# Patient Record
Sex: FEMALE | Race: BLACK OR AFRICAN AMERICAN | Employment: UNEMPLOYED
[De-identification: names, ages, dates, MRNs, and addresses within clinical notes are randomized per-mention and may not be internally consistent; named-entity substitution may affect disease eponyms.]

---

## 2017-01-20 ENCOUNTER — OFFICE VISIT (OUTPATIENT)
Dept: ORTHOPEDIC SURGERY | Facility: CLINIC | Age: 47
End: 2017-01-20

## 2017-01-20 VITALS — HEIGHT: 68 IN | BODY MASS INDEX: 44.41 KG/M2 | WEIGHT: 293 LBS

## 2017-01-20 DIAGNOSIS — M17.12 ARTHRITIS OF LEFT KNEE: Primary | ICD-10-CM

## 2017-01-20 DIAGNOSIS — M17.11 ARTHRITIS OF RIGHT KNEE: ICD-10-CM

## 2017-01-20 PROCEDURE — 1036F TOBACCO NON-USER: CPT | Performed by: ORTHOPAEDIC SURGERY

## 2017-01-20 PROCEDURE — 99213 OFFICE O/P EST LOW 20 MIN: CPT | Performed by: ORTHOPAEDIC SURGERY

## 2017-01-20 PROCEDURE — G8427 DOCREV CUR MEDS BY ELIG CLIN: HCPCS | Performed by: ORTHOPAEDIC SURGERY

## 2017-01-20 PROCEDURE — G8419 CALC BMI OUT NRM PARAM NOF/U: HCPCS | Performed by: ORTHOPAEDIC SURGERY

## 2017-01-20 PROCEDURE — 20610 DRAIN/INJ JOINT/BURSA W/O US: CPT | Performed by: ORTHOPAEDIC SURGERY

## 2017-01-20 PROCEDURE — G8484 FLU IMMUNIZE NO ADMIN: HCPCS | Performed by: ORTHOPAEDIC SURGERY

## 2017-01-20 RX ORDER — BUPIVACAINE HYDROCHLORIDE 2.5 MG/ML
2 INJECTION, SOLUTION INFILTRATION; PERINEURAL ONCE
Status: COMPLETED | OUTPATIENT
Start: 2017-01-20 | End: 2017-01-20

## 2017-01-20 RX ORDER — BETAMETHASONE SODIUM PHOSPHATE AND BETAMETHASONE ACETATE 3; 3 MG/ML; MG/ML
3 INJECTION, SUSPENSION INTRA-ARTICULAR; INTRALESIONAL; INTRAMUSCULAR; SOFT TISSUE ONCE
Status: COMPLETED | OUTPATIENT
Start: 2017-01-20 | End: 2017-01-20

## 2017-01-20 RX ORDER — METHYLPREDNISOLONE ACETATE 80 MG/ML
80 INJECTION, SUSPENSION INTRA-ARTICULAR; INTRALESIONAL; INTRAMUSCULAR; SOFT TISSUE ONCE
Status: COMPLETED | OUTPATIENT
Start: 2017-01-20 | End: 2017-01-20

## 2017-01-20 RX ADMIN — BUPIVACAINE HYDROCHLORIDE 5 MG: 2.5 INJECTION, SOLUTION INFILTRATION; PERINEURAL at 13:30

## 2017-01-20 RX ADMIN — METHYLPREDNISOLONE ACETATE 80 MG: 80 INJECTION, SUSPENSION INTRA-ARTICULAR; INTRALESIONAL; INTRAMUSCULAR; SOFT TISSUE at 13:28

## 2017-01-20 RX ADMIN — BETAMETHASONE SODIUM PHOSPHATE AND BETAMETHASONE ACETATE 3 MG: 3; 3 INJECTION, SUSPENSION INTRA-ARTICULAR; INTRALESIONAL; INTRAMUSCULAR; SOFT TISSUE at 13:30

## 2017-01-20 RX ADMIN — METHYLPREDNISOLONE ACETATE 80 MG: 80 INJECTION, SUSPENSION INTRA-ARTICULAR; INTRALESIONAL; INTRAMUSCULAR; SOFT TISSUE at 13:29

## 2017-01-20 ASSESSMENT — ENCOUNTER SYMPTOMS
NAUSEA: 0
COUGH: 0
DIARRHEA: 0
CONSTIPATION: 0

## 2017-02-20 ENCOUNTER — HOSPITAL ENCOUNTER (OUTPATIENT)
Dept: PAIN MANAGEMENT | Age: 47
Discharge: HOME | End: 2017-02-20
Payer: COMMERCIAL

## 2017-02-20 VITALS
HEIGHT: 68 IN | HEART RATE: 92 BPM | BODY MASS INDEX: 44.41 KG/M2 | WEIGHT: 293 LBS | DIASTOLIC BLOOD PRESSURE: 74 MMHG | TEMPERATURE: 97.6 F | SYSTOLIC BLOOD PRESSURE: 131 MMHG | RESPIRATION RATE: 18 BRPM

## 2017-02-20 PROCEDURE — 80307 DRUG TEST PRSMV CHEM ANLYZR: CPT

## 2017-02-20 PROCEDURE — 99213 OFFICE O/P EST LOW 20 MIN: CPT

## 2017-02-20 RX ORDER — TIZANIDINE 4 MG/1
4 TABLET ORAL NIGHTLY PRN
Qty: 30 TABLET | Refills: 0 | Status: SHIPPED | OUTPATIENT
Start: 2017-02-20 | End: 2017-03-24 | Stop reason: SDUPTHER

## 2017-02-20 RX ORDER — MELOXICAM 15 MG/1
15 TABLET ORAL DAILY
Qty: 30 TABLET | Refills: 0 | Status: SHIPPED | OUTPATIENT
Start: 2017-02-26 | End: 2017-03-24 | Stop reason: SDUPTHER

## 2017-02-20 RX ORDER — OXYCODONE HYDROCHLORIDE 5 MG/1
5 TABLET ORAL EVERY 8 HOURS PRN
Qty: 90 TABLET | Refills: 0 | Status: SHIPPED | OUTPATIENT
Start: 2017-02-26 | End: 2017-03-24 | Stop reason: SDUPTHER

## 2017-02-20 ASSESSMENT — PAIN DESCRIPTION - FREQUENCY: FREQUENCY: CONTINUOUS

## 2017-02-20 ASSESSMENT — PAIN DESCRIPTION - PROGRESSION: CLINICAL_PROGRESSION: NOT CHANGED

## 2017-02-20 ASSESSMENT — PAIN DESCRIPTION - ONSET: ONSET: ON-GOING

## 2017-02-20 ASSESSMENT — PAIN DESCRIPTION - ORIENTATION: ORIENTATION: LOWER;LEFT

## 2017-02-20 ASSESSMENT — PAIN SCALES - GENERAL: PAINLEVEL_OUTOF10: 6

## 2017-02-20 ASSESSMENT — PAIN DESCRIPTION - PAIN TYPE: TYPE: CHRONIC PAIN

## 2017-02-20 ASSESSMENT — PAIN DESCRIPTION - LOCATION: LOCATION: BACK

## 2017-02-23 LAB
6-ACETYLMORPHINE, UR: NOT DETECTED
7-AMINOCLONAZEPAM, URINE: NOT DETECTED
ALPHA-OH-ALPRAZ, URINE: NOT DETECTED
ALPRAZOLAM, URINE: NOT DETECTED
AMPHETAMINES, URINE: NOT DETECTED
BARBITURATES, URINE: NOT DETECTED
BENZOYLECGONINE, UR: NOT DETECTED
BUPRENORPHINE URINE: NOT DETECTED
CARISOPRODOL, UR: NOT DETECTED
CLONAZEPAM, URINE: NOT DETECTED
CODEINE, URINE: NOT DETECTED
CREATININE URINE: 139.7 MG/DL (ref 20–400)
DIAZEPAM, URINE: NOT DETECTED
EER PAIN MGT DRUG PANEL, HIGH RES/EMIT U: NORMAL
ETHYL GLUCURONIDE UR: NOT DETECTED
FENTANYL URINE: NOT DETECTED
HYDROCODONE, URINE: NOT DETECTED
HYDROMORPHONE, URINE: NOT DETECTED
LORAZEPAM, URINE: NOT DETECTED
MARIJUANA METAB, UR: NOT DETECTED
MDA, UR: NOT DETECTED
MDEA, EVE, UR: NOT DETECTED
MDMA URINE: NOT DETECTED
MEPERIDINE METAB, UR: NOT DETECTED
METHADONE, URINE: NOT DETECTED
METHAMPHETAMINE, URINE: NOT DETECTED
METHYLPHENIDATE: NOT DETECTED
MIDAZOLAM, URINE: NOT DETECTED
MORPHINE URINE: NOT DETECTED
NORBUPRENORPHINE, URINE: NOT DETECTED
NORDIAZEPAM, URINE: NOT DETECTED
NORFENTANYL, URINE: NOT DETECTED
NORHYDROCODONE, URINE: NOT DETECTED
NOROXYCODONE, URINE: PRESENT
NOROXYMORPHONE, URINE: NOT DETECTED
OXAZEPAM, URINE: NOT DETECTED
OXYCODONE URINE: PRESENT
OXYMORPHONE, URINE: NOT DETECTED
PAIN MGT DRUG PANEL, HI RES, UR: NORMAL
PCP,URINE: NOT DETECTED
PHENTERMINE, UR: NOT DETECTED
PROPOXYPHENE, URINE: NOT DETECTED
TAPENTADOL, URINE: NOT DETECTED
TAPENTADOL-O-SULFATE, URINE: NOT DETECTED
TEMAZEPAM, URINE: NOT DETECTED
TRAMADOL, URINE: NOT DETECTED
ZOLPIDEM, URINE: NOT DETECTED

## 2017-03-24 ENCOUNTER — HOSPITAL ENCOUNTER (OUTPATIENT)
Dept: PAIN MANAGEMENT | Age: 47
Discharge: HOME | End: 2017-03-24
Payer: COMMERCIAL

## 2017-03-24 VITALS — DIASTOLIC BLOOD PRESSURE: 81 MMHG | SYSTOLIC BLOOD PRESSURE: 137 MMHG | HEART RATE: 82 BPM | RESPIRATION RATE: 18 BRPM

## 2017-03-24 DIAGNOSIS — M17.0 PRIMARY OSTEOARTHRITIS OF BOTH KNEES: Primary | ICD-10-CM

## 2017-03-24 PROCEDURE — 99214 OFFICE O/P EST MOD 30 MIN: CPT | Performed by: NURSE PRACTITIONER

## 2017-03-24 PROCEDURE — 99213 OFFICE O/P EST LOW 20 MIN: CPT | Performed by: NURSE PRACTITIONER

## 2017-03-24 RX ORDER — OXYCODONE HYDROCHLORIDE 5 MG/1
5 TABLET ORAL EVERY 8 HOURS PRN
Qty: 90 TABLET | Refills: 0 | Status: SHIPPED | OUTPATIENT
Start: 2017-03-28 | End: 2017-04-24 | Stop reason: SDUPTHER

## 2017-03-24 RX ORDER — MELOXICAM 15 MG/1
15 TABLET ORAL DAILY
Qty: 30 TABLET | Refills: 0 | Status: SHIPPED | OUTPATIENT
Start: 2017-03-28 | End: 2017-04-24 | Stop reason: SDUPTHER

## 2017-03-24 RX ORDER — TIZANIDINE 4 MG/1
4 TABLET ORAL NIGHTLY PRN
Qty: 30 TABLET | Refills: 0 | Status: SHIPPED | OUTPATIENT
Start: 2017-03-28 | End: 2017-04-24 | Stop reason: SDUPTHER

## 2017-03-24 ASSESSMENT — ENCOUNTER SYMPTOMS
COUGH: 0
CONSTIPATION: 0
SHORTNESS OF BREATH: 0

## 2017-03-24 ASSESSMENT — PAIN DESCRIPTION - ORIENTATION: ORIENTATION: RIGHT;LEFT

## 2017-03-24 ASSESSMENT — PAIN SCALES - GENERAL: PAINLEVEL_OUTOF10: 6

## 2017-03-24 ASSESSMENT — PAIN DESCRIPTION - LOCATION: LOCATION: KNEE

## 2017-03-24 ASSESSMENT — PAIN DESCRIPTION - FREQUENCY: FREQUENCY: CONTINUOUS

## 2017-03-24 ASSESSMENT — PAIN DESCRIPTION - DESCRIPTORS: DESCRIPTORS: ACHING;CONSTANT;DULL;NAGGING

## 2017-04-24 ENCOUNTER — HOSPITAL ENCOUNTER (OUTPATIENT)
Dept: PAIN MANAGEMENT | Age: 47
Discharge: HOME | End: 2017-04-24
Payer: COMMERCIAL

## 2017-04-24 VITALS
DIASTOLIC BLOOD PRESSURE: 93 MMHG | HEIGHT: 68 IN | TEMPERATURE: 98.2 F | RESPIRATION RATE: 18 BRPM | WEIGHT: 293 LBS | HEART RATE: 90 BPM | BODY MASS INDEX: 44.41 KG/M2 | SYSTOLIC BLOOD PRESSURE: 146 MMHG

## 2017-04-24 DIAGNOSIS — M17.0 PRIMARY OSTEOARTHRITIS OF BOTH KNEES: Primary | ICD-10-CM

## 2017-04-24 PROCEDURE — 99214 OFFICE O/P EST MOD 30 MIN: CPT

## 2017-04-24 RX ORDER — MELOXICAM 15 MG/1
15 TABLET ORAL DAILY
Qty: 30 TABLET | Refills: 0 | Status: SHIPPED | OUTPATIENT
Start: 2017-04-27 | End: 2017-05-25 | Stop reason: SDUPTHER

## 2017-04-24 RX ORDER — OXYCODONE HYDROCHLORIDE 5 MG/1
5 TABLET ORAL EVERY 8 HOURS PRN
Qty: 90 TABLET | Refills: 0 | Status: SHIPPED | OUTPATIENT
Start: 2017-04-27 | End: 2017-05-25 | Stop reason: SDUPTHER

## 2017-04-24 RX ORDER — TIZANIDINE 4 MG/1
4 TABLET ORAL NIGHTLY PRN
Qty: 30 TABLET | Refills: 0 | Status: SHIPPED | OUTPATIENT
Start: 2017-04-27 | End: 2017-05-25 | Stop reason: SDUPTHER

## 2017-04-24 ASSESSMENT — ENCOUNTER SYMPTOMS
CONSTIPATION: 0
COUGH: 0
SHORTNESS OF BREATH: 0

## 2017-04-24 ASSESSMENT — PAIN DESCRIPTION - ORIENTATION: ORIENTATION: RIGHT;LEFT

## 2017-04-24 ASSESSMENT — PAIN SCALES - GENERAL: PAINLEVEL_OUTOF10: 6

## 2017-04-24 ASSESSMENT — PAIN DESCRIPTION - DESCRIPTORS: DESCRIPTORS: ACHING;BURNING;PINS AND NEEDLES

## 2017-04-24 ASSESSMENT — PAIN DESCRIPTION - ONSET: ONSET: ON-GOING

## 2017-04-24 ASSESSMENT — PAIN DESCRIPTION - PAIN TYPE: TYPE: CHRONIC PAIN

## 2017-04-24 ASSESSMENT — PAIN DESCRIPTION - PROGRESSION: CLINICAL_PROGRESSION: NOT CHANGED

## 2017-04-24 ASSESSMENT — PAIN DESCRIPTION - FREQUENCY: FREQUENCY: CONTINUOUS

## 2017-04-24 ASSESSMENT — PAIN DESCRIPTION - LOCATION: LOCATION: KNEE

## 2017-05-25 ENCOUNTER — HOSPITAL ENCOUNTER (OUTPATIENT)
Dept: PAIN MANAGEMENT | Age: 47
Discharge: HOME | End: 2017-05-25
Payer: COMMERCIAL

## 2017-05-25 VITALS
SYSTOLIC BLOOD PRESSURE: 123 MMHG | DIASTOLIC BLOOD PRESSURE: 71 MMHG | HEART RATE: 98 BPM | RESPIRATION RATE: 12 BRPM | TEMPERATURE: 98.2 F

## 2017-05-25 DIAGNOSIS — M17.0 PRIMARY OSTEOARTHRITIS OF BOTH KNEES: Primary | ICD-10-CM

## 2017-05-25 PROCEDURE — G0463 HOSPITAL OUTPT CLINIC VISIT: HCPCS

## 2017-05-25 PROCEDURE — 99214 OFFICE O/P EST MOD 30 MIN: CPT

## 2017-05-25 RX ORDER — MELOXICAM 15 MG/1
15 TABLET ORAL DAILY
Qty: 30 TABLET | Refills: 0 | Status: SHIPPED | OUTPATIENT
Start: 2017-05-27 | End: 2017-05-31

## 2017-05-25 RX ORDER — TIZANIDINE 4 MG/1
4 TABLET ORAL NIGHTLY PRN
Qty: 30 TABLET | Refills: 0 | Status: SHIPPED | OUTPATIENT
Start: 2017-05-27 | End: 2017-05-31

## 2017-05-25 RX ORDER — OXYCODONE HYDROCHLORIDE 5 MG/1
5 TABLET ORAL EVERY 8 HOURS PRN
Qty: 90 TABLET | Refills: 0 | Status: SHIPPED | OUTPATIENT
Start: 2017-05-27 | End: 2017-06-27 | Stop reason: SDUPTHER

## 2017-05-25 ASSESSMENT — PAIN DESCRIPTION - FREQUENCY: FREQUENCY: CONTINUOUS

## 2017-05-25 ASSESSMENT — ENCOUNTER SYMPTOMS
CONSTIPATION: 0
COUGH: 0
SHORTNESS OF BREATH: 0

## 2017-05-25 ASSESSMENT — PAIN SCALES - GENERAL: PAINLEVEL_OUTOF10: 7

## 2017-05-25 ASSESSMENT — PAIN DESCRIPTION - LOCATION: LOCATION: KNEE

## 2017-05-25 ASSESSMENT — PAIN DESCRIPTION - PROGRESSION: CLINICAL_PROGRESSION: NOT CHANGED

## 2017-05-25 ASSESSMENT — PAIN DESCRIPTION - ORIENTATION: ORIENTATION: LEFT;RIGHT

## 2017-05-25 ASSESSMENT — PAIN DESCRIPTION - PAIN TYPE: TYPE: CHRONIC PAIN

## 2017-05-31 ENCOUNTER — OFFICE VISIT (OUTPATIENT)
Dept: INTERNAL MEDICINE | Age: 47
End: 2017-05-31
Payer: COMMERCIAL

## 2017-05-31 ENCOUNTER — NURSE ONLY (OUTPATIENT)
Dept: INTERNAL MEDICINE | Age: 47
End: 2017-05-31

## 2017-05-31 VITALS
HEART RATE: 90 BPM | HEIGHT: 68 IN | SYSTOLIC BLOOD PRESSURE: 113 MMHG | WEIGHT: 293 LBS | DIASTOLIC BLOOD PRESSURE: 71 MMHG | BODY MASS INDEX: 44.41 KG/M2

## 2017-05-31 DIAGNOSIS — E11.9 TYPE 2 DIABETES MELLITUS WITHOUT COMPLICATION, WITH LONG-TERM CURRENT USE OF INSULIN (HCC): Primary | ICD-10-CM

## 2017-05-31 DIAGNOSIS — M17.0 PRIMARY OSTEOARTHRITIS OF BOTH KNEES: ICD-10-CM

## 2017-05-31 DIAGNOSIS — I10 ESSENTIAL HYPERTENSION: ICD-10-CM

## 2017-05-31 DIAGNOSIS — Z71.89 ACP (ADVANCE CARE PLANNING): Primary | ICD-10-CM

## 2017-05-31 DIAGNOSIS — Z79.4 TYPE 2 DIABETES MELLITUS WITHOUT COMPLICATION, WITH LONG-TERM CURRENT USE OF INSULIN (HCC): Primary | ICD-10-CM

## 2017-05-31 DIAGNOSIS — R07.89 OTHER CHEST PAIN: ICD-10-CM

## 2017-05-31 DIAGNOSIS — Z23 NEED FOR PROPHYLACTIC VACCINATION AGAINST STREPTOCOCCUS PNEUMONIAE (PNEUMOCOCCUS): ICD-10-CM

## 2017-05-31 DIAGNOSIS — E55.9 VITAMIN D DEFICIENCY: ICD-10-CM

## 2017-05-31 DIAGNOSIS — E78.00 PURE HYPERCHOLESTEROLEMIA: ICD-10-CM

## 2017-05-31 DIAGNOSIS — E03.9 ACQUIRED HYPOTHYROIDISM: ICD-10-CM

## 2017-05-31 DIAGNOSIS — D50.8 IRON DEFICIENCY ANEMIA SECONDARY TO INADEQUATE DIETARY IRON INTAKE: ICD-10-CM

## 2017-05-31 DIAGNOSIS — J45.20 MILD INTERMITTENT ASTHMA WITHOUT COMPLICATION: ICD-10-CM

## 2017-05-31 DIAGNOSIS — N39.41 URGE INCONTINENCE OF URINE: ICD-10-CM

## 2017-05-31 LAB — HBA1C MFR BLD: 7.6 %

## 2017-05-31 PROCEDURE — 90732 PPSV23 VACC 2 YRS+ SUBQ/IM: CPT | Performed by: INTERNAL MEDICINE

## 2017-05-31 PROCEDURE — G0009 ADMIN PNEUMOCOCCAL VACCINE: HCPCS | Performed by: INTERNAL MEDICINE

## 2017-05-31 PROCEDURE — 83036 HEMOGLOBIN GLYCOSYLATED A1C: CPT | Performed by: INTERNAL MEDICINE

## 2017-05-31 PROCEDURE — 99214 OFFICE O/P EST MOD 30 MIN: CPT | Performed by: INTERNAL MEDICINE

## 2017-05-31 RX ORDER — OXYBUTYNIN CHLORIDE 10 MG/1
10 TABLET, EXTENDED RELEASE ORAL DAILY
Qty: 30 TABLET | Refills: 2 | Status: SHIPPED | OUTPATIENT
Start: 2017-05-31 | End: 2017-05-31 | Stop reason: SDUPTHER

## 2017-05-31 RX ORDER — ALBUTEROL SULFATE 90 UG/1
2 AEROSOL, METERED RESPIRATORY (INHALATION) EVERY 6 HOURS PRN
Qty: 1 INHALER | Refills: 2 | Status: SHIPPED | OUTPATIENT
Start: 2017-05-31 | End: 2017-05-31 | Stop reason: SDUPTHER

## 2017-05-31 RX ORDER — OXYBUTYNIN CHLORIDE 10 MG/1
10 TABLET, EXTENDED RELEASE ORAL DAILY
Qty: 30 TABLET | Refills: 2 | Status: SHIPPED | OUTPATIENT
Start: 2017-05-31 | End: 2017-10-09 | Stop reason: SDUPTHER

## 2017-05-31 RX ORDER — TIZANIDINE 4 MG/1
4 TABLET ORAL NIGHTLY PRN
Qty: 30 TABLET | Refills: 2 | Status: SHIPPED | OUTPATIENT
Start: 2017-05-31 | End: 2017-06-27 | Stop reason: SDUPTHER

## 2017-05-31 RX ORDER — LISINOPRIL 5 MG/1
5 TABLET ORAL DAILY
Qty: 30 TABLET | Refills: 2 | Status: SHIPPED | OUTPATIENT
Start: 2017-05-31 | End: 2017-10-09 | Stop reason: SDUPTHER

## 2017-05-31 RX ORDER — ALBUTEROL SULFATE 90 UG/1
2 AEROSOL, METERED RESPIRATORY (INHALATION) EVERY 6 HOURS PRN
Qty: 1 INHALER | Refills: 2 | Status: SHIPPED | OUTPATIENT
Start: 2017-05-31 | End: 2017-08-22 | Stop reason: SDUPTHER

## 2017-05-31 RX ORDER — SIMVASTATIN 40 MG
40 TABLET ORAL NIGHTLY
Qty: 30 TABLET | Refills: 2 | Status: SHIPPED | OUTPATIENT
Start: 2017-05-31 | End: 2017-05-31 | Stop reason: SDUPTHER

## 2017-05-31 RX ORDER — LANCETS
EACH MISCELLANEOUS
Qty: 100 EACH | Refills: 11 | Status: SHIPPED | OUTPATIENT
Start: 2017-05-31 | End: 2019-03-06 | Stop reason: SDUPTHER

## 2017-05-31 RX ORDER — ASPIRIN 81 MG/1
81 TABLET ORAL DAILY
Qty: 30 TABLET | Refills: 2 | Status: SHIPPED | OUTPATIENT
Start: 2017-05-31 | End: 2017-10-09 | Stop reason: SDUPTHER

## 2017-05-31 RX ORDER — ASPIRIN 81 MG/1
81 TABLET ORAL DAILY
Qty: 30 TABLET | Refills: 2 | Status: SHIPPED | OUTPATIENT
Start: 2017-05-31 | End: 2017-05-31 | Stop reason: SDUPTHER

## 2017-05-31 RX ORDER — SIMVASTATIN 40 MG
40 TABLET ORAL NIGHTLY
Qty: 30 TABLET | Refills: 2 | Status: SHIPPED | OUTPATIENT
Start: 2017-05-31 | End: 2017-10-09 | Stop reason: SDUPTHER

## 2017-05-31 RX ORDER — MELOXICAM 15 MG/1
15 TABLET ORAL DAILY
Qty: 30 TABLET | Refills: 2 | Status: SHIPPED | OUTPATIENT
Start: 2017-05-31 | End: 2017-08-22 | Stop reason: SDUPTHER

## 2017-05-31 RX ORDER — LANCETS
EACH MISCELLANEOUS
Qty: 100 EACH | Refills: 11 | Status: SHIPPED | OUTPATIENT
Start: 2017-05-31 | End: 2017-05-31 | Stop reason: SDUPTHER

## 2017-05-31 RX ORDER — LISINOPRIL 5 MG/1
5 TABLET ORAL DAILY
Qty: 30 TABLET | Refills: 2 | Status: SHIPPED | OUTPATIENT
Start: 2017-05-31 | End: 2017-05-31 | Stop reason: SDUPTHER

## 2017-05-31 RX ORDER — TIZANIDINE 4 MG/1
4 TABLET ORAL NIGHTLY PRN
Qty: 30 TABLET | Refills: 2 | Status: SHIPPED | OUTPATIENT
Start: 2017-05-31 | End: 2017-05-31 | Stop reason: SDUPTHER

## 2017-05-31 RX ORDER — FERROUS SULFATE 325(65) MG
325 TABLET ORAL 2 TIMES DAILY
Qty: 60 TABLET | Refills: 2 | Status: SHIPPED | OUTPATIENT
Start: 2017-05-31 | End: 2017-10-09 | Stop reason: SDUPTHER

## 2017-05-31 RX ORDER — FERROUS SULFATE 325(65) MG
325 TABLET ORAL 2 TIMES DAILY
Qty: 60 TABLET | Refills: 2 | Status: SHIPPED | OUTPATIENT
Start: 2017-05-31 | End: 2017-05-31 | Stop reason: SDUPTHER

## 2017-05-31 RX ORDER — LEVOTHYROXINE SODIUM 0.05 MG/1
50 TABLET ORAL DAILY
Qty: 30 TABLET | Refills: 2 | Status: SHIPPED | OUTPATIENT
Start: 2017-05-31 | End: 2017-05-31 | Stop reason: SDUPTHER

## 2017-05-31 RX ORDER — LEVOTHYROXINE SODIUM 0.05 MG/1
50 TABLET ORAL DAILY
Qty: 30 TABLET | Refills: 2 | Status: SHIPPED | OUTPATIENT
Start: 2017-05-31 | End: 2017-10-09 | Stop reason: SDUPTHER

## 2017-05-31 RX ORDER — ALBUTEROL SULFATE 90 UG/1
2 AEROSOL, METERED RESPIRATORY (INHALATION) EVERY 6 HOURS PRN
Qty: 1 INHALER | Refills: 2 | Status: SHIPPED | OUTPATIENT
Start: 2017-05-31 | End: 2017-10-09 | Stop reason: SDUPTHER

## 2017-05-31 ASSESSMENT — PATIENT HEALTH QUESTIONNAIRE - PHQ9
4. FEELING TIRED OR HAVING LITTLE ENERGY: 1
7. TROUBLE CONCENTRATING ON THINGS, SUCH AS READING THE NEWSPAPER OR WATCHING TELEVISION: 1
9. THOUGHTS THAT YOU WOULD BE BETTER OFF DEAD, OR OF HURTING YOURSELF: 0
SUM OF ALL RESPONSES TO PHQ9 QUESTIONS 1 & 2: 2
5. POOR APPETITE OR OVEREATING: 2
8. MOVING OR SPEAKING SO SLOWLY THAT OTHER PEOPLE COULD HAVE NOTICED. OR THE OPPOSITE, BEING SO FIGETY OR RESTLESS THAT YOU HAVE BEEN MOVING AROUND A LOT MORE THAN USUAL: 0
SUM OF ALL RESPONSES TO PHQ QUESTIONS 1-9: 9
3. TROUBLE FALLING OR STAYING ASLEEP: 2
6. FEELING BAD ABOUT YOURSELF - OR THAT YOU ARE A FAILURE OR HAVE LET YOURSELF OR YOUR FAMILY DOWN: 1
2. FEELING DOWN, DEPRESSED OR HOPELESS: 1
1. LITTLE INTEREST OR PLEASURE IN DOING THINGS: 1
10. IF YOU CHECKED OFF ANY PROBLEMS, HOW DIFFICULT HAVE THESE PROBLEMS MADE IT FOR YOU TO DO YOUR WORK, TAKE CARE OF THINGS AT HOME, OR GET ALONG WITH OTHER PEOPLE: 1

## 2017-06-06 ENCOUNTER — TELEPHONE (OUTPATIENT)
Dept: INTERNAL MEDICINE | Age: 47
End: 2017-06-06

## 2017-06-15 ENCOUNTER — TELEPHONE (OUTPATIENT)
Dept: INTERNAL MEDICINE | Age: 47
End: 2017-06-15

## 2017-06-16 ENCOUNTER — TELEPHONE (OUTPATIENT)
Dept: INTERNAL MEDICINE | Age: 47
End: 2017-06-16

## 2017-06-27 ENCOUNTER — HOSPITAL ENCOUNTER (OUTPATIENT)
Dept: PAIN MANAGEMENT | Age: 47
Discharge: HOME | End: 2017-06-27
Payer: COMMERCIAL

## 2017-06-27 VITALS
HEART RATE: 89 BPM | DIASTOLIC BLOOD PRESSURE: 95 MMHG | TEMPERATURE: 98.3 F | RESPIRATION RATE: 16 BRPM | SYSTOLIC BLOOD PRESSURE: 152 MMHG

## 2017-06-27 DIAGNOSIS — M17.0 PRIMARY OSTEOARTHRITIS OF BOTH KNEES: ICD-10-CM

## 2017-06-27 PROCEDURE — G0463 HOSPITAL OUTPT CLINIC VISIT: HCPCS

## 2017-06-27 PROCEDURE — 99214 OFFICE O/P EST MOD 30 MIN: CPT

## 2017-06-27 PROCEDURE — 99213 OFFICE O/P EST LOW 20 MIN: CPT

## 2017-06-27 RX ORDER — AMOXICILLIN 500 MG/1
500 CAPSULE ORAL 3 TIMES DAILY
COMMUNITY
End: 2017-08-22 | Stop reason: ALTCHOICE

## 2017-06-27 RX ORDER — BUTALBITAL, ACETAMINOPHEN AND CAFFEINE 50; 325; 40 MG/1; MG/1; MG/1
1 TABLET ORAL EVERY 6 HOURS PRN
COMMUNITY
End: 2018-01-19

## 2017-06-27 RX ORDER — TIZANIDINE 4 MG/1
4 TABLET ORAL NIGHTLY PRN
Qty: 30 TABLET | Refills: 2 | Status: SHIPPED | OUTPATIENT
Start: 2017-06-27 | End: 2017-07-24 | Stop reason: SDUPTHER

## 2017-06-27 RX ORDER — OXYCODONE HYDROCHLORIDE 5 MG/1
5 TABLET ORAL EVERY 8 HOURS PRN
COMMUNITY
End: 2017-08-22 | Stop reason: SDUPTHER

## 2017-06-27 RX ORDER — OXYCODONE HYDROCHLORIDE 5 MG/1
5 TABLET ORAL EVERY 8 HOURS PRN
Qty: 90 TABLET | Refills: 0 | Status: SHIPPED | OUTPATIENT
Start: 2017-06-27 | End: 2017-07-24 | Stop reason: SDUPTHER

## 2017-06-27 ASSESSMENT — PAIN DESCRIPTION - LOCATION: LOCATION: KNEE

## 2017-06-27 ASSESSMENT — PAIN DESCRIPTION - PROGRESSION: CLINICAL_PROGRESSION: GRADUALLY WORSENING

## 2017-06-27 ASSESSMENT — PAIN DESCRIPTION - FREQUENCY: FREQUENCY: CONTINUOUS

## 2017-06-27 ASSESSMENT — PAIN SCALES - GENERAL: PAINLEVEL_OUTOF10: 7

## 2017-06-27 ASSESSMENT — PAIN DESCRIPTION - ONSET: ONSET: ON-GOING

## 2017-06-27 ASSESSMENT — PAIN DESCRIPTION - ORIENTATION: ORIENTATION: RIGHT;LEFT

## 2017-06-27 ASSESSMENT — PAIN DESCRIPTION - PAIN TYPE: TYPE: CHRONIC PAIN

## 2017-07-24 ENCOUNTER — HOSPITAL ENCOUNTER (OUTPATIENT)
Dept: PAIN MANAGEMENT | Age: 47
Discharge: HOME | End: 2017-07-24
Payer: COMMERCIAL

## 2017-07-24 VITALS
SYSTOLIC BLOOD PRESSURE: 128 MMHG | TEMPERATURE: 98 F | HEART RATE: 93 BPM | DIASTOLIC BLOOD PRESSURE: 83 MMHG | RESPIRATION RATE: 16 BRPM

## 2017-07-24 DIAGNOSIS — M17.0 PRIMARY OSTEOARTHRITIS OF BOTH KNEES: ICD-10-CM

## 2017-07-24 PROCEDURE — 99214 OFFICE O/P EST MOD 30 MIN: CPT

## 2017-07-24 PROCEDURE — G0463 HOSPITAL OUTPT CLINIC VISIT: HCPCS

## 2017-07-24 RX ORDER — TIZANIDINE 4 MG/1
4 TABLET ORAL NIGHTLY PRN
Qty: 30 TABLET | Refills: 2 | Status: SHIPPED | OUTPATIENT
Start: 2017-07-27 | End: 2017-08-26

## 2017-07-24 RX ORDER — OXYCODONE HYDROCHLORIDE 5 MG/1
5 TABLET ORAL EVERY 8 HOURS PRN
Qty: 90 TABLET | Refills: 0 | Status: SHIPPED | OUTPATIENT
Start: 2017-07-27 | End: 2017-08-26

## 2017-07-24 ASSESSMENT — PAIN SCALES - GENERAL: PAINLEVEL_OUTOF10: 7

## 2017-07-24 ASSESSMENT — PAIN DESCRIPTION - FREQUENCY: FREQUENCY: CONTINUOUS

## 2017-07-24 ASSESSMENT — ENCOUNTER SYMPTOMS
COUGH: 0
SHORTNESS OF BREATH: 0
CONSTIPATION: 0

## 2017-07-24 ASSESSMENT — PAIN DESCRIPTION - ONSET: ONSET: ON-GOING

## 2017-07-24 ASSESSMENT — PAIN DESCRIPTION - ORIENTATION: ORIENTATION: RIGHT;LEFT

## 2017-07-24 ASSESSMENT — PAIN DESCRIPTION - LOCATION: LOCATION: KNEE

## 2017-07-24 ASSESSMENT — PAIN DESCRIPTION - PROGRESSION: CLINICAL_PROGRESSION: GRADUALLY WORSENING

## 2017-07-24 ASSESSMENT — PAIN DESCRIPTION - PAIN TYPE: TYPE: CHRONIC PAIN

## 2017-08-22 ENCOUNTER — HOSPITAL ENCOUNTER (OUTPATIENT)
Dept: PAIN MANAGEMENT | Age: 47
Discharge: HOME | End: 2017-08-22
Payer: COMMERCIAL

## 2017-08-22 VITALS — TEMPERATURE: 97.9 F

## 2017-08-22 DIAGNOSIS — M17.0 PRIMARY OSTEOARTHRITIS OF BOTH KNEES: Primary | ICD-10-CM

## 2017-08-22 PROCEDURE — 99214 OFFICE O/P EST MOD 30 MIN: CPT

## 2017-08-22 PROCEDURE — G0463 HOSPITAL OUTPT CLINIC VISIT: HCPCS

## 2017-08-22 PROCEDURE — 80307 DRUG TEST PRSMV CHEM ANLYZR: CPT

## 2017-08-22 RX ORDER — MELOXICAM 15 MG/1
15 TABLET ORAL DAILY
Qty: 30 TABLET | Refills: 2 | Status: SHIPPED | OUTPATIENT
Start: 2017-08-26 | End: 2018-01-19

## 2017-08-22 RX ORDER — TIZANIDINE 4 MG/1
4 TABLET ORAL NIGHTLY PRN
Qty: 30 TABLET | Refills: 2 | Status: SHIPPED | OUTPATIENT
Start: 2017-08-26 | End: 2017-09-25

## 2017-08-22 RX ORDER — OXYCODONE HYDROCHLORIDE 5 MG/1
5 TABLET ORAL EVERY 8 HOURS PRN
Qty: 90 TABLET | Refills: 0 | Status: SHIPPED | OUTPATIENT
Start: 2017-08-26 | End: 2017-09-25

## 2017-08-22 ASSESSMENT — ENCOUNTER SYMPTOMS
CONSTIPATION: 0
SHORTNESS OF BREATH: 0
COUGH: 0
BACK PAIN: 1

## 2017-08-22 ASSESSMENT — PAIN DESCRIPTION - PAIN TYPE: TYPE: CHRONIC PAIN

## 2017-08-22 ASSESSMENT — PAIN DESCRIPTION - FREQUENCY: FREQUENCY: CONTINUOUS

## 2017-08-22 ASSESSMENT — PAIN DESCRIPTION - PROGRESSION: CLINICAL_PROGRESSION: NOT CHANGED

## 2017-08-22 ASSESSMENT — PAIN DESCRIPTION - DESCRIPTORS: DESCRIPTORS: SHARP;STABBING

## 2017-08-22 ASSESSMENT — PAIN DESCRIPTION - ORIENTATION: ORIENTATION: RIGHT;LEFT

## 2017-08-22 ASSESSMENT — PAIN SCALES - GENERAL: PAINLEVEL_OUTOF10: 5

## 2017-08-22 ASSESSMENT — PAIN DESCRIPTION - ONSET: ONSET: ON-GOING

## 2017-08-22 ASSESSMENT — PAIN DESCRIPTION - LOCATION: LOCATION: KNEE

## 2017-08-24 LAB

## 2017-09-05 ENCOUNTER — TELEPHONE (OUTPATIENT)
Dept: INTERNAL MEDICINE | Age: 47
End: 2017-09-05

## 2017-09-06 ENCOUNTER — HOSPITAL ENCOUNTER (EMERGENCY)
Age: 47
Discharge: HOME | End: 2017-09-07
Attending: EMERGENCY MEDICINE
Payer: COMMERCIAL

## 2017-09-06 VITALS
HEART RATE: 95 BPM | RESPIRATION RATE: 22 BRPM | DIASTOLIC BLOOD PRESSURE: 88 MMHG | OXYGEN SATURATION: 99 % | BODY MASS INDEX: 45.99 KG/M2 | WEIGHT: 293 LBS | TEMPERATURE: 98.2 F | HEIGHT: 67 IN | SYSTOLIC BLOOD PRESSURE: 150 MMHG

## 2017-09-06 DIAGNOSIS — R10.9 ABDOMINAL PAIN, UNSPECIFIED LOCATION: Primary | ICD-10-CM

## 2017-09-06 PROCEDURE — 96374 THER/PROPH/DIAG INJ IV PUSH: CPT

## 2017-09-06 PROCEDURE — 6360000002 HC RX W HCPCS: Performed by: NURSE PRACTITIONER

## 2017-09-06 PROCEDURE — 83605 ASSAY OF LACTIC ACID: CPT

## 2017-09-06 PROCEDURE — 99284 EMERGENCY DEPT VISIT MOD MDM: CPT

## 2017-09-06 PROCEDURE — 83690 ASSAY OF LIPASE: CPT

## 2017-09-06 PROCEDURE — 80076 HEPATIC FUNCTION PANEL: CPT

## 2017-09-06 PROCEDURE — 82150 ASSAY OF AMYLASE: CPT

## 2017-09-06 PROCEDURE — 80048 BASIC METABOLIC PNL TOTAL CA: CPT

## 2017-09-06 PROCEDURE — 2580000003 HC RX 258: Performed by: NURSE PRACTITIONER

## 2017-09-06 PROCEDURE — 85025 COMPLETE CBC W/AUTO DIFF WBC: CPT

## 2017-09-06 PROCEDURE — 96375 TX/PRO/DX INJ NEW DRUG ADDON: CPT

## 2017-09-06 RX ORDER — SODIUM CHLORIDE 9 MG/ML
INJECTION, SOLUTION INTRAVENOUS CONTINUOUS
Status: DISCONTINUED | OUTPATIENT
Start: 2017-09-06 | End: 2017-09-07 | Stop reason: HOSPADM

## 2017-09-06 RX ORDER — ONDANSETRON 2 MG/ML
4 INJECTION INTRAMUSCULAR; INTRAVENOUS ONCE
Status: COMPLETED | OUTPATIENT
Start: 2017-09-06 | End: 2017-09-06

## 2017-09-06 RX ORDER — FENTANYL CITRATE 50 UG/ML
25 INJECTION, SOLUTION INTRAMUSCULAR; INTRAVENOUS ONCE
Status: COMPLETED | OUTPATIENT
Start: 2017-09-06 | End: 2017-09-06

## 2017-09-06 RX ADMIN — FENTANYL CITRATE 25 MCG: 50 INJECTION INTRAMUSCULAR; INTRAVENOUS at 23:45

## 2017-09-06 RX ADMIN — SODIUM CHLORIDE: 9 INJECTION, SOLUTION INTRAVENOUS at 23:45

## 2017-09-06 RX ADMIN — ONDANSETRON 4 MG: 2 INJECTION INTRAMUSCULAR; INTRAVENOUS at 23:45

## 2017-09-06 ASSESSMENT — ENCOUNTER SYMPTOMS
SINUS PRESSURE: 0
VOMITING: 0
RHINORRHEA: 0
ABDOMINAL PAIN: 1
SORE THROAT: 0
SHORTNESS OF BREATH: 0
DIARRHEA: 0
COLOR CHANGE: 0
CONSTIPATION: 0
NAUSEA: 1
COUGH: 0
WHEEZING: 0

## 2017-09-06 ASSESSMENT — PAIN SCALES - GENERAL
PAINLEVEL_OUTOF10: 10
PAINLEVEL_OUTOF10: 10

## 2017-09-06 ASSESSMENT — PAIN DESCRIPTION - LOCATION: LOCATION: ABDOMEN

## 2017-09-07 LAB
ABSOLUTE EOS #: 0.1 K/UL (ref 0–0.4)
ABSOLUTE LYMPH #: 2.4 K/UL (ref 1–4.8)
ABSOLUTE MONO #: 0.4 K/UL (ref 0.2–0.8)
ALBUMIN SERPL-MCNC: 4 G/DL (ref 3.5–5.2)
ALBUMIN/GLOBULIN RATIO: NORMAL (ref 1–2.5)
ALP BLD-CCNC: 74 U/L (ref 35–104)
ALT SERPL-CCNC: 9 U/L (ref 5–33)
AMYLASE: 51 U/L (ref 28–100)
ANION GAP SERPL CALCULATED.3IONS-SCNC: 16 MMOL/L (ref 9–17)
AST SERPL-CCNC: 11 U/L
BASOPHILS # BLD: 1 %
BASOPHILS ABSOLUTE: 0.1 K/UL (ref 0–0.2)
BILIRUB SERPL-MCNC: 0.3 MG/DL (ref 0.3–1.2)
BILIRUBIN DIRECT: 0.08 MG/DL
BILIRUBIN, INDIRECT: 0.22 MG/DL (ref 0–1)
BUN BLDV-MCNC: 10 MG/DL (ref 6–20)
BUN/CREAT BLD: 16 (ref 9–20)
CALCIUM SERPL-MCNC: 9.3 MG/DL (ref 8.6–10.4)
CHLORIDE BLD-SCNC: 101 MMOL/L (ref 98–107)
CO2: 22 MMOL/L (ref 20–31)
CREAT SERPL-MCNC: 0.62 MG/DL (ref 0.5–0.9)
DIFFERENTIAL TYPE: NORMAL
EOSINOPHILS RELATIVE PERCENT: 1 %
GFR AFRICAN AMERICAN: >60 ML/MIN
GFR NON-AFRICAN AMERICAN: >60 ML/MIN
GFR SERPL CREATININE-BSD FRML MDRD: ABNORMAL ML/MIN/{1.73_M2}
GFR SERPL CREATININE-BSD FRML MDRD: ABNORMAL ML/MIN/{1.73_M2}
GLOBULIN: NORMAL G/DL (ref 1.5–3.8)
GLUCOSE BLD-MCNC: 121 MG/DL (ref 70–99)
HCT VFR BLD CALC: 42.6 % (ref 36–46)
HEMOGLOBIN: 14.1 G/DL (ref 12–16)
LACTIC ACID: 1.6 MMOL/L (ref 0.5–2.2)
LIPASE: 17 U/L (ref 13–60)
LYMPHOCYTES # BLD: 34 %
MCH RBC QN AUTO: 31.1 PG (ref 26–34)
MCHC RBC AUTO-ENTMCNC: 33.1 G/DL (ref 31–37)
MCV RBC AUTO: 94 FL (ref 80–100)
MONOCYTES # BLD: 5 %
PDW BLD-RTO: 13 % (ref 11.5–14.5)
PLATELET # BLD: 320 K/UL (ref 130–400)
PLATELET ESTIMATE: NORMAL
PMV BLD AUTO: NORMAL FL (ref 6–12)
POTASSIUM SERPL-SCNC: 3.9 MMOL/L (ref 3.7–5.3)
RBC # BLD: 4.53 M/UL (ref 4–5.2)
RBC # BLD: NORMAL 10*6/UL
SEG NEUTROPHILS: 59 %
SEGMENTED NEUTROPHILS ABSOLUTE COUNT: 4.2 K/UL (ref 1.8–7.7)
SODIUM BLD-SCNC: 139 MMOL/L (ref 135–144)
TOTAL PROTEIN: 7.1 G/DL (ref 6.4–8.3)
WBC # BLD: 7.2 K/UL (ref 3.5–11)
WBC # BLD: NORMAL 10*3/UL

## 2017-09-07 PROCEDURE — 96375 TX/PRO/DX INJ NEW DRUG ADDON: CPT

## 2017-09-07 PROCEDURE — 6360000002 HC RX W HCPCS: Performed by: NURSE PRACTITIONER

## 2017-09-07 RX ORDER — PROMETHAZINE HYDROCHLORIDE 25 MG/ML
12.5 INJECTION, SOLUTION INTRAMUSCULAR; INTRAVENOUS ONCE
Status: COMPLETED | OUTPATIENT
Start: 2017-09-07 | End: 2017-09-07

## 2017-09-07 RX ORDER — PROMETHAZINE HYDROCHLORIDE 25 MG/1
25 TABLET ORAL EVERY 6 HOURS PRN
Qty: 20 TABLET | Refills: 0 | Status: SHIPPED | OUTPATIENT
Start: 2017-09-07 | End: 2017-09-14

## 2017-09-07 RX ADMIN — PROMETHAZINE HYDROCHLORIDE 12.5 MG: 25 INJECTION INTRAMUSCULAR; INTRAVENOUS at 01:23

## 2017-09-07 RX ADMIN — Medication 1 MG: at 01:24

## 2017-09-07 ASSESSMENT — PAIN SCALES - GENERAL: PAINLEVEL_OUTOF10: 6

## 2017-09-11 ENCOUNTER — HOSPITAL ENCOUNTER (OUTPATIENT)
Age: 47
Setting detail: SPECIMEN
Discharge: HOME | End: 2017-09-11
Payer: COMMERCIAL

## 2017-09-11 DIAGNOSIS — E78.00 PURE HYPERCHOLESTEROLEMIA: ICD-10-CM

## 2017-09-11 LAB
CHOLESTEROL/HDL RATIO: 3.4
CHOLESTEROL: 189 MG/DL
CREATININE URINE: 168.8 MG/DL (ref 28–217)
HDLC SERPL-MCNC: 56 MG/DL
LDL CHOLESTEROL: 113 MG/DL (ref 0–130)
MICROALBUMIN/CREAT 24H UR: <12 MG/L
MICROALBUMIN/CREAT UR-RTO: 7 MCG/MG CREAT
TRIGL SERPL-MCNC: 100 MG/DL
VLDLC SERPL CALC-MCNC: NORMAL MG/DL (ref 1–30)

## 2017-09-11 PROCEDURE — 82570 ASSAY OF URINE CREATININE: CPT

## 2017-09-11 PROCEDURE — 80061 LIPID PANEL: CPT

## 2017-09-11 PROCEDURE — 82043 UR ALBUMIN QUANTITATIVE: CPT

## 2017-09-11 PROCEDURE — 36415 COLL VENOUS BLD VENIPUNCTURE: CPT

## 2017-10-02 ENCOUNTER — TELEPHONE (OUTPATIENT)
Dept: INTERNAL MEDICINE | Age: 47
End: 2017-10-02

## 2017-10-02 NOTE — LETTER
IR[address]  [address] [facility] 29 [facility]  Phone: [phone]  Fax: [phone]    Roopa Reveles MD        October 2, 2017    [address] [address]      Dear Charlette Sanchez: We are sending this letter because your PCP ordered Imaging for you to have done at your last visit here and they have not yet been completed. If you can please come to our office on the 2nd floor to  your orders and have your labs completed at our [facility] lab. If you do not have a follow-up appointment scheduled you can either contact the office to make an appointment with us or you can make one when you come in to pick-up your orders. If you have any questions or concerns, please don't hesitate to call.     Sincerely,        Roopa Reveles MD

## 2017-10-09 ENCOUNTER — OFFICE VISIT (OUTPATIENT)
Dept: INTERNAL MEDICINE | Age: 47
End: 2017-10-09
Payer: COMMERCIAL

## 2017-10-09 VITALS
WEIGHT: 293 LBS | BODY MASS INDEX: 44.41 KG/M2 | HEART RATE: 89 BPM | DIASTOLIC BLOOD PRESSURE: 76 MMHG | SYSTOLIC BLOOD PRESSURE: 118 MMHG | HEIGHT: 68 IN

## 2017-10-09 DIAGNOSIS — D50.8 IRON DEFICIENCY ANEMIA SECONDARY TO INADEQUATE DIETARY IRON INTAKE: ICD-10-CM

## 2017-10-09 DIAGNOSIS — N39.41 URGE INCONTINENCE OF URINE: ICD-10-CM

## 2017-10-09 DIAGNOSIS — M17.0 OSTEOARTHRITIS OF BOTH KNEES, UNSPECIFIED OSTEOARTHRITIS TYPE: ICD-10-CM

## 2017-10-09 DIAGNOSIS — E11.9 TYPE 2 DIABETES MELLITUS WITHOUT COMPLICATION, WITH LONG-TERM CURRENT USE OF INSULIN (HCC): Primary | ICD-10-CM

## 2017-10-09 DIAGNOSIS — E03.9 ACQUIRED HYPOTHYROIDISM: ICD-10-CM

## 2017-10-09 DIAGNOSIS — E78.00 PURE HYPERCHOLESTEROLEMIA: ICD-10-CM

## 2017-10-09 DIAGNOSIS — Z11.4 SCREENING FOR HIV WITHOUT PRESENCE OF RISK FACTORS: ICD-10-CM

## 2017-10-09 DIAGNOSIS — Z23 NEED FOR PROPHYLACTIC VACCINATION AGAINST DIPHTHERIA-TETANUS-PERTUSSIS (DTP): ICD-10-CM

## 2017-10-09 DIAGNOSIS — Z23 NEED FOR PROPHYLACTIC VACCINATION AND INOCULATION AGAINST INFLUENZA: ICD-10-CM

## 2017-10-09 DIAGNOSIS — I10 ESSENTIAL HYPERTENSION: ICD-10-CM

## 2017-10-09 DIAGNOSIS — J45.20 MILD INTERMITTENT ASTHMA WITHOUT COMPLICATION: ICD-10-CM

## 2017-10-09 DIAGNOSIS — Z79.4 TYPE 2 DIABETES MELLITUS WITHOUT COMPLICATION, WITH LONG-TERM CURRENT USE OF INSULIN (HCC): Primary | ICD-10-CM

## 2017-10-09 PROCEDURE — 90688 IIV4 VACCINE SPLT 0.5 ML IM: CPT | Performed by: INTERNAL MEDICINE

## 2017-10-09 PROCEDURE — G0008 ADMIN INFLUENZA VIRUS VAC: HCPCS | Performed by: INTERNAL MEDICINE

## 2017-10-09 PROCEDURE — 99214 OFFICE O/P EST MOD 30 MIN: CPT | Performed by: INTERNAL MEDICINE

## 2017-10-09 RX ORDER — FERROUS SULFATE 325(65) MG
325 TABLET ORAL 2 TIMES DAILY
Qty: 60 TABLET | Refills: 2 | Status: SHIPPED | OUTPATIENT
Start: 2017-10-09 | End: 2018-01-19

## 2017-10-09 RX ORDER — ALBUTEROL SULFATE 90 UG/1
2 AEROSOL, METERED RESPIRATORY (INHALATION) EVERY 6 HOURS PRN
Qty: 1 INHALER | Refills: 2 | Status: SHIPPED | OUTPATIENT
Start: 2017-10-09 | End: 2018-01-19

## 2017-10-09 RX ORDER — OXYBUTYNIN CHLORIDE 10 MG/1
10 TABLET, EXTENDED RELEASE ORAL DAILY
Qty: 30 TABLET | Refills: 2 | Status: SHIPPED | OUTPATIENT
Start: 2017-10-09 | End: 2018-01-19

## 2017-10-09 RX ORDER — ASPIRIN 81 MG/1
81 TABLET ORAL DAILY
Qty: 30 TABLET | Refills: 2 | Status: SHIPPED | OUTPATIENT
Start: 2017-10-09 | End: 2018-01-19

## 2017-10-09 RX ORDER — SIMVASTATIN 40 MG
40 TABLET ORAL NIGHTLY
Qty: 30 TABLET | Refills: 2 | Status: SHIPPED | OUTPATIENT
Start: 2017-10-09 | End: 2018-01-19

## 2017-10-09 RX ORDER — LEVOTHYROXINE SODIUM 0.05 MG/1
50 TABLET ORAL DAILY
Qty: 30 TABLET | Refills: 2 | Status: SHIPPED | OUTPATIENT
Start: 2017-10-09 | End: 2018-01-19

## 2017-10-09 RX ORDER — LISINOPRIL 5 MG/1
5 TABLET ORAL DAILY
Qty: 30 TABLET | Refills: 2 | Status: SHIPPED | OUTPATIENT
Start: 2017-10-09 | End: 2018-01-19

## 2017-10-09 NOTE — PROGRESS NOTES
[facility]  [facility] [address] [facility]  [address] [facility] [address]  Dept: [phone]  Dept Fax: [phone]    Office Progress/Follow Up Note  Date of patient's visit: 10/9/2017  Patient's Name:  Brad Shaw YOB: 1970            Patient Care Team:  Roopa Argueta MD as PCP - Lorena Parker MD as Anesthesiologist (Pain Management)  Goldy Alvarado MD as Consulting Physician (Urology)  ================================================================    REASON FOR VISIT/CHIEF COMPLAINT:  Hypertension (No showed for stress test); Diabetes; Health Maintenance (has eye appt scheduled 10/22/17); and Knee Pain (needs new Pain Clinic referral)    HISTORY OF PRESENTING ILLNESS:  History was obtained from: patient. Brad Shaw is a 49 y.o. is here for a follow-up visit. Patient was discharged from pain management clinic because her urine toxicology came back negative. Patient did report that she takes her medication on a regular basis. She is requesting a referral to a different pain management. She has chronic back pain. He denies any recent new symptoms of neuropathy. She has extensive medical history including diabetes and hypertension as well as obesity which are controlled. Her asthma is also controlled. She is due for medication refills today. Problem list, medications and blood work reviewed.       Patient Active Problem List   Diagnosis    Smoker    Diabetes mellitus, type 2 ([address]    Arthritis of knee, degenerative    Carpal tunnel syndrome on both sides    Hypothyroidism    Iron deficiency anemia    Depressive disorder, not elsewhere classified    Benign essential HTN    Type 2 diabetes, uncontrolled, with neuropathy ([address]    Morbid obesity with BMI of 50.0-59.9, adult ([address]    Primary osteoarthritis of both knees    Hydronephrosis, left    Ureteral calculus, left    Urge incontinence of urine    Mild intermittent asthma without complication       Health Maintenance Due   Topic Date Due    HIV screen  07/06/1984    DTaP/Tdap/Td vaccine (1 - Tdap) 07/06/1988    Cervical cancer screen  05/20/2016    Diabetic foot exam  09/04/2016    Diabetic retinal exam  10/01/2016       Allergies   Allergen Reactions    Januvia [Sitagliptin Phosphate] Hives    Naproxen Sodium Hives    Neurontin [Gabapentin] Nausea Only         Current Outpatient Prescriptions   Medication Sig Dispense Refill    Tdap (ADACEL) 5-2-15.5 LF-MCG/0.5 injection Inject 0.5 mLs into the muscle once for 1 dose 0.5 mL 0    insulin glargine (LANTUS SOLOSTAR) 100 UNIT/ML injection pen Inject 30 Units into the skin nightly 3 Pen 2    simvastatin (ZOCOR) 40 MG tablet Take 1 tablet by mouth nightly 30 tablet 2    insulin lispro (HUMALOG KWIKPEN) 100 UNIT/ML pen Inject 20 Units into the skin 3 times daily (before meals) 3 Pen 2    lisinopril (PRINIVIL;ZESTRIL) 5 MG tablet Take 1 tablet by mouth daily 30 tablet 2    aspirin EC 81 MG EC tablet Take 1 tablet by mouth daily 30 tablet 2    albuterol sulfate HFA (VENTOLIN HFA) 108 (90 Base) MCG/ACT inhaler Inhale 2 puffs into the lungs every 6 hours as needed for Wheezing 1 Inhaler 2    oxybutynin (DITROPAN XL) 10 MG extended release tablet Take 1 tablet by mouth daily 30 tablet 2    metFORMIN (GLUCOPHAGE) 1000 MG tablet Take 1 tablet by mouth 2 times daily (with meals) 60 tablet 2    levothyroxine (SYNTHROID) 50 MCG tablet Take 1 tablet by mouth daily 30 tablet 2    ferrous sulfate 325 (65 Fe) MG tablet Take 1 tablet by mouth 2 times daily 60 tablet 2    fluticasone (VERAMYST) 27.5 MCG/SPRAY nasal spray 2 sprays by Nasal route daily      butalbital-acetaminophen-caffeine (FIORICET, ESGIC) -40 MG per tablet Take 1 tablet by mouth every 6 hours as needed for Headaches or Migraine      glucose blood VI test strips (TRUETEST TEST) strip Dx: DM-2 use 2-3 times daily 100 strip 11    ONE TOUCH 07/24/17 128/83      General appearance - alert, well appearing, and in no distress  Mental status - alert, oriented to person, place, and time  Lymphatics - no palpable lymphadenopathy, no hepatosplenomegaly  Chest - clear to auscultation, no wheezes, rales or rhonchi, symmetric air entry  Heart - normal rate, regular rhythm, normal S1, S2, no murmurs, rubs, clicks or gallops  Abdomen - soft, nontender, nondistended, no masses or organomegaly  Musculoskeletal - no joint tenderness, deformity or swelling  Extremities - peripheral pulses normal, no pedal edema, no clubbing or cyanosis    Physical Exam      DIAGNOSTIC FINDINGS:  CBC:  Lab Results   Component Value Date    WBC 7.2 09/06/2017    HGB 14.1 09/06/2017     09/06/2017     04/26/2012       BMP:    Lab Results   Component Value Date     09/06/2017    K 3.9 09/06/2017     09/06/2017    CO2 22 09/06/2017    BUN 10 09/06/2017    CREATININE 0.62 09/06/2017    GLUCOSE 121 09/06/2017    GLUCOSE 296 12/29/2011       HEMOGLOBIN A1C:   Lab Results   Component Value Date    LABA1C 7.6 05/31/2017       FASTING LIPID PANEL:  Lab Results   Component Value Date    CHOL 189 09/11/2017    HDL 56 09/11/2017    TRIG 100 09/11/2017       ASSESSMENT AND PLAN:  Eleazar Ybarra was seen today for hypertension, diabetes, health maintenance and knee pain. Diagnoses and all orders for this visit:    Type 2 diabetes mellitus without complication, with long-term current use of insulin ([facility])  -      DIABETES FOOT EXAM  -     insulin glargine (LANTUS SOLOSTAR) 100 UNIT/ML injection pen; Inject 30 Units into the skin nightly  -     insulin lispro (HUMALOG KWIKPEN) 100 UNIT/ML pen; Inject 20 Units into the skin 3 times daily (before meals)  -     metFORMIN (GLUCOPHAGE) 1000 MG tablet; Take 1 tablet by mouth 2 times daily (with meals)    Screening for HIV without presence of risk factors  -     HIV Screen;  Future    Need for prophylactic vaccination against diphtheria-tetanus-pertussis (DTP)  -     Tdap (ADACEL) 5-2-15.5 LF-MCG/0.5 injection; Inject 0.5 mLs into the muscle once for 1 dose    Need for prophylactic vaccination and inoculation against influenza  -     NM ADMIN INFLUENZA VIRUS VAC  -     INFLUENZA, QUADV, 6 MO AND OLDER, IM, MDV, 0.5ML (FLULAVAL QUADV)    Osteoarthritis of both knees, unspecified osteoarthritis type  -     Kandy Wheat MD, Pain Management [place]*    Pure hypercholesterolemia  -     simvastatin (ZOCOR) 40 MG tablet; Take 1 tablet by mouth nightly    Essential hypertension  -     lisinopril (PRINIVIL;ZESTRIL) 5 MG tablet; Take 1 tablet by mouth daily  -     aspirin EC 81 MG EC tablet; Take 1 tablet by mouth daily    Mild intermittent asthma without complication  -     albuterol sulfate HFA (VENTOLIN HFA) 108 (90 Base) MCG/ACT inhaler; Inhale 2 puffs into the lungs every 6 hours as needed for Wheezing    Urge incontinence of urine  -     oxybutynin (DITROPAN XL) 10 MG extended release tablet; Take 1 tablet by mouth daily    Acquired hypothyroidism  -     levothyroxine (SYNTHROID) 50 MCG tablet; Take 1 tablet by mouth daily    Iron deficiency anemia secondary to inadequate dietary iron intake  -     ferrous sulfate 325 (65 Fe) MG tablet; Take 1 tablet by mouth 2 times daily      FOLLOW UP AND INSTRUCTIONS:  · Return in about 3 months (around 1/9/2018) for HTN, DM-2, HLD, chronic back pain. · Andre Concepciontles received counseling on the following healthy behaviors: nutrition and exercise    · Discussed use, benefit, and side effects of prescribed medications. Barriers to medication compliance addressed. All patient questions answered. Pt voiced understanding.      · Patient given educational materials - see patient instructions    Roopa Radford MD, CHIV[facility], [address] [place] [address] Internal Medicine Associate  10/9/2017, 12:47 PM    This note is created with the assistance of a speech-recognition

## 2017-10-09 NOTE — PROGRESS NOTES
DIABETES and HYPERTENSION visit    BP Readings from Last 3 Encounters:   09/06/17 (!) 150/88   07/24/17 128/83   06/27/17 (!) 152/95        Hemoglobin A1C (%)   Date Value   05/31/2017 7.6   09/16/2016 9.1   02/27/2016 8.1 (H)     Microalb/Crt. Ratio (mcg/mg creat)   Date Value   09/11/2017 7     LDL Cholesterol (mg/dL)   Date Value   09/11/2017 113     HDL (mg/dL)   Date Value   09/11/2017 56     BUN (mg/dL)   Date Value   09/06/2017 10     CREATININE (mg/dL)   Date Value   09/06/2017 0.62     Glucose (mg/dL)   Date Value   09/06/2017 121 (H)   12/29/2011 296 (H)            Have you changed or started any medications since your last visit including any over-the-counter medicines, vitamins, or herbal medicines? no   Have you stopped taking any of your medications? Is so, why? -  no  Are you having any side effects from any of your medications? - no    Have you seen any other physician or provider since your last visit?  no   Have you had any other diagnostic tests since your last visit? yes - blood work   Have you been seen in the emergency room and/or had an admission in a hospital since we last saw you?  yes - 9/6/17   Have you had your routine dental cleaning in the past 6 months?  no     Do you have an active MyChart account? If no, what is the barrier?   Yes    Patient Care Team:  Roopa Ya MD as PCP - Oscar Alatorre MD as Anesthesiologist (Pain Management)  Cleopatra Herbert MD as Consulting Physician (Urology)    Medical History Review  Past Medical, Family, and Social History reviewed and does contribute to the patient presenting condition    Health Maintenance   Topic Date Due    HIV screen  07/06/1984    DTaP/Tdap/Td vaccine (1 - Tdap) 07/06/1988    Cervical cancer screen  05/20/2016    Diabetic foot exam  09/04/2016    Diabetic retinal exam  10/01/2016    Flu vaccine (1) 09/01/2017    Diabetic hemoglobin A1C test  05/31/2018    Diabetic microalbuminuria test  09/11/2018    Lipid screen  09/11/2018    Pneumococcal med risk  Completed

## 2017-10-30 ENCOUNTER — OFFICE VISIT (OUTPATIENT)
Dept: PAIN MANAGEMENT | Age: 47
End: 2017-10-30
Payer: COMMERCIAL

## 2017-10-30 VITALS
BODY MASS INDEX: 45.99 KG/M2 | WEIGHT: 293 LBS | SYSTOLIC BLOOD PRESSURE: 131 MMHG | DIASTOLIC BLOOD PRESSURE: 80 MMHG | HEIGHT: 67 IN | RESPIRATION RATE: 16 BRPM | HEART RATE: 97 BPM

## 2017-10-30 DIAGNOSIS — M25.569 CHRONIC KNEE PAIN, UNSPECIFIED LATERALITY: Primary | ICD-10-CM

## 2017-10-30 DIAGNOSIS — E66.01 MORBID OBESITY WITH BMI OF 50.0-59.9, ADULT (HCC): ICD-10-CM

## 2017-10-30 DIAGNOSIS — G89.29 CHRONIC KNEE PAIN, UNSPECIFIED LATERALITY: Primary | ICD-10-CM

## 2017-10-30 DIAGNOSIS — M17.0 PRIMARY OSTEOARTHRITIS OF BOTH KNEES: ICD-10-CM

## 2017-10-30 LAB
AMPHETAMINE SCREEN, URINE: NEGATIVE
BARBITURATE SCREEN, URINE: NEGATIVE
BENZODIAZEPINE SCREEN, URINE: NEGATIVE
COCAINE METABOLITE SCREEN URINE: NEGATIVE
MDMA URINE: NORMAL
METHADONE SCREEN, URINE: NEGATIVE
METHAMPHETAMINE, URINE: NEGATIVE
OPIATE SCREEN URINE: NEGATIVE
OXYCODONE SCREEN URINE: NEGATIVE
PHENCYCLIDINE SCREEN URINE: NEGATIVE
PROPOXYPHENE SCREEN, URINE: NORMAL
THC: NEGATIVE
TRICYCLIC ANTIDEPRESSANTS, UR: NEGATIVE

## 2017-10-30 PROCEDURE — 80305 DRUG TEST PRSMV DIR OPT OBS: CPT | Performed by: INTERNAL MEDICINE

## 2017-10-30 PROCEDURE — G8427 DOCREV CUR MEDS BY ELIG CLIN: HCPCS | Performed by: INTERNAL MEDICINE

## 2017-10-30 PROCEDURE — G8484 FLU IMMUNIZE NO ADMIN: HCPCS | Performed by: INTERNAL MEDICINE

## 2017-10-30 PROCEDURE — G8417 CALC BMI ABV UP PARAM F/U: HCPCS | Performed by: INTERNAL MEDICINE

## 2017-10-30 PROCEDURE — 99214 OFFICE O/P EST MOD 30 MIN: CPT | Performed by: INTERNAL MEDICINE

## 2017-10-30 PROCEDURE — 1036F TOBACCO NON-USER: CPT | Performed by: INTERNAL MEDICINE

## 2017-10-30 RX ORDER — OXYMORPHONE HYDROCHLORIDE 5 MG/1
5 TABLET ORAL EVERY 8 HOURS PRN
Qty: 45 TABLET | Refills: 0 | Status: SHIPPED | OUTPATIENT
Start: 2017-10-30 | End: 2018-01-19

## 2017-10-30 ASSESSMENT — ENCOUNTER SYMPTOMS
SPUTUM PRODUCTION: 0
ORTHOPNEA: 0
CONSTIPATION: 0
BLOOD IN STOOL: 0
BACK PAIN: 1
ABDOMINAL PAIN: 0
COUGH: 0
VOMITING: 0
WHEEZING: 0
BLURRED VISION: 0
SHORTNESS OF BREATH: 0
NAUSEA: 0

## 2017-10-30 NOTE — PROGRESS NOTES
History Narrative    No narrative on file       Current Outpatient Prescriptions   Medication Sig Dispense Refill    glucose blood VI test strips (TRUETEST TEST) strip Dx: DM-2 use 2-3 times daily 100 strip 11    ONE TOUCH ULTRASOFT LANCETS MISC Dx: DM-2 use 2-3 times daily 100 each 11    traZODone (DESYREL) 150 MG tablet Take 150 mg by mouth nightly      VORTIoxetine (TRINTELLIX) 10 MG TABS tablet Take 10 mg by mouth daily      acetaminophen-codeine (TYLENOL #3) 300-30 MG per tablet Take 1-2 tablets by mouth 2 times daily as needed for Pain for up to 30 days. 60 tablet 0    Misc. Devices (TOILET SEAT ELEVATOR) MISC Dx: Chronic back pain, use as needed 1 each 0    meloxicam (MOBIC) 15 MG tablet Take 1 tablet by mouth daily 30 tablet 2    ferrous sulfate 325 (65 Fe) MG tablet Take 1 tablet by mouth 2 times daily 60 tablet 2    levothyroxine (SYNTHROID) 50 MCG tablet Take 1 tablet by mouth daily 30 tablet 2    metFORMIN (GLUCOPHAGE) 1000 MG tablet Take 1 tablet by mouth 2 times daily (with meals) 60 tablet 2    oxybutynin (DITROPAN XL) 10 MG extended release tablet Take 1 tablet by mouth daily 30 tablet 2    aspirin EC 81 MG EC tablet Take 1 tablet by mouth daily 30 tablet 2    lisinopril (PRINIVIL;ZESTRIL) 5 MG tablet Take 1 tablet by mouth daily 30 tablet 2    insulin lispro (HUMALOG KWIKPEN) 100 UNIT/ML pen Inject 20 Units into the skin 3 times daily (before meals) 3 pen 2    simvastatin (ZOCOR) 40 MG tablet Take 1 tablet by mouth nightly 30 tablet 2    insulin glargine (LANTUS SOLOSTAR) 100 UNIT/ML injection pen Inject 30 Units into the skin nightly 3 pen 2    albuterol sulfate HFA (PROVENTIL HFA) 108 (90 Base) MCG/ACT inhaler Inhale 2 puffs into the lungs every 6 hours as needed for Wheezing 1 Inhaler 2     No current facility-administered medications for this visit.         Allergies   Allergen Reactions    Januvia [Sitagliptin Phosphate] Hives    Naproxen Sodium Hives    Neurontin without complication 3/17/3392    Obesity     Osteoarthritis     KNEE    Polyneuropathy ([address] 2012    RAD (reactive airway disease) 2012    Snores     possible apnea but not tested yet    Type II or unspecified type diabetes mellitus without mention of complication, not stated as uncontrolled     Urge incontinence of urine 2017    Wears glasses     Weight loss        Past Surgical History:     Past Surgical History:   Procedure Laterality Date    CARPAL TUNNEL RELEASE      2008 left,  2009 right     SECTION  2007    CYSTOSCOPY  2016    cysto with left ureteral stent placement    HYSTERECTOMY  2013    LITHOTRIPSY Left 2016    cysto with ESWL and left ureteral stent placement.  NERVE BLOCK Left 2016    left knee kenalog 80mg    NERVE BLOCK  3/11/16    Rt Knee depo medrol 80     TUBAL LIGATION  3/2012       Family History:     Family History   Problem Relation Age of Onset    Stroke Father     Diabetes Mother     Hypertension Mother     Breast Cancer Neg Hx     Cancer Neg Hx     Colon Cancer Neg Hx     Eclampsia Neg Hx     Ovarian Cancer Neg Hx      Labor Neg Hx     Spont Abortions Neg Hx        Social History:      TOBACCO:   reports that she has quit smoking. Her smoking use included Cigarettes. She has a 3.75 pack-year smoking history. She has never used smokeless tobacco.  ETOH:   reports that she drinks alcohol. REVIEW OF SYSTEMS:     Review of Systems   Constitutional: Negative for chills, diaphoresis, fever, malaise/fatigue and weight loss. HENT: Negative for tinnitus. Eyes: Negative for blurred vision. Respiratory: Negative for cough, sputum production, shortness of breath and wheezing. Cardiovascular: Negative for chest pain, palpitations, orthopnea, claudication and leg swelling. Gastrointestinal: Negative for abdominal pain, blood in stool, constipation, melena, nausea and vomiting.    Genitourinary: Negative for tenderness is noted on palpation. Range of movements are painful and tender. Inspection of the knees shows no effusion or  Erythema. Skin or soft tissue change  is not  noted over the knees. No erythema. Palpation of the knees shows tenderness along the medial and lateral joint lines. Moderate effusion is  noted. Decreased quad tone, VMO atrophy. ROM 0-130 degrees. Compartments soft. 2+ DP pulse. TA/EHL/FHL/GS motor intact. Deep and Superficial Peroneal/Saphenous/Sural SILT. Patient walks with assistive device and gait is slow and painful. Tests for internal derangement shows the knee joint is intact with respect to ACL, PCL. Neurological: She is alert and oriented to person, place, and time. No cranial nerve deficit or sensory deficit. Gait (carries a cane and asks with painful antalgic gait. ) abnormal.   Skin: Skin is warm, dry and intact. No rash noted. Psychiatric: She has a normal mood and affect. Her speech is normal and behavior is normal. Judgment and thought content normal. Cognition and memory are normal.     Right Knee Exam     Other   Other tests: effusion present      Left Knee Exam     Other   Effusion: effusion present              Assessment:     DIAGNOSIS:  1. Chronic knee pain, unspecified laterality    2. Primary osteoarthritis of both knees    3. Morbid obesity with BMI of 50.0-59.9, adult ([facility]      We will do Urine drug screen and re evaluate. Treatment Plan:       Interventional Treatment:     No and Possible if conservative Rx does not help. Medical Necessity for Intervention: Bilateral knee pain worse on the left side. See Chief complaint, HPI, Physical Examination. [x]The patient's questions were answered to the best of my abilities.     Medical Management Plan:       Goals for today's visit include to decrease pain to a lesser level, ability to engage in daily activities, decrease pain medication use, decrease health care utilization, muscle strengthening exercises. Controlled Substances Monitoring: Attestation: The Prescription Monitoring Report for this patient was reviewed today. (Rosana Olivares MD)  Documentation: No signs of potential drug abuse or diversion identified. , Random urine drug screen sent today. (Rosana Olivares MD)    Orders Placed This Encounter   Medications    DISCONTD: oxymorphone (OPANA) 5 MG tablet     Sig: Take 1 tablet by mouth every 8 hours as needed for Pain . Dispense:  45 tablet     Refill:  0     Urine drug screen results:    Pending. Safe use of medications explained to patient. Counselling/Preventive measures for pain  Control:    [x]  Spine strengthening exercises are discussed with patient in detail. [x] Ill effects of being on chronic pain medications such as sleep disturbances, hormonal changes, withdrawal symptoms,  chronic opioid dependence and tolerance were discussed with patient. I had asked the patient to minimize medication use and utilize pain medications only for uncontrolled rest pain or pain with exertional activities. I advised patient not to self escalate pain medications without consulting with us. Patient had not been to any other pain clinic or ER since the last visit. Patient  does not complain of drowsiness and constipation occasionally from pain medications. Patient has taken medications as instructed and is satisfied with pain control. Patient denied illegal use of drugs and  is not currently enrolled in Physical Therapy or Psychological services. Patient does have questions or concerns. Patient's OARRS were reviewed. It is acceptable and appears patient is not receiving prescriptions from multiple prescribers. At each of patient's future visits we will try to taper pain medications, while adjusting the adjunct medications, and re-evaluating for Physical Therapy to improve spinal and joint strength.  We will continue to

## 2017-10-31 ENCOUNTER — TELEPHONE (OUTPATIENT)
Dept: PAIN MANAGEMENT | Age: 47
End: 2017-10-31

## 2017-10-31 NOTE — TELEPHONE ENCOUNTER
t left message states she was in need of a prior [facility] pharmacy unsure where the pt took RX they dont have it no need for auth form faxed over to send for PA

## 2017-11-13 ENCOUNTER — OFFICE VISIT (OUTPATIENT)
Dept: PAIN MANAGEMENT | Age: 47
End: 2017-11-13
Payer: COMMERCIAL

## 2017-11-13 VITALS
HEIGHT: 67 IN | SYSTOLIC BLOOD PRESSURE: 135 MMHG | WEIGHT: 293 LBS | RESPIRATION RATE: 16 BRPM | BODY MASS INDEX: 45.99 KG/M2 | DIASTOLIC BLOOD PRESSURE: 80 MMHG | HEART RATE: 94 BPM

## 2017-11-13 DIAGNOSIS — M17.0 OSTEOARTHRITIS OF BOTH KNEES, UNSPECIFIED OSTEOARTHRITIS TYPE: Primary | ICD-10-CM

## 2017-11-13 DIAGNOSIS — M17.0 PRIMARY OSTEOARTHRITIS OF BOTH KNEES: ICD-10-CM

## 2017-11-13 PROCEDURE — 1036F TOBACCO NON-USER: CPT | Performed by: INTERNAL MEDICINE

## 2017-11-13 PROCEDURE — G8484 FLU IMMUNIZE NO ADMIN: HCPCS | Performed by: INTERNAL MEDICINE

## 2017-11-13 PROCEDURE — 99213 OFFICE O/P EST LOW 20 MIN: CPT | Performed by: INTERNAL MEDICINE

## 2017-11-13 PROCEDURE — G8427 DOCREV CUR MEDS BY ELIG CLIN: HCPCS | Performed by: INTERNAL MEDICINE

## 2017-11-13 PROCEDURE — G8417 CALC BMI ABV UP PARAM F/U: HCPCS | Performed by: INTERNAL MEDICINE

## 2017-11-13 ASSESSMENT — ENCOUNTER SYMPTOMS
COUGH: 0
SHORTNESS OF BREATH: 0
ORTHOPNEA: 0
SPUTUM PRODUCTION: 0
CONSTIPATION: 0
BLOOD IN STOOL: 0
WHEEZING: 0

## 2017-11-13 NOTE — PROGRESS NOTES
Allergies   Allergen Reactions    Januvia [Sitagliptin Phosphate] Hives    Naproxen Sodium Hives    Neurontin [Gabapentin] Nausea Only       Past Medical History:     Past Medical History:   Diagnosis Date    Anxiety     Arthritis of knee, degenerative 2012    Carpal tunnel syndrome on both sides 1/15/2013    Chronic knee pain     Depressive disorder, not elsewhere classified 10/14/2014    Hydronephrosis, left 2016    Hypothyroidism 2013    Iron deficiency anemia 6/10/2014    Mild intermittent asthma without complication 3/72/5492    Obesity     Osteoarthritis     KNEE    Polyneuropathy ([address] 2012    RAD (reactive airway disease) 2012    Snores     possible apnea but not tested yet    Type II or unspecified type diabetes mellitus without mention of complication, not stated as uncontrolled     Urge incontinence of urine 2017    Wears glasses     Weight loss        Past Surgical History:     Past Surgical History:   Procedure Laterality Date    CARPAL TUNNEL RELEASE      2008 left,  2009 right     SECTION  2007    CYSTOSCOPY  2016    cysto with left ureteral stent placement    HYSTERECTOMY  2013    LITHOTRIPSY Left 2016    cysto with ESWL and left ureteral stent placement.  NERVE BLOCK Left 2016    left knee kenalog 80mg    NERVE BLOCK  3/11/16    Rt Knee depo medrol 80     TUBAL LIGATION  3/2012       Family History:     Family History   Problem Relation Age of Onset    Stroke Father     Diabetes Mother     Hypertension Mother     Breast Cancer Neg Hx     Cancer Neg Hx     Colon Cancer Neg Hx     Eclampsia Neg Hx     Ovarian Cancer Neg Hx      Labor Neg Hx     Spont Abortions Neg Hx        Social History:      TOBACCO:   reports that she has quit smoking. Her smoking use included Cigarettes. She has a 3.75 pack-year smoking history.  She has never used smokeless tobacco.  ETOH:   reports that she drinks OARRS)  Medical Necessity for Intervention:    See Chief complaint, HPI, Physical Examination. [x]The patient's questions were answered to the best of my abilities. Medical Management Plan: We will continue current pain medications. Current medications are being tolerated without any Adverse side effects. Goals for today's visit include to decrease pain to a lesser level, ability to engage in daily activities, decrease pain medication use, decrease health care utilization, muscle strengthening exercises. Controlled Substances Monitoring: Attestation: The Prescription Monitoring Report for this patient was reviewed today. (Dmitriy Florian MD)  Documentation: Potential drug abuse or diversion identified, see note documentation. , Random urine drug screen sent today. (positive for marijuana and lyrica ( not prescribed). ) Dmitriy Florian MD)    Urine drug screen results:    Positive for Marijuana and Lyrica ( not prescribed ). No aberrant activity noted. Analgesia is achieved. Activities of daily living are possible because of medications. Safe use of medications explained to patient. Counselling/Preventive measures for pain  Control:       Patient denied illegal use of drugs and  is not currently enrolled in Physical Therapy or Psychological services. Patient does have questions or concerns. Decision Making Process : Patient's health history and referral records thoroughly reviewed before focused physical examination and discussion with patient. Over 50% of today's visit is spent on examining the patient and counseling. Level of complexity of date to be reviewed is Moderate. The chart date reviewed include the following: Imaging Reports. Summary of Care. Time spent reviewing with patient the below reports:   Medication safety, Treatment options.     Level of diagnosis and management options of this case is multiple: involving the following management options:

## 2017-11-30 DIAGNOSIS — M17.0 OSTEOARTHRITIS OF BOTH KNEES, UNSPECIFIED OSTEOARTHRITIS TYPE: Primary | ICD-10-CM

## 2017-12-01 ENCOUNTER — OFFICE VISIT (OUTPATIENT)
Dept: ORTHOPEDIC SURGERY | Age: 47
End: 2017-12-01
Payer: COMMERCIAL

## 2017-12-01 VITALS — HEIGHT: 67 IN | BODY MASS INDEX: 45.99 KG/M2 | WEIGHT: 293 LBS

## 2017-12-01 DIAGNOSIS — M17.0 OSTEOARTHRITIS OF BOTH KNEES, UNSPECIFIED OSTEOARTHRITIS TYPE: Primary | ICD-10-CM

## 2017-12-01 PROCEDURE — 1036F TOBACCO NON-USER: CPT | Performed by: ORTHOPAEDIC SURGERY

## 2017-12-01 PROCEDURE — G8484 FLU IMMUNIZE NO ADMIN: HCPCS | Performed by: ORTHOPAEDIC SURGERY

## 2017-12-01 PROCEDURE — 20610 DRAIN/INJ JOINT/BURSA W/O US: CPT | Performed by: ORTHOPAEDIC SURGERY

## 2017-12-01 PROCEDURE — G8417 CALC BMI ABV UP PARAM F/U: HCPCS | Performed by: ORTHOPAEDIC SURGERY

## 2017-12-01 PROCEDURE — 99213 OFFICE O/P EST LOW 20 MIN: CPT | Performed by: ORTHOPAEDIC SURGERY

## 2017-12-01 PROCEDURE — G8427 DOCREV CUR MEDS BY ELIG CLIN: HCPCS | Performed by: ORTHOPAEDIC SURGERY

## 2017-12-01 RX ORDER — METHYLPREDNISOLONE ACETATE 80 MG/ML
80 INJECTION, SUSPENSION INTRA-ARTICULAR; INTRALESIONAL; INTRAMUSCULAR; SOFT TISSUE ONCE
Status: COMPLETED | OUTPATIENT
Start: 2017-12-01 | End: 2017-12-01

## 2017-12-01 RX ORDER — BUPIVACAINE HYDROCHLORIDE 2.5 MG/ML
2 INJECTION, SOLUTION INFILTRATION; PERINEURAL ONCE
Status: COMPLETED | OUTPATIENT
Start: 2017-12-01 | End: 2017-12-01

## 2017-12-01 RX ADMIN — BUPIVACAINE HYDROCHLORIDE 5 MG: 2.5 INJECTION, SOLUTION INFILTRATION; PERINEURAL at 17:25

## 2017-12-01 RX ADMIN — METHYLPREDNISOLONE ACETATE 80 MG: 80 INJECTION, SUSPENSION INTRA-ARTICULAR; INTRALESIONAL; INTRAMUSCULAR; SOFT TISSUE at 17:28

## 2017-12-01 ASSESSMENT — ENCOUNTER SYMPTOMS
DIARRHEA: 0
COUGH: 0
NAUSEA: 0
CONSTIPATION: 0

## 2017-12-01 NOTE — PROGRESS NOTES
There is a negative anterior drawer Lachman's test.  There is increased pain with varus Carmencita's testing. No calf tenderness is noted. There is a negative hip log roll and Stinchfield test.  Motor, sensory, vascular examination to the Bilateral lower extremity is intact. Patient has full range of motion of the ankle. Neuro: alert. oriented  Eyes: Extra-ocular muscles intact  Mouth: Oral mucosa moist. No perioral lesions  Pulm: Respirations unlabored and regular. Skin: warm, well perfused  Psych:   Patient has good fund of knowledge and displays understanging of exam, diagnosis, and plan. Radiology:     History: Right knee pain. Findings: Standing AP, lateral, merchant, tunnel view x-rays of the right knee done the office today reveals moderate joint space narrowing medially and mostly laterally with periarticular osteophytosis and joint line sclerosis. No evidence of fracture, subluxation or dislocation or radiopaque foreign body, radiopaque tumors appreciated. Impression: Moderate approaching severe right knee degenerative joint disease as described above    History: Left knee pain    Findings: Standing AP, lateral, merchant, tunnel view x-rays of left knee done the office today shows complete loss of medial joint space with bone-on-bone gonarthrosis, joint line sclerosis, subchondral cystic changes, periarticular osteophytosis, varus alignment. No evidence of fracture, subluxation, dislocation, radiopaque foreign body, radiopaque tumors appreciated. Impression: Left knee degenerative changes as described above. KNEE INJECTION PROCEDURE NOTE:  The patient was identified. The left knee was confirmed with the patient. After a sterile prep with Betadine the knee was injected using a lateral joint line approach with a mixture of 2 mL of 0.25% Marcaine and 80 mg of Depo-Medrol. Patient tolerated the procedure well without post injection complications.   I instructed the patient to call our

## 2018-01-19 ENCOUNTER — HOSPITAL ENCOUNTER (OUTPATIENT)
Age: 48
Setting detail: SPECIMEN
Discharge: HOME | End: 2018-01-19
Payer: COMMERCIAL

## 2018-01-19 ENCOUNTER — OFFICE VISIT (OUTPATIENT)
Dept: INTERNAL MEDICINE | Age: 48
End: 2018-01-19
Payer: COMMERCIAL

## 2018-01-19 VITALS
DIASTOLIC BLOOD PRESSURE: 73 MMHG | BODY MASS INDEX: 44.41 KG/M2 | HEIGHT: 68 IN | WEIGHT: 293 LBS | HEART RATE: 83 BPM | SYSTOLIC BLOOD PRESSURE: 128 MMHG

## 2018-01-19 DIAGNOSIS — E03.9 ACQUIRED HYPOTHYROIDISM: ICD-10-CM

## 2018-01-19 DIAGNOSIS — Z23 NEED FOR IMMUNIZATION AGAINST TETANUS ALONE: ICD-10-CM

## 2018-01-19 DIAGNOSIS — N39.41 URGE INCONTINENCE OF URINE: ICD-10-CM

## 2018-01-19 DIAGNOSIS — R07.89 OTHER CHEST PAIN: ICD-10-CM

## 2018-01-19 DIAGNOSIS — E78.00 PURE HYPERCHOLESTEROLEMIA: ICD-10-CM

## 2018-01-19 DIAGNOSIS — M17.0 PRIMARY OSTEOARTHRITIS OF BOTH KNEES: ICD-10-CM

## 2018-01-19 DIAGNOSIS — E11.42 TYPE 2 DIABETES MELLITUS WITH DIABETIC POLYNEUROPATHY, WITH LONG-TERM CURRENT USE OF INSULIN (HCC): ICD-10-CM

## 2018-01-19 DIAGNOSIS — Z11.4 SCREENING FOR HIV WITHOUT PRESENCE OF RISK FACTORS: ICD-10-CM

## 2018-01-19 DIAGNOSIS — D50.8 IRON DEFICIENCY ANEMIA SECONDARY TO INADEQUATE DIETARY IRON INTAKE: ICD-10-CM

## 2018-01-19 DIAGNOSIS — Z79.4 TYPE 2 DIABETES MELLITUS WITH DIABETIC POLYNEUROPATHY, WITH LONG-TERM CURRENT USE OF INSULIN (HCC): ICD-10-CM

## 2018-01-19 DIAGNOSIS — J45.20 MILD INTERMITTENT ASTHMA WITHOUT COMPLICATION: ICD-10-CM

## 2018-01-19 DIAGNOSIS — I10 ESSENTIAL HYPERTENSION: ICD-10-CM

## 2018-01-19 DIAGNOSIS — Z12.39 SCREENING FOR BREAST CANCER: ICD-10-CM

## 2018-01-19 DIAGNOSIS — E11.9 TYPE 2 DIABETES MELLITUS WITHOUT COMPLICATION, WITHOUT LONG-TERM CURRENT USE OF INSULIN (HCC): Primary | ICD-10-CM

## 2018-01-19 LAB
HBA1C MFR BLD: 7.5 %
HIV AG/AB: NONREACTIVE

## 2018-01-19 PROCEDURE — G8484 FLU IMMUNIZE NO ADMIN: HCPCS | Performed by: INTERNAL MEDICINE

## 2018-01-19 PROCEDURE — 1036F TOBACCO NON-USER: CPT | Performed by: INTERNAL MEDICINE

## 2018-01-19 PROCEDURE — G8427 DOCREV CUR MEDS BY ELIG CLIN: HCPCS | Performed by: INTERNAL MEDICINE

## 2018-01-19 PROCEDURE — 3045F PR MOST RECENT HEMOGLOBIN A1C LEVEL 7.0-9.0%: CPT | Performed by: INTERNAL MEDICINE

## 2018-01-19 PROCEDURE — 36415 COLL VENOUS BLD VENIPUNCTURE: CPT

## 2018-01-19 PROCEDURE — 87389 HIV-1 AG W/HIV-1&-2 AB AG IA: CPT

## 2018-01-19 PROCEDURE — 83036 HEMOGLOBIN GLYCOSYLATED A1C: CPT | Performed by: INTERNAL MEDICINE

## 2018-01-19 PROCEDURE — 99214 OFFICE O/P EST MOD 30 MIN: CPT | Performed by: INTERNAL MEDICINE

## 2018-01-19 PROCEDURE — G8417 CALC BMI ABV UP PARAM F/U: HCPCS | Performed by: INTERNAL MEDICINE

## 2018-01-19 RX ORDER — OXYBUTYNIN CHLORIDE 10 MG/1
10 TABLET, EXTENDED RELEASE ORAL DAILY
Qty: 30 TABLET | Refills: 2 | Status: SHIPPED | OUTPATIENT
Start: 2018-01-19 | End: 2018-05-09 | Stop reason: SDUPTHER

## 2018-01-19 RX ORDER — FERROUS SULFATE 325(65) MG
325 TABLET ORAL 2 TIMES DAILY
Qty: 60 TABLET | Refills: 2 | Status: SHIPPED | OUTPATIENT
Start: 2018-01-19 | End: 2018-05-09 | Stop reason: SDUPTHER

## 2018-01-19 RX ORDER — LISINOPRIL 5 MG/1
5 TABLET ORAL DAILY
Qty: 30 TABLET | Refills: 2 | Status: SHIPPED | OUTPATIENT
Start: 2018-01-19 | End: 2018-05-09 | Stop reason: SDUPTHER

## 2018-01-19 RX ORDER — SIMVASTATIN 40 MG
40 TABLET ORAL NIGHTLY
Qty: 30 TABLET | Refills: 2 | Status: SHIPPED | OUTPATIENT
Start: 2018-01-19 | End: 2018-05-09 | Stop reason: SDUPTHER

## 2018-01-19 RX ORDER — LEVOTHYROXINE SODIUM 0.05 MG/1
50 TABLET ORAL DAILY
Qty: 30 TABLET | Refills: 2 | Status: SHIPPED | OUTPATIENT
Start: 2018-01-19 | End: 2018-05-09 | Stop reason: SDUPTHER

## 2018-01-19 RX ORDER — MELOXICAM 15 MG/1
15 TABLET ORAL DAILY
Qty: 30 TABLET | Refills: 2 | Status: SHIPPED | OUTPATIENT
Start: 2018-01-19 | End: 2018-04-26 | Stop reason: ALTCHOICE

## 2018-01-19 RX ORDER — ALBUTEROL SULFATE 90 UG/1
2 AEROSOL, METERED RESPIRATORY (INHALATION) EVERY 6 HOURS PRN
Qty: 1 INHALER | Refills: 2 | Status: SHIPPED | OUTPATIENT
Start: 2018-01-19 | End: 2018-05-09 | Stop reason: SDUPTHER

## 2018-01-19 RX ORDER — ASPIRIN 81 MG/1
81 TABLET ORAL DAILY
Qty: 30 TABLET | Refills: 2 | Status: SHIPPED | OUTPATIENT
Start: 2018-01-19 | End: 2018-05-09 | Stop reason: SDUPTHER

## 2018-01-19 ASSESSMENT — ENCOUNTER SYMPTOMS
COUGH: 0
DOUBLE VISION: 0
HEMOPTYSIS: 0
NAUSEA: 0
ABDOMINAL PAIN: 0
BLURRED VISION: 0
HEARTBURN: 0

## 2018-01-19 NOTE — PROGRESS NOTES
[facility]  [facility] [address] [facility]  [address] [facility] [address]  Dept: [phone]  Dept Fax: [phone]    Office Progress/Follow Up Note  Date of patient's visit: 1/19/2018  Patient's Name:  David Griffin YOB: 1970            Patient Care Team:  Roopa Hodgson MD as PCP - Sonja Holland MD as Anesthesiologist (Pain Management)  Larisa Carrero MD as Consulting Physician (Urology)  ================================================================    REASON FOR VISIT/CHIEF COMPLAINT:  Hypertension; Diabetes; Health Maintenance (due for pap, patient given number to schedule eye appt, lost script for raised toilet seat); and Numbness (left hand and then left side of body gets numb 2 times weekly for one month, gets very dizzy when this happens, lasts about 10 minutes each time)    HISTORY OF PRESENTING ILLNESS:  History was obtained from: patient. David Griffin is a 49 y.o. is here for a follow-up visit. Patient is complaining of numbness without any weakness on the left upper extremity which usually happen when she ambulates. She reports occasional chest pain without any shortness of breath or palpitation. The pain will last for about a minute to 2 and resolved with rest.  She has been referred in the past to obtain stress test but has not done this. She has a risk factor for heart disease including obesity, hypertension and diabetes. She has chronic knee pain due to osteoarthritis from morbid obesity. She used to follow up with pain management and received narcotic from them but was discharged from the practice because she tested negative for tox screen when she was evaluated. She would like to be referred to a different pain management. She is also asking for a raised toilet seat due to severe pain when she goes to the bathroom. She has history of diabetes which is controlled. A1c is 7.6.   She is on both insulin and oral hypoglycemics. She denies any hypoglycemics and reported that her sugar especially fasting is below 1:30. She has hypertension which is controlled as well. She is due for screening mammogram.  She is also due for blood work as well as medication refills. Problem list, medications and blood work reviewed      Patient Active Problem List   Diagnosis    Smoker    Carpal tunnel syndrome on both sides    Acquired hypothyroidism    Iron deficiency anemia    Mild episode of recurrent major depressive disorder ([facility] [address]    Type 2 diabetes mellitus with diabetic polyneuropathy, with long-term current use of insulin ([facility] [address]    Morbid obesity with BMI of 50.0-59.9, adult ([facility] [address]    Primary osteoarthritis of both knees    Urge incontinence of urine    Mild intermittent asthma without complication    Pure hypercholesterolemia    Essential hypertension       Health Maintenance Due   Topic Date Due    HIV screen  07/06/1984    DTaP/Tdap/Td vaccine (1 - Tdap) 07/06/1988    Cervical cancer screen  05/20/2016    Diabetic retinal exam  10/01/2016    TSH testing  01/06/2018       Allergies   Allergen Reactions    Januvia [Sitagliptin Phosphate] Hives    Naproxen Sodium Hives    Neurontin [Gabapentin] Nausea Only         Current Outpatient Prescriptions   Medication Sig Dispense Refill    Tdap (ADACEL) 5-2-15.5 LF-MCG/0.5 injection Inject 0.5 mLs into the muscle once for 1 dose 0.5 mL 0    Misc.  Devices (TOILET SEAT ELEVATOR) MISC Dx: Chronic back pain, use as needed 1 each 0    meloxicam (MOBIC) 15 MG tablet Take 1 tablet by mouth daily 30 tablet 2    ferrous sulfate 325 (65 Fe) MG tablet Take 1 tablet by mouth 2 times daily 60 tablet 2    levothyroxine (SYNTHROID) 50 MCG tablet Take 1 tablet by mouth daily 30 tablet 2    metFORMIN (GLUCOPHAGE) 1000 MG tablet Take 1 tablet by mouth 2 times daily (with meals) 60 tablet 2    oxybutynin (DITROPAN XL) 10 MG extended release tablet Take 1 tablet by mouth daily 30 Value Date    CHOL 189 09/11/2017    HDL 56 09/11/2017    TRIG 100 09/11/2017       ASSESSMENT AND PLAN:  Willian Albarado was seen today for hypertension, diabetes, health maintenance and numbness. Diagnoses and all orders for this visit:    Type 2 diabetes mellitus without complication, without long-term current use of insulin (HCC)  -     POCT glycosylated hemoglobin (Hb A1C)  -     metFORMIN (GLUCOPHAGE) 1000 MG tablet; Take 1 tablet by mouth 2 times daily (with meals)  -     insulin lispro (HUMALOG KWIKPEN) 100 UNIT/ML pen; Inject 20 Units into the skin 3 times daily (before meals)  -     insulin glargine (LANTUS SOLOSTAR) 100 UNIT/ML injection pen; Inject 30 Units into the skin nightly    Primary osteoarthritis of both knees  -     Misc. Devices (TOILET SEAT ELEVATOR) MISC; Dx: Chronic back pain, use as needed  -     meloxicam (MOBIC) 15 MG tablet; Take 1 tablet by mouth daily  -     [facility]    Iron deficiency anemia secondary to inadequate dietary iron intake  -     ferrous sulfate 325 (65 Fe) MG tablet; Take 1 tablet by mouth 2 times daily    Acquired hypothyroidism  -     levothyroxine (SYNTHROID) 50 MCG tablet; Take 1 tablet by mouth daily    Urge incontinence of urine  -     oxybutynin (DITROPAN XL) 10 MG extended release tablet; Take 1 tablet by mouth daily    Mild intermittent asthma without complication  -     albuterol sulfate HFA (PROVENTIL HFA) 108 (90 Base) MCG/ACT inhaler; Inhale 2 puffs into the lungs every 6 hours as needed for Wheezing    Essential hypertension  -     aspirin EC 81 MG EC tablet; Take 1 tablet by mouth daily  -     lisinopril (PRINIVIL;ZESTRIL) 5 MG tablet; Take 1 tablet by mouth daily    Pure hypercholesterolemia  -     simvastatin (ZOCOR) 40 MG tablet; Take 1 tablet by mouth nightly    Need for immunization against tetanus alone  -     Tdap (ADACEL) 5-2-15.5 LF-MCG/0.5 injection;  Inject 0.5 mLs into the muscle once for 1 dose    Other chest pain  -

## 2018-02-16 ENCOUNTER — HOSPITAL ENCOUNTER (OUTPATIENT)
Dept: MAMMOGRAPHY | Age: 48
Discharge: HOME | End: 2018-02-18
Payer: COMMERCIAL

## 2018-02-16 DIAGNOSIS — Z12.39 SCREENING FOR BREAST CANCER: ICD-10-CM

## 2018-02-16 PROCEDURE — 77067 SCR MAMMO BI INCL CAD: CPT

## 2018-02-16 PROCEDURE — 77063 BREAST TOMOSYNTHESIS BI: CPT

## 2018-02-19 ENCOUNTER — OFFICE VISIT (OUTPATIENT)
Dept: INTERNAL MEDICINE | Age: 48
End: 2018-02-19
Payer: COMMERCIAL

## 2018-02-19 VITALS
WEIGHT: 293 LBS | SYSTOLIC BLOOD PRESSURE: 128 MMHG | BODY MASS INDEX: 45.99 KG/M2 | HEART RATE: 100 BPM | HEIGHT: 67 IN | DIASTOLIC BLOOD PRESSURE: 80 MMHG

## 2018-02-19 DIAGNOSIS — R20.0 NUMBNESS AND TINGLING IN LEFT UPPER EXTREMITY: Primary | ICD-10-CM

## 2018-02-19 DIAGNOSIS — Z23 NEED FOR PROPHYLACTIC VACCINATION AGAINST DIPHTHERIA-TETANUS-PERTUSSIS (DTP): ICD-10-CM

## 2018-02-19 DIAGNOSIS — M17.0 PRIMARY OSTEOARTHRITIS OF BOTH KNEES: ICD-10-CM

## 2018-02-19 DIAGNOSIS — R20.2 NUMBNESS AND TINGLING IN LEFT UPPER EXTREMITY: Primary | ICD-10-CM

## 2018-02-19 PROCEDURE — G8417 CALC BMI ABV UP PARAM F/U: HCPCS | Performed by: INTERNAL MEDICINE

## 2018-02-19 PROCEDURE — 99213 OFFICE O/P EST LOW 20 MIN: CPT | Performed by: INTERNAL MEDICINE

## 2018-02-19 PROCEDURE — G8484 FLU IMMUNIZE NO ADMIN: HCPCS | Performed by: INTERNAL MEDICINE

## 2018-02-19 PROCEDURE — G8427 DOCREV CUR MEDS BY ELIG CLIN: HCPCS | Performed by: INTERNAL MEDICINE

## 2018-02-19 PROCEDURE — 1036F TOBACCO NON-USER: CPT | Performed by: INTERNAL MEDICINE

## 2018-02-19 RX ORDER — ACETAMINOPHEN AND CODEINE PHOSPHATE 300; 30 MG/1; MG/1
1-2 TABLET ORAL 2 TIMES DAILY PRN
Qty: 60 TABLET | Refills: 0 | Status: SHIPPED | OUTPATIENT
Start: 2018-02-19 | End: 2018-03-21

## 2018-02-19 ASSESSMENT — ENCOUNTER SYMPTOMS
COUGH: 0
HEMOPTYSIS: 0
BLURRED VISION: 0
NAUSEA: 0
ABDOMINAL PAIN: 0
HEARTBURN: 0
DOUBLE VISION: 0

## 2018-02-19 NOTE — PROGRESS NOTES
Visit Information    Have you changed or started any medications since your last visit including any over-the-counter medicines, vitamins, or herbal medicines? no   Have you stopped taking any of your medications? Is so, why? -  no  Are you having any side effects from any of your medications? - no    Have you seen any other physician or provider since your last visit?  no   Have you had any other diagnostic tests since your last visit? yes - labs,  Mamm,   Have you been seen in the emergency room and/or had an admission in a hospital since we last saw you?  no   Have you had your routine dental cleaning in the past 6 months?  no     Do you have an active MyChart account? If no, what is the barrier?   Yes    Patient Care Team:  Roopa Hodgson MD as PCP - Sonja Holland MD as Anesthesiologist (Pain Management)  Larisa Carrero MD as Consulting Physician (Urology)    Medical History Review  Past Medical, Family, and Social History reviewed and does contribute to the patient presenting condition    Health Maintenance   Topic Date Due    DTaP/Tdap/Td vaccine (1 - Tdap) 07/06/1988    Cervical cancer screen  05/20/2016    Diabetic retinal exam  10/01/2016    TSH testing  01/06/2018    Potassium monitoring  09/06/2018    Creatinine monitoring  09/06/2018    Diabetic microalbuminuria test  09/11/2018    Lipid screen  09/11/2018    Diabetic foot exam  10/09/2018    A1C test (Diabetic or Prediabetic)  01/19/2019    Flu vaccine  Completed    Pneumococcal med risk  Completed    HIV screen  Completed
inhaler Inhale 2 puffs into the lungs every 6 hours as needed for Wheezing 1 Inhaler 2    glucose blood VI test strips (TRUETEST TEST) strip Dx: DM-2 use 2-3 times daily 100 strip 11    ONE TOUCH ULTRASOFT LANCETS MISC Dx: DM-2 use 2-3 times daily 100 each 11    traZODone (DESYREL) 150 MG tablet Take 150 mg by mouth nightly      meloxicam (MOBIC) 15 MG tablet Take 1 tablet by mouth daily 30 tablet 2     No current facility-administered medications for this visit. Social History   Substance Use Topics    Smoking status: Former Smoker     Packs/day: 0.25     Years: 15.00     Types: Cigarettes    Smokeless tobacco: Never Used      Comment: 3-4 cigarettes a day     Alcohol use 0.0 oz/week      Comment: pt states 1 glass of wine per month       Family History   Problem Relation Age of Onset   [place] Stroke Father     Diabetes Mother     Hypertension Mother     Breast Cancer Neg Hx     Cancer Neg Hx     Colon Cancer Neg Hx     Eclampsia Neg Hx     Ovarian Cancer Neg Hx      Labor Neg Hx     Spont Abortions Neg Hx         REVIEW OF SYSTEMS:  Review of Systems   Constitutional: Negative for chills and fever. HENT: Negative for congestion, ear discharge and nosebleeds. Eyes: Negative for blurred vision and double vision. Respiratory: Negative for cough and hemoptysis. Cardiovascular: Negative for chest pain and palpitations. Gastrointestinal: Negative for abdominal pain, heartburn and nausea. Genitourinary: Negative for dysuria and urgency. Musculoskeletal: Negative for myalgias and neck pain. Neurological: Negative for dizziness and headaches. Endo/Heme/Allergies: Does not bruise/bleed easily. Psychiatric/Behavioral: Negative for depression and suicidal ideas.        PHYSICAL EXAM:  Vitals:    18 1425   BP: 128/80   Site: Left Arm   Position: Sitting   Cuff Size: Large Adult   Pulse: 100   Weight: (!) 303 lb 3.2 oz (137.5 kg)   Height: 5' 7\" (1.702 m)     BP Readings from

## 2018-02-27 ENCOUNTER — TELEPHONE (OUTPATIENT)
Dept: INTERNAL MEDICINE | Age: 48
End: 2018-02-27

## 2018-03-13 ENCOUNTER — TELEPHONE (OUTPATIENT)
Dept: INTERNAL MEDICINE | Age: 48
End: 2018-03-13

## 2018-03-13 NOTE — TELEPHONE ENCOUNTER
PC from patient stating that she was kicked out of Pain Management in December due to Marijuana in her system although she does not smoke marijuana. Pt states that Dr. Garth Salinas told her that if Dr. Troy Aguila can write her a letter stating that she is cleared of any substances in her system outside of her medications that she can start resuming her care once more. Pt wanting to know if that is something Dr. Adryan Mccormick can do that for her, pt willing to come in for an appointment for a drug screen at anytime. Please advise and call patient back at main call number in chart.

## 2018-03-26 ENCOUNTER — TELEPHONE (OUTPATIENT)
Dept: PAIN MANAGEMENT | Age: 48
End: 2018-03-26

## 2018-03-26 NOTE — TELEPHONE ENCOUNTER
Phone call to pt and gave the phone number to pain management to schedule  [facility] office does not schedule for pain management

## 2018-04-02 ENCOUNTER — OFFICE VISIT (OUTPATIENT)
Dept: PAIN MANAGEMENT | Age: 48
End: 2018-04-02
Payer: COMMERCIAL

## 2018-04-02 VITALS
HEIGHT: 67 IN | RESPIRATION RATE: 14 BRPM | WEIGHT: 293 LBS | BODY MASS INDEX: 45.99 KG/M2 | DIASTOLIC BLOOD PRESSURE: 90 MMHG | SYSTOLIC BLOOD PRESSURE: 129 MMHG | OXYGEN SATURATION: 99 % | HEART RATE: 104 BPM

## 2018-04-02 DIAGNOSIS — M25.562 CHRONIC PAIN OF BOTH KNEES: Primary | ICD-10-CM

## 2018-04-02 DIAGNOSIS — M25.561 CHRONIC PAIN OF BOTH KNEES: Primary | ICD-10-CM

## 2018-04-02 DIAGNOSIS — Z01.89 ENCOUNTER FOR PAIN MANAGEMENT PLANNING: ICD-10-CM

## 2018-04-02 DIAGNOSIS — G89.29 CHRONIC PAIN OF BOTH KNEES: Primary | ICD-10-CM

## 2018-04-02 PROCEDURE — G8417 CALC BMI ABV UP PARAM F/U: HCPCS | Performed by: INTERNAL MEDICINE

## 2018-04-02 PROCEDURE — 99213 OFFICE O/P EST LOW 20 MIN: CPT | Performed by: INTERNAL MEDICINE

## 2018-04-02 PROCEDURE — G8427 DOCREV CUR MEDS BY ELIG CLIN: HCPCS | Performed by: INTERNAL MEDICINE

## 2018-04-02 PROCEDURE — 1036F TOBACCO NON-USER: CPT | Performed by: INTERNAL MEDICINE

## 2018-04-02 RX ORDER — LIDOCAINE 50 MG/G
1 PATCH TOPICAL DAILY
Qty: 30 PATCH | Refills: 0 | Status: SHIPPED | OUTPATIENT
Start: 2018-04-02 | End: 2018-07-20

## 2018-04-04 ENCOUNTER — TELEPHONE (OUTPATIENT)
Dept: PAIN MANAGEMENT | Age: 48
End: 2018-04-04

## 2018-04-17 ENCOUNTER — HOSPITAL ENCOUNTER (EMERGENCY)
Age: 48
Discharge: HOME | End: 2018-04-17
Attending: EMERGENCY MEDICINE
Payer: COMMERCIAL

## 2018-04-17 ENCOUNTER — APPOINTMENT (OUTPATIENT)
Dept: CT IMAGING | Age: 48
End: 2018-04-17
Payer: COMMERCIAL

## 2018-04-17 VITALS
DIASTOLIC BLOOD PRESSURE: 91 MMHG | HEART RATE: 97 BPM | RESPIRATION RATE: 22 BRPM | TEMPERATURE: 98.7 F | HEIGHT: 67 IN | SYSTOLIC BLOOD PRESSURE: 157 MMHG | BODY MASS INDEX: 45.99 KG/M2 | WEIGHT: 293 LBS | OXYGEN SATURATION: 100 %

## 2018-04-17 DIAGNOSIS — N83.209 CYST OF OVARY, UNSPECIFIED LATERALITY: ICD-10-CM

## 2018-04-17 DIAGNOSIS — R10.9 ABDOMINAL PAIN, UNSPECIFIED ABDOMINAL LOCATION: Primary | ICD-10-CM

## 2018-04-17 LAB
ABSOLUTE EOS #: 0.1 K/UL (ref 0–0.4)
ABSOLUTE IMMATURE GRANULOCYTE: ABNORMAL K/UL (ref 0–0.3)
ABSOLUTE LYMPH #: 2.4 K/UL (ref 1–4.8)
ABSOLUTE MONO #: 0.4 K/UL (ref 0.2–0.8)
ALBUMIN SERPL-MCNC: 3.5 G/DL (ref 3.5–5.2)
ALBUMIN/GLOBULIN RATIO: ABNORMAL (ref 1–2.5)
ALP BLD-CCNC: 82 U/L (ref 35–104)
ALT SERPL-CCNC: 9 U/L (ref 5–33)
AMYLASE: 59 U/L (ref 28–100)
ANION GAP SERPL CALCULATED.3IONS-SCNC: 14 MMOL/L (ref 9–17)
AST SERPL-CCNC: 10 U/L
BASOPHILS # BLD: 3 % (ref 0–2)
BASOPHILS ABSOLUTE: 0.2 K/UL (ref 0–0.2)
BILIRUB SERPL-MCNC: 0.15 MG/DL (ref 0.3–1.2)
BILIRUBIN DIRECT: 0.1 MG/DL
BILIRUBIN URINE: NEGATIVE
BILIRUBIN, INDIRECT: 0.05 MG/DL (ref 0–1)
BUN BLDV-MCNC: 9 MG/DL (ref 6–20)
BUN/CREAT BLD: 11 (ref 9–20)
CALCIUM SERPL-MCNC: 8.8 MG/DL (ref 8.6–10.4)
CHLORIDE BLD-SCNC: 101 MMOL/L (ref 98–107)
CO2: 20 MMOL/L (ref 20–31)
COLOR: YELLOW
COMMENT UA: ABNORMAL
CREAT SERPL-MCNC: 0.84 MG/DL (ref 0.5–0.9)
DIFFERENTIAL TYPE: ABNORMAL
EOSINOPHILS RELATIVE PERCENT: 2 % (ref 1–4)
GFR AFRICAN AMERICAN: >60 ML/MIN
GFR NON-AFRICAN AMERICAN: >60 ML/MIN
GFR SERPL CREATININE-BSD FRML MDRD: ABNORMAL ML/MIN/{1.73_M2}
GFR SERPL CREATININE-BSD FRML MDRD: ABNORMAL ML/MIN/{1.73_M2}
GLOBULIN: ABNORMAL G/DL (ref 1.5–3.8)
GLUCOSE BLD-MCNC: 232 MG/DL (ref 70–99)
GLUCOSE URINE: ABNORMAL
HCT VFR BLD CALC: 39 % (ref 36–46)
HEMOGLOBIN: 13 G/DL (ref 12–16)
IMMATURE GRANULOCYTES: ABNORMAL %
KETONES, URINE: NEGATIVE
LACTIC ACID: 1.4 MMOL/L (ref 0.5–2.2)
LEUKOCYTE ESTERASE, URINE: NEGATIVE
LIPASE: 29 U/L (ref 13–60)
LYMPHOCYTES # BLD: 38 % (ref 24–44)
MCH RBC QN AUTO: 32.2 PG (ref 26–34)
MCHC RBC AUTO-ENTMCNC: 33.3 G/DL (ref 31–37)
MCV RBC AUTO: 96.5 FL (ref 80–100)
MONOCYTES # BLD: 6 % (ref 1–7)
NITRITE, URINE: NEGATIVE
NRBC AUTOMATED: ABNORMAL PER 100 WBC
PDW BLD-RTO: 14.6 % (ref 11.5–14.5)
PH UA: 7 (ref 5–8)
PLATELET # BLD: 338 K/UL (ref 130–400)
PLATELET ESTIMATE: ABNORMAL
PMV BLD AUTO: 7.9 FL (ref 6–12)
POTASSIUM SERPL-SCNC: 4.3 MMOL/L (ref 3.7–5.3)
PROTEIN UA: NEGATIVE
RBC # BLD: 4.04 M/UL (ref 4–5.2)
RBC # BLD: ABNORMAL 10*6/UL
SEG NEUTROPHILS: 51 % (ref 36–66)
SEGMENTED NEUTROPHILS ABSOLUTE COUNT: 3.3 K/UL (ref 1.8–7.7)
SODIUM BLD-SCNC: 135 MMOL/L (ref 135–144)
SPECIFIC GRAVITY UA: 1.02 (ref 1–1.03)
TOTAL PROTEIN: 6.9 G/DL (ref 6.4–8.3)
TURBIDITY: CLEAR
URINE HGB: NEGATIVE
UROBILINOGEN, URINE: NORMAL
WBC # BLD: 6.5 K/UL (ref 3.5–11)
WBC # BLD: ABNORMAL 10*3/UL

## 2018-04-17 PROCEDURE — 83605 ASSAY OF LACTIC ACID: CPT

## 2018-04-17 PROCEDURE — 96374 THER/PROPH/DIAG INJ IV PUSH: CPT

## 2018-04-17 PROCEDURE — 99284 EMERGENCY DEPT VISIT MOD MDM: CPT

## 2018-04-17 PROCEDURE — 83690 ASSAY OF LIPASE: CPT

## 2018-04-17 PROCEDURE — 6360000002 HC RX W HCPCS: Performed by: NURSE PRACTITIONER

## 2018-04-17 PROCEDURE — 85025 COMPLETE CBC W/AUTO DIFF WBC: CPT

## 2018-04-17 PROCEDURE — 82150 ASSAY OF AMYLASE: CPT

## 2018-04-17 PROCEDURE — 80048 BASIC METABOLIC PNL TOTAL CA: CPT

## 2018-04-17 PROCEDURE — 6360000004 HC RX CONTRAST MEDICATION: Performed by: NURSE PRACTITIONER

## 2018-04-17 PROCEDURE — 74177 CT ABD & PELVIS W/CONTRAST: CPT

## 2018-04-17 PROCEDURE — 96375 TX/PRO/DX INJ NEW DRUG ADDON: CPT

## 2018-04-17 PROCEDURE — 81003 URINALYSIS AUTO W/O SCOPE: CPT

## 2018-04-17 PROCEDURE — 80076 HEPATIC FUNCTION PANEL: CPT

## 2018-04-17 PROCEDURE — 2580000003 HC RX 258: Performed by: NURSE PRACTITIONER

## 2018-04-17 RX ORDER — 0.9 % SODIUM CHLORIDE 0.9 %
50 INTRAVENOUS SOLUTION INTRAVENOUS ONCE
Status: COMPLETED | OUTPATIENT
Start: 2018-04-17 | End: 2018-04-17

## 2018-04-17 RX ORDER — ONDANSETRON 4 MG/1
4 TABLET, ORALLY DISINTEGRATING ORAL EVERY 8 HOURS PRN
Qty: 15 TABLET | Refills: 0 | Status: SHIPPED | OUTPATIENT
Start: 2018-04-17 | End: 2018-05-09

## 2018-04-17 RX ORDER — SODIUM CHLORIDE 0.9 % (FLUSH) 0.9 %
10 SYRINGE (ML) INJECTION PRN
Status: DISCONTINUED | OUTPATIENT
Start: 2018-04-17 | End: 2018-04-18 | Stop reason: HOSPADM

## 2018-04-17 RX ORDER — 0.9 % SODIUM CHLORIDE 0.9 %
1000 INTRAVENOUS SOLUTION INTRAVENOUS ONCE
Status: COMPLETED | OUTPATIENT
Start: 2018-04-17 | End: 2018-04-17

## 2018-04-17 RX ORDER — DICYCLOMINE HCL 20 MG
20 TABLET ORAL 3 TIMES DAILY PRN
Qty: 20 TABLET | Refills: 0 | Status: SHIPPED | OUTPATIENT
Start: 2018-04-17 | End: 2018-08-03

## 2018-04-17 RX ORDER — FENTANYL CITRATE 50 UG/ML
50 INJECTION, SOLUTION INTRAMUSCULAR; INTRAVENOUS ONCE
Status: COMPLETED | OUTPATIENT
Start: 2018-04-17 | End: 2018-04-17

## 2018-04-17 RX ORDER — PROMETHAZINE HYDROCHLORIDE 25 MG/ML
12.5 INJECTION, SOLUTION INTRAMUSCULAR; INTRAVENOUS ONCE
Status: COMPLETED | OUTPATIENT
Start: 2018-04-17 | End: 2018-04-17

## 2018-04-17 RX ADMIN — Medication 10 ML: at 21:57

## 2018-04-17 RX ADMIN — SODIUM CHLORIDE 50 ML: 9 INJECTION, SOLUTION INTRAVENOUS at 21:57

## 2018-04-17 RX ADMIN — IOPAMIDOL 125 ML: 755 INJECTION, SOLUTION INTRAVENOUS at 21:57

## 2018-04-17 RX ADMIN — FENTANYL CITRATE 50 MCG: 50 INJECTION INTRAMUSCULAR; INTRAVENOUS at 21:28

## 2018-04-17 RX ADMIN — PROMETHAZINE HYDROCHLORIDE 12.5 MG: 25 INJECTION INTRAMUSCULAR; INTRAVENOUS at 21:28

## 2018-04-17 RX ADMIN — SODIUM CHLORIDE 1000 ML: 9 INJECTION, SOLUTION INTRAVENOUS at 21:28

## 2018-04-17 ASSESSMENT — PAIN DESCRIPTION - DESCRIPTORS: DESCRIPTORS: CRAMPING;SHARP

## 2018-04-17 ASSESSMENT — PAIN DESCRIPTION - ORIENTATION: ORIENTATION: LOWER

## 2018-04-17 ASSESSMENT — PAIN DESCRIPTION - PAIN TYPE: TYPE: ACUTE PAIN

## 2018-04-17 ASSESSMENT — ENCOUNTER SYMPTOMS
CONSTIPATION: 0
VOMITING: 0
NAUSEA: 1
ABDOMINAL PAIN: 1
DIARRHEA: 0
BACK PAIN: 0

## 2018-04-17 ASSESSMENT — PAIN DESCRIPTION - LOCATION: LOCATION: ABDOMEN

## 2018-04-17 ASSESSMENT — PAIN DESCRIPTION - FREQUENCY: FREQUENCY: CONTINUOUS

## 2018-04-17 ASSESSMENT — PAIN SCALES - GENERAL: PAINLEVEL_OUTOF10: 7

## 2018-04-20 ENCOUNTER — TELEPHONE (OUTPATIENT)
Dept: INTERNAL MEDICINE | Age: 48
End: 2018-04-20

## 2018-04-26 ENCOUNTER — OFFICE VISIT (OUTPATIENT)
Dept: ORTHOPEDIC SURGERY | Age: 48
End: 2018-04-26
Payer: COMMERCIAL

## 2018-04-26 VITALS — HEIGHT: 68 IN | BODY MASS INDEX: 44.41 KG/M2 | WEIGHT: 293 LBS

## 2018-04-26 DIAGNOSIS — M17.12 ARTHRITIS OF LEFT KNEE: ICD-10-CM

## 2018-04-26 DIAGNOSIS — M17.11 ARTHRITIS OF RIGHT KNEE: ICD-10-CM

## 2018-04-26 DIAGNOSIS — M17.0 PRIMARY OSTEOARTHRITIS OF BOTH KNEES: Primary | ICD-10-CM

## 2018-04-26 PROCEDURE — 20610 DRAIN/INJ JOINT/BURSA W/O US: CPT | Performed by: ORTHOPAEDIC SURGERY

## 2018-04-26 PROCEDURE — 99213 OFFICE O/P EST LOW 20 MIN: CPT | Performed by: ORTHOPAEDIC SURGERY

## 2018-04-26 PROCEDURE — G8417 CALC BMI ABV UP PARAM F/U: HCPCS | Performed by: ORTHOPAEDIC SURGERY

## 2018-04-26 PROCEDURE — 1036F TOBACCO NON-USER: CPT | Performed by: ORTHOPAEDIC SURGERY

## 2018-04-26 PROCEDURE — G8427 DOCREV CUR MEDS BY ELIG CLIN: HCPCS | Performed by: ORTHOPAEDIC SURGERY

## 2018-04-26 RX ORDER — METHYLPREDNISOLONE ACETATE 80 MG/ML
80 INJECTION, SUSPENSION INTRA-ARTICULAR; INTRALESIONAL; INTRAMUSCULAR; SOFT TISSUE ONCE
Status: COMPLETED | OUTPATIENT
Start: 2018-04-26 | End: 2018-04-27

## 2018-04-26 RX ORDER — BUPIVACAINE HYDROCHLORIDE 2.5 MG/ML
2 INJECTION, SOLUTION INFILTRATION; PERINEURAL ONCE
Status: COMPLETED | OUTPATIENT
Start: 2018-04-26 | End: 2018-04-27

## 2018-04-26 ASSESSMENT — ENCOUNTER SYMPTOMS
VOMITING: 0
COUGH: 0
APNEA: 0
CHEST TIGHTNESS: 0
ABDOMINAL PAIN: 0

## 2018-04-27 RX ADMIN — BUPIVACAINE HYDROCHLORIDE 5 MG: 2.5 INJECTION, SOLUTION INFILTRATION; PERINEURAL at 14:05

## 2018-04-27 RX ADMIN — METHYLPREDNISOLONE ACETATE 80 MG: 80 INJECTION, SUSPENSION INTRA-ARTICULAR; INTRALESIONAL; INTRAMUSCULAR; SOFT TISSUE at 14:05

## 2018-04-27 RX ADMIN — BUPIVACAINE HYDROCHLORIDE 5 MG: 2.5 INJECTION, SOLUTION INFILTRATION; PERINEURAL at 14:04

## 2018-05-09 ENCOUNTER — OFFICE VISIT (OUTPATIENT)
Dept: INTERNAL MEDICINE | Age: 48
End: 2018-05-09
Payer: COMMERCIAL

## 2018-05-09 VITALS
SYSTOLIC BLOOD PRESSURE: 139 MMHG | WEIGHT: 293 LBS | HEIGHT: 68 IN | HEART RATE: 93 BPM | BODY MASS INDEX: 44.41 KG/M2 | DIASTOLIC BLOOD PRESSURE: 84 MMHG

## 2018-05-09 DIAGNOSIS — E66.01 MORBID OBESITY WITH BMI OF 50.0-59.9, ADULT (HCC): ICD-10-CM

## 2018-05-09 DIAGNOSIS — E03.9 ACQUIRED HYPOTHYROIDISM: ICD-10-CM

## 2018-05-09 DIAGNOSIS — D50.8 IRON DEFICIENCY ANEMIA SECONDARY TO INADEQUATE DIETARY IRON INTAKE: ICD-10-CM

## 2018-05-09 DIAGNOSIS — M17.0 PRIMARY OSTEOARTHRITIS OF BOTH KNEES: Primary | ICD-10-CM

## 2018-05-09 DIAGNOSIS — E78.00 PURE HYPERCHOLESTEROLEMIA: ICD-10-CM

## 2018-05-09 DIAGNOSIS — E11.9 TYPE 2 DIABETES MELLITUS WITHOUT COMPLICATION, WITHOUT LONG-TERM CURRENT USE OF INSULIN (HCC): ICD-10-CM

## 2018-05-09 DIAGNOSIS — E11.9 TYPE 2 DIABETES MELLITUS WITHOUT COMPLICATION, WITH LONG-TERM CURRENT USE OF INSULIN (HCC): ICD-10-CM

## 2018-05-09 DIAGNOSIS — I10 ESSENTIAL HYPERTENSION: ICD-10-CM

## 2018-05-09 DIAGNOSIS — N39.41 URGE INCONTINENCE OF URINE: ICD-10-CM

## 2018-05-09 DIAGNOSIS — J45.20 MILD INTERMITTENT ASTHMA WITHOUT COMPLICATION: ICD-10-CM

## 2018-05-09 DIAGNOSIS — Z79.4 TYPE 2 DIABETES MELLITUS WITHOUT COMPLICATION, WITH LONG-TERM CURRENT USE OF INSULIN (HCC): ICD-10-CM

## 2018-05-09 PROCEDURE — G8417 CALC BMI ABV UP PARAM F/U: HCPCS | Performed by: INTERNAL MEDICINE

## 2018-05-09 PROCEDURE — 99213 OFFICE O/P EST LOW 20 MIN: CPT | Performed by: INTERNAL MEDICINE

## 2018-05-09 PROCEDURE — 1036F TOBACCO NON-USER: CPT | Performed by: INTERNAL MEDICINE

## 2018-05-09 PROCEDURE — 2022F DILAT RTA XM EVC RTNOPTHY: CPT | Performed by: INTERNAL MEDICINE

## 2018-05-09 PROCEDURE — 99214 OFFICE O/P EST MOD 30 MIN: CPT | Performed by: INTERNAL MEDICINE

## 2018-05-09 PROCEDURE — G8427 DOCREV CUR MEDS BY ELIG CLIN: HCPCS | Performed by: INTERNAL MEDICINE

## 2018-05-09 PROCEDURE — 3045F PR MOST RECENT HEMOGLOBIN A1C LEVEL 7.0-9.0%: CPT | Performed by: INTERNAL MEDICINE

## 2018-05-09 RX ORDER — PHENTERMINE HYDROCHLORIDE 37.5 MG/1
37.5 TABLET ORAL
Qty: 30 TABLET | Refills: 0 | Status: SHIPPED | OUTPATIENT
Start: 2018-05-09 | End: 2018-05-30 | Stop reason: SDUPTHER

## 2018-05-09 RX ORDER — OXYBUTYNIN CHLORIDE 10 MG/1
10 TABLET, EXTENDED RELEASE ORAL DAILY
Qty: 30 TABLET | Refills: 2 | Status: SHIPPED | OUTPATIENT
Start: 2018-05-09 | End: 2019-03-06 | Stop reason: SDUPTHER

## 2018-05-09 RX ORDER — SIMVASTATIN 40 MG
40 TABLET ORAL NIGHTLY
Qty: 30 TABLET | Refills: 2 | Status: SHIPPED | OUTPATIENT
Start: 2018-05-09 | End: 2018-08-03 | Stop reason: SDUPTHER

## 2018-05-09 RX ORDER — ACETAMINOPHEN AND CODEINE PHOSPHATE 300; 30 MG/1; MG/1
1 TABLET ORAL 2 TIMES DAILY PRN
Qty: 60 TABLET | Refills: 0 | Status: SHIPPED | OUTPATIENT
Start: 2018-05-09 | End: 2018-06-08

## 2018-05-09 RX ORDER — LISINOPRIL 5 MG/1
5 TABLET ORAL DAILY
Qty: 30 TABLET | Refills: 2 | Status: SHIPPED | OUTPATIENT
Start: 2018-05-09 | End: 2018-08-03 | Stop reason: SDUPTHER

## 2018-05-09 RX ORDER — FERROUS SULFATE 325(65) MG
325 TABLET ORAL 2 TIMES DAILY
Qty: 60 TABLET | Refills: 2 | Status: SHIPPED | OUTPATIENT
Start: 2018-05-09 | End: 2018-08-03 | Stop reason: SDUPTHER

## 2018-05-09 RX ORDER — ALBUTEROL SULFATE 90 UG/1
2 AEROSOL, METERED RESPIRATORY (INHALATION) EVERY 6 HOURS PRN
Qty: 1 INHALER | Refills: 2 | Status: SHIPPED | OUTPATIENT
Start: 2018-05-09 | End: 2019-03-06 | Stop reason: SDUPTHER

## 2018-05-09 RX ORDER — ASPIRIN 81 MG/1
81 TABLET ORAL DAILY
Qty: 30 TABLET | Refills: 2 | Status: SHIPPED | OUTPATIENT
Start: 2018-05-09 | End: 2019-03-06 | Stop reason: SDUPTHER

## 2018-05-09 RX ORDER — LEVOTHYROXINE SODIUM 0.05 MG/1
50 TABLET ORAL DAILY
Qty: 30 TABLET | Refills: 2 | Status: SHIPPED | OUTPATIENT
Start: 2018-05-09 | End: 2018-08-03 | Stop reason: SDUPTHER

## 2018-05-09 ASSESSMENT — ENCOUNTER SYMPTOMS
DOUBLE VISION: 0
BLURRED VISION: 0
HEMOPTYSIS: 0
HEARTBURN: 0
ABDOMINAL PAIN: 0
COUGH: 0
NAUSEA: 0

## 2018-05-10 ENCOUNTER — OFFICE VISIT (OUTPATIENT)
Dept: OBGYN | Age: 48
End: 2018-05-10
Payer: COMMERCIAL

## 2018-05-10 VITALS
SYSTOLIC BLOOD PRESSURE: 112 MMHG | RESPIRATION RATE: 16 BRPM | TEMPERATURE: 97.6 F | BODY MASS INDEX: 44.41 KG/M2 | WEIGHT: 293 LBS | DIASTOLIC BLOOD PRESSURE: 75 MMHG | HEART RATE: 94 BPM | HEIGHT: 68 IN

## 2018-05-10 DIAGNOSIS — N83.202 BILATERAL OVARIAN CYSTS: ICD-10-CM

## 2018-05-10 DIAGNOSIS — N83.209 CYST OF OVARY, UNSPECIFIED LATERALITY: Primary | ICD-10-CM

## 2018-05-10 DIAGNOSIS — Z90.710 HISTORY OF HYSTERECTOMY: ICD-10-CM

## 2018-05-10 DIAGNOSIS — N83.201 BILATERAL OVARIAN CYSTS: ICD-10-CM

## 2018-05-10 DIAGNOSIS — R10.2 PELVIC PAIN IN FEMALE: ICD-10-CM

## 2018-05-10 PROCEDURE — 99203 OFFICE O/P NEW LOW 30 MIN: CPT | Performed by: OBSTETRICS & GYNECOLOGY

## 2018-05-10 PROCEDURE — G8417 CALC BMI ABV UP PARAM F/U: HCPCS | Performed by: OBSTETRICS & GYNECOLOGY

## 2018-05-10 PROCEDURE — 4004F PT TOBACCO SCREEN RCVD TLK: CPT | Performed by: OBSTETRICS & GYNECOLOGY

## 2018-05-10 PROCEDURE — 99212 OFFICE O/P EST SF 10 MIN: CPT | Performed by: OBSTETRICS & GYNECOLOGY

## 2018-05-10 PROCEDURE — G8427 DOCREV CUR MEDS BY ELIG CLIN: HCPCS | Performed by: OBSTETRICS & GYNECOLOGY

## 2018-05-14 ENCOUNTER — TELEPHONE (OUTPATIENT)
Dept: ORTHOPEDIC SURGERY | Age: 48
End: 2018-05-14

## 2018-05-22 ENCOUNTER — HOSPITAL ENCOUNTER (OUTPATIENT)
Age: 48
Setting detail: SPECIMEN
Discharge: HOME | End: 2018-05-22
Payer: COMMERCIAL

## 2018-05-22 DIAGNOSIS — E03.9 ACQUIRED HYPOTHYROIDISM: ICD-10-CM

## 2018-05-22 LAB — TSH SERPL DL<=0.05 MIU/L-ACNC: 1.13 MIU/L (ref 0.3–5)

## 2018-05-22 PROCEDURE — 36415 COLL VENOUS BLD VENIPUNCTURE: CPT

## 2018-05-22 PROCEDURE — 84443 ASSAY THYROID STIM HORMONE: CPT

## 2018-05-30 ENCOUNTER — OFFICE VISIT (OUTPATIENT)
Dept: INTERNAL MEDICINE | Age: 48
End: 2018-05-30
Payer: COMMERCIAL

## 2018-05-30 VITALS
SYSTOLIC BLOOD PRESSURE: 127 MMHG | BODY MASS INDEX: 44.41 KG/M2 | HEART RATE: 96 BPM | HEIGHT: 68 IN | DIASTOLIC BLOOD PRESSURE: 87 MMHG | WEIGHT: 293 LBS

## 2018-05-30 DIAGNOSIS — E66.01 MORBID OBESITY WITH BMI OF 50.0-59.9, ADULT (HCC): ICD-10-CM

## 2018-05-30 PROCEDURE — G8417 CALC BMI ABV UP PARAM F/U: HCPCS | Performed by: INTERNAL MEDICINE

## 2018-05-30 PROCEDURE — 99212 OFFICE O/P EST SF 10 MIN: CPT | Performed by: INTERNAL MEDICINE

## 2018-05-30 PROCEDURE — 99213 OFFICE O/P EST LOW 20 MIN: CPT | Performed by: INTERNAL MEDICINE

## 2018-05-30 PROCEDURE — G8427 DOCREV CUR MEDS BY ELIG CLIN: HCPCS | Performed by: INTERNAL MEDICINE

## 2018-05-30 PROCEDURE — 4004F PT TOBACCO SCREEN RCVD TLK: CPT | Performed by: INTERNAL MEDICINE

## 2018-05-30 RX ORDER — PHENTERMINE HYDROCHLORIDE 37.5 MG/1
37.5 TABLET ORAL
Qty: 30 TABLET | Refills: 0 | Status: SHIPPED | OUTPATIENT
Start: 2018-05-30 | End: 2018-05-30 | Stop reason: SDUPTHER

## 2018-05-30 RX ORDER — PHENTERMINE HYDROCHLORIDE 37.5 MG/1
37.5 TABLET ORAL
Qty: 30 TABLET | Refills: 0 | Status: SHIPPED | OUTPATIENT
Start: 2018-05-30 | End: 2018-07-02 | Stop reason: SDUPTHER

## 2018-05-30 ASSESSMENT — ENCOUNTER SYMPTOMS
DOUBLE VISION: 0
BLURRED VISION: 0
NAUSEA: 0
ABDOMINAL PAIN: 0
COUGH: 0
HEMOPTYSIS: 0
HEARTBURN: 0

## 2018-06-07 DIAGNOSIS — M17.0 PRIMARY OSTEOARTHRITIS OF BOTH KNEES: ICD-10-CM

## 2018-06-07 RX ORDER — ACETAMINOPHEN AND CODEINE PHOSPHATE 300; 30 MG/1; MG/1
1 TABLET ORAL 2 TIMES DAILY PRN
Qty: 60 TABLET | Refills: 0 | Status: CANCELLED | OUTPATIENT
Start: 2018-06-07 | End: 2018-07-07

## 2018-06-08 DIAGNOSIS — M17.0 PRIMARY OSTEOARTHRITIS OF BOTH KNEES: ICD-10-CM

## 2018-06-12 RX ORDER — ACETAMINOPHEN AND CODEINE PHOSPHATE 300; 30 MG/1; MG/1
TABLET ORAL
Qty: 60 TABLET | Refills: 0 | OUTPATIENT
Start: 2018-06-12

## 2018-06-14 ENCOUNTER — OFFICE VISIT (OUTPATIENT)
Dept: OBGYN | Age: 48
End: 2018-06-14
Payer: COMMERCIAL

## 2018-06-14 VITALS
HEART RATE: 106 BPM | BODY MASS INDEX: 44.75 KG/M2 | DIASTOLIC BLOOD PRESSURE: 77 MMHG | SYSTOLIC BLOOD PRESSURE: 120 MMHG | WEIGHT: 290 LBS | TEMPERATURE: 97.9 F

## 2018-06-14 DIAGNOSIS — N83.202 BILATERAL OVARIAN CYSTS: Primary | ICD-10-CM

## 2018-06-14 DIAGNOSIS — N83.201 BILATERAL OVARIAN CYSTS: Primary | ICD-10-CM

## 2018-06-14 PROCEDURE — G8417 CALC BMI ABV UP PARAM F/U: HCPCS | Performed by: OBSTETRICS & GYNECOLOGY

## 2018-06-14 PROCEDURE — 99213 OFFICE O/P EST LOW 20 MIN: CPT | Performed by: OBSTETRICS & GYNECOLOGY

## 2018-06-14 PROCEDURE — 4004F PT TOBACCO SCREEN RCVD TLK: CPT | Performed by: OBSTETRICS & GYNECOLOGY

## 2018-06-14 PROCEDURE — G8427 DOCREV CUR MEDS BY ELIG CLIN: HCPCS | Performed by: OBSTETRICS & GYNECOLOGY

## 2018-06-28 ENCOUNTER — TELEPHONE (OUTPATIENT)
Dept: OBGYN | Age: 48
End: 2018-06-28

## 2018-07-02 ENCOUNTER — OFFICE VISIT (OUTPATIENT)
Dept: INTERNAL MEDICINE | Age: 48
End: 2018-07-02
Payer: COMMERCIAL

## 2018-07-02 VITALS
WEIGHT: 289 LBS | HEART RATE: 100 BPM | SYSTOLIC BLOOD PRESSURE: 112 MMHG | BODY MASS INDEX: 43.8 KG/M2 | HEIGHT: 68 IN | DIASTOLIC BLOOD PRESSURE: 78 MMHG

## 2018-07-02 DIAGNOSIS — M17.0 PRIMARY OSTEOARTHRITIS OF BOTH KNEES: ICD-10-CM

## 2018-07-02 DIAGNOSIS — E66.01 MORBID OBESITY WITH BMI OF 50.0-59.9, ADULT (HCC): Primary | ICD-10-CM

## 2018-07-02 PROCEDURE — 99213 OFFICE O/P EST LOW 20 MIN: CPT | Performed by: INTERNAL MEDICINE

## 2018-07-02 PROCEDURE — G8417 CALC BMI ABV UP PARAM F/U: HCPCS | Performed by: INTERNAL MEDICINE

## 2018-07-02 PROCEDURE — 99212 OFFICE O/P EST SF 10 MIN: CPT | Performed by: INTERNAL MEDICINE

## 2018-07-02 PROCEDURE — 4004F PT TOBACCO SCREEN RCVD TLK: CPT | Performed by: INTERNAL MEDICINE

## 2018-07-02 PROCEDURE — G8427 DOCREV CUR MEDS BY ELIG CLIN: HCPCS | Performed by: INTERNAL MEDICINE

## 2018-07-02 RX ORDER — PHENTERMINE HYDROCHLORIDE 37.5 MG/1
37.5 TABLET ORAL
Qty: 30 TABLET | Refills: 0 | Status: SHIPPED | OUTPATIENT
Start: 2018-07-02 | End: 2018-08-01

## 2018-07-02 RX ORDER — DULOXETIN HYDROCHLORIDE 30 MG/1
30 CAPSULE, DELAYED RELEASE ORAL DAILY
Qty: 30 CAPSULE | Refills: 2 | Status: SHIPPED | OUTPATIENT
Start: 2018-07-02 | End: 2018-07-20 | Stop reason: SINTOL

## 2018-07-02 ASSESSMENT — ENCOUNTER SYMPTOMS
COUGH: 0
BLURRED VISION: 0
DOUBLE VISION: 0
HEARTBURN: 0
ABDOMINAL PAIN: 0
HEMOPTYSIS: 0
NAUSEA: 0

## 2018-07-02 ASSESSMENT — PATIENT HEALTH QUESTIONNAIRE - PHQ9
SUM OF ALL RESPONSES TO PHQ9 QUESTIONS 1 & 2: 0
SUM OF ALL RESPONSES TO PHQ QUESTIONS 1-9: 0
1. LITTLE INTEREST OR PLEASURE IN DOING THINGS: 0
2. FEELING DOWN, DEPRESSED OR HOPELESS: 0

## 2018-07-02 NOTE — PROGRESS NOTES
[facility]  [facility] [address]  [address] [facility] [address]  Dept: [phone]  Dept Fax: [phone]    Office Progress/Follow Up Note  Date of patient's visit: 7/2/2018  Patient's Name:  Nae Sky YOB: 1970            Patient Care Team:  Roopa Brown MD as PCP - Tien Gamez MD as Anesthesiologist (Pain Management)  Loretta Meek MD as Consulting Physician (Urology)  ================================================================    REASON FOR VISIT/CHIEF COMPLAINT:  Knee Pain (bilateral knee pain) and Obesity (Adipex review)    HISTORY OF PRESENTING ILLNESS:  History was obtained from: patient. Nae Sky is a 49 y.o. is here for a follow-up visit. She has morbid obesity with BMI of 45. She was started on Adipex which she has completed one month. She has lost 5 pounds. She would like to continue with Adipex. She also has chronic knee pain due to osteoarthritis from obesity. She is scheduled to see pain management in 2 weeks time. She continued to complain of pain medication and asking for narcotic. Patient has taking gabapentin in the past and developed side effects. Not been tried on Cymbalta.        Patient Active Problem List   Diagnosis    Smoker    Carpal tunnel syndrome on both sides    Acquired hypothyroidism    Iron deficiency anemia    Mild episode of recurrent major depressive disorder ([facility] [address]    Type 2 diabetes mellitus with diabetic polyneuropathy, with long-term current use of insulin ([facility] [address]    Morbid obesity with BMI of 50.0-59.9, adult (HCC)    Primary osteoarthritis of both knees    Urge incontinence of urine    Mild intermittent asthma without complication    Pure hypercholesterolemia    Essential hypertension    Pelvic pain in female    Bilateral ovarian cysts    H/O Hysterectomy (2013)       Health Maintenance Due   Topic Date Due    DTaP/Tdap/Td vaccine (1 - Tdap) 07/06/1988 1 patch onto the skin daily 12 hours on, 12 hours off. 30 patch 0    Misc. Devices (TOILET SEAT ELEVATOR) MISC Dx: Chronic back pain, use as needed 1 each 0     No current facility-administered medications for this visit. Social History   Substance Use Topics    Smoking status: Current Every Day Smoker     Packs/day: 0.25     Years: 15.00     Types: Cigarettes    Smokeless tobacco: Never Used      Comment: 3-4 cigarettes a day     Alcohol use 0.0 oz/week      Comment: pt states 1 glass of wine per month       Family History   Problem Relation Age of Onset   [facility] Stroke Father     Diabetes Mother     Hypertension Mother     Breast Cancer Neg Hx     Cancer Neg Hx     Colon Cancer Neg Hx     Eclampsia Neg Hx     Ovarian Cancer Neg Hx      Labor Neg Hx     Spont Abortions Neg Hx         REVIEW OF SYSTEMS:  Review of Systems   Constitutional: Negative for chills and fever. HENT: Negative for congestion, ear discharge and nosebleeds. Eyes: Negative for blurred vision and double vision. Respiratory: Negative for cough and hemoptysis. Cardiovascular: Negative for chest pain and palpitations. Gastrointestinal: Negative for abdominal pain, heartburn and nausea. Genitourinary: Negative for dysuria and urgency. Musculoskeletal: Negative for myalgias and neck pain. Neurological: Negative for dizziness and headaches. Endo/Heme/Allergies: Does not bruise/bleed easily. Psychiatric/Behavioral: Negative for depression and suicidal ideas. PHYSICAL EXAM:  Vitals:    18 1548   BP: 112/78   Site: Left Arm   Position: Sitting   Cuff Size: Large Adult   Pulse: 100   Weight: 289 lb (131.1 kg)   Height: 5' 7.5\" (1.715 m)     BP Readings from Last 3 Encounters:   18 112/78   18 120/77   18 127/87        Physical Exam   Constitutional: She is oriented to person, place, and time and well-developed, well-nourished, and in no distress. No distress.    HENT:   Mouth/Throat:

## 2018-07-02 NOTE — PROGRESS NOTES
Visit Information    Have you changed or started any medications since your last visit including any over-the-counter medicines, vitamins, or herbal medicines? no   Have you stopped taking any of your medications? Is so, why? -  no  Are you having any side effects from any of your medications? - no    Have you seen any other physician or provider since your last visit? yes - GYN  Have you had any other diagnostic tests since your last visit? yes - labs   Have you been seen in the emergency room and/or had an admission in a hospital since we last saw you?  no   Have you had your routine dental cleaning in the past 6 months?  no     Do you have an active MyChart account? If no, what is the barrier?   Yes    Patient Care Team:  Roopa Martinez MD as PCP - Nabil Pratt MD as Anesthesiologist (Pain Management)  Iesha Griffiths MD as Consulting Physician (Urology)    Medical History Review  Past Medical, Family, and Social History reviewed and does not contribute to the patient presenting condition    Health Maintenance   Topic Date Due    DTaP/Tdap/Td vaccine (1 - Tdap) 07/06/1988    Diabetic retinal exam  10/01/2016    Flu vaccine (1) 09/01/2018    Diabetic microalbuminuria test  09/11/2018    Lipid screen  09/11/2018    Diabetic foot exam  10/09/2018    A1C test (Diabetic or Prediabetic)  01/19/2019    Potassium monitoring  04/17/2019    Creatinine monitoring  04/17/2019    TSH testing  05/22/2019    Pneumococcal med risk  Completed    HIV screen  Completed

## 2018-07-11 DIAGNOSIS — M17.0 PRIMARY OSTEOARTHRITIS OF BOTH KNEES: ICD-10-CM

## 2018-07-11 RX ORDER — ACETAMINOPHEN AND CODEINE PHOSPHATE 300; 30 MG/1; MG/1
TABLET ORAL
Qty: 60 TABLET | Refills: 0 | OUTPATIENT
Start: 2018-07-11

## 2018-07-11 NOTE — TELEPHONE ENCOUNTER
E-scribe request for ACETAMINOPHEN-CODEINE # [id number] TABS. Please review and e-scribe if applicable. Last Visit Date:  7/2/18  Next Visit Date:  7/30/2018    Hemoglobin A1C (%)   Date Value   01/19/2018 7.5   05/31/2017 7.6   09/16/2016 9.1             ( goal A1C is < 7)   Microalb/Crt.  Ratio (mcg/mg creat)   Date Value   09/11/2017 7     LDL Cholesterol (mg/dL)   Date Value   09/11/2017 113       (goal LDL is <100)   AST (U/L)   Date Value   04/17/2018 10     ALT (U/L)   Date Value   04/17/2018 9     BUN (mg/dL)   Date Value   04/17/2018 9     BP Readings from Last 3 Encounters:   07/02/18 112/78   06/14/18 120/77   05/30/18 127/87          (goal 120/80)        Patient Active Problem List:     Smoker     Carpal tunnel syndrome on both sides     Acquired hypothyroidism     Iron deficiency anemia     Mild episode of recurrent major depressive disorder (HCC)     Type 2 diabetes mellitus with diabetic polyneuropathy, with long-term current use of insulin (HCC)     Morbid obesity with BMI of 50.0-59.9, adult (HCC)     Primary osteoarthritis of both knees     Urge incontinence of urine     Mild intermittent asthma without complication     Pure hypercholesterolemia     Essential hypertension     Pelvic pain in female     Bilateral ovarian cysts     H/O Hysterectomy (2013)      ----JF

## 2018-07-16 ENCOUNTER — TELEPHONE (OUTPATIENT)
Dept: PAIN MANAGEMENT | Age: 48
End: 2018-07-16

## 2018-07-20 ENCOUNTER — INITIAL CONSULT (OUTPATIENT)
Dept: PAIN MANAGEMENT | Age: 48
End: 2018-07-20
Payer: COMMERCIAL

## 2018-07-20 VITALS
HEART RATE: 111 BPM | SYSTOLIC BLOOD PRESSURE: 139 MMHG | WEIGHT: 287.6 LBS | DIASTOLIC BLOOD PRESSURE: 85 MMHG | HEIGHT: 68 IN | OXYGEN SATURATION: 98 % | TEMPERATURE: 98 F | RESPIRATION RATE: 16 BRPM | BODY MASS INDEX: 43.59 KG/M2

## 2018-07-20 DIAGNOSIS — M17.0 PRIMARY OSTEOARTHRITIS OF BOTH KNEES: Primary | ICD-10-CM

## 2018-07-20 PROCEDURE — G8427 DOCREV CUR MEDS BY ELIG CLIN: HCPCS | Performed by: ANESTHESIOLOGY

## 2018-07-20 PROCEDURE — 99204 OFFICE O/P NEW MOD 45 MIN: CPT | Performed by: ANESTHESIOLOGY

## 2018-07-20 PROCEDURE — G8417 CALC BMI ABV UP PARAM F/U: HCPCS | Performed by: ANESTHESIOLOGY

## 2018-07-20 PROCEDURE — 4004F PT TOBACCO SCREEN RCVD TLK: CPT | Performed by: ANESTHESIOLOGY

## 2018-07-20 ASSESSMENT — ENCOUNTER SYMPTOMS
GASTROINTESTINAL NEGATIVE: 1
RESPIRATORY NEGATIVE: 1
ALLERGIC/IMMUNOLOGIC NEGATIVE: 1
EYES NEGATIVE: 1

## 2018-07-20 NOTE — PROGRESS NOTES
Subjective:      Patient ID: Lorena Pollack is a 51 y.o. female. This is a 76-year-old woman with past medical history of obesity, she have lost significant weight over last year. She is seen today for chronic knee pain. Onset of pain many years ago symptoms have progressively worsened over years. She describes any pain as a constant aching throbbing pain sensation that aggravates with standing and walking and mid and daily physical activity and become sharp. She rates her average pain intensity between 7-8/10. Alleviating factors include rest TENS unit. She denies any associated numbness or paresthesia in her legs. No history of bladder or bowel incontinence. No history of fever chills or weight loss. Patient states that she had completed therapy many times in past but last therapy was many years ago. She been diagnosed with bilateral knee osteoarthritis and has been seeing orthopedic physicians at [facility]. She had received knee injections with short term benefits with the steroids. Most recent injection was in April 2018. No history of knee surgeries. She states that she is told by the surgeon to lose 100 pounds before she can have a total knee replacement surgeries. Patient states that she has tried Lyrica and Neurontin in past and had an allergic reaction. She states she of tried over-the-counter inflammatory medications and prescription strength NSAIDs without any relief. She is seen pain clinics in past.  She was seeing pain management at [facility] pain clinic at [facility] but had urine positive for alcohol and therefore was not prescribed any opioids. Before that she was seeing pain management at [place] and was discharged for failure urine drug screening in 2017. She states that she do have a TENS unit at home and knee brace which she uses as needed basis.        Requesting physician for the evaluation of Lorena Pollack 1970: Dr Duane Arredondo    Pain History  Pain score today 7  1. Location:both knees however the left knee is the worse  2. Radiation:left leg going up from the knee  3. Character:penetrating, aching, throbbing, shooting, sharp  5. Duration:constant  6. Onset: years, maybe 10 yrs  7. Did an injury cause pain: no  8. Aggravating factors:walking, stairs, housework, standing too long, cooking  9. Alleviating factors:laying in bed, TENS machine helps a little   10. Associated symptoms (numbness / tingling / weakness):  yes  -Where at:knees   -Down into finger tips or toes (specify which finger or toes):no   -constant or intermitting: intermittent  11. Red Flags: (weight loss / chills / loss of bladder or bowel control):weight loss purposely, urgency with Bladder     Previous management history  1. Previous diagnostic workup: (Imaging/EMG)   CT, MRI, or Xray: knee xrays in 2017 and 2016 in Epic  What part of the body:knees  What facility did they have it at:[facility]  What year or specific date:above  EMG:  no    2. Previous non interventional treatments tried:  chiropractor or physical therapy: PT several times   What part of the body:knees  What [place] [facility]  How long ago was it last tried:early MANY YEARS AGO  Did it work: No  Did they complete it:Yes    3. Previous Medications tried  NSAID's: yes  Neurontin: allergy  Lyrica: allergy  Trycyclic antidepressant (Ellavil / Pamelor ): no  Cymbalta:tried it caused stomach pain  Opioids (Ultram / Vicodin / Percocet / Morphine / Dilaudid / Oramorph/ Fentanyl etc.):Tylenol 3, Percocet years ago   Last Pain medication taken (name of med and date): Aleve today, Tylenol 3  About a month ago    4. Previous Interventional pain procedures tried: did see PM doc a  year ago in the [facility] building  What kind of injection:Dr Dodson-eliseo steroid ángel knees   did the injection help: for about  A week   Last time injection was done:May 2018    5.  Previous surgeries for pain no surgeries in the Comment: 3-4 cigarettes a day     Alcohol use 0.0 oz/week      Comment: holidays    Drug use: No    Sexual activity: Yes     Partners: Male     Other Topics Concern    None     Social History Narrative    None     Family History   Problem Relation Age of Onset   Bhupinder Velázquez Stroke Father     Diabetes Mother     Hypertension Mother     Breast Cancer Neg Hx     Cancer Neg Hx     Colon Cancer Neg Hx     Eclampsia Neg Hx     Ovarian Cancer Neg Hx      Labor Neg Hx     Spont Abortions Neg Hx        Review of Systems   HENT: Negative. Eyes: Negative. Respiratory: Negative. Cardiovascular: Negative. Gastrointestinal: Negative. Endocrine: Negative. Genitourinary: Positive for urgency. Musculoskeletal: Positive for joint swelling (knee pain and swelling). Skin: Negative. Allergic/Immunologic: Negative. Neurological: Positive for weakness (uses a cane ). Hematological: Bruises/bleeds easily. Psychiatric/Behavioral: Positive for agitation, dysphoric mood and sleep disturbance. Objective:   Physical Exam   Constitutional: She is oriented to person, place, and time. No distress. OBESE   HENT:   Head: Normocephalic and atraumatic. Eyes: Conjunctivae and EOM are normal. Pupils are equal, round, and reactive to light. Cardiovascular: Normal rate. Pulmonary/Chest: Effort normal.   Musculoskeletal:        Right knee: She exhibits decreased range of motion. She exhibits no swelling and no effusion. Tenderness found. Medial joint line and lateral joint line tenderness noted. Left knee: She exhibits decreased range of motion. She exhibits no swelling and no effusion. Tenderness found. Medial joint line and lateral joint line tenderness noted. Neurological: She is alert and oriented to person, place, and time. Gait abnormal.   AMBULATE WITH CANE   Skin: Skin is warm and dry. Psychiatric: She has a normal mood and affect.  Her behavior is normal. Judgment and thought

## 2018-07-20 NOTE — LETTER
[facility] Pain Management [address]   [place] [address]  Phone: [phone]  Fax: [phone]    Sandro Alba MD        July 20, 2018     Roopa Martinez MD  [address] [facility] [address]    Patient: Tomer Luis  MR Number: [id number]  YOB: 1970  Date of Visit: 7/20/2018    Dear Dr. Enedina Reina:    Thank you for the request for consultation for Doreen Vaughan to me for the evaluation of chronic bilateral knee pain. Below are the relevant portions of my assessment and plan of care. Assessment: This is a 25-year-old woman with past medical history of obesity, she have lost significant weight over last year. She is seen today for chronic knee pain. Onset of pain many years ago symptoms have progressively worsened over years. She describes any pain as a constant aching throbbing pain sensation that aggravates with standing and walking and mid and daily physical activity and become sharp. She rates her average pain intensity between 7-8/10. Alleviating factors include rest TENS unit. She denies any associated numbness or paresthesia in her legs. No history of bladder or bowel incontinence. No history of fever chills or weight loss. Patient states that she had completed therapy many times in past but last therapy was many years ago. She been diagnosed with bilateral knee osteoarthritis and has been seeing orthopedic physicians at [facility]. She had received knee injections with short term benefits with the steroids. Most recent injection was in April 2018. No history of knee surgeries. She states that she is told by the surgeon to lose 100 pounds before she can have a total knee replacement surgeries. Patient states that she has tried Lyrica and Neurontin in past and had an allergic reaction.   She states she of tried over-the-counter inflammatory medications and prescription strength NSAIDs without any relief. She is seen pain clinics in past.  She was seeing pain management at [facility] pain clinic at [facility] location but had urine positive for alcohol and therefore was not prescribed any opioids. Before that she was seeing pain management at [place] and was discharged for failure urine drug screening in 2017. She states that she do have a TENS unit at home and knee brace which she uses as needed basis. Have Knee brace and TENS unit  Have tried topical voltaren    1. Primary osteoarthritis of both knees    2. BMI 40.0-44.9, adult ([facility] [address]              Plan:        - As she have failed steroid injection I discussed the option off viscose supplementation. Information material is provided. I have also discussed the options of genicular nerve blocks. Information material was provided. She have not done any therapy for several years therefore I advised her to complete a formal course of physical therapy. She reported no improvement with topical Voltaren gel therefore I ordered a compounding cream with a combination of local anesthetic and anti-inflammatory drug. Patient will be a high risk for opioid considering obesity and previous failed urine drug screenings and history of marijuana abuse       Orders Placed This Encounter   Procedures    Ambulatory referral to Physical Therapy     Referral Priority:   Routine     Referral Type:   Eval and Treat     Referral Reason:   Continuity of Care     Requested Specialty:   Physical Therapy     Number of Visits Requested:   1     No orders of the defined types were placed in this encounter. Controlled Substances Monitoring:     RX Monitoring 6/9/2018   Attestation The Prescription Monitoring Report for this patient was reviewed today. Documentation No signs of potential drug abuse or diversion identified.    Chronic Pain - If you have questions, please do not hesitate to call me. I look forward to following Erica Rogers along with you.     Sincerely,        Selena Marshall MD

## 2018-07-20 NOTE — COMMUNICATION BODY
Assessment: This is a 50-year-old woman with past medical history of obesity, she have lost significant weight over last year. She is seen today for chronic knee pain. Onset of pain many years ago symptoms have progressively worsened over years. She describes any pain as a constant aching throbbing pain sensation that aggravates with standing and walking and mid and daily physical activity and become sharp. She rates her average pain intensity between 7-8/10. Alleviating factors include rest TENS unit. She denies any associated numbness or paresthesia in her legs. No history of bladder or bowel incontinence. No history of fever chills or weight loss. Patient states that she had completed therapy many times in past but last therapy was many years ago. She been diagnosed with bilateral knee osteoarthritis and has been seeing orthopedic physicians at [facility]. She had received knee injections with short term benefits with the steroids. Most recent injection was in April 2018. No history of knee surgeries. She states that she is told by the surgeon to lose 100 pounds before she can have a total knee replacement surgeries. Patient states that she has tried Lyrica and Neurontin in past and had an allergic reaction. She states she of tried over-the-counter inflammatory medications and prescription strength NSAIDs without any relief. She is seen pain clinics in past.  She was seeing pain management at [facility] pain clinic at [facility] but had urine positive for alcohol and therefore was not prescribed any opioids. Before that she was seeing pain management at [place] and was discharged for failure urine drug screening in 2017. She states that she do have a TENS unit at home and knee brace which she uses as needed basis. Have Knee brace and TENS unit  Have tried topical voltaren    1. Primary osteoarthritis of both knees    2.  BMI 40.0-44.9, adult [facility])              Plan: - As she have failed steroid injection I discussed the option off viscose supplementation. Information material is provided. I have also discussed the options of genicular nerve blocks. Information material was provided. She have not done any therapy for several years therefore I advised her to complete a formal course of physical therapy. She reported no improvement with topical Voltaren gel therefore I ordered a compounding cream with a combination of local anesthetic and anti-inflammatory drug. Patient will be a high risk for opioid considering obesity and previous failed urine drug screenings and history of marijuana abuse       Orders Placed This Encounter   Procedures    Ambulatory referral to Physical Therapy     Referral Priority:   Routine     Referral Type:   Eval and Treat     Referral Reason:   Continuity of Care     Requested Specialty:   Physical Therapy     Number of Visits Requested:   1     No orders of the defined types were placed in this encounter. Controlled Substances Monitoring:     RX Monitoring 6/9/2018   Attestation The Prescription Monitoring Report for this patient was reviewed today. Documentation No signs of potential drug abuse or diversion identified.    Chronic Pain -

## 2018-08-03 ENCOUNTER — OFFICE VISIT (OUTPATIENT)
Dept: INTERNAL MEDICINE | Age: 48
End: 2018-08-03
Payer: COMMERCIAL

## 2018-08-03 VITALS
DIASTOLIC BLOOD PRESSURE: 80 MMHG | SYSTOLIC BLOOD PRESSURE: 126 MMHG | WEIGHT: 288 LBS | HEIGHT: 67 IN | HEART RATE: 117 BPM | BODY MASS INDEX: 45.2 KG/M2

## 2018-08-03 DIAGNOSIS — Z79.4 TYPE 2 DIABETES MELLITUS WITH DIABETIC POLYNEUROPATHY, WITH LONG-TERM CURRENT USE OF INSULIN (HCC): ICD-10-CM

## 2018-08-03 DIAGNOSIS — D50.9 IRON DEFICIENCY ANEMIA, UNSPECIFIED IRON DEFICIENCY ANEMIA TYPE: ICD-10-CM

## 2018-08-03 DIAGNOSIS — E03.9 ACQUIRED HYPOTHYROIDISM: ICD-10-CM

## 2018-08-03 DIAGNOSIS — F17.200 SMOKER: ICD-10-CM

## 2018-08-03 DIAGNOSIS — E11.9 TYPE 2 DIABETES MELLITUS WITHOUT COMPLICATION, WITHOUT LONG-TERM CURRENT USE OF INSULIN (HCC): ICD-10-CM

## 2018-08-03 DIAGNOSIS — D50.8 IRON DEFICIENCY ANEMIA SECONDARY TO INADEQUATE DIETARY IRON INTAKE: ICD-10-CM

## 2018-08-03 DIAGNOSIS — K59.00 CONSTIPATION, UNSPECIFIED CONSTIPATION TYPE: ICD-10-CM

## 2018-08-03 DIAGNOSIS — M17.0 PRIMARY OSTEOARTHRITIS OF BOTH KNEES: ICD-10-CM

## 2018-08-03 DIAGNOSIS — E78.00 PURE HYPERCHOLESTEROLEMIA: ICD-10-CM

## 2018-08-03 DIAGNOSIS — J45.20 MILD INTERMITTENT ASTHMA WITHOUT COMPLICATION: ICD-10-CM

## 2018-08-03 DIAGNOSIS — E66.01 MORBID OBESITY WITH BMI OF 50.0-59.9, ADULT (HCC): Primary | ICD-10-CM

## 2018-08-03 DIAGNOSIS — I10 ESSENTIAL HYPERTENSION: ICD-10-CM

## 2018-08-03 DIAGNOSIS — E11.42 TYPE 2 DIABETES MELLITUS WITH DIABETIC POLYNEUROPATHY, WITH LONG-TERM CURRENT USE OF INSULIN (HCC): ICD-10-CM

## 2018-08-03 DIAGNOSIS — N39.41 URGE INCONTINENCE OF URINE: ICD-10-CM

## 2018-08-03 LAB — HBA1C MFR BLD: 7.1 %

## 2018-08-03 PROCEDURE — 99214 OFFICE O/P EST MOD 30 MIN: CPT | Performed by: STUDENT IN AN ORGANIZED HEALTH CARE EDUCATION/TRAINING PROGRAM

## 2018-08-03 PROCEDURE — 4004F PT TOBACCO SCREEN RCVD TLK: CPT | Performed by: STUDENT IN AN ORGANIZED HEALTH CARE EDUCATION/TRAINING PROGRAM

## 2018-08-03 PROCEDURE — 2022F DILAT RTA XM EVC RTNOPTHY: CPT | Performed by: STUDENT IN AN ORGANIZED HEALTH CARE EDUCATION/TRAINING PROGRAM

## 2018-08-03 PROCEDURE — G8427 DOCREV CUR MEDS BY ELIG CLIN: HCPCS | Performed by: STUDENT IN AN ORGANIZED HEALTH CARE EDUCATION/TRAINING PROGRAM

## 2018-08-03 PROCEDURE — 99213 OFFICE O/P EST LOW 20 MIN: CPT | Performed by: STUDENT IN AN ORGANIZED HEALTH CARE EDUCATION/TRAINING PROGRAM

## 2018-08-03 PROCEDURE — 3045F PR MOST RECENT HEMOGLOBIN A1C LEVEL 7.0-9.0%: CPT | Performed by: STUDENT IN AN ORGANIZED HEALTH CARE EDUCATION/TRAINING PROGRAM

## 2018-08-03 PROCEDURE — G8417 CALC BMI ABV UP PARAM F/U: HCPCS | Performed by: STUDENT IN AN ORGANIZED HEALTH CARE EDUCATION/TRAINING PROGRAM

## 2018-08-03 PROCEDURE — 83036 HEMOGLOBIN GLYCOSYLATED A1C: CPT | Performed by: STUDENT IN AN ORGANIZED HEALTH CARE EDUCATION/TRAINING PROGRAM

## 2018-08-03 RX ORDER — DOCUSATE SODIUM 100 MG/1
100 CAPSULE, LIQUID FILLED ORAL 2 TIMES DAILY PRN
Qty: 30 CAPSULE | Refills: 2 | Status: SHIPPED | OUTPATIENT
Start: 2018-08-03 | End: 2019-03-06 | Stop reason: SDUPTHER

## 2018-08-03 RX ORDER — SIMVASTATIN 40 MG
40 TABLET ORAL NIGHTLY
Qty: 30 TABLET | Refills: 2 | Status: SHIPPED | OUTPATIENT
Start: 2018-08-03 | End: 2019-03-06 | Stop reason: SDUPTHER

## 2018-08-03 RX ORDER — LEVOTHYROXINE SODIUM 0.05 MG/1
50 TABLET ORAL DAILY
Qty: 30 TABLET | Refills: 2 | Status: SHIPPED | OUTPATIENT
Start: 2018-08-03 | End: 2019-03-06 | Stop reason: SDUPTHER

## 2018-08-03 RX ORDER — LISINOPRIL 5 MG/1
5 TABLET ORAL DAILY
Qty: 30 TABLET | Refills: 2 | Status: SHIPPED | OUTPATIENT
Start: 2018-08-03 | End: 2019-03-06 | Stop reason: SDUPTHER

## 2018-08-03 RX ORDER — FERROUS SULFATE 325(65) MG
325 TABLET ORAL 2 TIMES DAILY
Qty: 60 TABLET | Refills: 2 | Status: SHIPPED | OUTPATIENT
Start: 2018-08-03 | End: 2019-03-06 | Stop reason: SDUPTHER

## 2018-08-03 NOTE — PROGRESS NOTES
Attending Physician Statement [facility])  Internal Medicine Clinic Progress Note      I have discussed the care of Mily Calabrese, including pertinent history and exam findings, with the resident. I have seen and examined the patient and the key elements of all parts of the encounter have been performed by me. I agree with the assessment, plan and orders as documented by the resident.     Roopa Saravia MD, CHIVO  Attending Physician, [address] [place] [address], Internal Medicine Residency Program  [place]  8/3/2018, 3:01 PM
Diabetes and Hypertension Visit Information    BP Readings from Last 3 Encounters:   07/20/18 139/85   07/02/18 112/78   06/14/18 120/77          Hemoglobin A1C (%)   Date Value   01/19/2018 7.5   05/31/2017 7.6   09/16/2016 9.1     Microalb/Crt. Ratio (mcg/mg creat)   Date Value   09/11/2017 7     LDL Cholesterol (mg/dL)   Date Value   09/11/2017 113     HDL (mg/dL)   Date Value   09/11/2017 56     BUN (mg/dL)   Date Value   04/17/2018 9     CREATININE (mg/dL)   Date Value   04/17/2018 0.84     Glucose (mg/dL)   Date Value   04/17/2018 232 (H)   12/29/2011 296 (H)            Have you changed or started any medications since your last visit including any over-the-counter medicines, vitamins, or herbal medicines? no   Are you having any side effects from any of your medications? -  yes - Pt states she has been constipated for last month since starting medications. Have you stopped taking any of your medications? Is so, why? -  no    Have you seen any other physician or provider since your last visit? Yes - Records Obtained, Pt saw Dr. Meera Adams on Pain Management on 7/20/18  Have you had any other diagnostic tests since your last visit? No  Have you been seen in the emergency room and/or had an admission to a hospital since we last saw you? No  Have you had your annual diabetic retinal (eye) exam? No  Have you had your routine dental cleaning in the past 6 months? yes     Have you activated your [employer] account? If not, what are your barriers?  Yes     Patient Care Team:  Roopa Brock MD as PCP - Harris Tran MD as Anesthesiologist (Pain Management)  Wendy Gonzalez MD as Consulting Physician (Urology)         Medical History Review  Past Medical, Family, and Social History reviewed and does contribute to the patient presenting condition    Health Maintenance   Topic Date Due    DTaP/Tdap/Td vaccine (1 - Tdap) 07/06/1988    Diabetic retinal exam  10/01/2016    Flu vaccine (1) 09/01/2018    Diabetic
Medication Sig Dispense Refill    ferrous sulfate 325 (65 Fe) MG tablet Take 1 tablet by mouth 2 times daily 60 tablet 2    levothyroxine (SYNTHROID) 50 MCG tablet Take 1 tablet by mouth daily 30 tablet 2    metFORMIN (GLUCOPHAGE) 1000 MG tablet Take 1 tablet by mouth 2 times daily (with meals) 60 tablet 2    oxybutynin (DITROPAN XL) 10 MG extended release tablet Take 1 tablet by mouth daily 30 tablet 2    aspirin EC 81 MG EC tablet Take 1 tablet by mouth daily 30 tablet 2    lisinopril (PRINIVIL;ZESTRIL) 5 MG tablet Take 1 tablet by mouth daily 30 tablet 2    insulin lispro (HUMALOG KWIKPEN) 100 UNIT/ML pen Inject 20 Units into the skin 3 times daily (before meals) 3 pen 2    simvastatin (ZOCOR) 40 MG tablet Take 1 tablet by mouth nightly 30 tablet 2    insulin glargine (LANTUS SOLOSTAR) 100 UNIT/ML injection pen Inject 30 Units into the skin nightly 3 pen 2    albuterol sulfate HFA (PROVENTIL HFA) 108 (90 Base) MCG/ACT inhaler Inhale 2 puffs into the lungs every 6 hours as needed for Wheezing 1 Inhaler 2    glucose blood VI test strips (TRUETEST TEST) strip Dx: DM-2 use 2-3 times daily 100 strip 11    dicyclomine (BENTYL) 20 MG tablet Take 1 tablet by mouth 3 times daily as needed (Abdominal pain) 20 tablet 0    VORTIoxetine (TRINTELLIX) 10 MG TABS tablet Take 10 mg by mouth daily      ONE TOUCH ULTRASOFT LANCETS MISC Dx: DM-2 use 2-3 times daily 100 each 11    traZODone (DESYREL) 150 MG tablet Take 150 mg by mouth nightly       No current facility-administered medications on file prior to visit. SOCIAL HISTORY    Reviewed and no change from previous record. Paige Newell  reports that she has been smoking Cigarettes. She has a 3.75 pack-year smoking history.  She has never used smokeless tobacco.    FAMILY HISTORY:    Reviewed and No change from previous visit    REVIEW OF SYSTEMS:    ENT: negative  Respiratory: no cough, shortness of breath, or wheezing  Cardiovascular: no chest pain or dyspnea

## 2018-10-26 ENCOUNTER — OFFICE VISIT (OUTPATIENT)
Dept: INTERNAL MEDICINE | Age: 48
End: 2018-10-26
Payer: COMMERCIAL

## 2018-10-26 VITALS
HEIGHT: 68 IN | BODY MASS INDEX: 43.95 KG/M2 | SYSTOLIC BLOOD PRESSURE: 139 MMHG | HEART RATE: 94 BPM | WEIGHT: 290 LBS | DIASTOLIC BLOOD PRESSURE: 87 MMHG

## 2018-10-26 DIAGNOSIS — Z23 NEEDS FLU SHOT: ICD-10-CM

## 2018-10-26 DIAGNOSIS — N89.8 VAGINAL DISCHARGE: Primary | ICD-10-CM

## 2018-10-26 PROCEDURE — 99211 OFF/OP EST MAY X REQ PHY/QHP: CPT | Performed by: INTERNAL MEDICINE

## 2018-10-26 PROCEDURE — 90686 IIV4 VACC NO PRSV 0.5 ML IM: CPT | Performed by: INTERNAL MEDICINE

## 2018-10-26 PROCEDURE — 99213 OFFICE O/P EST LOW 20 MIN: CPT | Performed by: INTERNAL MEDICINE

## 2018-10-26 PROCEDURE — G8417 CALC BMI ABV UP PARAM F/U: HCPCS | Performed by: INTERNAL MEDICINE

## 2018-10-26 PROCEDURE — G8427 DOCREV CUR MEDS BY ELIG CLIN: HCPCS | Performed by: INTERNAL MEDICINE

## 2018-10-26 PROCEDURE — 4004F PT TOBACCO SCREEN RCVD TLK: CPT | Performed by: INTERNAL MEDICINE

## 2018-10-26 PROCEDURE — G8482 FLU IMMUNIZE ORDER/ADMIN: HCPCS | Performed by: INTERNAL MEDICINE

## 2018-10-26 RX ORDER — METRONIDAZOLE 500 MG/1
500 TABLET ORAL 2 TIMES DAILY
Qty: 14 TABLET | Refills: 0 | Status: SHIPPED | OUTPATIENT
Start: 2018-10-26 | End: 2018-11-02

## 2018-10-26 NOTE — PROGRESS NOTES
Visit Information    Have you changed or started any medications since your last visit including any over-the-counter medicines, vitamins, or herbal medicines? no   Have you stopped taking any of your medications? Is so, why? -  no  Are you having any side effects from any of your medications? - no    Have you seen any other physician or provider since your last visit?  no   Have you had any other diagnostic tests since your last visit?  no   Have you been seen in the emergency room and/or had an admission in a hospital since we last saw you?  no   Have you had your routine dental cleaning in the past 6 months?  no     Do you have an active MyChart account? If no, what is the barrier?   Yes    Patient Care Team:  Roopa Young MD as PCP - Michelle Moore MD as Anesthesiologist (Pain Management)  Juan Francisco Kong MD as Consulting Physician (Urology)    Medical History Review  Past Medical, Family, and Social History reviewed and does contribute to the patient presenting condition    Health Maintenance   Topic Date Due    DTaP/Tdap/Td vaccine (1 - Tdap) 07/06/1988    Diabetic retinal exam  10/01/2016    Flu vaccine (1) 09/01/2018    Diabetic microalbuminuria test  09/11/2018    Lipid screen  09/11/2018    Diabetic foot exam  10/09/2018    Potassium monitoring  04/17/2019    Creatinine monitoring  04/17/2019    TSH testing  05/22/2019    A1C test (Diabetic or Prediabetic)  08/03/2019    Pneumococcal med risk  Completed    HIV screen  Completed

## 2018-12-04 ENCOUNTER — HOSPITAL ENCOUNTER (OUTPATIENT)
Age: 48
Setting detail: SPECIMEN
Discharge: HOME | End: 2018-12-04
Payer: MEDICARE

## 2018-12-04 ENCOUNTER — OFFICE VISIT (OUTPATIENT)
Dept: OBGYN | Age: 48
End: 2018-12-04
Payer: MEDICARE

## 2018-12-04 VITALS
HEART RATE: 109 BPM | SYSTOLIC BLOOD PRESSURE: 126 MMHG | DIASTOLIC BLOOD PRESSURE: 72 MMHG | WEIGHT: 293 LBS | BODY MASS INDEX: 44.41 KG/M2 | HEIGHT: 68 IN

## 2018-12-04 DIAGNOSIS — N89.8 VAGINAL DISCHARGE: Primary | ICD-10-CM

## 2018-12-04 DIAGNOSIS — N89.8 VAGINAL DISCHARGE: ICD-10-CM

## 2018-12-04 DIAGNOSIS — N83.202 LEFT OVARIAN CYST: ICD-10-CM

## 2018-12-04 DIAGNOSIS — A59.01 TRICHOMONAL VAGINITIS: ICD-10-CM

## 2018-12-04 LAB
DIRECT EXAM: ABNORMAL
Lab: ABNORMAL
SPECIMEN DESCRIPTION: ABNORMAL
STATUS: ABNORMAL

## 2018-12-04 PROCEDURE — 4004F PT TOBACCO SCREEN RCVD TLK: CPT | Performed by: OBSTETRICS & GYNECOLOGY

## 2018-12-04 PROCEDURE — 99213 OFFICE O/P EST LOW 20 MIN: CPT | Performed by: OBSTETRICS & GYNECOLOGY

## 2018-12-04 PROCEDURE — 99212 OFFICE O/P EST SF 10 MIN: CPT | Performed by: OBSTETRICS & GYNECOLOGY

## 2018-12-04 PROCEDURE — G8417 CALC BMI ABV UP PARAM F/U: HCPCS | Performed by: OBSTETRICS & GYNECOLOGY

## 2018-12-04 PROCEDURE — G8427 DOCREV CUR MEDS BY ELIG CLIN: HCPCS | Performed by: OBSTETRICS & GYNECOLOGY

## 2018-12-04 PROCEDURE — G8482 FLU IMMUNIZE ORDER/ADMIN: HCPCS | Performed by: OBSTETRICS & GYNECOLOGY

## 2018-12-04 RX ORDER — METRONIDAZOLE 500 MG/1
500 TABLET ORAL 2 TIMES DAILY
Qty: 14 TABLET | Refills: 0 | Status: SHIPPED | OUTPATIENT
Start: 2018-12-04 | End: 2018-12-11

## 2018-12-04 NOTE — LETTER
[address] [place] [address] [address] [address]  Phone: [phone]  Fax: [phone]    Hilda Christiansen MD        December 4, 2018     Patient: Brittany Harrison       Date of Visit: 12/4/2018       To Whom it May Concern:    Palmira Wilson was present in our office with above patient. If you have any questions or concerns, please don't hesitate to call.     Sincerely,         Hilda Christiansen MD

## 2018-12-04 NOTE — PROGRESS NOTES
ureteral stent placement.  NERVE BLOCK Left 2016    left knee kenalog 80mg    NERVE BLOCK  3/11/16    Rt Knee depo medrol [de-identified]     TUBAL LIGATION  3/2012       Family History   Problem Relation Age of Onset    Stroke Father     Diabetes Mother     Hypertension Mother     Breast Cancer Neg Hx     Cancer Neg Hx     Colon Cancer Neg Hx     Eclampsia Neg Hx     Ovarian Cancer Neg Hx      Labor Neg Hx     Spont Abortions Neg Hx        Social History     Social History    Marital status: Single     Spouse name: N/A    Number of children: N/A    Years of education: N/A     Occupational History    Not on file.      Social History Main Topics    Smoking status: Current Every Day Smoker     Packs/day: 0.25     Years: 15.00     Types: Cigarettes    Smokeless tobacco: Never Used      Comment: 3-4 cigarettes a day     Alcohol use 0.0 oz/week      Comment: holidays    Drug use: No    Sexual activity: Yes     Partners: Male     Other Topics Concern    Not on file     Social History Narrative    No narrative on file         MEDICATIONS:  Current Outpatient Prescriptions on File Prior to Visit   Medication Sig Dispense Refill    Calcium Carb-Ergocalciferol 250-125 MG-UNIT TABS Take 1 tablet by mouth      ferrous sulfate 325 (65 Fe) MG tablet Take 1 tablet by mouth 2 times daily 60 tablet 2    levothyroxine (SYNTHROID) 50 MCG tablet Take 1 tablet by mouth daily 30 tablet 2    metFORMIN (GLUCOPHAGE) 1000 MG tablet Take 1 tablet by mouth 2 times daily (with meals) 60 tablet 2    lisinopril (PRINIVIL;ZESTRIL) 5 MG tablet Take 1 tablet by mouth daily 30 tablet 2    simvastatin (ZOCOR) 40 MG tablet Take 1 tablet by mouth nightly 30 tablet 2    docusate sodium (COLACE) 100 MG capsule Take 1 capsule by mouth 2 times daily as needed for Constipation 30 capsule 2    oxybutynin (DITROPAN XL) 10 MG extended release tablet Take 1 tablet by mouth daily 30 tablet 2    aspirin EC 81 MG EC tablet Take 1

## 2019-02-27 ENCOUNTER — HOSPITAL ENCOUNTER (OUTPATIENT)
Dept: MAMMOGRAPHY | Age: 49
Discharge: HOME | End: 2019-03-01
Payer: MEDICARE

## 2019-02-27 DIAGNOSIS — Z12.31 ENCOUNTER FOR SCREENING MAMMOGRAM FOR BREAST CANCER: ICD-10-CM

## 2019-02-27 PROCEDURE — 77063 BREAST TOMOSYNTHESIS BI: CPT

## 2019-03-06 ENCOUNTER — OFFICE VISIT (OUTPATIENT)
Dept: INTERNAL MEDICINE | Age: 49
End: 2019-03-06
Payer: MEDICARE

## 2019-03-06 VITALS
WEIGHT: 292 LBS | HEIGHT: 68 IN | BODY MASS INDEX: 44.25 KG/M2 | HEART RATE: 102 BPM | DIASTOLIC BLOOD PRESSURE: 77 MMHG | SYSTOLIC BLOOD PRESSURE: 139 MMHG

## 2019-03-06 DIAGNOSIS — E66.01 MORBID OBESITY WITH BMI OF 45.0-49.9, ADULT (HCC): ICD-10-CM

## 2019-03-06 DIAGNOSIS — J45.20 MILD INTERMITTENT ASTHMA WITHOUT COMPLICATION: ICD-10-CM

## 2019-03-06 DIAGNOSIS — I10 ESSENTIAL HYPERTENSION: ICD-10-CM

## 2019-03-06 DIAGNOSIS — Z13.220 SCREENING FOR HYPERLIPIDEMIA: ICD-10-CM

## 2019-03-06 DIAGNOSIS — E11.42 TYPE 2 DIABETES MELLITUS WITH DIABETIC POLYNEUROPATHY, WITH LONG-TERM CURRENT USE OF INSULIN (HCC): Primary | ICD-10-CM

## 2019-03-06 DIAGNOSIS — N39.41 URGE INCONTINENCE OF URINE: ICD-10-CM

## 2019-03-06 DIAGNOSIS — D50.8 IRON DEFICIENCY ANEMIA SECONDARY TO INADEQUATE DIETARY IRON INTAKE: ICD-10-CM

## 2019-03-06 DIAGNOSIS — K59.00 CONSTIPATION, UNSPECIFIED CONSTIPATION TYPE: ICD-10-CM

## 2019-03-06 DIAGNOSIS — F33.0 MILD EPISODE OF RECURRENT MAJOR DEPRESSIVE DISORDER (HCC): ICD-10-CM

## 2019-03-06 DIAGNOSIS — E78.00 PURE HYPERCHOLESTEROLEMIA: ICD-10-CM

## 2019-03-06 DIAGNOSIS — E03.9 ACQUIRED HYPOTHYROIDISM: ICD-10-CM

## 2019-03-06 DIAGNOSIS — Z79.4 TYPE 2 DIABETES MELLITUS WITH DIABETIC POLYNEUROPATHY, WITH LONG-TERM CURRENT USE OF INSULIN (HCC): Primary | ICD-10-CM

## 2019-03-06 LAB — HBA1C MFR BLD: 8 %

## 2019-03-06 PROCEDURE — 83036 HEMOGLOBIN GLYCOSYLATED A1C: CPT | Performed by: INTERNAL MEDICINE

## 2019-03-06 PROCEDURE — G8427 DOCREV CUR MEDS BY ELIG CLIN: HCPCS | Performed by: INTERNAL MEDICINE

## 2019-03-06 PROCEDURE — G8482 FLU IMMUNIZE ORDER/ADMIN: HCPCS | Performed by: INTERNAL MEDICINE

## 2019-03-06 PROCEDURE — 99214 OFFICE O/P EST MOD 30 MIN: CPT | Performed by: INTERNAL MEDICINE

## 2019-03-06 PROCEDURE — 2022F DILAT RTA XM EVC RTNOPTHY: CPT | Performed by: INTERNAL MEDICINE

## 2019-03-06 PROCEDURE — 4004F PT TOBACCO SCREEN RCVD TLK: CPT | Performed by: INTERNAL MEDICINE

## 2019-03-06 PROCEDURE — 99211 OFF/OP EST MAY X REQ PHY/QHP: CPT | Performed by: INTERNAL MEDICINE

## 2019-03-06 PROCEDURE — G8417 CALC BMI ABV UP PARAM F/U: HCPCS | Performed by: INTERNAL MEDICINE

## 2019-03-06 PROCEDURE — 3045F PR MOST RECENT HEMOGLOBIN A1C LEVEL 7.0-9.0%: CPT | Performed by: INTERNAL MEDICINE

## 2019-03-06 RX ORDER — LISINOPRIL 5 MG/1
5 TABLET ORAL DAILY
Qty: 30 TABLET | Refills: 2 | Status: SHIPPED | OUTPATIENT
Start: 2019-03-06 | End: 2019-07-22 | Stop reason: SDUPTHER

## 2019-03-06 RX ORDER — BLOOD-GLUCOSE METER
KIT MISCELLANEOUS
Qty: 1 KIT | Refills: 0 | Status: SHIPPED | OUTPATIENT
Start: 2019-03-06 | End: 2020-05-01 | Stop reason: SDUPTHER

## 2019-03-06 RX ORDER — LANCETS
EACH MISCELLANEOUS
Qty: 100 EACH | Refills: 11 | Status: SHIPPED | OUTPATIENT
Start: 2019-03-06 | End: 2020-05-01 | Stop reason: SDUPTHER

## 2019-03-06 RX ORDER — ALBUTEROL SULFATE 90 UG/1
2 AEROSOL, METERED RESPIRATORY (INHALATION) EVERY 6 HOURS PRN
Qty: 1 INHALER | Refills: 2 | Status: SHIPPED | OUTPATIENT
Start: 2019-03-06 | End: 2020-05-01 | Stop reason: SDUPTHER

## 2019-03-06 RX ORDER — SIMVASTATIN 40 MG
40 TABLET ORAL NIGHTLY
Qty: 30 TABLET | Refills: 2 | Status: SHIPPED | OUTPATIENT
Start: 2019-03-06 | End: 2019-07-22 | Stop reason: SDUPTHER

## 2019-03-06 RX ORDER — LEVOTHYROXINE SODIUM 0.05 MG/1
50 TABLET ORAL DAILY
Qty: 30 TABLET | Refills: 2 | Status: SHIPPED | OUTPATIENT
Start: 2019-03-06 | End: 2020-05-01 | Stop reason: SDUPTHER

## 2019-03-06 RX ORDER — ASPIRIN 81 MG/1
81 TABLET ORAL DAILY
Qty: 30 TABLET | Refills: 2 | Status: SHIPPED | OUTPATIENT
Start: 2019-03-06 | End: 2020-05-01 | Stop reason: SDUPTHER

## 2019-03-06 RX ORDER — OXYBUTYNIN CHLORIDE 10 MG/1
10 TABLET, EXTENDED RELEASE ORAL DAILY
Qty: 30 TABLET | Refills: 2 | Status: SHIPPED | OUTPATIENT
Start: 2019-03-06 | End: 2019-07-22 | Stop reason: SDUPTHER

## 2019-03-06 RX ORDER — FERROUS SULFATE 325(65) MG
325 TABLET ORAL 2 TIMES DAILY
Qty: 60 TABLET | Refills: 2 | Status: SHIPPED | OUTPATIENT
Start: 2019-03-06 | End: 2020-05-01 | Stop reason: SDUPTHER

## 2019-03-06 RX ORDER — DOCUSATE SODIUM 100 MG/1
100 CAPSULE, LIQUID FILLED ORAL 2 TIMES DAILY PRN
Qty: 30 CAPSULE | Refills: 2 | Status: SHIPPED | OUTPATIENT
Start: 2019-03-06 | End: 2019-07-22 | Stop reason: ALTCHOICE

## 2019-03-06 ASSESSMENT — ENCOUNTER SYMPTOMS
BLOOD IN STOOL: 0
SHORTNESS OF BREATH: 0
EYE DISCHARGE: 0
PHOTOPHOBIA: 0
COUGH: 0
DIARRHEA: 0
WHEEZING: 0
CONSTIPATION: 0
VOMITING: 0
EYE PAIN: 0
COLOR CHANGE: 0
SORE THROAT: 0
ABDOMINAL PAIN: 0
NAUSEA: 0

## 2019-05-30 ENCOUNTER — TELEPHONE (OUTPATIENT)
Dept: OBGYN | Age: 49
End: 2019-05-30

## 2019-05-30 NOTE — TELEPHONE ENCOUNTER
Called and left a message for patient to give the office a call back regarding her appointment on 6/4/19.

## 2019-07-22 ENCOUNTER — OFFICE VISIT (OUTPATIENT)
Dept: INTERNAL MEDICINE | Age: 49
End: 2019-07-22
Payer: MEDICARE

## 2019-07-22 VITALS
DIASTOLIC BLOOD PRESSURE: 65 MMHG | BODY MASS INDEX: 44.41 KG/M2 | HEART RATE: 91 BPM | SYSTOLIC BLOOD PRESSURE: 121 MMHG | HEIGHT: 68 IN | WEIGHT: 293 LBS

## 2019-07-22 DIAGNOSIS — Z12.11 SCREENING FOR COLON CANCER: ICD-10-CM

## 2019-07-22 DIAGNOSIS — Z13.220 SCREENING FOR HYPERLIPIDEMIA: ICD-10-CM

## 2019-07-22 DIAGNOSIS — N39.41 URGE INCONTINENCE OF URINE: ICD-10-CM

## 2019-07-22 DIAGNOSIS — M17.0 PRIMARY OSTEOARTHRITIS OF BOTH KNEES: ICD-10-CM

## 2019-07-22 DIAGNOSIS — E78.00 PURE HYPERCHOLESTEROLEMIA: ICD-10-CM

## 2019-07-22 DIAGNOSIS — E11.42 TYPE 2 DIABETES MELLITUS WITH DIABETIC POLYNEUROPATHY, WITH LONG-TERM CURRENT USE OF INSULIN (HCC): ICD-10-CM

## 2019-07-22 DIAGNOSIS — Z79.4 TYPE 2 DIABETES MELLITUS WITH DIABETIC POLYNEUROPATHY, WITH LONG-TERM CURRENT USE OF INSULIN (HCC): ICD-10-CM

## 2019-07-22 DIAGNOSIS — I10 ESSENTIAL HYPERTENSION: Primary | ICD-10-CM

## 2019-07-22 DIAGNOSIS — Z23 NEED FOR PROPHYLACTIC VACCINATION AND INOCULATION AGAINST VARICELLA: ICD-10-CM

## 2019-07-22 DIAGNOSIS — E03.9 ACQUIRED HYPOTHYROIDISM: ICD-10-CM

## 2019-07-22 PROBLEM — R51.9 FRONTAL HEADACHE: Status: ACTIVE | Noted: 2017-06-24

## 2019-07-22 PROBLEM — J32.1 FRONTAL SINUSITIS: Status: ACTIVE | Noted: 2017-06-24

## 2019-07-22 PROBLEM — R07.9 CHEST PAIN: Status: ACTIVE | Noted: 2017-06-23

## 2019-07-22 PROCEDURE — 99214 OFFICE O/P EST MOD 30 MIN: CPT | Performed by: NURSE PRACTITIONER

## 2019-07-22 PROCEDURE — 4004F PT TOBACCO SCREEN RCVD TLK: CPT | Performed by: NURSE PRACTITIONER

## 2019-07-22 PROCEDURE — G8427 DOCREV CUR MEDS BY ELIG CLIN: HCPCS | Performed by: NURSE PRACTITIONER

## 2019-07-22 PROCEDURE — G8417 CALC BMI ABV UP PARAM F/U: HCPCS | Performed by: NURSE PRACTITIONER

## 2019-07-22 PROCEDURE — 3045F PR MOST RECENT HEMOGLOBIN A1C LEVEL 7.0-9.0%: CPT | Performed by: NURSE PRACTITIONER

## 2019-07-22 PROCEDURE — 99211 OFF/OP EST MAY X REQ PHY/QHP: CPT | Performed by: NURSE PRACTITIONER

## 2019-07-22 PROCEDURE — 3017F COLORECTAL CA SCREEN DOC REV: CPT | Performed by: NURSE PRACTITIONER

## 2019-07-22 PROCEDURE — 2022F DILAT RTA XM EVC RTNOPTHY: CPT | Performed by: NURSE PRACTITIONER

## 2019-07-22 RX ORDER — SIMVASTATIN 40 MG
40 TABLET ORAL NIGHTLY
Qty: 30 TABLET | Refills: 2 | Status: SHIPPED | OUTPATIENT
Start: 2019-07-22 | End: 2020-05-01 | Stop reason: SDUPTHER

## 2019-07-22 RX ORDER — LISINOPRIL 5 MG/1
5 TABLET ORAL DAILY
Qty: 30 TABLET | Refills: 2 | Status: SHIPPED | OUTPATIENT
Start: 2019-07-22 | End: 2020-05-01 | Stop reason: SDUPTHER

## 2019-07-22 RX ORDER — OXYBUTYNIN CHLORIDE 10 MG/1
10 TABLET, EXTENDED RELEASE ORAL DAILY
Qty: 30 TABLET | Refills: 2 | Status: SHIPPED | OUTPATIENT
Start: 2019-07-22 | End: 2020-05-01 | Stop reason: SDUPTHER

## 2019-07-22 ASSESSMENT — ENCOUNTER SYMPTOMS
BLURRED VISION: 0
SHORTNESS OF BREATH: 0
ORTHOPNEA: 0

## 2019-07-22 NOTE — PROGRESS NOTES
polyneuropathy, with long-term current use of insulin (HCC)  - Microalbumin, Ur; Future  - insulin lispro (HUMALOG KWIKPEN) 100 UNIT/ML pen; Inject 20 Units into the skin 3 times daily (before meals)  Dispense: 3 pen; Refill: 2  - insulin glargine (LANTUS SOLOSTAR) 100 UNIT/ML injection pen; Inject 30 Units into the skin nightly  Dispense: 3 pen; Refill: 2  - metFORMIN (GLUCOPHAGE) 1000 MG tablet; Take 1 tablet by mouth 2 times daily (with meals)  Dispense: 60 tablet; Refill: 2  - CBC Auto Differential; Future  - Comprehensive Metabolic Panel; Future  - Hemoglobin A1C; Future    3. Primary osteoarthritis of both knees  - [facility], [address] [facility], DO, Orthopedic Surgery, [facility]    4. Screening for hyperlipidemia  - Lipid, Fasting; Future    5. Need for prophylactic vaccination and inoculation against varicella  - zoster recombinant adjuvanted vaccine [facility]) 50 MCG/0.5ML SUSR injection; Inject 0.5 mLs into the muscle once for 1 dose 50 MCG IM then repeat 2-6 months. Dispense: 1 each; Refill: 1    6. Screening for colon cancer  - COLOGUARD (For external results only); Future    7. Acquired hypothyroidism  - TSH with Reflex; Future    8. Pure hypercholesterolemia  - simvastatin (ZOCOR) 40 MG tablet; Take 1 tablet by mouth nightly  Dispense: 30 tablet; Refill: 2    9. Urge incontinence of urine  - oxybutynin (DITROPAN XL) 10 MG extended release tablet; Take 1 tablet by mouth daily  Dispense: 30 tablet;  Refill: 2    RV 3 months        MARILU Noriega - CNP

## 2019-07-23 ENCOUNTER — TELEPHONE (OUTPATIENT)
Dept: INTERNAL MEDICINE | Age: 49
End: 2019-07-23

## 2019-07-23 NOTE — TELEPHONE ENCOUNTER
PC from Griselda at the pt. pharmacy stating the medication that was prescribed- Lispro on 7/22/19 is not covered under the patients insurance but Novalog is. Griselda stated the medications are similar but no the same, they are both fast acting insulins. He wanted to know if the medication can be switched for the patient?

## 2019-07-25 DIAGNOSIS — M17.0 PRIMARY OSTEOARTHRITIS OF BOTH KNEES: Primary | ICD-10-CM

## 2019-07-29 ENCOUNTER — OFFICE VISIT (OUTPATIENT)
Dept: ORTHOPEDIC SURGERY | Age: 49
End: 2019-07-29
Payer: MEDICARE

## 2019-07-29 VITALS — WEIGHT: 293 LBS | BODY MASS INDEX: 44.41 KG/M2 | HEIGHT: 68 IN

## 2019-07-29 DIAGNOSIS — M17.0 PRIMARY OSTEOARTHRITIS OF BOTH KNEES: Primary | ICD-10-CM

## 2019-07-29 PROCEDURE — 20610 DRAIN/INJ JOINT/BURSA W/O US: CPT | Performed by: ORTHOPAEDIC SURGERY

## 2019-07-29 PROCEDURE — 99213 OFFICE O/P EST LOW 20 MIN: CPT | Performed by: ORTHOPAEDIC SURGERY

## 2019-08-29 ENCOUNTER — TELEPHONE (OUTPATIENT)
Dept: INTERNAL MEDICINE | Age: 49
End: 2019-08-29

## 2019-10-09 ENCOUNTER — HOSPITAL ENCOUNTER (EMERGENCY)
Age: 49
Discharge: HOME | End: 2019-10-10
Attending: EMERGENCY MEDICINE
Payer: MEDICARE

## 2019-10-09 DIAGNOSIS — K08.89 PAIN, DENTAL: Primary | ICD-10-CM

## 2019-10-09 PROCEDURE — 99282 EMERGENCY DEPT VISIT SF MDM: CPT

## 2019-10-10 VITALS
DIASTOLIC BLOOD PRESSURE: 78 MMHG | OXYGEN SATURATION: 99 % | WEIGHT: 291.7 LBS | RESPIRATION RATE: 16 BRPM | TEMPERATURE: 97.7 F | BODY MASS INDEX: 45.78 KG/M2 | SYSTOLIC BLOOD PRESSURE: 134 MMHG | HEIGHT: 67 IN | HEART RATE: 90 BPM

## 2019-10-10 RX ORDER — PENICILLIN V POTASSIUM 500 MG/1
500 TABLET ORAL 4 TIMES DAILY
Qty: 40 TABLET | Refills: 0 | Status: SHIPPED | OUTPATIENT
Start: 2019-10-10 | End: 2019-10-20

## 2019-10-10 RX ORDER — IBUPROFEN 800 MG/1
800 TABLET ORAL EVERY 8 HOURS PRN
Qty: 15 TABLET | Refills: 0 | Status: SHIPPED | OUTPATIENT
Start: 2019-10-10 | End: 2020-01-29 | Stop reason: ALTCHOICE

## 2019-10-10 RX ORDER — ACETAMINOPHEN AND CODEINE PHOSPHATE 300; 30 MG/1; MG/1
1 TABLET ORAL EVERY 8 HOURS PRN
Qty: 10 TABLET | Refills: 0 | Status: SHIPPED | OUTPATIENT
Start: 2019-10-10 | End: 2019-10-17

## 2019-10-10 ASSESSMENT — ENCOUNTER SYMPTOMS
TROUBLE SWALLOWING: 0
VOICE CHANGE: 0
SORE THROAT: 0
COLOR CHANGE: 0
FACIAL SWELLING: 0

## 2019-11-07 ENCOUNTER — TELEPHONE (OUTPATIENT)
Dept: INTERNAL MEDICINE | Age: 49
End: 2019-11-07

## 2019-11-13 ENCOUNTER — OFFICE VISIT (OUTPATIENT)
Dept: PRIMARY CARE CLINIC | Age: 49
End: 2019-11-13
Payer: MEDICARE

## 2019-11-13 ENCOUNTER — HOSPITAL ENCOUNTER (OUTPATIENT)
Age: 49
Setting detail: SPECIMEN
Discharge: HOME | End: 2019-11-13
Payer: MEDICARE

## 2019-11-13 VITALS
BODY MASS INDEX: 45.89 KG/M2 | TEMPERATURE: 97.2 F | DIASTOLIC BLOOD PRESSURE: 86 MMHG | SYSTOLIC BLOOD PRESSURE: 136 MMHG | WEIGHT: 293 LBS | HEART RATE: 91 BPM

## 2019-11-13 DIAGNOSIS — R30.0 DYSURIA: ICD-10-CM

## 2019-11-13 DIAGNOSIS — N89.8 VAGINAL DISCHARGE: Primary | ICD-10-CM

## 2019-11-13 DIAGNOSIS — N89.8 VAGINAL DISCHARGE: ICD-10-CM

## 2019-11-13 LAB
BILIRUBIN, POC: NEGATIVE
BLOOD URINE, POC: ABNORMAL
CLARITY, POC: ABNORMAL
COLOR, POC: ABNORMAL
GLUCOSE URINE, POC: NEGATIVE
KETONES, POC: POSITIVE
LEUKOCYTE EST, POC: ABNORMAL
NITRITE, POC: NEGATIVE
PH, POC: 5.5
PROTEIN, POC: 30
SPECIFIC GRAVITY, POC: 1.03
UROBILINOGEN, POC: 0.2

## 2019-11-13 PROCEDURE — G8417 CALC BMI ABV UP PARAM F/U: HCPCS | Performed by: NURSE PRACTITIONER

## 2019-11-13 PROCEDURE — 4004F PT TOBACCO SCREEN RCVD TLK: CPT | Performed by: NURSE PRACTITIONER

## 2019-11-13 PROCEDURE — 99202 OFFICE O/P NEW SF 15 MIN: CPT | Performed by: NURSE PRACTITIONER

## 2019-11-13 PROCEDURE — G8484 FLU IMMUNIZE NO ADMIN: HCPCS | Performed by: NURSE PRACTITIONER

## 2019-11-13 PROCEDURE — 81003 URINALYSIS AUTO W/O SCOPE: CPT | Performed by: NURSE PRACTITIONER

## 2019-11-13 PROCEDURE — 3017F COLORECTAL CA SCREEN DOC REV: CPT | Performed by: NURSE PRACTITIONER

## 2019-11-13 PROCEDURE — G8427 DOCREV CUR MEDS BY ELIG CLIN: HCPCS | Performed by: NURSE PRACTITIONER

## 2019-11-13 RX ORDER — FLUCONAZOLE 150 MG/1
150 TABLET ORAL ONCE
Qty: 1 TABLET | Refills: 0 | Status: SHIPPED | OUTPATIENT
Start: 2019-11-13 | End: 2019-11-13

## 2019-11-13 RX ORDER — SULFAMETHOXAZOLE AND TRIMETHOPRIM 800; 160 MG/1; MG/1
1 TABLET ORAL 2 TIMES DAILY
Qty: 14 TABLET | Refills: 0 | Status: SHIPPED | OUTPATIENT
Start: 2019-11-13 | End: 2019-11-20

## 2019-11-13 RX ORDER — METRONIDAZOLE 500 MG/1
500 TABLET ORAL 2 TIMES DAILY
Qty: 14 TABLET | Refills: 0 | Status: SHIPPED | OUTPATIENT
Start: 2019-11-13 | End: 2019-11-20

## 2019-11-13 ASSESSMENT — ENCOUNTER SYMPTOMS
DIARRHEA: 0
NAUSEA: 0
VOMITING: 0
ABDOMINAL PAIN: 0
COUGH: 0
SHORTNESS OF BREATH: 0

## 2019-11-14 LAB
CULTURE: ABNORMAL
DIRECT EXAM: ABNORMAL
Lab: ABNORMAL
Lab: ABNORMAL
SPECIMEN DESCRIPTION: ABNORMAL
SPECIMEN DESCRIPTION: ABNORMAL

## 2019-11-15 LAB
C TRACH DNA GENITAL QL NAA+PROBE: NEGATIVE
N. GONORRHOEAE DNA: NEGATIVE
SPECIMEN DESCRIPTION: NORMAL

## 2020-01-20 ENCOUNTER — OFFICE VISIT (OUTPATIENT)
Dept: ORTHOPEDIC SURGERY | Age: 50
End: 2020-01-20
Payer: MEDICARE

## 2020-01-20 VITALS — WEIGHT: 292.99 LBS | HEIGHT: 67 IN | BODY MASS INDEX: 45.99 KG/M2

## 2020-01-20 PROCEDURE — G8484 FLU IMMUNIZE NO ADMIN: HCPCS | Performed by: ORTHOPAEDIC SURGERY

## 2020-01-20 PROCEDURE — G8428 CUR MEDS NOT DOCUMENT: HCPCS | Performed by: ORTHOPAEDIC SURGERY

## 2020-01-20 PROCEDURE — 3017F COLORECTAL CA SCREEN DOC REV: CPT | Performed by: ORTHOPAEDIC SURGERY

## 2020-01-20 PROCEDURE — 99214 OFFICE O/P EST MOD 30 MIN: CPT | Performed by: ORTHOPAEDIC SURGERY

## 2020-01-20 PROCEDURE — G8417 CALC BMI ABV UP PARAM F/U: HCPCS | Performed by: ORTHOPAEDIC SURGERY

## 2020-01-20 PROCEDURE — 4004F PT TOBACCO SCREEN RCVD TLK: CPT | Performed by: ORTHOPAEDIC SURGERY

## 2020-01-20 PROCEDURE — 20610 DRAIN/INJ JOINT/BURSA W/O US: CPT | Performed by: ORTHOPAEDIC SURGERY

## 2020-01-20 RX ORDER — METHYLPREDNISOLONE ACETATE 80 MG/ML
80 INJECTION, SUSPENSION INTRA-ARTICULAR; INTRALESIONAL; INTRAMUSCULAR; SOFT TISSUE ONCE
Status: COMPLETED | OUTPATIENT
Start: 2020-01-20 | End: 2020-01-21

## 2020-01-20 RX ORDER — BUPIVACAINE HYDROCHLORIDE 2.5 MG/ML
2 INJECTION, SOLUTION INFILTRATION; PERINEURAL ONCE
Status: COMPLETED | OUTPATIENT
Start: 2020-01-20 | End: 2020-01-21

## 2020-01-20 ASSESSMENT — ENCOUNTER SYMPTOMS
DIARRHEA: 0
NAUSEA: 0
CONSTIPATION: 0
COUGH: 0

## 2020-01-20 NOTE — PROGRESS NOTES
[facility]  [address]  Dept: [phone]  Dept Fax: [phone]        Ambulatory Follow Up      Subjective:   Bennie Sheppard is a 47y.o. year old female who presents to our office today for routine followup regarding her   1. Primary osteoarthritis of both knees    . Chief Complaint   Patient presents with    Knee Pain     bilateral       HPI     Bennie Sheppard  is a 47 y.o.  female who presents today in follow for bilateral knee pain due to knee arthritis. Patient states the left knee is more painful than the right. The patient was last seen on 7/29/2019 and underwent treatment in the form of Synvisc One injections. The patient notes 70-80% improvement with the previous treatment for about a month. Patient states that she uses a cane for the left knee pain. She is unable to walk long distance or stand for a long period of time. Review of Systems   Constitutional: Negative for chills and fever. Respiratory: Negative for cough. Gastrointestinal: Negative for constipation, diarrhea and nausea. Musculoskeletal: Positive for arthralgias (bilateral knee). Negative for gait problem, joint swelling and myalgias. Neurological: Negative for dizziness, weakness and numbness. I have reviewed the CC, HPI, ROS, PMH, FHX, Social History, and if not present in this note, I have reviewed in the patient's chart. I agree with the documentation provided by other staff and have reviewed their documentation prior to providing my signature indicating agreement. Objective :   Ht 5' 7.01\" (1.702 m)   Wt 292 lb 15.9 oz (132.9 kg)   LMP 07/29/2013   BMI 45.88 kg/m²  Body mass index is 45.88 kg/m². General: Bennie Sheppard is a 47 y.o. female who is alert and oriented and sitting comfortably in our office. Ortho Exam  MS:  Evaluation of the Bilateral knee reveals no significant outward deformity.   There is no erythema, osteoarthritis of both knees       Plan:      Discussed etiology and natural history of bilateral knee arthriths. The treatment options may include oral anti-inflammatories, bracing, injections, advanced imaging, activity modification, physical therapy and/or surgical intervention. The patient would like to proceed with bilateral corticosteroid injections and referral to weight management. Discussed with the patient that she is in need of a TKA, however I would like her BMI to be at or under 40. Patient notes understanding. The patient will follow up in 4-6 weeks. We discussed that the patient should call us with any concerns or questions. Follow up:Return in about 5 weeks (around 2/24/2020). Orders Placed This Encounter   Medications    bupivacaine (MARCAINE) 0.25 % injection 5 mg    methylPREDNISolone acetate (DEPO-MEDROL) injection 80 mg    bupivacaine (MARCAINE) 0.25 % injection 5 mg    methylPREDNISolone acetate (DEPO-MEDROL) injection 80 mg         Orders Placed This Encounter   Procedures   Luis Angel Colbert MD, General Surgery, [facility]     Referral Priority:   Routine     Referral Type:   Eval and Treat     Referral Reason:   Specialty Services Required     Requested Specialty:   General Surgery     Number of Visits Requested:   1    AL ARTHROCENTESIS ASPIR&/INJ MAJOR JT/BURSA W/O US     I, Ninfa Salazar MA am scribing for and in the presence of Dr. Anisa Melendez  1/20/2020 12:08 PM    I have reviewed and made changes accordingly to the work scribed by Coco Duran. The documentation accurately reflects work and decisions made by me. I have also reviewed documentation completed by clinical staff.     Anisa Melendez DO, [address] [facility] Sports Medicine  1/24/2020 5:19 PM    This note is created with the assistance of a speech recognition program.  While intending to generate a document that actually reflects the content of the visit, the document can still have some errors including those of syntax and sound a like substitutions which may escape proof reading.  In such instances, actual meaning can be extrapolated by contextual diversion      Electronically signed by Warner Narvaez DO, FAOAO on 1/24/2020 at 5:19 PM

## 2020-01-21 RX ADMIN — METHYLPREDNISOLONE ACETATE 80 MG: 80 INJECTION, SUSPENSION INTRA-ARTICULAR; INTRALESIONAL; INTRAMUSCULAR; SOFT TISSUE at 09:25

## 2020-01-21 RX ADMIN — METHYLPREDNISOLONE ACETATE 80 MG: 80 INJECTION, SUSPENSION INTRA-ARTICULAR; INTRALESIONAL; INTRAMUSCULAR; SOFT TISSUE at 09:26

## 2020-01-21 RX ADMIN — BUPIVACAINE HYDROCHLORIDE 5 MG: 2.5 INJECTION, SOLUTION INFILTRATION; PERINEURAL at 09:25

## 2020-01-21 RX ADMIN — BUPIVACAINE HYDROCHLORIDE 5 MG: 2.5 INJECTION, SOLUTION INFILTRATION; PERINEURAL at 09:23

## 2020-01-27 ENCOUNTER — HOSPITAL ENCOUNTER (OUTPATIENT)
Age: 50
Setting detail: SPECIMEN
Discharge: HOME | End: 2020-01-27
Payer: MEDICARE

## 2020-01-27 ENCOUNTER — OFFICE VISIT (OUTPATIENT)
Dept: INTERNAL MEDICINE | Age: 50
End: 2020-01-27
Payer: MEDICARE

## 2020-01-27 VITALS
WEIGHT: 288.8 LBS | SYSTOLIC BLOOD PRESSURE: 128 MMHG | BODY MASS INDEX: 45.33 KG/M2 | HEART RATE: 89 BPM | HEIGHT: 67 IN | DIASTOLIC BLOOD PRESSURE: 77 MMHG

## 2020-01-27 LAB
ABSOLUTE EOS #: 0.04 K/UL (ref 0–0.44)
ABSOLUTE IMMATURE GRANULOCYTE: 0.03 K/UL (ref 0–0.3)
ABSOLUTE LYMPH #: 2.48 K/UL (ref 1.1–3.7)
ABSOLUTE MONO #: 1.06 K/UL (ref 0.1–1.2)
ALBUMIN SERPL-MCNC: 4.1 G/DL (ref 3.5–5.2)
ALBUMIN/GLOBULIN RATIO: 1.2 (ref 1–2.5)
ALP BLD-CCNC: 93 U/L (ref 35–104)
ALT SERPL-CCNC: 22 U/L (ref 5–33)
ANION GAP SERPL CALCULATED.3IONS-SCNC: 15 MMOL/L (ref 9–17)
AST SERPL-CCNC: 16 U/L
BASOPHILS # BLD: 0 % (ref 0–2)
BASOPHILS ABSOLUTE: 0.04 K/UL (ref 0–0.2)
BILIRUB SERPL-MCNC: 0.22 MG/DL (ref 0.3–1.2)
BUN BLDV-MCNC: 17 MG/DL (ref 6–20)
BUN/CREAT BLD: ABNORMAL (ref 9–20)
CALCIUM SERPL-MCNC: 9.8 MG/DL (ref 8.6–10.4)
CHLORIDE BLD-SCNC: 100 MMOL/L (ref 98–107)
CHOLESTEROL, FASTING: 203 MG/DL
CHOLESTEROL/HDL RATIO: 2.5
CO2: 21 MMOL/L (ref 20–31)
CREAT SERPL-MCNC: 0.63 MG/DL (ref 0.5–0.9)
DIFFERENTIAL TYPE: ABNORMAL
EOSINOPHILS RELATIVE PERCENT: 0 % (ref 1–4)
GFR AFRICAN AMERICAN: >60 ML/MIN
GFR NON-AFRICAN AMERICAN: >60 ML/MIN
GFR SERPL CREATININE-BSD FRML MDRD: ABNORMAL ML/MIN/{1.73_M2}
GFR SERPL CREATININE-BSD FRML MDRD: ABNORMAL ML/MIN/{1.73_M2}
GLUCOSE BLD-MCNC: 228 MG/DL (ref 70–99)
HBA1C MFR BLD: 9.5 %
HCT VFR BLD CALC: 42.5 % (ref 36.3–47.1)
HDLC SERPL-MCNC: 82 MG/DL
HEMOGLOBIN: 13.7 G/DL (ref 11.9–15.1)
IMMATURE GRANULOCYTES: 0 %
LDL CHOLESTEROL: 100 MG/DL (ref 0–130)
LYMPHOCYTES # BLD: 26 % (ref 24–43)
MCH RBC QN AUTO: 30.9 PG (ref 25.2–33.5)
MCHC RBC AUTO-ENTMCNC: 32.2 G/DL (ref 28.4–34.8)
MCV RBC AUTO: 95.7 FL (ref 82.6–102.9)
MONOCYTES # BLD: 11 % (ref 3–12)
NRBC AUTOMATED: 0 PER 100 WBC
PDW BLD-RTO: 13.2 % (ref 11.8–14.4)
PLATELET # BLD: 297 K/UL (ref 138–453)
PLATELET ESTIMATE: ABNORMAL
PMV BLD AUTO: 10.7 FL (ref 8.1–13.5)
POTASSIUM SERPL-SCNC: 4 MMOL/L (ref 3.7–5.3)
RBC # BLD: 4.44 M/UL (ref 3.95–5.11)
RBC # BLD: ABNORMAL 10*6/UL
SEG NEUTROPHILS: 63 % (ref 36–65)
SEGMENTED NEUTROPHILS ABSOLUTE COUNT: 6 K/UL (ref 1.5–8.1)
SODIUM BLD-SCNC: 136 MMOL/L (ref 135–144)
TOTAL PROTEIN: 7.4 G/DL (ref 6.4–8.3)
TRIGLYCERIDE, FASTING: 103 MG/DL
TSH SERPL DL<=0.05 MIU/L-ACNC: 2.83 MIU/L (ref 0.3–5)
VLDLC SERPL CALC-MCNC: ABNORMAL MG/DL (ref 1–30)
WBC # BLD: 9.7 K/UL (ref 3.5–11.3)
WBC # BLD: ABNORMAL 10*3/UL

## 2020-01-27 PROCEDURE — 85025 COMPLETE CBC W/AUTO DIFF WBC: CPT

## 2020-01-27 PROCEDURE — G0008 ADMIN INFLUENZA VIRUS VAC: HCPCS | Performed by: NURSE PRACTITIONER

## 2020-01-27 PROCEDURE — G8417 CALC BMI ABV UP PARAM F/U: HCPCS | Performed by: NURSE PRACTITIONER

## 2020-01-27 PROCEDURE — G8427 DOCREV CUR MEDS BY ELIG CLIN: HCPCS | Performed by: NURSE PRACTITIONER

## 2020-01-27 PROCEDURE — 4004F PT TOBACCO SCREEN RCVD TLK: CPT | Performed by: NURSE PRACTITIONER

## 2020-01-27 PROCEDURE — 80061 LIPID PANEL: CPT

## 2020-01-27 PROCEDURE — 84443 ASSAY THYROID STIM HORMONE: CPT

## 2020-01-27 PROCEDURE — 99211 OFF/OP EST MAY X REQ PHY/QHP: CPT | Performed by: NURSE PRACTITIONER

## 2020-01-27 PROCEDURE — 3017F COLORECTAL CA SCREEN DOC REV: CPT | Performed by: NURSE PRACTITIONER

## 2020-01-27 PROCEDURE — G8482 FLU IMMUNIZE ORDER/ADMIN: HCPCS | Performed by: NURSE PRACTITIONER

## 2020-01-27 PROCEDURE — 83036 HEMOGLOBIN GLYCOSYLATED A1C: CPT | Performed by: NURSE PRACTITIONER

## 2020-01-27 PROCEDURE — 80053 COMPREHEN METABOLIC PANEL: CPT

## 2020-01-27 PROCEDURE — 36415 COLL VENOUS BLD VENIPUNCTURE: CPT

## 2020-01-27 PROCEDURE — 3046F HEMOGLOBIN A1C LEVEL >9.0%: CPT | Performed by: NURSE PRACTITIONER

## 2020-01-27 PROCEDURE — 2022F DILAT RTA XM EVC RTNOPTHY: CPT | Performed by: NURSE PRACTITIONER

## 2020-01-27 PROCEDURE — 99214 OFFICE O/P EST MOD 30 MIN: CPT | Performed by: NURSE PRACTITIONER

## 2020-01-27 RX ORDER — HYDROCODONE BITARTRATE AND ACETAMINOPHEN 5; 325 MG/1; MG/1
1 TABLET ORAL
COMMUNITY
Start: 2020-01-24 | End: 2020-01-29

## 2020-01-27 RX ORDER — METHYLPREDNISOLONE 4 MG/1
TABLET ORAL
COMMUNITY
Start: 2020-01-24 | End: 2020-01-29 | Stop reason: ALTCHOICE

## 2020-01-27 RX ORDER — FAMOTIDINE 40 MG/1
40 TABLET, FILM COATED ORAL
COMMUNITY
Start: 2020-01-24 | End: 2020-01-29 | Stop reason: ALTCHOICE

## 2020-01-27 RX ORDER — CYCLOBENZAPRINE HCL 10 MG
10 TABLET ORAL
COMMUNITY
Start: 2020-01-24 | End: 2020-10-26 | Stop reason: ALTCHOICE

## 2020-01-27 ASSESSMENT — PATIENT HEALTH QUESTIONNAIRE - PHQ9
1. LITTLE INTEREST OR PLEASURE IN DOING THINGS: 0
SUM OF ALL RESPONSES TO PHQ9 QUESTIONS 1 & 2: 0
2. FEELING DOWN, DEPRESSED OR HOPELESS: 0
SUM OF ALL RESPONSES TO PHQ QUESTIONS 1-9: 0
SUM OF ALL RESPONSES TO PHQ QUESTIONS 1-9: 0

## 2020-01-27 NOTE — PATIENT INSTRUCTIONS
Return To Clinic 4/27/2020 for 3 month follow up. After Visit Summary given and reviewed. The medication list included in this document is our record of what you are currently taking, including any changes that were made at today's visit. If you find any differences when compared to your medications at home, or have any questions that were not answered at your visit, please contact the office. It is very important for your care that you keep your appointment. If for some reason you are unable to keep your appointment, it is equally important that you call our office at [phone] to cancel your appointment and reschedule. Failure to do so may result in your termination from our practice. Referral for Endocrinology sent to [facility]. Specialty office will contact patient for appointment. A copy of referral with contact information and address given to patient. Patient should contact specialist office if no contact has been made to patient within 1 week. Script for Tdap was printed and given to patient. Patient was instructed to take script(s) to pharmacy to get filled. Script for Shingrix was printed and given to patient. Patient was instructed to take script(s) to pharmacy to get filled. Patient was administered vaccination(s) for Influenza in the office. VIS given to patient for each vaccination given.     -MAR Lind     Labs reprinted from 7/22/2019. Roge resubmitted for patient.

## 2020-01-27 NOTE — PROGRESS NOTES
DIABETES and HYPERTENSION visit    BP Readings from Last 3 Encounters:   11/13/19 136/86   10/09/19 134/78   07/22/19 121/65        Hemoglobin A1C (%)   Date Value   03/06/2019 8.0   08/03/2018 7.1   01/19/2018 7.5     Microalb/Crt. Ratio (mcg/mg creat)   Date Value   09/11/2017 7     LDL Cholesterol (mg/dL)   Date Value   09/11/2017 113     HDL (mg/dL)   Date Value   09/11/2017 56     BUN (mg/dL)   Date Value   04/17/2018 9     CREATININE (mg/dL)   Date Value   04/17/2018 0.84     Glucose (mg/dL)   Date Value   04/17/2018 232 (H)   12/29/2011 296 (H)            Have you changed or started any medications since your last visit including any over-the-counter medicines, vitamins, or herbal medicines? no   Have you stopped taking any of your medications? Is so, why? -  no  Are you having any side effects from any of your medications? - no    Have you seen any other physician or provider since your last visit? yes - Orthopedics   Have you had any other diagnostic tests since your last visit? yes - Cultures, XR   Have you been seen in the emergency room and/or had an admission in a hospital since we last saw you?  yes - [facility] (10/9), [facility] (11/13)   Have you had your routine dental cleaning in the past 6 months?  no     Have you had your annual diabetic retinal (eye) exam? No   (ensure copy of exam is in the chart)    Do you have an active MyChart account? If no, what is the barrier?   Yes    Patient Care Team:  Bethann Lefort, APRN - CNP as PCP - General (Family Nurse Practitioner)  Roopa Mims MD as PCP - [facility]  Sherita Easley MD as Anesthesiologist (Pain Management)  Frederick Roman MD as Consulting Physician (Urology)  Ellwood Boeck, MD as Consulting Physician (Internal Medicine)    Medical History Review  Past Medical, Family, and Social History reviewed and does not contribute to the patient presenting condition    Health Maintenance   Topic Date Due    DTaP/Tdap/Td vaccine (1 - Tdap) 07/06/1980    Hepatitis B vaccine (1 of 3 - Risk 3-dose series) 07/06/1988    Diabetic retinal exam  10/01/2016    Diabetic microalbuminuria test  09/11/2018    Lipid screen  09/11/2018    Potassium monitoring  04/17/2019    Creatinine monitoring  04/17/2019    TSH testing  05/22/2019    Annual Wellness Visit (AWV)  05/29/2019    Shingles Vaccine (1 of 2) 07/06/2019    Colon cancer screen colonoscopy  07/06/2019    Flu vaccine (1) 09/01/2019    Diabetic foot exam  03/06/2020    A1C test (Diabetic or Prediabetic)  03/06/2020    Breast cancer screen  02/27/2021    Pneumococcal 0-64 years Vaccine  Completed    HIV screen  Completed

## 2020-01-27 NOTE — PROGRESS NOTES
Subjective:      Patient ID: Lina Shetty is a 47 y.o. female. Hypertension   This is a chronic problem. The current episode started more than 1 year ago. The problem has been waxing and waning since onset. The problem is controlled. Associated symptoms include headaches. Pertinent negatives include no anxiety, blurred vision, chest pain, malaise/fatigue, neck pain, orthopnea, palpitations, peripheral edema, PND, shortness of breath or sweats. There are no associated agents to hypertension. Risk factors for coronary artery disease include diabetes mellitus, dyslipidemia, obesity and smoking/tobacco exposure. Past treatments include ACE inhibitors. The current treatment provides significant improvement. Compliance problems include diet and exercise. There is no history of angina, kidney disease, CAD/MI, CVA, heart failure, left ventricular hypertrophy, PVD or retinopathy. Diabetes   She presents for her follow-up diabetic visit. She has type 2 diabetes mellitus. No MedicAlert identification noted. Her disease course has been fluctuating. Hypoglycemia symptoms include headaches. Pertinent negatives for hypoglycemia include no sweats. There are no diabetic associated symptoms. Pertinent negatives for diabetes include no blurred vision and no chest pain. There are no hypoglycemic complications. There are no diabetic complications. Pertinent negatives for diabetic complications include no CVA, PVD or retinopathy. Risk factors for coronary artery disease include diabetes mellitus, dyslipidemia, hypertension, obesity and tobacco exposure. Current diabetic treatment includes insulin injections and oral agent (monotherapy). She is compliant with treatment most of the time. Her weight is increasing steadily. She is following a generally unhealthy diet. When asked about meal planning, she reported none. She has not had a previous visit with a dietitian. She never participates in exercise.  Her home blood glucose canal and external ear normal.      Nose: Nose normal.      Mouth/Throat:      Mouth: Mucous membranes are moist.      Pharynx: Oropharynx is clear. Eyes:      Extraocular Movements: Extraocular movements intact. Conjunctiva/sclera: Conjunctivae normal.      Pupils: Pupils are equal, round, and reactive to light. Neck:      Musculoskeletal: Normal range of motion and neck supple. Cardiovascular:      Rate and Rhythm: Normal rate and regular rhythm. Pulmonary:      Effort: Pulmonary effort is normal.      Breath sounds: Normal breath sounds. Abdominal:      General: Bowel sounds are normal.      Palpations: Abdomen is soft. Musculoskeletal: Normal range of motion. Skin:     General: Skin is warm and dry. Neurological:      General: No focal deficit present. Mental Status: She is alert and oriented to person, place, and time. Psychiatric:         Mood and Affect: Mood normal.         Behavior: Behavior normal.         Thought Content: Thought content normal.         Judgment: Judgment normal.         Assessment/Plan:      1. Type 2 diabetes mellitus with diabetic polyneuropathy, with long-term current use of insulin (LTAC, located within [facility])  - POCT glycosylated hemoglobin (Hb A1C)  - Yodit Gutierrez MD, Endocrinology, [place]    2. Need for prophylactic vaccination against diphtheria-tetanus-pertussis (DTP)  - Tdap (ADACEL) 5-2-15.5 LF-MCG/0.5 injection; Inject 0.5 mLs into the muscle once for 1 dose  Dispense: 0.5 mL; Refill: 0    3. Need for prophylactic vaccination and inoculation against varicella  - zoster recombinant adjuvanted vaccine [facility]) 50 MCG/0.5ML SUSR injection; Inject 0.5 mLs into the muscle once for 1 dose 50 MCG IM then repeat 2-6 months. Dispense: 1 each; Refill: 1    4.  Flu vaccine need  - INFLUENZA, QUADV, 3 YRS AND OLDER, IM, MDV, 0.5ML (AFLURIA QUADV)  - NH IMMUNIZ ADMIN,1 SINGLE/COMB VAC/TOXOID    RV 3 months     MARILU Dockery - CNP

## 2020-01-29 ENCOUNTER — OFFICE VISIT (OUTPATIENT)
Dept: BARIATRICS/WEIGHT MGMT | Age: 50
End: 2020-01-29
Payer: MEDICARE

## 2020-01-29 VITALS
HEIGHT: 68 IN | WEIGHT: 280.8 LBS | BODY MASS INDEX: 42.56 KG/M2 | SYSTOLIC BLOOD PRESSURE: 138 MMHG | DIASTOLIC BLOOD PRESSURE: 80 MMHG | HEART RATE: 84 BPM

## 2020-01-29 PROBLEM — E66.813 OBESITY, CLASS III, BMI 40-49.9 (MORBID OBESITY): Status: ACTIVE | Noted: 2020-01-29

## 2020-01-29 PROBLEM — M25.562 BILATERAL CHRONIC KNEE PAIN: Status: ACTIVE | Noted: 2020-01-29

## 2020-01-29 PROBLEM — G89.29 BILATERAL CHRONIC KNEE PAIN: Status: ACTIVE | Noted: 2020-01-29

## 2020-01-29 PROBLEM — M25.561 BILATERAL CHRONIC KNEE PAIN: Status: ACTIVE | Noted: 2020-01-29

## 2020-01-29 PROBLEM — E66.01 OBESITY, CLASS III, BMI 40-49.9 (MORBID OBESITY) (HCC): Status: ACTIVE | Noted: 2020-01-29

## 2020-01-29 PROCEDURE — 99214 OFFICE O/P EST MOD 30 MIN: CPT | Performed by: NURSE PRACTITIONER

## 2020-01-29 PROCEDURE — G8417 CALC BMI ABV UP PARAM F/U: HCPCS | Performed by: NURSE PRACTITIONER

## 2020-01-29 PROCEDURE — 3046F HEMOGLOBIN A1C LEVEL >9.0%: CPT | Performed by: NURSE PRACTITIONER

## 2020-01-29 PROCEDURE — G8427 DOCREV CUR MEDS BY ELIG CLIN: HCPCS | Performed by: NURSE PRACTITIONER

## 2020-01-29 PROCEDURE — G8482 FLU IMMUNIZE ORDER/ADMIN: HCPCS | Performed by: NURSE PRACTITIONER

## 2020-01-29 PROCEDURE — 3017F COLORECTAL CA SCREEN DOC REV: CPT | Performed by: NURSE PRACTITIONER

## 2020-01-29 PROCEDURE — 4004F PT TOBACCO SCREEN RCVD TLK: CPT | Performed by: NURSE PRACTITIONER

## 2020-01-29 PROCEDURE — 2022F DILAT RTA XM EVC RTNOPTHY: CPT | Performed by: NURSE PRACTITIONER

## 2020-01-29 NOTE — PROGRESS NOTES
partner violence:     Fear of current or ex partner: Not on file     Emotionally abused: Not on file     Physically abused: Not on file     Forced sexual activity: Not on file   Other Topics Concern    Not on file   Social History Narrative    Not on file       Current Medications:  Current Outpatient Medications   Medication Sig Dispense Refill    cyclobenzaprine (FLEXERIL) 10 MG tablet Take 10 mg by mouth      insulin lispro (HUMALOG KWIKPEN) 100 UNIT/ML pen Inject 20 Units into the skin 3 times daily (before meals) 3 pen 2    insulin glargine (LANTUS SOLOSTAR) 100 UNIT/ML injection pen Inject 30 Units into the skin nightly 3 pen 2    lisinopril (PRINIVIL;ZESTRIL) 5 MG tablet Take 1 tablet by mouth daily 30 tablet 2    metFORMIN (GLUCOPHAGE) 1000 MG tablet Take 1 tablet by mouth 2 times daily (with meals) 60 tablet 2    simvastatin (ZOCOR) 40 MG tablet Take 1 tablet by mouth nightly 30 tablet 2    oxybutynin (DITROPAN XL) 10 MG extended release tablet Take 1 tablet by mouth daily 30 tablet 2    ferrous sulfate 325 (65 Fe) MG tablet Take 1 tablet by mouth 2 times daily 60 tablet 2    levothyroxine (SYNTHROID) 50 MCG tablet Take 1 tablet by mouth daily 30 tablet 2    aspirin EC 81 MG EC tablet Take 1 tablet by mouth daily 30 tablet 2    albuterol sulfate HFA (PROVENTIL HFA) 108 (90 Base) MCG/ACT inhaler Inhale 2 puffs into the lungs every 6 hours as needed for Wheezing 1 Inhaler 2    blood glucose test strips (TRUETEST TEST) strip Dx: DM-2 use 2-3 times daily 100 strip 11    Calcium Carb-Ergocalciferol 250-125 MG-UNIT TABS Take 1 tablet by mouth      VORTIoxetine (TRINTELLIX) 10 MG TABS tablet Take 10 mg by mouth daily      traZODone (DESYREL) 150 MG tablet Take 150 mg by mouth nightly      ONE TOUCH ULTRASOFT LANCETS MISC Dx: DM-2 use 2-3 times daily 100 each 11    glucose monitoring kit (FREESTYLE) monitoring kit Diagnosis: Diabetes type 2, insulin-dependent.   Use 3-4 times daily 1 kit 0     No

## 2020-01-31 ASSESSMENT — ENCOUNTER SYMPTOMS
SHORTNESS OF BREATH: 0
BLURRED VISION: 0
ORTHOPNEA: 0

## 2020-02-19 ENCOUNTER — HOSPITAL ENCOUNTER (OUTPATIENT)
Age: 50
Setting detail: SPECIMEN
Discharge: HOME | End: 2020-02-19
Payer: MEDICARE

## 2020-02-19 ENCOUNTER — OFFICE VISIT (OUTPATIENT)
Dept: OBGYN | Age: 50
End: 2020-02-19
Payer: MEDICARE

## 2020-02-19 VITALS
DIASTOLIC BLOOD PRESSURE: 82 MMHG | HEIGHT: 68 IN | SYSTOLIC BLOOD PRESSURE: 133 MMHG | WEIGHT: 279.13 LBS | HEART RATE: 95 BPM | BODY MASS INDEX: 42.3 KG/M2

## 2020-02-19 PROCEDURE — G8482 FLU IMMUNIZE ORDER/ADMIN: HCPCS | Performed by: OBSTETRICS & GYNECOLOGY

## 2020-02-19 PROCEDURE — G0101 CA SCREEN;PELVIC/BREAST EXAM: HCPCS | Performed by: OBSTETRICS & GYNECOLOGY

## 2020-02-19 RX ORDER — FLUCONAZOLE 150 MG/1
150 TABLET ORAL ONCE
Qty: 2 TABLET | Refills: 0 | Status: SHIPPED | OUTPATIENT
Start: 2020-02-19 | End: 2020-02-19

## 2020-02-19 NOTE — PROGRESS NOTES
II or unspecified type diabetes mellitus without mention of complication, not stated as uncontrolled     Urge incontinence of urine 2017    Wears glasses     Weight loss                                                                    Past Surgical History:   Procedure Laterality Date    CARPAL TUNNEL RELEASE      2008 left,  2009 right     SECTION  2007    CYSTOSCOPY  2016    cysto with left ureteral stent placement    HYSTERECTOMY, TOTAL ABDOMINAL  2013    LITHOTRIPSY Left 2016    cysto with ESWL and left ureteral stent placement.     NERVE BLOCK Left 2016    left knee kenalog 80mg    NERVE BLOCK  3/11/16    Rt Knee depo medrol [de-identified]     TUBAL LIGATION  3/2012     Family History   Problem Relation Age of Onset    Stroke Father     Diabetes Mother     Hypertension Mother     Breast Cancer Neg Hx     Cancer Neg Hx     Colon Cancer Neg Hx     Eclampsia Neg Hx     Ovarian Cancer Neg Hx      Labor Neg Hx     Spont Abortions Neg Hx      Social History     Socioeconomic History    Marital status: Single     Spouse name: Not on file    Number of children: Not on file    Years of education: Not on file    Highest education level: Not on file   Occupational History    Not on file   Social Needs    Financial resource strain: Not on file    Food insecurity:     Worry: Not on file     Inability: Not on file    Transportation needs:     Medical: Not on file     Non-medical: Not on file   Tobacco Use    Smoking status: Current Every Day Smoker     Packs/day: 0.25     Years: 15.00     Pack years: 3.75     Types: Cigarettes    Smokeless tobacco: Never Used    Tobacco comment: 3-4 cigarettes a day    Substance and Sexual Activity    Alcohol use: Not Currently     Alcohol/week: 0.0 standard drinks     Comment: rare    Drug use: No    Sexual activity: Yes     Partners: Male   Lifestyle    Physical activity:     Days per week: Not on file     Minutes per session: Not on file    Stress: Not on file   Relationships    Social connections:     Talks on phone: Not on file     Gets together: Not on file     Attends Bahai service: Not on file     Active member of club or organization: Not on file     Attends meetings of clubs or organizations: Not on file     Relationship status: Not on file    Intimate partner violence:     Fear of current or ex partner: Not on file     Emotionally abused: Not on file     Physically abused: Not on file     Forced sexual activity: Not on file   Other Topics Concern    Not on file   Social History Narrative    Not on file       MEDICATIONS:  Current Outpatient Medications   Medication Sig Dispense Refill    fluconazole (DIFLUCAN) 150 MG tablet Take 1 tablet by mouth once for 1 dose Take one tablet today and may repeat again in 2-3 days if symptoms persist 2 tablet 0    cyclobenzaprine (FLEXERIL) 10 MG tablet Take 10 mg by mouth      insulin lispro (HUMALOG KWIKPEN) 100 UNIT/ML pen Inject 20 Units into the skin 3 times daily (before meals) 3 pen 2    insulin glargine (LANTUS SOLOSTAR) 100 UNIT/ML injection pen Inject 30 Units into the skin nightly 3 pen 2    lisinopril (PRINIVIL;ZESTRIL) 5 MG tablet Take 1 tablet by mouth daily 30 tablet 2    metFORMIN (GLUCOPHAGE) 1000 MG tablet Take 1 tablet by mouth 2 times daily (with meals) 60 tablet 2    simvastatin (ZOCOR) 40 MG tablet Take 1 tablet by mouth nightly 30 tablet 2    oxybutynin (DITROPAN XL) 10 MG extended release tablet Take 1 tablet by mouth daily 30 tablet 2    ferrous sulfate 325 (65 Fe) MG tablet Take 1 tablet by mouth 2 times daily 60 tablet 2    levothyroxine (SYNTHROID) 50 MCG tablet Take 1 tablet by mouth daily 30 tablet 2    aspirin EC 81 MG EC tablet Take 1 tablet by mouth daily 30 tablet 2    albuterol sulfate HFA (PROVENTIL HFA) 108 (90 Base) MCG/ACT inhaler Inhale 2 puffs into the lungs every 6 hours as needed for Wheezing 1 Inhaler 2    blood glucose Appearance: This  is a well Developed, well Nourished, well groomed female. Her BMI was reviewed. Nutritional decision making was discussed. Skin:  There was a Normal Inspection of the skin without rashes or lesions. There were no rashes. (Papular, Maculopapular, Hives, Pustular, Macular)     There were no lesions (Ulcers, Erythema, Abn. Appearing Nevi)            Lymphatic:  No Lymph Nodes were Palpable in the neck , axilla or groin.  0 # Of Lymph Nodes; Location ; Character [Normal]  [Shotty] [Tender] [Enlarged]     Neck and EENT:  The neck was supple. There were no masses   The thyroid was not enlarged and had no masses. Perrla, EOMI B/L, TMI B/L No Abnormalities. Throat inspected-No exudates or Masses, Nares Patent No Masses        Respiratory: The lungs were auscultated and found to be clear. There were no rales, rhonchi or wheezes. There was a good respiratory effort. Cardiovascular: The heart was in a regular rate and rhythm. . No S3 or S4. There was no murmur appreciated. Location, grade, and radiation are not applicable. Extremities: The patients extremities were without calf tenderness, edema, or varicosities. There was full range of motion in all four extremities. Pulses in all four extremities were appreciated and are 2/4. Abdomen: The abdomen was soft and non-tender. There were good bowel sounds in all quadrants and there was no guarding, rebound or rigidity. On evaluation there was no evidence of hepatosplenomegaly and there was no costal vertebral arnaldo tenderness bilaterally. No hernias were appreciated. Abdominal Scars: pfannenstiel incision from hysterectomy    Psych:   The patient had a normal Orientation to: Time, Place, Person, and Situation  There is no Mood / Affect changes    Breast:  (Chest)  normal appearance, no masses or tenderness, Inspection negative, No nipple retraction or dimpling, No nipple discharge or bleeding, No axillary or supraclavicular per prevailing guidelines. 2. Mammograms begin every year at 35 yo if no abnormalities are found and no family     History. 3. Bone density studies every 2-3 years. Begin at 73 yo. If no fracture history or osteoporosis family history. (significant). 4. Colonoscopy begin at 40 yo. Repeat every ten years if negative and no family history. 5. Calcium of 3223-0904 mg/day in split dosing  6. Vitamin D 400-800 IU/day  7. All other preventative health recommendations will be managed by the patients Primary care physician. The patient was instructed to stop smoking. Morbidity, mortality, and cessation programs were reviewed. Worsening of long and short term medical health risks were discussed with the addition of tobacco. Secondary smoke risks were reviewed with respect to children and newborns. Increases in Sudden Infant Death Syndrome, asthma, respiratory problems, and cancers were reviewed. PLAN:  Pap smears no longer needed - s/p hysterectomy  Vaginal cultures obtained - will treat if appropriate  Mammogram ordered  Flu vaccine - pt reported she already received this  Ordered pelvic ultrasound to follow up on ovarian cysts  Will refer pt to gen surg for colonoscopy  Return in about 1 year (around 2/19/2021) for Annual Exam.  Repeat Annual every 1 year  Cervical Cytology Evaluation begins at 24years old. If Negative Cytology, Follow-up screening per current guidelines. Mammograms every 1 year. If 35 yo and last mammogram was negative. Calcium and Vitamin D dosing reviewed. Colonoscopy screening reviewed as well as onset for bone density testing. Birth control and barrier recommendations discussed. STD counseling and prevention reviewed. Gardisil counseling completed for all patients 10-35 yo. Routine health maintenance per patients PCP.     Lupillo Adhikari DO

## 2020-02-20 LAB
DIRECT EXAM: ABNORMAL
Lab: ABNORMAL
SPECIMEN DESCRIPTION: ABNORMAL

## 2020-02-20 RX ORDER — METRONIDAZOLE 500 MG/1
500 TABLET ORAL 2 TIMES DAILY
Qty: 14 TABLET | Refills: 0 | Status: SHIPPED | OUTPATIENT
Start: 2020-02-20 | End: 2020-02-27

## 2020-03-02 ENCOUNTER — OFFICE VISIT (OUTPATIENT)
Dept: ORTHOPEDIC SURGERY | Age: 50
End: 2020-03-02
Payer: MEDICARE

## 2020-03-02 VITALS — BODY MASS INDEX: 42.3 KG/M2 | HEIGHT: 68 IN | WEIGHT: 279.1 LBS

## 2020-03-02 PROCEDURE — 20610 DRAIN/INJ JOINT/BURSA W/O US: CPT | Performed by: STUDENT IN AN ORGANIZED HEALTH CARE EDUCATION/TRAINING PROGRAM

## 2020-03-02 NOTE — PROGRESS NOTES
[facility]  [facility]  [address] [facility] [address]  Dept: [phone]  Dept Fax: [phone]        Ambulatory Follow Up    Subjective:   Josefina Ocasio is a 47y.o. year old female who presents to our office today for routine followup regarding her   1. Primary osteoarthritis of both knees    2. Primary osteoarthritis of left knee    . Chief Complaint   Patient presents with    Knee Pain     bilateral       HPI   49f here for follow up of mainly left knee pain. Pt with hx of left knee OA being treated conservatively. Pt had steroid injection to left knee 1/20/20, pt reports minimal relief since then. Patient had a Synvisc injection in July 2019 with provided significant relief for a significant amount of time. Pt is doing her home exercises. Pt is taking Mobic without relief. Pt has connected with weight management program and continues to lose weight. Pt reports no pain to right knee. Review of Systems  Negative except in HPI    Objective :   General: Josefina Ocasio is a 47 y.o. female who is alert and oriented and sitting comfortably in our office. Ortho Exam  MS: L knee: TTP medial and lateral joint lines. Stable varus and valgus stress at 0 and 30 degrees. Negative Lockman's, negative posterior drawer. Negative Carmencita's. Compartments soft. 2+ DP pulse. TA/EHL/FHL/GS motor intact. Deep and Superficial Peroneal/Saphenous/Sural SILT. Neuro: alert. oriented  Eyes: Extra-ocular muscles intact  Mouth: Oral mucosa moist. No perioral lesions  Pulm: Respirations unlabored and regular. Skin: warm, well perfused  Psych:   Patient has good fund of knowledge and displays understanging of exam, diagnosis, and plan. Radiology:   None    Assessment:      1. Primary osteoarthritis of both knees    2. Primary osteoarthritis of left knee       Plan:       Patient is too close to previous steroid injection.   However, patient is covered for another Synvisc injection which she is interested in today. Injection given, see procedure note below. Voltaren prescription given. Patient is to continue home exercise program as well as weight management classes. Follow-up in 8 weeks. Injection procedure note  The alternatives, benefits, and risks were discussed with the patient. After answering all questions to the patient's satisfaction, the patient agreed to proceed forward with injection and gave verbal consent for the procedure. With the patient's permission, appropriate anatomic landmarks were identified and the left knee joint was prepped in a sterile fashion using alcohol and/or betadine. A 22 gauge needle was then used to inject Synvisc one. The injection was advanced without resistance confirming appropriate position. The patient tolerated the procedure well and the site was dressed with a band-aid. Patient was advised to ice the area for 15-20 minutes to relieve any injection site related pain. Patient was advised to contact nurse if area becomes swollen, hot, erythematous, or painful, or to go to the emergency room after business hours.       Orders Placed This Encounter   Medications    Hylan injection 48 mg    diclofenac (VOLTAREN) 50 MG EC tablet     Sig: Take 1 tablet by mouth 2 times daily     Dispense:  60 tablet     Refill:  3          Orders Placed This Encounter   Procedures    HI ARTHROCENTESIS ASPIR&/INJ MAJOR JT/BURSA W/O US       Electronically signed by Isaiah Mead DO   Orthopedic Surgery Resident PGY-2  [facility]  3/2/2020 at 2:24 PM

## 2020-03-04 NOTE — PROGRESS NOTES
I performed a history and physical examination of the patient and discussed management with the resident. I reviewed the physician assistant/resident physician note and agree with the documented findings and plan of care. Any areas of disagreement are noted on the chart. I have personally evaluated this patient and have completed at least one if not all key elements of the E/M (history, physical exam, and MDM). Additional findings are as noted. I agree with the chief complaint, past medical history, past surgical history, allergies, medications, social and family history as documented unless otherwise noted below.      Electronically signed by Capo Smith DO on 3/4/2020 at 1:33 PM

## 2020-03-17 ENCOUNTER — TELEPHONE (OUTPATIENT)
Dept: INTERNAL MEDICINE | Age: 50
End: 2020-03-17

## 2020-05-01 ENCOUNTER — TELEPHONE (OUTPATIENT)
Dept: INTERNAL MEDICINE | Age: 50
End: 2020-05-01

## 2020-05-01 ENCOUNTER — VIRTUAL VISIT (OUTPATIENT)
Dept: INTERNAL MEDICINE | Age: 50
End: 2020-05-01
Payer: MEDICARE

## 2020-05-01 VITALS — BODY MASS INDEX: 45.36 KG/M2 | WEIGHT: 289 LBS | HEIGHT: 67 IN

## 2020-05-01 PROCEDURE — G2012 BRIEF CHECK IN BY MD/QHP: HCPCS | Performed by: NURSE PRACTITIONER

## 2020-05-01 RX ORDER — LEVOTHYROXINE SODIUM 0.05 MG/1
50 TABLET ORAL DAILY
Qty: 30 TABLET | Refills: 2 | Status: SHIPPED | OUTPATIENT
Start: 2020-05-01 | End: 2020-10-26 | Stop reason: SDUPTHER

## 2020-05-01 RX ORDER — SIMVASTATIN 40 MG
40 TABLET ORAL NIGHTLY
Qty: 30 TABLET | Refills: 2 | Status: SHIPPED | OUTPATIENT
Start: 2020-05-01 | End: 2020-10-26 | Stop reason: SDUPTHER

## 2020-05-01 RX ORDER — INSULIN GLARGINE 100 [IU]/ML
30 INJECTION, SOLUTION SUBCUTANEOUS NIGHTLY
Qty: 3 PEN | Refills: 2 | Status: CANCELLED | OUTPATIENT
Start: 2020-05-01

## 2020-05-01 RX ORDER — NYSTATIN 100000 [USP'U]/G
POWDER TOPICAL
Qty: 1 BOTTLE | Refills: 0 | Status: SHIPPED | OUTPATIENT
Start: 2020-05-01 | End: 2020-10-26 | Stop reason: SDUPTHER

## 2020-05-01 RX ORDER — ASPIRIN 81 MG/1
81 TABLET ORAL DAILY
Qty: 30 TABLET | Refills: 2 | Status: SHIPPED | OUTPATIENT
Start: 2020-05-01 | End: 2020-10-26 | Stop reason: SDUPTHER

## 2020-05-01 RX ORDER — LANCETS
EACH MISCELLANEOUS
Qty: 100 EACH | Refills: 2 | Status: SHIPPED | OUTPATIENT
Start: 2020-05-01 | End: 2020-05-04

## 2020-05-01 RX ORDER — BIOTIN 1 MG
TABLET ORAL
Qty: 100 STRIP | Refills: 2 | Status: SHIPPED | OUTPATIENT
Start: 2020-05-01 | End: 2020-05-04

## 2020-05-01 RX ORDER — INSULIN GLARGINE 100 [IU]/ML
35 INJECTION, SOLUTION SUBCUTANEOUS NIGHTLY
Qty: 3 PEN | Refills: 2 | Status: SHIPPED | OUTPATIENT
Start: 2020-05-01 | End: 2020-05-08 | Stop reason: ALTCHOICE

## 2020-05-01 RX ORDER — LISINOPRIL 5 MG/1
5 TABLET ORAL DAILY
Qty: 30 TABLET | Refills: 2 | Status: SHIPPED | OUTPATIENT
Start: 2020-05-01 | End: 2020-10-26 | Stop reason: SDUPTHER

## 2020-05-01 RX ORDER — OXYBUTYNIN CHLORIDE 10 MG/1
10 TABLET, EXTENDED RELEASE ORAL DAILY
Qty: 30 TABLET | Refills: 2 | Status: SHIPPED | OUTPATIENT
Start: 2020-05-01 | End: 2020-10-26

## 2020-05-01 RX ORDER — BLOOD PRESSURE TEST KIT
KIT MISCELLANEOUS
Qty: 1 KIT | Refills: 0 | Status: SHIPPED | OUTPATIENT
Start: 2020-05-01 | End: 2020-05-22

## 2020-05-01 RX ORDER — BLOOD-GLUCOSE METER
KIT MISCELLANEOUS
Qty: 1 KIT | Refills: 0 | Status: SHIPPED | OUTPATIENT
Start: 2020-05-01 | End: 2020-05-04

## 2020-05-01 RX ORDER — ALBUTEROL SULFATE 90 UG/1
2 AEROSOL, METERED RESPIRATORY (INHALATION) EVERY 6 HOURS PRN
Qty: 1 INHALER | Refills: 2 | Status: SHIPPED | OUTPATIENT
Start: 2020-05-01 | End: 2020-10-26 | Stop reason: SDUPTHER

## 2020-05-01 RX ORDER — FERROUS SULFATE 325(65) MG
325 TABLET ORAL 2 TIMES DAILY
Qty: 60 TABLET | Refills: 2 | Status: SHIPPED | OUTPATIENT
Start: 2020-05-01 | End: 2020-10-26 | Stop reason: SDUPTHER

## 2020-05-01 ASSESSMENT — PATIENT HEALTH QUESTIONNAIRE - PHQ9
SUM OF ALL RESPONSES TO PHQ QUESTIONS 1-9: 0
1. LITTLE INTEREST OR PLEASURE IN DOING THINGS: 0
2. FEELING DOWN, DEPRESSED OR HOPELESS: 0
SUM OF ALL RESPONSES TO PHQ9 QUESTIONS 1 & 2: 0
SUM OF ALL RESPONSES TO PHQ QUESTIONS 1-9: 0

## 2020-05-04 ENCOUNTER — OFFICE VISIT (OUTPATIENT)
Dept: ORTHOPEDIC SURGERY | Age: 50
End: 2020-05-04
Payer: MEDICARE

## 2020-05-04 VITALS — WEIGHT: 284.5 LBS | BODY MASS INDEX: 44.65 KG/M2 | HEIGHT: 67 IN

## 2020-05-04 PROCEDURE — 99213 OFFICE O/P EST LOW 20 MIN: CPT | Performed by: ORTHOPAEDIC SURGERY

## 2020-05-04 PROCEDURE — 4004F PT TOBACCO SCREEN RCVD TLK: CPT | Performed by: ORTHOPAEDIC SURGERY

## 2020-05-04 PROCEDURE — G8417 CALC BMI ABV UP PARAM F/U: HCPCS | Performed by: ORTHOPAEDIC SURGERY

## 2020-05-04 PROCEDURE — G8428 CUR MEDS NOT DOCUMENT: HCPCS | Performed by: ORTHOPAEDIC SURGERY

## 2020-05-04 PROCEDURE — 3017F COLORECTAL CA SCREEN DOC REV: CPT | Performed by: ORTHOPAEDIC SURGERY

## 2020-05-04 PROCEDURE — 20610 DRAIN/INJ JOINT/BURSA W/O US: CPT | Performed by: ORTHOPAEDIC SURGERY

## 2020-05-04 RX ORDER — METHYLPREDNISOLONE ACETATE 80 MG/ML
80 INJECTION, SUSPENSION INTRA-ARTICULAR; INTRALESIONAL; INTRAMUSCULAR; SOFT TISSUE ONCE
Status: COMPLETED | OUTPATIENT
Start: 2020-05-04 | End: 2020-05-06

## 2020-05-04 RX ORDER — BIOTIN 1 MG
TABLET ORAL
Qty: 100 STRIP | Refills: 2 | Status: SHIPPED | OUTPATIENT
Start: 2020-05-04 | End: 2020-10-26 | Stop reason: SDUPTHER

## 2020-05-04 RX ORDER — BUPIVACAINE HYDROCHLORIDE 2.5 MG/ML
2 INJECTION, SOLUTION INFILTRATION; PERINEURAL ONCE
Status: COMPLETED | OUTPATIENT
Start: 2020-05-04 | End: 2020-05-06

## 2020-05-04 RX ORDER — BLOOD-GLUCOSE METER
KIT MISCELLANEOUS
Qty: 1 KIT | Refills: 0 | Status: SHIPPED | OUTPATIENT
Start: 2020-05-04 | End: 2020-05-22

## 2020-05-04 RX ORDER — LANCETS
EACH MISCELLANEOUS
Qty: 100 EACH | Refills: 2 | Status: SHIPPED | OUTPATIENT
Start: 2020-05-04 | End: 2020-10-26 | Stop reason: SDUPTHER

## 2020-05-06 RX ADMIN — METHYLPREDNISOLONE ACETATE 80 MG: 80 INJECTION, SUSPENSION INTRA-ARTICULAR; INTRALESIONAL; INTRAMUSCULAR; SOFT TISSUE at 07:41

## 2020-05-06 RX ADMIN — BUPIVACAINE HYDROCHLORIDE 5 MG: 2.5 INJECTION, SOLUTION INFILTRATION; PERINEURAL at 07:40

## 2020-05-06 RX ADMIN — BUPIVACAINE HYDROCHLORIDE 5 MG: 2.5 INJECTION, SOLUTION INFILTRATION; PERINEURAL at 07:41

## 2020-05-07 ENCOUNTER — TELEPHONE (OUTPATIENT)
Dept: INTERNAL MEDICINE | Age: 50
End: 2020-05-07

## 2020-05-16 ENCOUNTER — HOSPITAL ENCOUNTER (EMERGENCY)
Age: 50
Discharge: HOME | End: 2020-05-16
Attending: EMERGENCY MEDICINE
Payer: MEDICARE

## 2020-05-16 VITALS
HEART RATE: 101 BPM | SYSTOLIC BLOOD PRESSURE: 130 MMHG | OXYGEN SATURATION: 99 % | BODY MASS INDEX: 44.42 KG/M2 | HEIGHT: 67 IN | WEIGHT: 283 LBS | RESPIRATION RATE: 18 BRPM | DIASTOLIC BLOOD PRESSURE: 84 MMHG | TEMPERATURE: 99.1 F

## 2020-05-16 PROCEDURE — 99282 EMERGENCY DEPT VISIT SF MDM: CPT

## 2020-05-16 RX ORDER — HYDROXYZINE HYDROCHLORIDE 25 MG/1
25 TABLET, FILM COATED ORAL EVERY 8 HOURS PRN
Qty: 30 TABLET | Refills: 0 | Status: SHIPPED | OUTPATIENT
Start: 2020-05-16 | End: 2020-05-26

## 2020-05-16 RX ORDER — FLUCONAZOLE 100 MG/1
100 TABLET ORAL EVERY OTHER DAY
Qty: 3 TABLET | Refills: 0 | Status: SHIPPED | OUTPATIENT
Start: 2020-05-16 | End: 2020-05-21

## 2020-05-16 ASSESSMENT — PAIN SCALES - GENERAL: PAINLEVEL_OUTOF10: 8

## 2020-05-16 ASSESSMENT — ENCOUNTER SYMPTOMS
ABDOMINAL PAIN: 0
COLOR CHANGE: 1

## 2020-05-16 NOTE — DISCHARGE INSTR - COC
Polysaccharide ([id number]) 05/31/2017       Active Problems:  Patient Active Problem List   Diagnosis Code    Tobacco use Z72.0    Carpal tunnel syndrome on both sides G56.03    Acquired hypothyroidism E03.9    Hyperlipidemia E78.5    Iron deficiency anemia D50.9    Mild episode of recurrent major depressive disorder ([facility] F33.0    Diabetes mellitus type 2 in obese ([address] [facility] E11.69, E66.9    Morbid obesity with BMI of 50.0-59.9, adult ([address] [facility] E66.01, Z68.43    Primary osteoarthritis of both knees M17.0    Urge incontinence of urine N39.41    Mild intermittent asthma without complication K90.53    Pure hypercholesterolemia E78.00    Hypertension I10    Pelvic pain in female R10.2    Bilateral ovarian cysts N83.201, N83.202    H/O Hysterectomy (2013) Z90.710    Trichomonal vaginitis A59.01    Chest pain R07.9    Frontal headache R51    Frontal sinusitis J32.1    Obesity, Class III, BMI 40-49.9 (morbid obesity) ([facility]) E66.01    Bilateral chronic knee pain M25.561, M25.562, G89.29       Isolation/Infection:   Isolation          No Isolation        Patient Infection Status     None to display          Nurse Assessment:  Last Vital Signs: /84   Pulse 101   Temp 99.1 °F (37.3 °C) (Oral)   Resp 18   Ht 5' 7\" (1.702 m)   Wt 283 lb (128.4 kg)   LMP 07/29/2013   SpO2 99%   BMI 44.32 kg/m²     Last documented pain score (0-10 scale): Pain Level: 8  Last Weight:   Wt Readings from Last 1 Encounters:   05/16/20 283 lb (128.4 kg)     Mental Status:  {IP PT MENTAL STATUS:55468}    IV Access:  { ZEN IV ACCESS:530391946}    Nursing Mobility/ADLs:  Walking   {P DME TTAF:035340048}  Transfer  {P DME JQHA:530008684}  Bathing  {P DME LYRE:323014625}  Dressing  {P DME OHBY:345411382}  Toileting  {P DME OSNK:680114741}  Feeding  {P DME OZUM:573841583}  Med Admin  {[facility] QDUJ:318581703}  Med Delivery   {[facility] MED Delivery:458909316}    Wound Care Documentation and Therapy: Elimination:  Continence:   · Bowel: {YES / Greenlandic:05748}  · Bladder: {YES / RD:10016}  Urinary Catheter: {Urinary Catheter:587888156}   Colostomy/Ileostomy/Ileal Conduit: {YES / IX:12251}       Date of Last BM: ***  No intake or output data in the 24 hours ending 20 1542  No intake/output data recorded.     Safety Concerns:     [facility] Safety Concerns:055505533}    Impairments/Disabilities:      [facility] Impairments/Disabilities:522108410}    Nutrition Therapy:  Current Nutrition Therapy:   [facility] Diet List:433599073}    Routes of Feeding: {CHP DME Other Feedings:379634663}  Liquids: {Slp liquid thickness:54502}  Daily Fluid Restriction: {CHP DME Yes amt example:937167249}  Last Modified Barium Swallow with Video (Video Swallowing Test): {Done Not Done PEWK:087600449}    Treatments at the Time of Hospital Discharge:   Respiratory Treatments: ***  Oxygen Therapy:  {Therapy; copd oxygen:13446}  Ventilator:    { CC Vent YAMB:117907066}    Rehab Therapies: {THERAPEUTIC INTERVENTION:9084192992}  Weight Bearing Status/Restrictions: [address] [place] [address] Weight Bearin}  Other Medical Equipment (for information only, NOT a DME order):  {EQUIPMENT:766798461}  Other Treatments: ***    Patient's personal belongings (please select all that are sent with patient):  {[facility] DME Belongings:623503323}    RN SIGNATURE:  {Esignature:727964093}    CASE MANAGEMENT/SOCIAL WORK SECTION    Inpatient Status Date: ***    Readmission Risk Assessment Score:  Readmission Risk              Risk of Unplanned Readmission:        0           Discharging to Facility/ Agency   · Name:   · Address:  · Phone:  · Fax:    Dialysis Facility (if applicable)   · Name:  · Address:  · Dialysis Schedule:  · Phone:  · Fax:    / signature: {Esignature:205267451}    PHYSICIAN SECTION    Prognosis: {Prognosis:0585111783}    Condition at Discharge: [address] [place] [address] Patient Condition:038742774}    Rehab Potential (if transferring to Rehab):

## 2020-05-16 NOTE — ED PROVIDER NOTES
(GLUCOPHAGE) 1000 MG tablet Take 1 tablet by mouth 2 times daily (with meals), Disp-60 tablet, R-2Normal      lisinopril (PRINIVIL;ZESTRIL) 5 MG tablet Take 1 tablet by mouth daily, Disp-30 tablet, R-2Normal      levothyroxine (SYNTHROID) 50 MCG tablet Take 1 tablet by mouth daily, Disp-30 tablet, R-2Normal      ferrous sulfate (IRON 325) 325 (65 Fe) MG tablet Take 1 tablet by mouth 2 times daily, Disp-60 tablet, R-2Normal      aspirin EC 81 MG EC tablet Take 1 tablet by mouth daily, Disp-30 tablet, R-2Normal      albuterol sulfate HFA (PROVENTIL HFA) 108 (90 Base) MCG/ACT inhaler Inhale 2 puffs into the lungs every 6 hours as needed for Wheezing, Disp-1 Inhaler, R-2Normal      nystatin (MYCOSTATIN) 461982 UNIT/GM powder Apply to skin of legs and in folds of skin with irritation, Disp-1 Bottle, R-0, Normal      Blood Pressure KIT Disp-1 kit, R-0, NormalUse to check blood pressure daily and as needed      cyclobenzaprine (FLEXERIL) 10 MG tablet Take 10 mg by mouthHistorical Med      insulin lispro (HUMALOG KWIKPEN) 100 UNIT/ML pen Inject 20 Units into the skin 3 times daily (before meals), Disp-3 pen, R-2Normal      Calcium Carb-Ergocalciferol 250-125 MG-UNIT TABS Take 1 tablet by mouthHistorical Med      traZODone (DESYREL) 150 MG tablet Take 150 mg by mouth nightly             PAST MEDICAL HISTORY         Diagnosis Date    Anxiety     Arthritis of knee, degenerative 1/31/2012    Carpal tunnel syndrome on both sides 1/15/2013    Chronic knee pain     on 4/17/18 pt is presently in pain mgmnt    Depressive disorder, not elsewhere classified 10/14/2014    Hydronephrosis, left 9/13/2016    Hypothyroidism 8/2/2013    Iron deficiency anemia 6/10/2014    Mild intermittent asthma without complication 4/95/6922    Obesity     Osteoarthritis     KNEE    Polyneuropathy 11/14/2012    RAD (reactive airway disease) 6/26/2012    Snores     possible apnea but not tested yet    Type II or unspecified type diabetes mellitus without mention of complication, not stated as uncontrolled     Urge incontinence of urine 2017    Wears glasses     Weight loss        SURGICAL HISTORY           Procedure Laterality Date    CARPAL TUNNEL RELEASE      2008 left,  2009 right     SECTION  2007    CYSTOSCOPY  2016    cysto with left ureteral stent placement    HYSTERECTOMY, TOTAL ABDOMINAL  2013    LITHOTRIPSY Left 2016    cysto with ESWL and left ureteral stent placement.  NERVE BLOCK Left 2016    left knee kenalog 80mg    NERVE BLOCK  3/11/16    Rt Knee depo medrol 80     TUBAL LIGATION  3/2012         FAMILY HISTORY           Problem Relation Age of Onset    Stroke Father     Diabetes Mother     Hypertension Mother     Breast Cancer Neg Hx     Cancer Neg Hx     Colon Cancer Neg Hx     Eclampsia Neg Hx     Ovarian Cancer Neg Hx      Labor Neg Hx     Spont Abortions Neg Hx      Family Status   Relation Name Status    Father  Alive    Mother  Alive    Neg Hx  (Not Specified)        SOCIAL HISTORY      reports that she has been smoking cigarettes. She has a 3.75 pack-year smoking history. She has never used smokeless tobacco. She reports previous alcohol use. She reports that she does not use drugs. REVIEW OF SYSTEMS    (2-9 systems for level 4, 10 or more for level 5)     Review of Systems   Constitutional: Negative for chills, diaphoresis, fatigue and fever. Gastrointestinal: Negative for abdominal pain. Genitourinary: Negative for dysuria, vaginal bleeding and vaginal discharge. Musculoskeletal: Negative for arthralgias and myalgias. Skin: Positive for color change and rash. Neurological: Negative for weakness. Except as noted above the remainder of the review of systems was reviewed and negative.      PHYSICAL EXAM    (up to 7 for level 4, 8 or more for level 5)     ED Triage Vitals [20 1313]   BP Temp Temp Source Pulse Resp SpO2 Height Weight

## 2020-05-18 ENCOUNTER — TELEPHONE (OUTPATIENT)
Dept: FAMILY MEDICINE CLINIC | Age: 50
End: 2020-05-18

## 2020-05-18 ENCOUNTER — TELEPHONE (OUTPATIENT)
Dept: OBGYN | Age: 50
End: 2020-05-18

## 2020-05-18 ENCOUNTER — CARE COORDINATION (OUTPATIENT)
Dept: CARE COORDINATION | Age: 50
End: 2020-05-18

## 2020-05-20 ENCOUNTER — HOSPITAL ENCOUNTER (OUTPATIENT)
Age: 50
Setting detail: SPECIMEN
Discharge: HOME | End: 2020-05-20
Payer: MEDICARE

## 2020-05-20 LAB
ESTIMATED AVERAGE GLUCOSE: 275 MG/DL
HBA1C MFR BLD: 11.2 % (ref 4–6)

## 2020-05-21 ENCOUNTER — OFFICE VISIT (OUTPATIENT)
Dept: SURGERY | Age: 50
End: 2020-05-21
Payer: COMMERCIAL

## 2020-05-21 VITALS
RESPIRATION RATE: 18 BRPM | DIASTOLIC BLOOD PRESSURE: 86 MMHG | TEMPERATURE: 97.8 F | BODY MASS INDEX: 43.04 KG/M2 | HEIGHT: 68 IN | SYSTOLIC BLOOD PRESSURE: 127 MMHG | WEIGHT: 284 LBS | HEART RATE: 88 BPM

## 2020-05-21 PROCEDURE — 99202 OFFICE O/P NEW SF 15 MIN: CPT

## 2020-05-21 PROCEDURE — 99999 PR OFFICE/OUTPT VISIT,PROCEDURE ONLY: CPT | Performed by: SURGERY

## 2020-05-21 NOTE — PROGRESS NOTES
File Prior to Visit   Medication Sig Dispense Refill    fluconazole (DIFLUCAN) 100 MG tablet Take 1 tablet by mouth every other day for 3 doses 3 tablet 0    hydrOXYzine (ATARAX) 25 MG tablet Take 1 tablet by mouth every 8 hours as needed for Itching 30 tablet 0    insulin glargine (LANTUS;BASAGLAR) 100 UNIT/ML injection pen Inject 35 Units into the skin nightly 5 pen 3    ONE TOUCH ULTRASOFT LANCETS MISC Dx: DM-2 use 2-3 times daily 100 each 2    blood glucose test strips (TRUETEST TEST) strip Dx: DM-2 use 2-3 times daily 100 strip 2    Insulin Pen Needle 31G X 5 MM MISC 1 each by Does not apply route daily 100 each 3    glucose monitoring kit (FREESTYLE) monitoring kit Diagnosis: Diabetes type 2, insulin-dependent.   Use 3-4 times daily 1 kit 0    diclofenac (VOLTAREN) 50 MG EC tablet Take 1 tablet by mouth 3 times daily (with meals) 60 tablet 3    simvastatin (ZOCOR) 40 MG tablet Take 1 tablet by mouth nightly 30 tablet 2    oxybutynin (DITROPAN XL) 10 MG extended release tablet Take 1 tablet by mouth daily 30 tablet 2    metFORMIN (GLUCOPHAGE) 1000 MG tablet Take 1 tablet by mouth 2 times daily (with meals) 60 tablet 2    lisinopril (PRINIVIL;ZESTRIL) 5 MG tablet Take 1 tablet by mouth daily 30 tablet 2    levothyroxine (SYNTHROID) 50 MCG tablet Take 1 tablet by mouth daily 30 tablet 2    ferrous sulfate (IRON 325) 325 (65 Fe) MG tablet Take 1 tablet by mouth 2 times daily 60 tablet 2    aspirin EC 81 MG EC tablet Take 1 tablet by mouth daily 30 tablet 2    albuterol sulfate HFA (PROVENTIL HFA) 108 (90 Base) MCG/ACT inhaler Inhale 2 puffs into the lungs every 6 hours as needed for Wheezing 1 Inhaler 2    nystatin (MYCOSTATIN) 438043 UNIT/GM powder Apply to skin of legs and in folds of skin with irritation 1 Bottle 0    Blood Pressure KIT Use to check blood pressure daily and as needed 1 kit 0    cyclobenzaprine (FLEXERIL) 10 MG tablet Take 10 mg by mouth      insulin lispro (HUMALOG KWIKPEN)

## 2020-05-22 ENCOUNTER — APPOINTMENT (OUTPATIENT)
Dept: GENERAL RADIOLOGY | Age: 50
End: 2020-05-22
Payer: COMMERCIAL

## 2020-05-22 ENCOUNTER — HOSPITAL ENCOUNTER (EMERGENCY)
Age: 50
Discharge: HOME | End: 2020-05-22
Attending: EMERGENCY MEDICINE
Payer: COMMERCIAL

## 2020-05-22 ENCOUNTER — VIRTUAL VISIT (OUTPATIENT)
Dept: INTERNAL MEDICINE | Age: 50
End: 2020-05-22
Payer: COMMERCIAL

## 2020-05-22 VITALS
TEMPERATURE: 96.4 F | DIASTOLIC BLOOD PRESSURE: 91 MMHG | BODY MASS INDEX: 44.42 KG/M2 | HEART RATE: 91 BPM | RESPIRATION RATE: 16 BRPM | SYSTOLIC BLOOD PRESSURE: 151 MMHG | WEIGHT: 283 LBS | OXYGEN SATURATION: 98 % | HEIGHT: 67 IN

## 2020-05-22 LAB
-: ABNORMAL
ABSOLUTE EOS #: 0.07 K/UL (ref 0–0.44)
ABSOLUTE IMMATURE GRANULOCYTE: 0.03 K/UL (ref 0–0.3)
ABSOLUTE LYMPH #: 2.68 K/UL (ref 1.1–3.7)
ABSOLUTE MONO #: 0.61 K/UL (ref 0.1–1.2)
ALBUMIN SERPL-MCNC: 3.7 G/DL (ref 3.5–5.2)
ALBUMIN/GLOBULIN RATIO: ABNORMAL (ref 1–2.5)
ALP BLD-CCNC: 98 U/L (ref 35–104)
ALT SERPL-CCNC: 14 U/L (ref 5–33)
AMORPHOUS: ABNORMAL
ANION GAP SERPL CALCULATED.3IONS-SCNC: 14 MMOL/L (ref 9–17)
AST SERPL-CCNC: 15 U/L
BACTERIA: ABNORMAL
BASOPHILS # BLD: 0 % (ref 0–2)
BASOPHILS ABSOLUTE: 0.03 K/UL (ref 0–0.2)
BETA-HYDROXYBUTYRATE: 0.06 MMOL/L (ref 0.02–0.27)
BILIRUB SERPL-MCNC: 0.15 MG/DL (ref 0.3–1.2)
BILIRUBIN URINE: NEGATIVE
BUN BLDV-MCNC: 11 MG/DL (ref 6–20)
BUN/CREAT BLD: 18 (ref 9–20)
CALCIUM SERPL-MCNC: 9.2 MG/DL (ref 8.6–10.4)
CASTS UA: ABNORMAL /LPF
CHLORIDE BLD-SCNC: 99 MMOL/L (ref 98–107)
CHP ED QC CHECK: NORMAL
CO2: 20 MMOL/L (ref 20–31)
COLOR: YELLOW
COMMENT UA: ABNORMAL
CREAT SERPL-MCNC: 0.61 MG/DL (ref 0.5–0.9)
CRYSTALS, UA: ABNORMAL /HPF
DIFFERENTIAL TYPE: ABNORMAL
EOSINOPHILS RELATIVE PERCENT: 1 % (ref 1–4)
EPITHELIAL CELLS UA: ABNORMAL /HPF (ref 0–5)
FIO2: ABNORMAL
GFR AFRICAN AMERICAN: >60 ML/MIN
GFR NON-AFRICAN AMERICAN: >60 ML/MIN
GFR SERPL CREATININE-BSD FRML MDRD: ABNORMAL ML/MIN/{1.73_M2}
GFR SERPL CREATININE-BSD FRML MDRD: ABNORMAL ML/MIN/{1.73_M2}
GLUCOSE BLD-MCNC: 358 MG/DL (ref 65–105)
GLUCOSE BLD-MCNC: 358 MG/DL (ref 70–99)
GLUCOSE URINE: ABNORMAL
HCO3 VENOUS: 24.2 MMOL/L (ref 24–30)
HCT VFR BLD CALC: 38.2 % (ref 36.3–47.1)
HEMOGLOBIN: 12.6 G/DL (ref 11.9–15.1)
IMMATURE GRANULOCYTES: 0 %
INR BLD: 0.9
KETONES, URINE: NEGATIVE
LEUKOCYTE ESTERASE, URINE: NEGATIVE
LYMPHOCYTES # BLD: 34 % (ref 24–43)
MCH RBC QN AUTO: 32 PG (ref 25.2–33.5)
MCHC RBC AUTO-ENTMCNC: 33 G/DL (ref 28.4–34.8)
MCV RBC AUTO: 97 FL (ref 82.6–102.9)
MONOCYTES # BLD: 8 % (ref 3–12)
MUCUS: ABNORMAL
NEGATIVE BASE EXCESS, VEN: ABNORMAL (ref 0–2)
NITRITE, URINE: NEGATIVE
NRBC AUTOMATED: 0 PER 100 WBC
O2 DEVICE/FLOW/%: ABNORMAL
O2 SAT, VEN: 92 %
OTHER OBSERVATIONS UA: ABNORMAL
PARTIAL THROMBOPLASTIN TIME: 24.1 SEC (ref 23.9–33.8)
PATIENT TEMP: ABNORMAL
PCO2, VEN: 37 MM HG (ref 39–55)
PDW BLD-RTO: 13 % (ref 11.8–14.4)
PH UA: 6 (ref 5–8)
PH VENOUS: 7.42 (ref 7.32–7.42)
PLATELET # BLD: 280 K/UL (ref 138–453)
PLATELET ESTIMATE: ABNORMAL
PMV BLD AUTO: 9.9 FL (ref 8.1–13.5)
PO2, VEN: 61 MM HG (ref 30–50)
POC PCO2 TEMP: ABNORMAL MM HG
POC PH TEMP: ABNORMAL
POC PO2 TEMP: ABNORMAL MM HG
POSITIVE BASE EXCESS, VEN: 0 (ref 0–2)
POTASSIUM SERPL-SCNC: 4.1 MMOL/L (ref 3.7–5.3)
PROTEIN UA: NEGATIVE
PROTHROMBIN TIME: 11.7 SEC (ref 11.5–14.2)
RBC # BLD: 3.94 M/UL (ref 3.95–5.11)
RBC # BLD: ABNORMAL 10*6/UL
RBC UA: ABNORMAL /HPF (ref 0–2)
RENAL EPITHELIAL, UA: ABNORMAL /HPF
SEG NEUTROPHILS: 57 % (ref 36–65)
SEGMENTED NEUTROPHILS ABSOLUTE COUNT: 4.52 K/UL (ref 1.5–8.1)
SODIUM BLD-SCNC: 133 MMOL/L (ref 135–144)
SPECIFIC GRAVITY UA: 1.02 (ref 1–1.03)
TOTAL CO2, VENOUS: 25 MMOL/L (ref 25–31)
TOTAL PROTEIN: 6.6 G/DL (ref 6.4–8.3)
TRICHOMONAS: ABNORMAL
TROPONIN INTERP: NORMAL
TROPONIN T: NORMAL NG/ML
TROPONIN, HIGH SENSITIVITY: <6 NG/L (ref 0–14)
TURBIDITY: ABNORMAL
URINE HGB: NEGATIVE
UROBILINOGEN, URINE: NORMAL
WBC # BLD: 7.9 K/UL (ref 3.5–11.3)
WBC # BLD: ABNORMAL 10*3/UL
WBC UA: ABNORMAL /HPF (ref 0–5)
YEAST: ABNORMAL

## 2020-05-22 PROCEDURE — 85730 THROMBOPLASTIN TIME PARTIAL: CPT

## 2020-05-22 PROCEDURE — 2580000003 HC RX 258: Performed by: EMERGENCY MEDICINE

## 2020-05-22 PROCEDURE — 82803 BLOOD GASES ANY COMBINATION: CPT

## 2020-05-22 PROCEDURE — 99285 EMERGENCY DEPT VISIT HI MDM: CPT

## 2020-05-22 PROCEDURE — 82805 BLOOD GASES W/O2 SATURATION: CPT

## 2020-05-22 PROCEDURE — 82010 KETONE BODYS QUAN: CPT

## 2020-05-22 PROCEDURE — 71045 X-RAY EXAM CHEST 1 VIEW: CPT

## 2020-05-22 PROCEDURE — 6370000000 HC RX 637 (ALT 250 FOR IP): Performed by: EMERGENCY MEDICINE

## 2020-05-22 PROCEDURE — 96360 HYDRATION IV INFUSION INIT: CPT

## 2020-05-22 PROCEDURE — 82947 ASSAY GLUCOSE BLOOD QUANT: CPT

## 2020-05-22 PROCEDURE — 85610 PROTHROMBIN TIME: CPT

## 2020-05-22 PROCEDURE — 36415 COLL VENOUS BLD VENIPUNCTURE: CPT

## 2020-05-22 PROCEDURE — 99442 PR PHYS/QHP TELEPHONE EVALUATION 11-20 MIN: CPT | Performed by: NURSE PRACTITIONER

## 2020-05-22 PROCEDURE — 80053 COMPREHEN METABOLIC PANEL: CPT

## 2020-05-22 PROCEDURE — 81001 URINALYSIS AUTO W/SCOPE: CPT

## 2020-05-22 PROCEDURE — 93005 ELECTROCARDIOGRAM TRACING: CPT | Performed by: EMERGENCY MEDICINE

## 2020-05-22 PROCEDURE — 84484 ASSAY OF TROPONIN QUANT: CPT

## 2020-05-22 PROCEDURE — 85025 COMPLETE CBC W/AUTO DIFF WBC: CPT

## 2020-05-22 RX ORDER — BUTALBITAL, ACETAMINOPHEN AND CAFFEINE 300; 40; 50 MG/1; MG/1; MG/1
1 CAPSULE ORAL EVERY 6 HOURS PRN
Qty: 10 CAPSULE | Refills: 0 | Status: SHIPPED | OUTPATIENT
Start: 2020-05-22 | End: 2020-05-27

## 2020-05-22 RX ORDER — BLOOD PRESSURE TEST KIT
KIT MISCELLANEOUS
Qty: 1 KIT | Refills: 0 | Status: CANCELLED | OUTPATIENT
Start: 2020-05-22

## 2020-05-22 RX ORDER — METRONIDAZOLE 500 MG/1
500 TABLET ORAL 2 TIMES DAILY
Qty: 14 TABLET | Refills: 0 | Status: SHIPPED | OUTPATIENT
Start: 2020-05-22 | End: 2020-05-29

## 2020-05-22 RX ORDER — BUTALBITAL, ACETAMINOPHEN AND CAFFEINE 50; 325; 40 MG/1; MG/1; MG/1
1 TABLET ORAL ONCE
Status: COMPLETED | OUTPATIENT
Start: 2020-05-22 | End: 2020-05-22

## 2020-05-22 RX ORDER — 0.9 % SODIUM CHLORIDE 0.9 %
1000 INTRAVENOUS SOLUTION INTRAVENOUS ONCE
Status: COMPLETED | OUTPATIENT
Start: 2020-05-22 | End: 2020-05-22

## 2020-05-22 RX ORDER — CLOTRIMAZOLE 1 %
CREAM (GRAM) TOPICAL
Qty: 1 TUBE | Refills: 0 | Status: SHIPPED | OUTPATIENT
Start: 2020-05-22 | End: 2020-05-29

## 2020-05-22 RX ORDER — GLUCOSAMINE HCL/CHONDROITIN SU 500-400 MG
CAPSULE ORAL
Refills: 0 | Status: CANCELLED | OUTPATIENT
Start: 2020-05-22

## 2020-05-22 RX ORDER — BLOOD PRESSURE TEST KIT
KIT MISCELLANEOUS
Qty: 1 KIT | Refills: 0 | Status: SHIPPED | OUTPATIENT
Start: 2020-05-22 | End: 2020-10-26 | Stop reason: SDUPTHER

## 2020-05-22 RX ADMIN — BUTALBITAL, ACETAMINOPHEN, AND CAFFEINE 1 TABLET: 50; 325; 40 TABLET ORAL at 01:28

## 2020-05-22 RX ADMIN — SODIUM CHLORIDE 1000 ML: 9 INJECTION, SOLUTION INTRAVENOUS at 01:19

## 2020-05-22 ASSESSMENT — PAIN SCALES - GENERAL
PAINLEVEL_OUTOF10: 10
PAINLEVEL_OUTOF10: 10

## 2020-05-22 ASSESSMENT — ENCOUNTER SYMPTOMS
VOMITING: 0
RHINORRHEA: 0
NAUSEA: 0
ABDOMINAL PAIN: 0
SHORTNESS OF BREATH: 0
BLURRED VISION: 0

## 2020-05-22 ASSESSMENT — PAIN DESCRIPTION - LOCATION: LOCATION: HEAD

## 2020-05-22 NOTE — PROGRESS NOTES
Brina Garcia is a 47 y.o. female evaluated via telephone on 5/22/2020. Consent:  She and/or health care decision maker is aware that that she may receive a bill for this telephone service, depending on her insurance coverage, and has provided verbal consent to proceed: Yes      Documentation:  I communicated with the patient and/or health care decision maker about:  Diabetes   She presents for her follow-up diabetic visit. She has type 2 diabetes mellitus. No MedicAlert identification noted. Her disease course has been fluctuating. There are no hypoglycemic associated symptoms. There are no diabetic associated symptoms. Pertinent negatives for diabetes include no fatigue. There are no hypoglycemic complications. Symptoms are stable. There are no diabetic complications. Risk factors for coronary artery disease include diabetes mellitus, hypertension, obesity, sedentary lifestyle, stress and tobacco exposure. Current diabetic treatment includes insulin injections and oral agent (monotherapy). She is compliant with treatment most of the time. Her weight is increasing steadily. She is following a generally healthy diet. When asked about meal planning, she reported none. She has not had a previous visit with a dietitian. She never participates in exercise. Her home blood glucose trend is increasing steadily. An ACE inhibitor/angiotensin II receptor blocker is being taken. She does not see a podiatrist.Eye exam is current. Rash   This is a recurrent problem. The current episode started more than 1 month ago. The problem has been waxing and waning since onset. The affected locations include the genitalia and groin. The rash is characterized by itchiness and draining. She was exposed to nothing. Pertinent negatives include no anorexia, congestion, cough, diarrhea, eye pain, facial edema, fatigue, fever, joint pain, nail changes, rhinorrhea, shortness of breath, sore throat or vomiting.  Past treatments include anti-itch cream and cold compress. The treatment provided no relief. There is no history of allergies, asthma, eczema or varicella. Details of this discussion including any medical advice provided:   Reports vaginal itch and drainage is similar to past episodes of BV. She would like to be treated for such. Explained this is reasonable but she should seek further treatment if symptoms do no resolve. Verbalizes understanding. I affirm this is a Patient Initiated Episode with a Patient who has not had a related appointment within my department in the past 7 days or scheduled within the next 24 hours. Patient identification was verified at the start of the visit: Yes    Total Time: minutes: 11-20 minutes    1. Type 2 diabetes mellitus with diabetic polyneuropathy, with long-term current use of insulin (HCC)  - insulin glargine (LANTUS;BASAGLAR) 100 UNIT/ML injection pen; Inject 40 Units into the skin nightly  Dispense: 5 pen; Refill: 3  - [id number] [facility]    2. BV (bacterial vaginosis)  - metroNIDAZOLE (FLAGYL) 500 MG tablet; Take 1 tablet by mouth 2 times daily for 7 days  Dispense: 14 tablet; Refill: 0    3.  Essential hypertension  - continue present medications        Note: not billable if this call serves to triage the patient into an appointment for the relevant concern      Hlida Valencia

## 2020-05-22 NOTE — ED PROVIDER NOTES
Negative for shortness of breath. Cardiovascular: Negative for chest pain. Gastrointestinal: Negative for abdominal pain, nausea and vomiting. Endocrine: Positive for polydipsia and polyuria. Genitourinary: Negative for dysuria. Musculoskeletal: Negative for neck pain. Skin: Positive for rash. Neurological: Positive for headaches. Negative for dizziness, weakness and numbness. Psychiatric/Behavioral: Negative for decreased concentration.      PASTMEDICAL HISTORY     Past Medical History:   Diagnosis Date    Anxiety     Arthritis of knee, degenerative 1/31/2012    Carpal tunnel syndrome on both sides 1/15/2013    Chronic knee pain     on 4/17/18 pt is presently in pain mgmnt    Depressive disorder, not elsewhere classified 10/14/2014    Hydronephrosis, left 9/13/2016    Hypothyroidism 8/2/2013    Iron deficiency anemia 6/10/2014    Mild intermittent asthma without complication 5/20/1637    Obesity     Osteoarthritis     KNEE    Polyneuropathy 11/14/2012    RAD (reactive airway disease) 6/26/2012    Snores     possible apnea but not tested yet    Type II or unspecified type diabetes mellitus without mention of complication, not stated as uncontrolled     Urge incontinence of urine 5/31/2017    Wears glasses     Weight loss      Past Problem List  Patient Active Problem List   Diagnosis Code    Tobacco use Z72.0    Carpal tunnel syndrome on both sides G56.03    Acquired hypothyroidism E03.9    Hyperlipidemia E78.5    Iron deficiency anemia D50.9    Mild episode of recurrent major depressive disorder ([facility] F33.0    Diabetes mellitus type 2 in obese ([facility] E11.69, E66.9    Morbid obesity with BMI of 50.0-59.9, adult ([facility] E66.01, Z68.43    Primary osteoarthritis of both knees M17.0    Urge incontinence of urine N39.41    Mild intermittent asthma without complication V24.94    Pure hypercholesterolemia E78.00    Hypertension I10    Pelvic pain in female R10.2    Bilateral ovarian cysts N83.201, N83.202    H/O Hysterectomy () Z90.710    Trichomonal vaginitis A59.01    Chest pain R07.9    Frontal headache R51    Frontal sinusitis J32.1    Obesity, Class III, BMI 40-49.9 (morbid obesity) ([facility]) E66.01    Bilateral chronic knee pain M25.561, M25.562, G89.29     SURGICAL HISTORY       Past Surgical History:   Procedure Laterality Date    CARPAL TUNNEL RELEASE      2008 left,  2009 right     SECTION      CYSTOSCOPY  2016    cysto with left ureteral stent placement    HYSTERECTOMY, TOTAL ABDOMINAL  2013    LITHOTRIPSY Left 2016    cysto with ESWL and left ureteral stent placement.  NERVE BLOCK Left 2016    left knee kenalog 80mg    NERVE BLOCK  3/11/16    Rt Knee depo medrol 80     TUBAL LIGATION  3/2012     CURRENT MEDICATIONS       Previous Medications    ALBUTEROL SULFATE HFA (PROVENTIL HFA) 108 (90 BASE) MCG/ACT INHALER    Inhale 2 puffs into the lungs every 6 hours as needed for Wheezing    ASPIRIN EC 81 MG EC TABLET    Take 1 tablet by mouth daily    BLOOD GLUCOSE TEST STRIPS (TRUETEST TEST) STRIP    Dx: DM-2 use 2-3 times daily    BLOOD PRESSURE KIT    Use to check blood pressure daily and as needed    CALCIUM CARB-ERGOCALCIFEROL 250-125 MG-UNIT TABS    Take 1 tablet by mouth    CYCLOBENZAPRINE (FLEXERIL) 10 MG TABLET    Take 10 mg by mouth    DICLOFENAC (VOLTAREN) 50 MG EC TABLET    Take 1 tablet by mouth 3 times daily (with meals)    FERROUS SULFATE (IRON 325) 325 (65 FE) MG TABLET    Take 1 tablet by mouth 2 times daily    GLUCOSE MONITORING KIT (FREESTYLE) MONITORING KIT    Diagnosis: Diabetes type 2, insulin-dependent.   Use 3-4 times daily    HYDROXYZINE (ATARAX) 25 MG TABLET    Take 1 tablet by mouth every 8 hours as needed for Itching    INSULIN GLARGINE (LANTUS;BASAGLAR) 100 UNIT/ML INJECTION PEN    Inject 35 Units into the skin nightly    INSULIN LISPRO (HUMALOG KWIKPEN) 100 UNIT/ML PEN    Inject 20 Units into the skin 3

## 2020-05-23 ENCOUNTER — CARE COORDINATION (OUTPATIENT)
Dept: CARE COORDINATION | Age: 50
End: 2020-05-23

## 2020-05-23 LAB
EKG ATRIAL RATE: 92 BPM
EKG P AXIS: 62 DEGREES
EKG P-R INTERVAL: 158 MS
EKG Q-T INTERVAL: 372 MS
EKG QRS DURATION: 84 MS
EKG QTC CALCULATION (BAZETT): 460 MS
EKG R AXIS: 34 DEGREES
EKG T AXIS: 49 DEGREES
EKG VENTRICULAR RATE: 92 BPM

## 2020-05-23 PROCEDURE — 93010 ELECTROCARDIOGRAM REPORT: CPT | Performed by: INTERNAL MEDICINE

## 2020-05-23 NOTE — CARE COORDINATION
Patient contacted regarding recent discharge and COVID-19 risk. Discussed COVID-19 related testing which was not done at this time. Test results were not done. Patient informed of results, if available? N/a      Care Transition Nurse/ Ambulatory Care Manager contacted the patient by telephone to perform post discharge assessment. Verified name and  with patient as identifiers. Patient has following risk factors of: diabetes. CTN/ACM reviewed discharge instructions, medical action plan and red flags related to discharge diagnosis. Reviewed and educated them on any new and changed medications related to discharge diagnosis. Advised obtaining a 90-day supply of all daily and as-needed medications. Education provided regarding infection prevention, and signs and symptoms of COVID-19 and when to seek medical attention with patient who verbalized understanding. Discussed exposure protocols and quarantine from [facility] you at higher risk for severe illness  and given an opportunity for questions and concerns. The patient agrees to contact the COVID-19 hotline [phone] or PCP office for questions related to their healthcare. CTN/ACM provided contact information for future reference. From CDC: Are you at higher risk for severe illness?  Wash your hands often.  Avoid close contact (6 feet, which is about two arm lengths) with people who are sick.  Put distance between yourself and other people if COVID-19 is spreading in your community.  Clean and disinfect frequently touched surfaces.  Avoid all cruise travel and non-essential air travel.  Call your healthcare professional if you have concerns about COVID-19 and your underlying condition or if you are sick. For more information on steps you can take to protect yourself, see CDC's How to Protect Yourself      Pt will be further monitored by COVID Loop Team based on severity of symptoms and risk factors.

## 2020-05-26 ENCOUNTER — TELEPHONE (OUTPATIENT)
Dept: SURGERY | Age: 50
End: 2020-05-26

## 2020-05-26 RX ORDER — POLYETHYLENE GLYCOL 3350, SODIUM SULFATE ANHYDROUS, SODIUM BICARBONATE, SODIUM CHLORIDE, POTASSIUM CHLORIDE 236; 22.74; 6.74; 5.86; 2.97 G/4L; G/4L; G/4L; G/4L; G/4L
4 POWDER, FOR SOLUTION ORAL ONCE
Qty: 1 BOTTLE | Refills: 0 | Status: SHIPPED | OUTPATIENT
Start: 2020-05-26 | End: 2020-05-26

## 2020-05-27 ENCOUNTER — HOSPITAL ENCOUNTER (OUTPATIENT)
Dept: DIABETES SERVICES | Age: 50
Setting detail: THERAPIES SERIES
Discharge: HOME | End: 2020-05-27
Payer: COMMERCIAL

## 2020-05-27 PROCEDURE — G0108 DIAB MANAGE TRN  PER INDIV: HCPCS

## 2020-05-27 SDOH — ECONOMIC STABILITY: FOOD INSECURITY: ADDITIONAL INFORMATION: NO

## 2020-05-27 ASSESSMENT — PROBLEM AREAS IN DIABETES QUESTIONNAIRE (PAID)
WORRYING ABOUT THE FUTURE AND THE POSSIBILITY OF SERIOUS COMPLICATIONS: 3
PAID-5 TOTAL SCORE: 9
COPING WITH COMPLICATIONS OF DIABETES: 1
FEELING DEPRESSED WHEN YOU THINK ABOUT LIVING WITH DIABETES: 2
FEELING SCARED WHEN YOU THINK ABOUT LIVING WITH DIABETES: 2
FEELING THAT DIABETES IS TAKING UP TOO MUCH OF YOUR MENTAL AND PHYSICAL ENERGY EVERY DAY: 1

## 2020-05-27 NOTE — LETTER
[facility]  [address] [facility]  Phone: [phone]         May 27, 2020    To Hay Reyes, APRN - CNP  [facility] fawad, [address]    From: Stanislav Chamorro RN    Patient: Kirk Manning   YOB: 1970   Date of Visit: 5/27/20     An initial assessment to determine diabetes education needs was completed on 5/27/20. Education plan includes :referred to Diabetes Educator, interpretation of lab results, blood sugar goals, complications of diabetes mellitus, hypoglycemia prevention and treatment, exercise, illness management, self-monitoring of blood glucose skills, nutrition, carbohydrate counting, site rotation, use of insulin pen and insulin adjustments. An ongoing plan was created to include: follow up per video on June 8 at 10:00a    Thank you for the opportunity to provide Diabetes Self Management Education to your patient.

## 2020-05-27 NOTE — PROGRESS NOTES
metFORMIN (GLUCOPHAGE) 1000 MG tablet Take 1 tablet by mouth 2 times daily (with meals) 60 tablet 2    lisinopril (PRINIVIL;ZESTRIL) 5 MG tablet Take 1 tablet by mouth daily 30 tablet 2    levothyroxine (SYNTHROID) 50 MCG tablet Take 1 tablet by mouth daily 30 tablet 2    ferrous sulfate (IRON 325) 325 (65 Fe) MG tablet Take 1 tablet by mouth 2 times daily 60 tablet 2    aspirin EC 81 MG EC tablet Take 1 tablet by mouth daily 30 tablet 2    albuterol sulfate HFA (PROVENTIL HFA) 108 (90 Base) MCG/ACT inhaler Inhale 2 puffs into the lungs every 6 hours as needed for Wheezing 1 Inhaler 2    nystatin (MYCOSTATIN) 512973 UNIT/GM powder Apply to skin of legs and in folds of skin with irritation 1 Bottle 0    insulin lispro (HUMALOG KWIKPEN) 100 UNIT/ML pen Inject 20 Units into the skin 3 times daily (before meals) 3 pen 2    Calcium Carb-Ergocalciferol 250-125 MG-UNIT TABS Take 1 tablet by mouth      traZODone (DESYREL) 150 MG tablet Take 150 mg by mouth nightly      Blood Pressure KIT Use to monitor blood pressure daily and as needed (Patient not taking: Reported on 5/27/2020) 1 kit 0    diclofenac (VOLTAREN) 50 MG EC tablet Take 1 tablet by mouth 3 times daily (with meals) (Patient not taking: Reported on 5/27/2020) 60 tablet 3    cyclobenzaprine (FLEXERIL) 10 MG tablet Take 10 mg by mouth       No current facility-administered medications for this encounter.    :     Comments:  Allergies:     Allergies   Allergen Reactions    Januvia [Sitagliptin Phosphate] Hives    Naproxen Sodium Hives    Neurontin [Gabapentin] Nausea Only       A1C blood level   Lab Results   Component Value Date    LABA1C 11.2 (H) 05/20/2020    LABA1C 9.5 (A) 01/27/2020    LABA1C 8.0 03/06/2019     Lab Results   Component Value Date    LABMICR 7 09/11/2017    CREATININE 0.61 05/22/2020       Blood pressure   BP Readings from Last 3 Encounters:   05/22/20 (!) 151/91   05/21/20 127/86   05/16/20 130/84        Cholesterol  Lab Results available. 1    Frustrated with BG and weight and motivated. Lives with son.  5/27/20CB     Developing strategies to promote health/change behavior: Identify 7 self-care behaviors; Personal health risk factors; Benefits, challenges & strategies for behavioral change;    1         Individualized goal selection. My goal , to help me improve my health, I will:   1. Try not to skip meals, reduce orange juice      2.try to walk 10 min after large meal       Plan  Follow-up Appointments planned June 8 @ 10:00 per video   Mailed out packet today  Instruction Method: [x]Lecture/Discussion  []Power Point Presentation  [x]Handouts  []Return Demonstration    Education Materials/Equipment Provided (VIA Mail for phone visits)  :    [x]Self-Management - Initial assessment - Enrolment in to [facility]  Where do I Begin, Living with Type 2 diabetes ADA home support program and  handout on diabetes education classes. 5/27/20CB    []Self-Management  Class 1 -Self-Management  Class 1 - \"Diabetes - your take control guide\" - ADA booklet -  o  \"ready, set, start counting\" teaching sheet in diet section   o  GLP-1 understanding 8 core deficits puzzle sheet in the medication section  o one day food diary and envelope for return of diet HX   o  You tube and website resource sheet-Understanding Type 2 Diabetes from Animated Diabetes Patient https://youtu. be/AZuJc04pACJ      [] Self-Management  Class 2 - Meal Plan and handout for serving sizes, smarter snacking, Ready Set Carb Counting / Plate Method, Nutrient Conversion and International Diabetes [address] Eating for People with Diabetes and Nutrition in the [employer] - fast facts about fast food    [] Self-Management  Class 3 -  Diabetes your take control guide pages 20 - 30,  type 2 diabetes and the role of GLP- 1,  Individualized Diabetes report card     [] Self-Management Class 4 - BD Booklet  Sick Day [place] and  Dinning Out Guide , recipe hand outs and tips,

## 2020-05-28 ASSESSMENT — ENCOUNTER SYMPTOMS
RHINORRHEA: 0
SHORTNESS OF BREATH: 0
EYE PAIN: 0
COUGH: 0
NAIL CHANGES: 0
SORE THROAT: 0
DIARRHEA: 0
VOMITING: 0

## 2020-06-04 ENCOUNTER — TELEPHONE (OUTPATIENT)
Dept: INTERNAL MEDICINE | Age: 50
End: 2020-06-04

## 2020-06-04 RX ORDER — INSULIN GLARGINE 100 [IU]/ML
40 INJECTION, SOLUTION SUBCUTANEOUS NIGHTLY
Qty: 5 PEN | Refills: 3 | Status: SHIPPED | OUTPATIENT
Start: 2020-06-04 | End: 2020-10-26 | Stop reason: SDUPTHER

## 2020-06-08 ENCOUNTER — HOSPITAL ENCOUNTER (OUTPATIENT)
Dept: DIABETES SERVICES | Age: 50
Setting detail: THERAPIES SERIES
Discharge: HOME | End: 2020-06-08
Payer: MEDICARE

## 2020-06-08 PROCEDURE — G0108 DIAB MANAGE TRN  PER INDIV: HCPCS

## 2020-06-08 NOTE — PROGRESS NOTES
unspecified type diabetes mellitus without mention of complication, not stated as uncontrolled     Urge incontinence of urine 2017    Wears glasses     Weight loss      Family History   Problem Relation Age of Onset    Stroke Father     Diabetes Mother     Hypertension Mother     Breast Cancer Neg Hx     Cancer Neg Hx     Colon Cancer Neg Hx     Eclampsia Neg Hx     Ovarian Cancer Neg Hx      Labor Neg Hx     Spont Abortions Neg Hx      Januvia [sitagliptin phosphate];  Naproxen sodium; and Neurontin [gabapentin]   Immunization History   Administered Date(s) Administered    Influenza Vaccine, unspecified formulation 10/09/2017, 10/26/2018    Influenza Virus Vaccine 2013, 10/09/2014, 2015, 2020    Influenza, High Dose (Fluzone 65 yrs and older) 2012    Influenza, Edwin, 6 mo and older, IM (Fluzone, Flulaval) 10/09/2017    Influenza, Quadv, IM, (6 mo and older Fluzone, Flulaval, Fluarix and 3 yrs and older Afluria) 2020    Influenza, Quadv, IM, PF (6 mo and older Fluzone, Flulaval, Fluarix, and 3 yrs and older Afluria) 10/26/2018    Pneumococcal Conjugate 13-valent ([id number]) 2015    Pneumococcal Polysaccharide ([id number]) 2017     Current Medications  Current Outpatient Medications   Medication Sig Dispense Refill    insulin glargine (BASAGLAR KWIKPEN) 100 UNIT/ML injection pen Inject 40 Units into the skin nightly 5 pen 3    Blood Pressure KIT Use to monitor blood pressure daily and as needed (Patient not taking: Reported on 2020) 1 kit 0    ONE TOUCH ULTRASOFT LANCETS MISC Dx: DM-2 use 2-3 times daily 100 each 2    blood glucose test strips (TRUETEST TEST) strip Dx: DM-2 use 2-3 times daily 100 strip 2    Insulin Pen Needle 31G X 5 MM MISC 1 each by Does not apply route daily 100 each 3    diclofenac (VOLTAREN) 50 MG EC tablet Take 1 tablet by mouth 3 times daily (with meals) (Patient not taking: Reported on 2020) 60 tablet

## 2020-06-19 ENCOUNTER — HOSPITAL ENCOUNTER (OUTPATIENT)
Dept: DIABETES SERVICES | Age: 50
Setting detail: THERAPIES SERIES
Discharge: HOME | End: 2020-06-19
Payer: MEDICARE

## 2020-06-19 PROCEDURE — G0108 DIAB MANAGE TRN  PER INDIV: HCPCS

## 2020-07-06 ENCOUNTER — TELEPHONE (OUTPATIENT)
Dept: INTERNAL MEDICINE | Age: 50
End: 2020-07-06

## 2020-07-06 NOTE — TELEPHONE ENCOUNTER
Fax received that pt has not returned cologuard test. PC to pt to see if pt received test and if pt had any questions or concerns about the test. PC to pt; Pt states she has the test and will do it today if possible.

## 2020-07-16 ENCOUNTER — TELEPHONE (OUTPATIENT)
Dept: PHARMACY | Facility: CLINIC | Age: 50
End: 2020-07-16

## 2020-07-16 NOTE — LETTER
[address]  [address], [place] [address]  Phone: [phone], [address] [facility] [address]           07/17/20     Dear Dariana Jorge,    We tried to reach you recently regarding your simvastatin, but were unable to reach you on the telephone. It appears that this medication has not been filled at proper times. We are worried you might be missing doses or not taking it as directed. It is important that you take your medications regularly and try not to miss a single dose. We have on file that you are currently taking simvastatin 40 mg in the evening. If you are no longer taking it or taking it differently than above, please call us at the number below so that we can discuss this and update your medication profile.     Some ways to help you remember to take and refill your medications are to:  · Use a pill box, set an alarm, and/or keep your medication near something that you do every day  · Fill a 3-month supply of your prescription at a time to save you time and trips to the pharmacy  if you would like assistance in switching your prescriptions to a 3-month supply, please contact us  · Ask your pharmacy if they participate in [facility]", a program where you can  all of your medications on the same day each month  · Ask your pharmacy if you can be set up with automatic refill, where they will automatically refill your prescription when it is due and let you know it's ready to     Sincerely,     Bonnie Larson, PharmD  [address]  Phone: [phone], [address]

## 2020-07-16 NOTE — TELEPHONE ENCOUNTER
CLINICAL PHARMACY: STATIN THERAPY REVIEW    SUBJECTIVE:   Identified care gap per [employer]: statin use in persons with diabetes and adherence     Last Office Visit: 5/22/2020    ASSESSMENT  STATIN GAP IDENTIFIED    Per chart review, patient is prescribed simvastatin 40 mg daily. Will route to clinical pharmacy  to outreach to pharmacy to confirm most recent refill data as well as prescription, prescriber and insurance information.      Génesis Flores, PharmD, [facility]  Direct: [phone]  Department, toll free [phone], [address]

## 2020-08-10 ENCOUNTER — TELEPHONE (OUTPATIENT)
Dept: OBGYN | Age: 50
End: 2020-08-10

## 2020-08-19 ENCOUNTER — TELEPHONE (OUTPATIENT)
Dept: FAMILY MEDICINE CLINIC | Age: 50
End: 2020-08-19

## 2020-08-19 NOTE — TELEPHONE ENCOUNTER
If Monistat did not improve her symptoms she needs an appointment for evaluation as it may not be a yeast infection.

## 2020-08-19 NOTE — TELEPHONE ENCOUNTER
Pt called she wants med for a Yeast Infection,symptoms are creamy white discharge/itchy x 1 wk, sent to the [address] [place] [address] Pharmacy,pt said she used Monistat 7 but did not work,pt last appt was 2/19/2020,please call when done

## 2020-08-27 ENCOUNTER — OFFICE VISIT (OUTPATIENT)
Dept: ORTHOPEDIC SURGERY | Age: 50
End: 2020-08-27
Payer: MEDICAID

## 2020-08-27 VITALS — WEIGHT: 276 LBS | BODY MASS INDEX: 41.83 KG/M2 | HEIGHT: 68 IN

## 2020-08-27 PROCEDURE — 99213 OFFICE O/P EST LOW 20 MIN: CPT | Performed by: ORTHOPAEDIC SURGERY

## 2020-08-27 PROCEDURE — 20610 DRAIN/INJ JOINT/BURSA W/O US: CPT | Performed by: ORTHOPAEDIC SURGERY

## 2020-08-27 RX ORDER — BUPIVACAINE HYDROCHLORIDE 2.5 MG/ML
2 INJECTION, SOLUTION INFILTRATION; PERINEURAL ONCE
Status: COMPLETED | OUTPATIENT
Start: 2020-08-27 | End: 2020-08-27

## 2020-08-27 RX ORDER — METHYLPREDNISOLONE ACETATE 80 MG/ML
80 INJECTION, SUSPENSION INTRA-ARTICULAR; INTRALESIONAL; INTRAMUSCULAR; SOFT TISSUE ONCE
Status: COMPLETED | OUTPATIENT
Start: 2020-08-27 | End: 2020-08-27

## 2020-08-27 RX ADMIN — BUPIVACAINE HYDROCHLORIDE 5 MG: 2.5 INJECTION, SOLUTION INFILTRATION; PERINEURAL at 15:30

## 2020-08-27 RX ADMIN — METHYLPREDNISOLONE ACETATE 80 MG: 80 INJECTION, SUSPENSION INTRA-ARTICULAR; INTRALESIONAL; INTRAMUSCULAR; SOFT TISSUE at 15:34

## 2020-08-27 NOTE — PROGRESS NOTES
[facility]  [facility]  [address] [facility] [address]  Dept: [phone]    Orthopedic Clinic New Patient    Subjective:     Chief Complaint   Patient presents with    Knee Pain     B/L       History of Present Illness:  Kevin Marshall is a 45y.o. year old female who presents as a follow up for her bilateral knee pain. Patient has been previously diagnosed with bilateral knee osteoarthritis. Left knee pain is greater than the right. She has no history of trauma to her bilateral knees. She does have a significant past medical history including obesity and uncontrolled diabetes. We have discussed total knee replacement surgery in the past which includes recommending weight management and better control of her diabetes. She has the same pain symptoms as previously described in notes. She routinely gets bilateral knee corticosteroid injections for which her last one on 5/4/2020 gave her roughly 2 to 3 months of relief. She is also had Synvisc injections in the past, her last being on 3/2/2020 which helped her out for 3 weeks in duration. She returns today to the office for repeat corticosteroid injections to her bilateral knees. She has no other complaints in the office today. Review of Systems:   Constitutional: Negative for fever and chills. HENT: Negative for congestion. Eyes: Negative for blurred vision and double vision. Respiratory: Negative for cough, shortness of breath. Cardiovascular: Negative for chest pain and palpitations. Gastrointestinal: Negative for nausea. Negative for vomiting. Musculoskeletal: Positive for bilateral knee pain. Skin: Negative for itching and rash. Neurological: Negative for numbness, tingling, weakness. Psychiatric/Behavioral: Negative for depression and suicidal ideas. Objective :   Physical Exam:  General: AAOx3, NAD, sitting comfortably in the room. CV: No dependent edema, RRR.   Pulm: Respirations compartments with associated osteophyte formation and subchondral sclerosis. No acute fracture or dislocation appreciated. There is also degenerative changes including joint space narrowing and osteophyte formation along the patellofemoral joint, no incidence of patellar subluxation. Impression: Tricompartmental osteoarthritis of the right knee. Left knee:  History: 45y.o. year old female with a history of bilateral knee osteoarthritis. Comparison: 7/29/2019    Findings: 4 views AP, oblique, lateral, merchant of the left knee demonstrates degenerative changes about the tibiofemoral joint in the medial compartment with significant joint space narrowing which is bone-on-bone. There is significant subchondral sclerosis and osteophyte formation. There is mild lateral subluxation of the tibia. No acute fracture or dislocation appreciated. Also appreciable is the patellofemoral degenerative changes including osteophyte formation and joint space narrowing. Patella is aligned at the trochlear groove. Impression: Tricompartmental osteoarthritis of the left knee. Assessment:   1. Tricompartmental osteoarthritis of the left knee  2. Tricompartmental osteoarthritis of the right knee  Plan:   Candi Martinez is here for her 3-month interval of bilateral knee corticosteroid injections. She states that her last injections lasted roughly 2 to almost 3 months in duration. She still needs a cane for ambulation assistance. She denies any new injuries to her bilateral knees. We discussed nonoperative and operative interventions for bilateral knee osteoarthritis, patient is understandable to the agreed plan for conservative therapy. She continues with weight loss management and better control of her diabetes in order to prepare her for future knee replacements. She was encouraged to call the office with any questions or concerns.      -Return if symptoms worsen or fail to improve.     Orders Placed This Encounter   Medications    bupivacaine (MARCAINE) 0.25 % injection 5 mg    bupivacaine (MARCAINE) 0.25 % injection 5 mg    methylPREDNISolone acetate (DEPO-MEDROL) injection 80 mg    methylPREDNISolone acetate (DEPO-MEDROL) injection 80 mg      Orders Placed This Encounter   Procedures    WV ARTHROCENTESIS ASPIR&/INJ MAJOR JT/BURSA W/O US         Electronically signed by Bolivar Elliott DO on 8/27/2020 at 12:22 PM

## 2020-09-02 ENCOUNTER — HOSPITAL ENCOUNTER (OUTPATIENT)
Age: 50
Setting detail: SPECIMEN
Discharge: HOME | End: 2020-09-02
Payer: MEDICARE

## 2020-09-02 ENCOUNTER — OFFICE VISIT (OUTPATIENT)
Dept: OBGYN | Age: 50
End: 2020-09-02
Payer: MEDICARE

## 2020-09-02 VITALS
DIASTOLIC BLOOD PRESSURE: 76 MMHG | BODY MASS INDEX: 41.82 KG/M2 | WEIGHT: 271 LBS | HEART RATE: 91 BPM | SYSTOLIC BLOOD PRESSURE: 123 MMHG | TEMPERATURE: 98 F

## 2020-09-02 PROCEDURE — 4004F PT TOBACCO SCREEN RCVD TLK: CPT | Performed by: OBSTETRICS & GYNECOLOGY

## 2020-09-02 PROCEDURE — 3017F COLORECTAL CA SCREEN DOC REV: CPT | Performed by: OBSTETRICS & GYNECOLOGY

## 2020-09-02 PROCEDURE — G8427 DOCREV CUR MEDS BY ELIG CLIN: HCPCS | Performed by: OBSTETRICS & GYNECOLOGY

## 2020-09-02 PROCEDURE — G8417 CALC BMI ABV UP PARAM F/U: HCPCS | Performed by: OBSTETRICS & GYNECOLOGY

## 2020-09-02 PROCEDURE — 99213 OFFICE O/P EST LOW 20 MIN: CPT | Performed by: OBSTETRICS & GYNECOLOGY

## 2020-09-02 RX ORDER — POLYETHYLENE GLYCOL 3350, SODIUM SULFATE ANHYDROUS, SODIUM BICARBONATE, SODIUM CHLORIDE, POTASSIUM CHLORIDE 236; 22.74; 6.74; 5.86; 2.97 G/4L; G/4L; G/4L; G/4L; G/4L
POWDER, FOR SOLUTION ORAL
COMMUNITY
Start: 2020-05-26 | End: 2020-10-26 | Stop reason: ALTCHOICE

## 2020-09-02 RX ORDER — CLOTRIMAZOLE AND BETAMETHASONE DIPROPIONATE 10; .64 MG/G; MG/G
CREAM TOPICAL
Qty: 1 TUBE | Refills: 1 | Status: SHIPPED | OUTPATIENT
Start: 2020-09-02 | End: 2020-10-26 | Stop reason: ALTCHOICE

## 2020-09-02 RX ORDER — FLUCONAZOLE 150 MG/1
150 TABLET ORAL ONCE
Qty: 2 TABLET | Refills: 0 | Status: SHIPPED | OUTPATIENT
Start: 2020-09-02 | End: 2020-10-19

## 2020-09-02 NOTE — PROGRESS NOTES
Surgical History:   Procedure Laterality Date    CARPAL TUNNEL RELEASE      2008 left,  2009 right     SECTION  2007    CYSTOSCOPY  2016    cysto with left ureteral stent placement    HYSTERECTOMY, TOTAL ABDOMINAL  2013    LITHOTRIPSY Left 2016    cysto with ESWL and left ureteral stent placement.     NERVE BLOCK Left 2016    left knee kenalog 80mg    NERVE BLOCK  3/11/16    Rt Knee depo medrol [de-identified]     TUBAL LIGATION  3/2012       Family History   Problem Relation Age of Onset    Stroke Father     Diabetes Mother     Hypertension Mother     Breast Cancer Neg Hx     Cancer Neg Hx     Colon Cancer Neg Hx     Eclampsia Neg Hx     Ovarian Cancer Neg Hx      Labor Neg Hx     Spont Abortions Neg Hx        Social History     Socioeconomic History    Marital status: Single     Spouse name: Not on file    Number of children: Not on file    Years of education: Not on file    Highest education level: Not on file   Occupational History    Not on file   Social Needs    Financial resource strain: Not on file    Food insecurity     Worry: Not on file     Inability: Not on file    Transportation needs     Medical: Not on file     Non-medical: Not on file   Tobacco Use    Smoking status: Current Some Day Smoker     Packs/day: 0.25     Years: 15.00     Pack years: 3.75     Types: Cigarettes    Smokeless tobacco: Never Used    Tobacco comment: occasionaly   Substance and Sexual Activity    Alcohol use: Not Currently     Alcohol/week: 0.0 standard drinks     Comment: rare    Drug use: No    Sexual activity: Yes     Partners: Male   Lifestyle    Physical activity     Days per week: Not on file     Minutes per session: Not on file    Stress: Not on file   Relationships    Social connections     Talks on phone: Not on file     Gets together: Not on file     Attends Jewish service: Not on file     Active member of club or organization: Not on file     Attends meetings of clubs or organizations: Not on file     Relationship status: Not on file    Intimate partner violence     Fear of current or ex partner: Not on file     Emotionally abused: Not on file     Physically abused: Not on file     Forced sexual activity: Not on file   Other Topics Concern    Not on file   Social History Narrative    Not on file         MEDICATIONS:  Current Outpatient Medications   Medication Sig Dispense Refill    PEG 3350-KCl-NaBcb-NaCl-NaSulf (PEG-3350/ELECTROLYTES) 236 g SOLR       Calcium Citrate-Vitamin D (CALCITRATE/VITAMIN D PO) Take by mouth      clotrimazole-betamethasone (LOTRISONE) 1-0.05 % cream Apply to affected area bid externally x 4 days then daily for 3 days 1 Tube 1    fluconazole (DIFLUCAN) 150 MG tablet Take 1 tablet by mouth once for 1 dose Take one tablet today and may repeat again in 2-3 days if symptoms persist 2 tablet 0    insulin glargine (BASAGLAR KWIKPEN) 100 UNIT/ML injection pen Inject 40 Units into the skin nightly 5 pen 3    Blood Pressure KIT Use to monitor blood pressure daily and as needed 1 kit 0    ONE TOUCH ULTRASOFT LANCETS MISC Dx: DM-2 use 2-3 times daily 100 each 2    blood glucose test strips (TRUETEST TEST) strip Dx: DM-2 use 2-3 times daily 100 strip 2    Insulin Pen Needle 31G X 5 MM MISC 1 each by Does not apply route daily 100 each 3    diclofenac (VOLTAREN) 50 MG EC tablet Take 1 tablet by mouth 3 times daily (with meals) 60 tablet 3    simvastatin (ZOCOR) 40 MG tablet Take 1 tablet by mouth nightly 30 tablet 2    oxybutynin (DITROPAN XL) 10 MG extended release tablet Take 1 tablet by mouth daily 30 tablet 2    metFORMIN (GLUCOPHAGE) 1000 MG tablet Take 1 tablet by mouth 2 times daily (with meals) 60 tablet 2    lisinopril (PRINIVIL;ZESTRIL) 5 MG tablet Take 1 tablet by mouth daily 30 tablet 2    levothyroxine (SYNTHROID) 50 MCG tablet Take 1 tablet by mouth daily 30 tablet 2    ferrous sulfate (IRON 325) 325 (65 Fe) MG tablet Take 1 tablet by mouth 2 times daily 60 tablet 2    aspirin EC 81 MG EC tablet Take 1 tablet by mouth daily 30 tablet 2    albuterol sulfate HFA (PROVENTIL HFA) 108 (90 Base) MCG/ACT inhaler Inhale 2 puffs into the lungs every 6 hours as needed for Wheezing 1 Inhaler 2    nystatin (MYCOSTATIN) 871176 UNIT/GM powder Apply to skin of legs and in folds of skin with irritation 1 Bottle 0    cyclobenzaprine (FLEXERIL) 10 MG tablet Take 10 mg by mouth      insulin lispro (HUMALOG KWIKPEN) 100 UNIT/ML pen Inject 20 Units into the skin 3 times daily (before meals) 3 pen 2    Calcium Carb-Ergocalciferol 250-125 MG-UNIT TABS Take 1 tablet by mouth      traZODone (DESYREL) 150 MG tablet Take 150 mg by mouth nightly       No current facility-administered medications for this visit. ALLERGIES:  Allergies as of 09/02/2020 - Review Complete 09/02/2020   Allergen Reaction Noted    Januvia [sitagliptin phosphate] Hives 12/06/2011    Naproxen sodium Hives 12/05/2011    Neurontin [gabapentin] Nausea Only 06/03/2013         REVIEW OF SYSTEMS:       A minimum of an eleven point review of systems was completed. Review Of Systems (11 point):  Constitutional: No fever, chills or malaise; No weight change or fatigue  Head and Eyes: No vision, Headache, Dizziness or trauma in last 12 months  ENT ROS: No hearing, Tinnitis, sinus or taste problems  Hematological and Lymphatic ROS:No Lymphoma, Von Willebrand's, Hemophillia or Bleeding History  Psych ROS: No Depression, Homicidal thoughts,suicidal thoughts, or anxiety  Breast ROS: No prior breast abnormalities or lumps  Respiratory ROS: No SOB, Pneumoniae,Cough, or Pulmonary Embolism History  Cardiovascular ROS: No Chest Pain with Exertion, Palpitations, Syncope, Edema, Arrhythmia  Gastrointestinal ROS: No Indigestion, Heartburn, Nausea, vomiting, Diarrhea, Constipation,or Bowel Changes;  No Bloody Stools or melena  Genito-Urinary ROS:+vulvar irritation and

## 2020-09-03 LAB
C TRACH DNA GENITAL QL NAA+PROBE: NEGATIVE
DIRECT EXAM: ABNORMAL
Lab: ABNORMAL
N. GONORRHOEAE DNA: NEGATIVE
SPECIMEN DESCRIPTION: ABNORMAL
SPECIMEN DESCRIPTION: NORMAL

## 2020-09-03 RX ORDER — METRONIDAZOLE 500 MG/1
500 TABLET ORAL 2 TIMES DAILY
Qty: 14 TABLET | Refills: 0 | Status: SHIPPED | OUTPATIENT
Start: 2020-09-03 | End: 2020-09-10

## 2020-09-03 RX ORDER — FLUCONAZOLE 150 MG/1
150 TABLET ORAL ONCE
Qty: 2 TABLET | Refills: 0 | Status: SHIPPED | OUTPATIENT
Start: 2020-09-03 | End: 2020-09-03

## 2020-09-04 ENCOUNTER — TELEPHONE (OUTPATIENT)
Dept: OBGYN | Age: 50
End: 2020-09-04

## 2020-09-04 NOTE — TELEPHONE ENCOUNTER
----- Message from Mari January, DO sent at 9/3/2020 12:05 PM EDT -----  Please let pt know she has BV and yeast - and Rxs have been sent to [facility] pharmacy to treat these.

## 2020-09-04 NOTE — LETTER
[address] [facility] [address] [address]  Phone: [phone]  Fax: [address] [address], APRN - CNP        March 17, 2020    Ambreen Snowden [address] [place] [id number]      Dear Ayan Das: We are sending this letter because your PCP ordered [facility] for you to have done at your last visit here and they have not yet been completed. If you can please come to our office on the 2nd floor to  your orders to have them compelted. If you do not have a follow-up appointment scheduled you can either contact the office to make an appointment with us or you can make one when you come in to pick-up your orders. If you have any questions or concerns, please don't hesitate to call.     Sincerely,        MARILU Sue CNP Continue with Ensure TID.  Diet consistency per SLP

## 2020-10-19 RX ORDER — FLUCONAZOLE 150 MG/1
TABLET ORAL
Qty: 2 TABLET | Refills: 0 | Status: SHIPPED | OUTPATIENT
Start: 2020-10-19 | End: 2020-10-26 | Stop reason: ALTCHOICE

## 2020-10-22 ENCOUNTER — HOSPITAL ENCOUNTER (EMERGENCY)
Age: 50
Discharge: HOME | End: 2020-10-22
Attending: EMERGENCY MEDICINE
Payer: MEDICARE

## 2020-10-22 VITALS
HEART RATE: 93 BPM | OXYGEN SATURATION: 97 % | DIASTOLIC BLOOD PRESSURE: 72 MMHG | SYSTOLIC BLOOD PRESSURE: 133 MMHG | HEIGHT: 67 IN | TEMPERATURE: 98.3 F | WEIGHT: 264 LBS | RESPIRATION RATE: 16 BRPM | BODY MASS INDEX: 41.44 KG/M2

## 2020-10-22 LAB
-: ABNORMAL
AMORPHOUS: ABNORMAL
BACTERIA: ABNORMAL
BILIRUBIN URINE: NEGATIVE
CASTS UA: ABNORMAL /LPF
COLOR: YELLOW
COMMENT UA: ABNORMAL
CRYSTALS, UA: ABNORMAL /HPF
DIRECT EXAM: ABNORMAL
EPITHELIAL CELLS UA: ABNORMAL /HPF (ref 0–5)
GLUCOSE URINE: ABNORMAL
KETONES, URINE: ABNORMAL
LEUKOCYTE ESTERASE, URINE: ABNORMAL
Lab: ABNORMAL
MUCUS: ABNORMAL
NITRITE, URINE: NEGATIVE
OTHER OBSERVATIONS UA: ABNORMAL
PH UA: 5.5 (ref 5–8)
PROTEIN UA: ABNORMAL
RBC UA: ABNORMAL /HPF (ref 0–2)
RENAL EPITHELIAL, UA: ABNORMAL /HPF
SPECIFIC GRAVITY UA: 1.02 (ref 1–1.03)
SPECIMEN DESCRIPTION: ABNORMAL
TRICHOMONAS: POSITIVE
TURBIDITY: ABNORMAL
URINE HGB: ABNORMAL
UROBILINOGEN, URINE: NORMAL
WBC UA: ABNORMAL /HPF (ref 0–5)
YEAST: ABNORMAL

## 2020-10-22 PROCEDURE — 81025 URINE PREGNANCY TEST: CPT

## 2020-10-22 PROCEDURE — 99283 EMERGENCY DEPT VISIT LOW MDM: CPT

## 2020-10-22 PROCEDURE — 87086 URINE CULTURE/COLONY COUNT: CPT

## 2020-10-22 PROCEDURE — 87591 N.GONORRHOEAE DNA AMP PROB: CPT

## 2020-10-22 PROCEDURE — 81001 URINALYSIS AUTO W/SCOPE: CPT

## 2020-10-22 PROCEDURE — 87480 CANDIDA DNA DIR PROBE: CPT

## 2020-10-22 PROCEDURE — 87510 GARDNER VAG DNA DIR PROBE: CPT

## 2020-10-22 PROCEDURE — 87660 TRICHOMONAS VAGIN DIR PROBE: CPT

## 2020-10-22 PROCEDURE — 87491 CHLMYD TRACH DNA AMP PROBE: CPT

## 2020-10-22 PROCEDURE — 86403 PARTICLE AGGLUT ANTBDY SCRN: CPT

## 2020-10-22 RX ORDER — METRONIDAZOLE 500 MG/1
500 TABLET ORAL 2 TIMES DAILY
Qty: 14 TABLET | Refills: 0 | Status: SHIPPED | OUTPATIENT
Start: 2020-10-22 | End: 2020-10-26 | Stop reason: ALTCHOICE

## 2020-10-22 ASSESSMENT — PAIN DESCRIPTION - LOCATION: LOCATION: VAGINA

## 2020-10-22 ASSESSMENT — PAIN SCALES - GENERAL: PAINLEVEL_OUTOF10: 5

## 2020-10-22 ASSESSMENT — ENCOUNTER SYMPTOMS
COLOR CHANGE: 0
DIARRHEA: 0
ABDOMINAL PAIN: 0
VOMITING: 0
NAUSEA: 0

## 2020-10-22 ASSESSMENT — PAIN DESCRIPTION - PAIN TYPE: TYPE: ACUTE PAIN

## 2020-10-22 ASSESSMENT — PAIN DESCRIPTION - DESCRIPTORS: DESCRIPTORS: DISCOMFORT

## 2020-10-22 NOTE — ED PROVIDER NOTES
EMERGENCY DEPARTMENT ENCOUNTER   ATTENDING ATTESTATION     Pt Name: Alysia Rai  MRN: [id number]  [place] 1970  Date of evaluation: 10/22/20   Alysia Rai is a 45 y.o. female with CC: Vaginal Pain    MDM:   Patient is a 39-year-old female who presented to the emergency department secondary to vaginal discharge cultures obtained treated for trichomonas prophylactically for other sexual transmitted infections, discharged home with outpatient follow-up and parameters to return to the emergency department. CRITICAL CARE:       EKG: All EKG's are interpreted by the Emergency Department Physician who either signs or Co-signs this chart in the absence of a cardiologist.      RADIOLOGY:All plain film, CT, MRI, and formal ultrasound images (except ED bedside ultrasound) are read by the radiologist, see reports below, unless otherwise noted in MDM or here. No orders to display     LABS: All lab results were reviewed by myself, and all abnormals are listed below. Labs Reviewed   VAGINITIS DNA PROBE   C.TRACHOMATIS N.GONORRHOEAE DNA   URINALYSIS WITH MICROSCOPIC     CONSULTS:  None  FINAL IMPRESSION    No diagnosis found.         PASTMEDICAL HISTORY     Past Medical History:   Diagnosis Date    Anxiety     Arthritis of knee, degenerative 1/31/2012    Carpal tunnel syndrome on both sides 1/15/2013    Chronic knee pain     on 4/17/18 pt is presently in pain mgmnt    Depressive disorder, not elsewhere classified 10/14/2014    Hydronephrosis, left 9/13/2016    Hypothyroidism 8/2/2013    Iron deficiency anemia 6/10/2014    Mild intermittent asthma without complication 0/73/1163    Obesity     Osteoarthritis     KNEE    Polyneuropathy 11/14/2012    RAD (reactive airway disease) 6/26/2012    Snores     possible apnea but not tested yet    Type II or unspecified type diabetes mellitus without mention of complication, not stated as uncontrolled     Urge incontinence of urine 5/31/2017    Wears glasses     Weight loss      SURGICAL HISTORY       Past Surgical History:   Procedure Laterality Date    CARPAL TUNNEL RELEASE      2008 left,  2009 right     SECTION  2007    CYSTOSCOPY  2016    cysto with left ureteral stent placement    HYSTERECTOMY, TOTAL ABDOMINAL  2013    LITHOTRIPSY Left 2016    cysto with ESWL and left ureteral stent placement.  NERVE BLOCK Left 2016    left knee kenalog 80mg    NERVE BLOCK  3/11/16    Rt Knee depo medrol 80     TUBAL LIGATION  3/2012     CURRENT MEDICATIONS       Previous Medications    ALBUTEROL SULFATE HFA (PROVENTIL HFA) 108 (90 BASE) MCG/ACT INHALER    Inhale 2 puffs into the lungs every 6 hours as needed for Wheezing    ASPIRIN EC 81 MG EC TABLET    Take 1 tablet by mouth daily    BLOOD GLUCOSE TEST STRIPS (TRUETEST TEST) STRIP    Dx: DM-2 use 2-3 times daily    BLOOD PRESSURE KIT    Use to monitor blood pressure daily and as needed    CALCIUM CARB-ERGOCALCIFEROL 250-125 MG-UNIT TABS    Take 1 tablet by mouth    CALCIUM CITRATE-VITAMIN D (CALCITRATE/VITAMIN D PO)    Take by mouth    CLOTRIMAZOLE-BETAMETHASONE (LOTRISONE) 1-0.05 % CREAM    Apply to affected area bid externally x 4 days then daily for 3 days    CYCLOBENZAPRINE (FLEXERIL) 10 MG TABLET    Take 10 mg by mouth    DICLOFENAC (VOLTAREN) 50 MG EC TABLET    Take 1 tablet by mouth 3 times daily (with meals)    FERROUS SULFATE (IRON 325) 325 (65 FE) MG TABLET    Take 1 tablet by mouth 2 times daily    FLUCONAZOLE (DIFLUCAN) 150 MG TABLET    TAKE 1 TABLET TODAY FOR 1 DOSE & MAY REPEAT IN 2-(3) DAYS IF SYMPTOMS PERSIST.     INSULIN GLARGINE (BASAGLAR KWIKPEN) 100 UNIT/ML INJECTION PEN    Inject 40 Units into the skin nightly    INSULIN LISPRO (HUMALOG KWIKPEN) 100 UNIT/ML PEN    Inject 20 Units into the skin 3 times daily (before meals)    INSULIN PEN NEEDLE 31G X 5 MM MISC    1 each by Does not apply route daily    LEVOTHYROXINE (SYNTHROID) 50 MCG TABLET    Take 1 tablet by mouth daily    LISINOPRIL (PRINIVIL;ZESTRIL) 5 MG TABLET    Take 1 tablet by mouth daily    METFORMIN (GLUCOPHAGE) 1000 MG TABLET    Take 1 tablet by mouth 2 times daily (with meals)    NYSTATIN (MYCOSTATIN) 291762 UNIT/GM POWDER    Apply to skin of legs and in folds of skin with irritation    ONE TOUCH ULTRASOFT LANCETS MISC    Dx: DM-2 use 2-3 times daily    OXYBUTYNIN (DITROPAN XL) 10 MG EXTENDED RELEASE TABLET    Take 1 tablet by mouth daily    PEG 3350-KCL-NABCB-NACL-NASULF (PEG-3350/ELECTROLYTES) 236 G SOLR        SIMVASTATIN (ZOCOR) 40 MG TABLET    Take 1 tablet by mouth nightly    TRAZODONE (DESYREL) 150 MG TABLET    Take 150 mg by mouth nightly     ALLERGIES     is allergic to [place] [sitagliptin phosphate]; naproxen sodium; and neurontin [gabapentin]. FAMILY HISTORY     She indicated that her mother is alive. She indicated that her father is alive. She indicated that the status of her neg hx is unknown. SOCIAL HISTORY       Social History     Tobacco Use    Smoking status: Current Some Day Smoker     Packs/day: 0.25     Years: 15.00     Pack years: 3.75     Types: Cigarettes    Smokeless tobacco: Never Used    Tobacco comment: occasionaly   Substance Use Topics    Alcohol use: Not Currently     Alcohol/week: 0.0 standard drinks     Comment: rare    Drug use: No       I personally evaluated and examined the patient in conjunction with the APC and agree with the assessment, treatment plan, and disposition of the patient as recorded by the APC.    Gilbert Moon MD  Attending Emergency Physician          Gilbert Moon MD  96/53/26 1490

## 2020-10-22 NOTE — ED PROVIDER NOTES
[facility] ED  eMERGENCY dEPARTMENT eNCOUnter      Pt Name: Gerard Dance  MRN: [id number]  [place] 1970  Date of evaluation: 10/22/2020  Provider: MARILU Rocha [id number]       Chief Complaint   Patient presents with    Vaginal Pain         HISTORY OF PRESENT ILLNESS  (Location/Symptom, Timing/Onset, Context/Setting, Quality, Duration, Modifying Factors, Severity.)   Gerard Dance is a 45 y.o. female who presents to the emergency department via private auto for vaginal irritation. Onset was a few days ago. States she completed OTC medication for a yeast infection without relief. Denies fever, chills, vaginal lesions. Reports mild discharge. Rates her pain 5/10 at this time. Nursing Notes were reviewed. ALLERGIES     Januvia [sitagliptin phosphate]; Naproxen sodium; and Neurontin [gabapentin]    CURRENT MEDICATIONS       Previous Medications    ALBUTEROL SULFATE HFA (PROVENTIL HFA) 108 (90 BASE) MCG/ACT INHALER    Inhale 2 puffs into the lungs every 6 hours as needed for Wheezing    ASPIRIN EC 81 MG EC TABLET    Take 1 tablet by mouth daily    BLOOD GLUCOSE TEST STRIPS (TRUETEST TEST) STRIP    Dx: DM-2 use 2-3 times daily    BLOOD PRESSURE KIT    Use to monitor blood pressure daily and as needed    CALCIUM CARB-ERGOCALCIFEROL 250-125 MG-UNIT TABS    Take 1 tablet by mouth    CALCIUM CITRATE-VITAMIN D (CALCITRATE/VITAMIN D PO)    Take by mouth    CLOTRIMAZOLE-BETAMETHASONE (LOTRISONE) 1-0.05 % CREAM    Apply to affected area bid externally x 4 days then daily for 3 days    CYCLOBENZAPRINE (FLEXERIL) 10 MG TABLET    Take 10 mg by mouth    DICLOFENAC (VOLTAREN) 50 MG EC TABLET    Take 1 tablet by mouth 3 times daily (with meals)    FERROUS SULFATE (IRON 325) 325 (65 FE) MG TABLET    Take 1 tablet by mouth 2 times daily    FLUCONAZOLE (DIFLUCAN) 150 MG TABLET    TAKE 1 TABLET TODAY FOR 1 DOSE & MAY REPEAT IN 2-(3) DAYS IF SYMPTOMS PERSIST.     INSULIN 2007    CYSTOSCOPY  2016    cysto with left ureteral stent placement    HYSTERECTOMY, TOTAL ABDOMINAL  2013    LITHOTRIPSY Left 2016    cysto with ESWL and left ureteral stent placement.  NERVE BLOCK Left 2016    left knee kenalog 80mg    NERVE BLOCK  3/11/16    Rt Knee depo medrol 80     TUBAL LIGATION  3/2012         FAMILY HISTORY           Problem Relation Age of Onset    Stroke Father     Diabetes Mother     Hypertension Mother     Breast Cancer Neg Hx     Cancer Neg Hx     Colon Cancer Neg Hx     Eclampsia Neg Hx     Ovarian Cancer Neg Hx      Labor Neg Hx     Spont Abortions Neg Hx      Family Status   Relation Name Status    Father  Alive    Mother  Alive    Neg Hx  (Not Specified)        SOCIAL HISTORY      reports that she has been smoking cigarettes. She has a 3.75 pack-year smoking history. She has never used smokeless tobacco. She reports previous alcohol use. She reports that she does not use drugs. REVIEW OF SYSTEMS    (2-9 systems for level 4, 10 or more for level 5)     Review of Systems   Constitutional: Negative for chills, diaphoresis, fatigue and fever. Gastrointestinal: Negative for abdominal pain, diarrhea, nausea and vomiting. Genitourinary: Positive for vaginal discharge. Negative for dysuria, flank pain, frequency, genital sores, hematuria, pelvic pain, urgency and vaginal bleeding. Skin: Negative for color change, rash and wound. Except as noted above the remainder of the review of systems was reviewed and negative. PHYSICAL EXAM    (up to 7 for level 4, 8 or more for level 5)     ED Triage Vitals [10/22/20 1020]   BP Temp Temp Source Pulse Resp SpO2 Height Weight   133/72 98.3 °F (36.8 °C) Oral 93 16 97 % 5' 7\" (1.702 m) 264 lb (119.7 kg)     Physical Exam  Vitals signs reviewed. Exam conducted with a chaperone present. Constitutional:       General: She is not in acute distress. Appearance: She is well-developed. She is not diaphoretic. Eyes:      General: No scleral icterus. Conjunctiva/sclera: Conjunctivae normal.   Cardiovascular:      Rate and Rhythm: Normal rate. Pulmonary:      Effort: Pulmonary effort is normal. No respiratory distress. Breath sounds: No rales. Abdominal:      Palpations: Abdomen is soft. Tenderness: There is no abdominal tenderness. Genitourinary:     Labia:         Right: No rash, tenderness or lesion. Left: No rash, tenderness or lesion. Vagina: No foreign body. Vaginal discharge (yellow) present. No erythema, bleeding or lesions. Skin:     General: Skin is warm and dry. Findings: No rash. Neurological:      Mental Status: She is alert and oriented to person, place, and time. Psychiatric:         Behavior: Behavior normal.         DIAGNOSTIC RESULTS       LABS:  Labs Reviewed   VAGINITIS DNA PROBE - Abnormal; Notable for the following components:       Result Value    Direct Exam POSITIVE for Trichomonas vaginalis (*)     All other components within normal limits   URINALYSIS WITH MICROSCOPIC - Abnormal; Notable for the following components:    Turbidity UA CLOUDY (*)     Glucose, Ur 3+ (*)     Ketones, Urine TRACE (*)     Urine Hgb 1+ (*)     Protein, UA TRACE (*)     Leukocyte Esterase, Urine SMALL (*)     Trichomonas, UA POSITIVE (*)     Amorphous, UA 2+ (*)     All other components within normal limits   C.TRACHOMATIS N.GONORRHOEAE DNA       All other labs were within normal range or not returned as of this dictation. EMERGENCY DEPARTMENT COURSE and DIFFERENTIAL DIAGNOSIS/MDM:   Vitals:    Vitals:    10/22/20 1020   BP: 133/72   Pulse: 93   Resp: 16   Temp: 98.3 °F (36.8 °C)   TempSrc: Oral   SpO2: 97%   Weight: 264 lb (119.7 kg)   Height: 5' 7\" (1.702 m)       CLINICAL DECISION MAKING:  The patient presented alert with a nontoxic appearance. She tested positive for trichomonas; GC/chlamydia cultures are pending.  A prescription was written for flagyl. Follow up with pcp and/or OB/GYN, return to ED if condition worsens. FINAL IMPRESSION      1.  Trichomonas infection            Problem List  Patient Active Problem List   Diagnosis Code    Tobacco use Z72.0    Carpal tunnel syndrome on both sides G56.03    Acquired hypothyroidism E03.9    Hyperlipidemia E78.5    Iron deficiency anemia D50.9    Mild episode of recurrent major depressive disorder ([facility] [address] F33.0    Diabetes mellitus type 2 in obese ([facility] [address] E11.69, E66.9    Morbid obesity with BMI of 50.0-59.9, adult ([facility] [address] E66.01, Z68.43    Primary osteoarthritis of both knees M17.0    Urge incontinence of urine N39.41    Mild intermittent asthma without complication A62.98    Pure hypercholesterolemia E78.00    Hypertension I10    Pelvic pain in female R10.2    Bilateral ovarian cysts N83.201, N83.202    H/O Hysterectomy (2013) Z90.710    Trichomonal vaginitis A59.01    Chest pain R07.9    Frontal headache R51.9    Frontal sinusitis J32.1    Obesity, Class III, BMI 40-49.9 (morbid obesity) ([facility]) E66.01    Bilateral chronic knee pain M25.561, M25.562, G89.29         DISPOSITION/PLAN   DISPOSITION Decision To Discharge 10/22/2020 11:27:34 AM      PATIENT REFERRED TO:   Floridalma Hansen, [place] [id number] [phone]    Schedule an appointment as soon as possible for a visit       [facility] ED  [address]  [phone]          DISCHARGE MEDICATIONS:     New Prescriptions    METRONIDAZOLE (FLAGYL) 500 MG TABLET    Take 1 tablet by mouth 2 times daily for 7 days           (Please note that portions of this note were completed with a voice recognition program.  Efforts were made to edit the dictations but occasionally words are mis-transcribed.)    Marcello Eid, [address] [facility], [facility]  10/22/20 9536

## 2020-10-23 LAB
CULTURE: ABNORMAL
HCG, PREGNANCY URINE (POC): NEGATIVE
Lab: ABNORMAL
SPECIMEN DESCRIPTION: ABNORMAL

## 2020-10-26 ENCOUNTER — OFFICE VISIT (OUTPATIENT)
Dept: INTERNAL MEDICINE | Age: 50
End: 2020-10-26
Payer: MEDICARE

## 2020-10-26 VITALS
HEART RATE: 94 BPM | SYSTOLIC BLOOD PRESSURE: 123 MMHG | BODY MASS INDEX: 42.28 KG/M2 | HEIGHT: 67 IN | WEIGHT: 269.4 LBS | DIASTOLIC BLOOD PRESSURE: 74 MMHG

## 2020-10-26 LAB — HBA1C MFR BLD: 14 %

## 2020-10-26 PROCEDURE — G8427 DOCREV CUR MEDS BY ELIG CLIN: HCPCS | Performed by: NURSE PRACTITIONER

## 2020-10-26 PROCEDURE — 2022F DILAT RTA XM EVC RTNOPTHY: CPT | Performed by: NURSE PRACTITIONER

## 2020-10-26 PROCEDURE — 3017F COLORECTAL CA SCREEN DOC REV: CPT | Performed by: NURSE PRACTITIONER

## 2020-10-26 PROCEDURE — 99214 OFFICE O/P EST MOD 30 MIN: CPT | Performed by: NURSE PRACTITIONER

## 2020-10-26 PROCEDURE — 3046F HEMOGLOBIN A1C LEVEL >9.0%: CPT | Performed by: NURSE PRACTITIONER

## 2020-10-26 PROCEDURE — 90688 IIV4 VACCINE SPLT 0.5 ML IM: CPT | Performed by: NURSE PRACTITIONER

## 2020-10-26 PROCEDURE — G8417 CALC BMI ABV UP PARAM F/U: HCPCS | Performed by: NURSE PRACTITIONER

## 2020-10-26 PROCEDURE — 83036 HEMOGLOBIN GLYCOSYLATED A1C: CPT | Performed by: NURSE PRACTITIONER

## 2020-10-26 PROCEDURE — 4004F PT TOBACCO SCREEN RCVD TLK: CPT | Performed by: NURSE PRACTITIONER

## 2020-10-26 PROCEDURE — 90746 HEPB VACCINE 3 DOSE ADULT IM: CPT | Performed by: NURSE PRACTITIONER

## 2020-10-26 PROCEDURE — 99211 OFF/OP EST MAY X REQ PHY/QHP: CPT | Performed by: NURSE PRACTITIONER

## 2020-10-26 PROCEDURE — G8482 FLU IMMUNIZE ORDER/ADMIN: HCPCS | Performed by: NURSE PRACTITIONER

## 2020-10-26 RX ORDER — BLOOD PRESSURE TEST KIT
KIT MISCELLANEOUS
Qty: 1 KIT | Refills: 0 | Status: SHIPPED | OUTPATIENT
Start: 2020-10-26 | End: 2022-01-21

## 2020-10-26 RX ORDER — NYSTATIN 100000 [USP'U]/G
POWDER TOPICAL
Qty: 1 BOTTLE | Refills: 0 | Status: SHIPPED | OUTPATIENT
Start: 2020-10-26 | End: 2021-02-18

## 2020-10-26 RX ORDER — TRAZODONE HYDROCHLORIDE 150 MG/1
150 TABLET ORAL NIGHTLY
Qty: 30 TABLET | Refills: 3 | Status: SHIPPED | OUTPATIENT
Start: 2020-10-26 | End: 2021-10-27 | Stop reason: SDUPTHER

## 2020-10-26 RX ORDER — FERROUS SULFATE 325(65) MG
325 TABLET ORAL 2 TIMES DAILY
Qty: 60 TABLET | Refills: 3 | Status: SHIPPED | OUTPATIENT
Start: 2020-10-26 | End: 2021-11-29 | Stop reason: SDUPTHER

## 2020-10-26 RX ORDER — LEVOTHYROXINE SODIUM 0.05 MG/1
50 TABLET ORAL DAILY
Qty: 30 TABLET | Refills: 2 | Status: SHIPPED | OUTPATIENT
Start: 2020-10-26 | End: 2021-09-30 | Stop reason: SDUPTHER

## 2020-10-26 RX ORDER — CYCLOBENZAPRINE HCL 10 MG
10 TABLET ORAL 3 TIMES DAILY PRN
Qty: 30 TABLET | Refills: 0 | Status: CANCELLED | OUTPATIENT
Start: 2020-10-26

## 2020-10-26 RX ORDER — SIMVASTATIN 40 MG
40 TABLET ORAL NIGHTLY
Qty: 30 TABLET | Refills: 3 | Status: SHIPPED | OUTPATIENT
Start: 2020-10-26 | End: 2021-10-27 | Stop reason: SDUPTHER

## 2020-10-26 RX ORDER — INSULIN GLARGINE 100 [IU]/ML
45 INJECTION, SOLUTION SUBCUTANEOUS NIGHTLY
Qty: 5 PEN | Refills: 3 | Status: SHIPPED | OUTPATIENT
Start: 2020-10-26 | End: 2020-11-17 | Stop reason: SDUPTHER

## 2020-10-26 RX ORDER — POLYETHYLENE GLYCOL 3350, SODIUM SULFATE ANHYDROUS, SODIUM BICARBONATE, SODIUM CHLORIDE, POTASSIUM CHLORIDE 236; 22.74; 6.74; 5.86; 2.97 G/4L; G/4L; G/4L; G/4L; G/4L
POWDER, FOR SOLUTION ORAL
Status: CANCELLED | OUTPATIENT
Start: 2020-10-26

## 2020-10-26 RX ORDER — ALBUTEROL SULFATE 90 UG/1
2 AEROSOL, METERED RESPIRATORY (INHALATION) EVERY 6 HOURS PRN
Qty: 1 INHALER | Refills: 2 | Status: SHIPPED | OUTPATIENT
Start: 2020-10-26

## 2020-10-26 RX ORDER — BIOTIN 1 MG
TABLET ORAL
Qty: 100 STRIP | Refills: 2 | Status: ON HOLD | OUTPATIENT
Start: 2020-10-26 | End: 2021-01-09 | Stop reason: HOSPADM

## 2020-10-26 RX ORDER — LISINOPRIL 5 MG/1
5 TABLET ORAL DAILY
Qty: 30 TABLET | Refills: 3 | Status: SHIPPED | OUTPATIENT
Start: 2020-10-26 | End: 2021-10-27 | Stop reason: SDUPTHER

## 2020-10-26 RX ORDER — LANCETS
EACH MISCELLANEOUS
Qty: 100 EACH | Refills: 2 | Status: SHIPPED | OUTPATIENT
Start: 2020-10-26 | End: 2021-10-27 | Stop reason: SDUPTHER

## 2020-10-26 RX ORDER — ASPIRIN 81 MG/1
81 TABLET ORAL DAILY
Qty: 30 TABLET | Refills: 3 | Status: SHIPPED | OUTPATIENT
Start: 2020-10-26 | End: 2021-10-27 | Stop reason: SDUPTHER

## 2020-10-26 RX ORDER — INSULIN LISPRO 100 [IU]/ML
35 INJECTION, SOLUTION INTRAVENOUS; SUBCUTANEOUS
Qty: 5 PEN | Refills: 3 | Status: SHIPPED | OUTPATIENT
Start: 2020-10-26 | End: 2020-11-04 | Stop reason: CLARIF

## 2020-10-26 NOTE — PATIENT INSTRUCTIONS
ANNUAL WELLNESS VISIT : 11/5/2020 at 10:00AM via telephone. Medications have been e-scribed to pharmacy of patients choice. LABORATORY INSTRUCTIONS    Your doctor has ordered blood or urine testing. You can get this testing done at the Lab located on the first floor of the [facility], or at any other [facility]. Please stop at Main Registration, before going to the lab, as you must be registered first.     Please get this lab done before your next visit. You may eat or drink before this test.    An order for Colonoscopy was placed by your physician. [facility] will be contacting you to schedule this procedure. Please contact the office at [phone] if you have not heard from scheduling within 1 week. An order for Blood Pressure Kit and Glucometer was given to patient. Patient was advised that this order must be taken to a [address] in order to receive product. List of DME Suppliers was given to patient along with order. Patient was administered vaccination(s) for Hepatitis B and Influenza in the office. VIS given to patient for each vaccination given. Script for Shingrix and Tdap was printed and given to patient.  Patient was instructed to take script(s) to pharmacy to get filled.    -MAR Lind

## 2020-10-26 NOTE — PROGRESS NOTES
provides moderate improvement. Compliance problems include diet and exercise. There is no history of angina, kidney disease, CAD/MI, CVA, heart failure, left ventricular hypertrophy, PVD or retinopathy. Review of Systems   Constitutional: Negative for malaise/fatigue. Eyes: Negative for blurred vision. Respiratory: Negative for shortness of breath. Cardiovascular: Negative for chest pain, palpitations, orthopnea and PND. Musculoskeletal: Negative for neck pain. Neurological: Negative for headaches. All other systems reviewed and are negative. Prior to Visit Medications    Medication Sig Taking? Authorizing Provider   zoster recombinant adjuvanted vaccine (SHINGRIX) 50 MCG/0.5ML SUSR injection 50 MCG IM then repeat 2-6 months. Yes MARILU Salmon CNP   Blood Pressure KIT Use to monitor blood pressure daily and as needed Yes MARILU Salmon CNP   blood glucose monitor kit and supplies Dispense sufficient amount for indicated testing frequency plus additional to accommodate PRN testing needs. Dispense all needed supplies to include: monitor, strips, lancing device, lancets, control solutions, alcohol swabs.  Yes MARILU Salmon CNP   traZODone (DESYREL) 150 MG tablet Take 1 tablet by mouth nightly Yes MARILU Salmon CNP   nystatin (MYCOSTATIN) 327747 UNIT/GM powder Apply to skin of legs and in folds of skin with irritation Yes MARILU Salmon CNP   albuterol sulfate HFA (PROVENTIL HFA) 108 (90 Base) MCG/ACT inhaler Inhale 2 puffs into the lungs every 6 hours as needed for Wheezing Yes MARILU Salmon CNP   aspirin EC 81 MG EC tablet Take 1 tablet by mouth daily Yes MARILU Salmon CNP   ferrous sulfate (IRON 325) 325 (65 Fe) MG tablet Take 1 tablet by mouth 2 times daily Yes MARILU Salmon CNP   levothyroxine (SYNTHROID) 50 MCG tablet Take 1 tablet by mouth daily Yes MARILU Salmon CNP   lisinopril (PRINIVIL;ZESTRIL) 5 MG tablet Take 1 tablet by mouth daily Yes MARILU Hyatt CNP   metFORMIN (GLUCOPHAGE) 500 MG tablet Take 1 tablet by mouth 2 times daily (with meals) Yes MARILU Hyatt CNP   simvastatin (ZOCOR) 40 MG tablet Take 1 tablet by mouth nightly Yes MARILU Hyatt CNP   diclofenac (VOLTAREN) 50 MG EC tablet Take 1 tablet by mouth 3 times daily (with meals) Yes MARILU Hyatt CNP   Insulin Pen Needle 31G X 5 MM MISC 1 each by Does not apply route daily Yes MARILU Hyatt CNP   blood glucose test strips (TRUETEST TEST) strip Dx: DM-2 use 2-3 times daily Yes MARILU Hyatt CNP   ONE TOUCH ULTRASOFT LANCETS MISC Dx: DM-2 use 2-3 times daily Yes MARILU Hyatt CNP   insulin glargine (BASAGLAR KWIKPEN) 100 UNIT/ML injection pen Inject 45 Units into the skin nightly Yes MARILU Hyatt CNP   mirabegron (MYRBETRIQ) 25 MG TB24 Take 1 tablet by mouth daily Yes MARILU Hyatt CNP   Calcium Citrate-Vitamin D (CALCITRATE/VITAMIN D PO) Take by mouth Yes Historical Provider, MD   insulin aspart (NOVOLOG FLEXPEN) 100 UNIT/ML injection pen Inject 35 Units into the skin 3 times daily (before meals)  MARILU Hyatt CNP        Social History     Tobacco Use    Smoking status: Current Some Day Smoker     Packs/day: 0.25     Years: 15.00     Pack years: 3.75     Types: Cigarettes    Smokeless tobacco: Never Used    Tobacco comment: occasionaly   Substance Use Topics    Alcohol use: Not Currently     Alcohol/week: 0.0 standard drinks     Comment: rare        Vitals:    10/26/20 1456   BP: 123/74  Comment: medication taken today   Site: Right Upper Arm   Position: Sitting   Cuff Size: Large Adult   Pulse: 94   Weight: 269 lb 6.4 oz (122.2 kg)   Height: 5' 7\" (1.702 m)     Estimated body mass index is 42.19 kg/m² as calculated from the following:    Height as of this encounter: 5' 7\" (1.702 m).     Weight as of this encounter: 269 lb 6.4 oz (122.2 kg). Physical Exam  Vitals signs and nursing note reviewed. Constitutional:       General: She is not in acute distress. Appearance: Normal appearance. HENT:      Head: Normocephalic and atraumatic. Right Ear: External ear normal.      Left Ear: External ear normal.      Nose: Nose normal.      Mouth/Throat:      Mouth: Mucous membranes are moist.      Pharynx: Oropharynx is clear. Eyes:      Conjunctiva/sclera: Conjunctivae normal.   Neck:      Musculoskeletal: Normal range of motion and neck supple. Cardiovascular:      Rate and Rhythm: Normal rate and regular rhythm. Pulmonary:      Effort: Pulmonary effort is normal.      Breath sounds: Normal breath sounds. Abdominal:      General: Bowel sounds are normal.      Palpations: Abdomen is soft. Musculoskeletal: Normal range of motion. Lymphadenopathy:      Cervical: No cervical adenopathy. Skin:     General: Skin is warm and dry. Neurological:      General: No focal deficit present. Mental Status: She is alert and oriented to person, place, and time. Psychiatric:         Mood and Affect: Mood normal.         Behavior: Behavior normal.         Thought Content: Thought content normal.         Judgment: Judgment normal.         ASSESSMENT/PLAN:  1. Type 2 diabetes mellitus with diabetic polyneuropathy, with long-term current use of insulin ([facility])  - POCT glycosylated hemoglobin (Hb A1C)  - Microalbumin, Ur; Future  - Creatinine, Random Urine; Future  - blood glucose monitor kit and supplies; Dispense sufficient amount for indicated testing frequency plus additional to accommodate PRN testing needs. Dispense all needed supplies to include: monitor, strips, lancing device, lancets, control solutions, alcohol swabs. Dispense: 1 kit; Refill: 0  - metFORMIN (GLUCOPHAGE) 500 MG tablet; Take 1 tablet by mouth 2 times daily (with meals)  Dispense: 60 tablet;  Refill: 3  - Insulin Pen Needle 31G X 5 MM MISC; times daily  Dispense: 60 tablet; Refill: 3    10. Acquired hypothyroidism  - levothyroxine (SYNTHROID) 50 MCG tablet; Take 1 tablet by mouth daily  Dispense: 30 tablet; Refill: 2    11. Pure hypercholesterolemia  - simvastatin (ZOCOR) 40 MG tablet; Take 1 tablet by mouth nightly  Dispense: 30 tablet; Refill: 3    12. Screening for colon cancer  - [facility] Screening Colonoscopy    13. Mild episode of recurrent major depressive disorder (HCC)  - traZODone (DESYREL) 150 MG tablet; Take 1 tablet by mouth nightly  Dispense: 30 tablet; Refill: 3    14. Chronic pain of both knees  - diclofenac (VOLTAREN) 50 MG EC tablet; Take 1 tablet by mouth 3 times daily (with meals)  Dispense: 60 tablet; Refill: 3    15. Urge incontinence of urine  - mirabegron (MYRBETRIQ) 25 MG TB24; Take 1 tablet by mouth daily  Dispense: 30 tablet; Refill: 3    16. Urinary frequency  - Urinalysis Reflex to Culture; Future      Return in about 2 weeks (around 11/9/2020). An electronic signature was used to authenticate this note.     --MARILU Brownlee - CNP on 11/8/2020 at 8:38 PM

## 2020-10-26 NOTE — PROGRESS NOTES
Vaccine Information Sheet, \"Influenza - Inactivated\"  given to Senait Dalal, or parent/legal guardian of  Senait Dalal and verbalized understanding. Patient responses:    Is the person being vaccinated sick today? No    Does the person to be vaccinated have an allergy to a component of the vaccine? No    Has the person to be vaccinated ever had a reaction to the flu vaccine in the past?  No    Have you ever had Guillian [place] Syndrome? No    Flu vaccine given per order. Please see immunization tab.

## 2020-11-04 ENCOUNTER — TELEPHONE (OUTPATIENT)
Dept: INTERNAL MEDICINE | Age: 50
End: 2020-11-04

## 2020-11-04 RX ORDER — INSULIN ASPART 100 [IU]/ML
35 INJECTION, SOLUTION INTRAVENOUS; SUBCUTANEOUS
Qty: 5 PEN | Refills: 3 | Status: SHIPPED | OUTPATIENT
Start: 2020-11-04 | End: 2021-07-16 | Stop reason: SDUPTHER

## 2020-11-04 NOTE — TELEPHONE ENCOUNTER
Received PA for Humalog Kwikpen. This is a non-formulary medication. Alternative medications that are covered by insurance :   Novolog Flexpen    Please discontinue non formulary medication and order the preferred medication, if appropriate. Thanks!

## 2020-11-05 ENCOUNTER — VIRTUAL VISIT (OUTPATIENT)
Dept: INTERNAL MEDICINE | Age: 50
End: 2020-11-05
Payer: COMMERCIAL

## 2020-11-05 PROCEDURE — G0438 PPPS, INITIAL VISIT: HCPCS | Performed by: NURSE PRACTITIONER

## 2020-11-05 ASSESSMENT — LIFESTYLE VARIABLES
AUDIT TOTAL SCORE: 1
HOW OFTEN DO YOU HAVE A DRINK CONTAINING ALCOHOL: 1
HOW OFTEN DURING THE LAST YEAR HAVE YOU HAD A FEELING OF GUILT OR REMORSE AFTER DRINKING: 0
HOW OFTEN DURING THE LAST YEAR HAVE YOU FAILED TO DO WHAT WAS NORMALLY EXPECTED FROM YOU BECAUSE OF DRINKING: 0
HOW OFTEN DURING THE LAST YEAR HAVE YOU FOUND THAT YOU WERE NOT ABLE TO STOP DRINKING ONCE YOU HAD STARTED: 0
HOW OFTEN DURING THE LAST YEAR HAVE YOU NEEDED AN ALCOHOLIC DRINK FIRST THING IN THE MORNING TO GET YOURSELF GOING AFTER A NIGHT OF HEAVY DRINKING: 0
HAVE YOU OR SOMEONE ELSE BEEN INJURED AS A RESULT OF YOUR DRINKING: 0
HOW OFTEN DURING THE LAST YEAR HAVE YOU BEEN UNABLE TO REMEMBER WHAT HAPPENED THE NIGHT BEFORE BECAUSE YOU HAD BEEN DRINKING: 0
HOW OFTEN DO YOU HAVE SIX OR MORE DRINKS ON ONE OCCASION: 0
HOW MANY STANDARD DRINKS CONTAINING ALCOHOL DO YOU HAVE ON A TYPICAL DAY: 0
HAS A RELATIVE, FRIEND, DOCTOR, OR ANOTHER HEALTH PROFESSIONAL EXPRESSED CONCERN ABOUT YOUR DRINKING OR SUGGESTED YOU CUT DOWN: 0
AUDIT-C TOTAL SCORE: 1

## 2020-11-05 ASSESSMENT — PATIENT HEALTH QUESTIONNAIRE - PHQ9
SUM OF ALL RESPONSES TO PHQ QUESTIONS 1-9: 0
SUM OF ALL RESPONSES TO PHQ QUESTIONS 1-9: 0
2. FEELING DOWN, DEPRESSED OR HOPELESS: 0
SUM OF ALL RESPONSES TO PHQ9 QUESTIONS 1 & 2: 0
SUM OF ALL RESPONSES TO PHQ QUESTIONS 1-9: 0
1. LITTLE INTEREST OR PLEASURE IN DOING THINGS: 0

## 2020-11-05 NOTE — PATIENT INSTRUCTIONS
(Maybe you're afraid of having pain, losing your independence, or being kept alive by machines.)  · Where would you prefer to die? (Your home? A hospital? A nursing home?)  · Do you want to donate your organs when you die? · Do you want certain Temple practices performed before you die? When should you call for help? Be sure to contact your doctor if you have any questions. Where can you learn more? Go to [email or web address] and sign in to your bright box account. Enter R264 in the Minka box to learn more about \"Advance Directives: Care Instructions. \"     If you do not have an account, please click on the \"Sign Up Now\" link. Current as of: December 9, 2019               Content Version: 12.6  © 4194-4950 [employer]. Care instructions adapted under license by [facility] ([place] [facility]). If you have questions about a medical condition or this instruction, always ask your healthcare professional. [place] [address] any warranty or liability for your use of this information. Learning About Medical Power of   What is a medical power of ? A medical power of , also called a durable power of  for health care, is one type of the legal forms called advance directives. It lets you name the person you want to make treatment decisions for you if you can't speak or decide for yourself. The person you choose is called your health care agent. This person is also called a health care proxy or health care surrogate. A medical power of  may be called something else in your state. How do you choose a health care agent? Choose your health care agent carefully. This person may or may not be a family member. Talk to the person before you make your final decision. Make sure he or she is comfortable with this responsibility. It's a good idea to choose someone who:  · Is at least 25years old.   · Knows you well and understands what makes life meaningful for you. · Understands your Adventist and moral values. · Will do what you want, not what he or she wants. · Will be able to make difficult choices at a stressful time. · Will be able to refuse or stop treatment, if that is what you would want, even if you could die. · Will be firm and confident with health professionals if needed. · Will ask questions to get needed information. · Lives near you or agrees to travel to you if needed. Your family may help you make medical decisions while you can still be part of that process. But it's important to choose one person to be your health care agent in case you aren't able to make decisions for yourself. If you don't fill out the legal form and name a health care agent, the decisions your family can make may be limited. A health care agent may be called something else in your state. Who will make decisions for you if you don't have a health care agent? If you don't have a health care agent or a living will, you may not get the care you want. Decisions may be made by family members who disagree about your medical care. Or decisions may be made by a medical professional who doesn't know you well. In some cases, a  makes the decisions. When you name a health care agent, it is very clear who has the power to make health decisions for you. How do you name a health care agent? You name your health care agent on a legal form. This form is usually called a medical power of . Ask your hospital, state bar association, or office on aging where to find these forms. You must sign the form to make it legal. Some states require you to get the form notarized. This means that a person called a  watches you sign the form and then he or she signs the form. Some states also require that two or more witnesses sign the form. Be sure to tell your family members and doctors who your health care agent is.   Where can you learn more? Go to [email or web address]://[email or web address]. org and sign in to your [employer] account. Enter [id number] in the [facility] box to learn more about \"Learning About [address]. \"     If you do not have an account, please click on the \"Sign Up Now\" link. Current as of: December 9, 2019               Content Version: 12.6  © 5688-9017 [employer]. Care instructions adapted under license by [facility] ([facility]). If you have questions about a medical condition or this instruction, always ask your healthcare professional. [address] any warranty or liability for your use of this information. Learning About Living Perroy  What is a living will? A living will, also called a declaration, is a legal form. It tells your family and your doctor your wishes when you can't speak for yourself. It's used by the health professionals who will treat you as you near the end of your life or if you get seriously hurt or ill. If you put your wishes in writing, your loved ones and others will know what kind of care you want. They won't need to guess. This can ease your mind and be helpful to others. And you can change or cancel your living will at any time. A living will is not the same as an estate or property will. An estate will explains what you want to happen with your money and property after you die. How do you use it? A living will is used to describe the kinds of treatment or life support you want as you near the end of your life or if you get seriously hurt or ill. Keep these facts in mind about living leyva. · Your living will is used only if you can't speak or make decisions for yourself. Most often, one or more doctors must certify that you can't speak or decide for yourself before your living will takes effect. · If you get better and can speak for yourself again, you can accept or refuse any treatment.  It doesn't matter what you said in your living will. · Some states may limit your right to refuse treatment in certain cases. For example, you may need to clearly state in your living will that you don't want artificial hydration and nutrition, such as being fed through a tube. Is a living will a legal document? A living will is a legal document. Each state has its own laws about living leyva. And a living will may be called something else in your state. Here are some things to know about living leyva. · You don't need an  to complete a living will. But legal advice can be helpful if your state's laws are unclear. It can also help if your health history is complicated or your family can't agree on what should be in your living will. · You can change your living will at any time. Some people find that their wishes about end-of-life care change as their health changes. If you make big changes to your living will, complete a new form. · If you move to another state, make sure that your living will is legal in the state where you now live. In most cases, doctors will respect your wishes even if you have a form from a different state. · You might use a universal form that has been approved by many states. This kind of form can sometimes be filled out and stored online. Your digital copy will then be available wherever you have a connection to the internet. The doctors and nurses who need to treat you can find it right away. · Your state may offer an online registry. This is another place where you can store your living will online. · It's a good idea to get your living will notarized. This means using a person called a  to watch two people sign, or witness, your living will. What should you know when you create a living will? Here are some questions to ask yourself as you make your living will:  · Do you know enough about life support methods that might be used?  If not, talk to your doctor so you know what might be done if you can't breathe on your own, your heart stops, or you can't swallow. · What things would you still want to be able to do after you receive life-support methods? Would you want to be able to walk? To speak? To eat on your own? To live without the help of machines? · Do you want certain Bahai practices performed if you become very ill? · If you have a choice, where do you want to be cared for? In your home? At a hospital or nursing home? · If you have a choice at the end of your life, where would you prefer to die? At home? In a hospital or nursing home? Somewhere else? · Would you prefer to be buried or cremated? · Do you want your organs to be donated after you die? What should you do with your living will? · Make sure that your family members and your health care agent have copies of your living will (also called a declaration). · Give your doctor a copy of your living will. Ask him or her to keep it as part of your medical record. If you have more than one doctor, make sure that each one has a copy. · Put a copy of your living will where it can be easily found. For example, some people may put a copy on their refrigerator door. If you are using a digital copy, be sure your doctor, family members, and health care agent know how to find and access it. Where can you learn more? Go to [email or web address] [email or web address] org and sign in to your [employer] account. Enter H341 in the [facility] box to learn more about \"Learning About Living Lesly. \"     If you do not have an account, please click on the \"Sign Up Now\" link. Current as of: December 9, 2019               Content Version: 12.6  © 8860-7184 [employer], Incorporated. Care instructions adapted under license by [facility] ([facility]). If you have questions about a medical condition or this instruction, always ask your healthcare professional. [address] any warranty or liability for your use of this information. Learning About Low-Carbohydrate Diets  What is a low-carbohydrate diet? A low-carbohydrate (or \"low-carb\") diet limits foods and drinks that have carbohydrates. This includes grains, fruits, milk and yogurt, and starchy vegetables like potatoes, beans, and corn. It also avoids foods and drinks that have added sugar. Instead, low-carb diets include foods that are high in protein and fat. Why might you follow a low-carb diet? Low-carb diets may be used for a variety of reasons, such as for weight loss. People who have diabetes may use a low-carb diet to help manage their blood sugar levels. What should you do before you start the diet? Talk to your doctor before you try any diet. This is even more important if you have health problems like kidney disease, heart disease, or diabetes. Your doctor may suggest that you meet with a registered dietitian. A dietitian can help you make an eating plan that works for you. What foods do you eat on a low-carb diet? On a low-carb diet, you choose foods that are high in protein and fat. Examples of these are:  · Meat, poultry, and fish. · Eggs. · Nuts, such as walnuts, pecans, almonds, and peanuts. · Peanut butter and other nut butters. · Tofu. · Avocado. · [place]. · Non-starchy vegetables like broccoli, cauliflower, green beans, mushrooms, peppers, lettuce, and spinach. · Unsweetened non-dairy milks like almond milk and coconut milk. · Cheese, cottage cheese, and cream cheese. Current as of: August 22, 2019               Content Version: 12.6  © 8078-8174 [employer], Incorporated. Care instructions adapted under license by [facility] ([facility]). If you have questions about a medical condition or this instruction, always ask your healthcare professional. [address] any warranty or liability for your use of this information.            Eating Healthy Foods: Care Instructions  Your Care Instructions     Eating healthy foods can help lower your risk for disease. Healthy food gives you energy and keeps your heart strong, your brain active, your muscles working, and your bones strong. A healthy diet includes a variety of foods from the basic food groups: grains, vegetables, fruits, milk and milk products, and meat and beans. Some people may eat more of their favorite foods from only one food group and, as a result, miss getting the nutrients they need. So, it is important to pay attention not only to what you eat but also to what you are missing from your diet. You can eat a healthy, balanced diet by making a few small changes. Follow-up care is a key part of your treatment and safety. Be sure to make and go to all appointments, and call your doctor if you are having problems. It's also a good idea to know your test results and keep a list of the medicines you take. How can you care for yourself at home? Look at what you eat  · Keep a food diary for a week or two and record everything you eat or drink. Track the number of servings you eat from each food group. · For a balanced diet every day, eat a variety of:  ? 6 or more ounce-equivalents of grains, such as cereals, breads, crackers, rice, or pasta, every day. An ounce-equivalent is 1 slice of bread, 1 cup of ready-to-eat cereal, or ½ cup of cooked rice, cooked pasta, or cooked cereal.  ? 2½ cups of vegetables, especially:  § Dark-green vegetables such as broccoli and spinach. § Orange vegetables such as carrots and sweet potatoes. § Dry beans (such as avila and kidney beans) and peas (such as lentils). ? 2 cups of fresh, frozen, or canned fruit. A small apple or 1 banana or orange equals 1 cup. ? 3 cups of nonfat or low-fat milk, yogurt, or other milk products. ? 5½ ounces of meat and beans, such as chicken, fish, lean meat, beans, nuts, and seeds. One egg, 1 tablespoon of peanut butter, ½ ounce nuts or seeds, or ¼ cup of cooked beans equals 1 ounce of meat.   · Learn how to read food labels for serving sizes and ingredients. Fast-food and convenience-food meals often contain few or no fruits or vegetables. Make sure you eat some fruits and vegetables to make the meal more nutritious. · Look at your food diary. For each food group, add up what you have eaten and then divide the total by the number of days. This will give you an idea of how much you are eating from each food group. See if you can find some ways to change your diet to make it more healthy. Start small  · Do not try to make dramatic changes to your diet all at once. You might feel that you are missing out on your favorite foods and then be more likely to fail. · Start slowly, and gradually change your habits. Try some of the following:  ? Use whole wheat bread instead of white bread. ? Use nonfat or low-fat milk instead of whole milk. ? Eat brown rice instead of white rice, and eat whole wheat pasta instead of white-flour pasta. ? Try low-fat cheeses and low-fat yogurt. ? Add more fruits and vegetables to meals and have them for snacks. ? Add lettuce, tomato, cucumber, and onion to sandwiches. ? Add fruit to yogurt and cereal.  Enjoy food  · You can still eat your favorite foods. You just may need to eat less of them. If your favorite foods are high in fat, salt, and sugar, limit how often you eat them, but do not cut them out entirely. · Eat a wide variety of foods. Make healthy choices when eating out  · The type of restaurant you choose can help you make healthy choices. Even fast-food chains are now offering more low-fat or healthier choices on the menu. · Choose smaller portions, or take half of your meal home. · When eating out, try:  ? A veggie pizza with a whole wheat crust or grilled chicken (instead of sausage or pepperoni). ? Pasta with roasted vegetables, grilled chicken, or marinara sauce instead of cream sauce. ? A vegetable wrap or grilled chicken wrap. ?  Broiled or poached food instead of fried or breaded items. Make healthy choices easy  · Buy packaged, prewashed, ready-to-eat fresh vegetables and fruits, such as baby carrots, salad mixes, and chopped or shredded broccoli and cauliflower. · Buy packaged, presliced fruits, such as melon or pineapple. · Choose 100% fruit or vegetable juice instead of soda. Limit juice intake to 4 to 6 oz (½ to ¾ cup) a day. · Blend low-fat yogurt, fruit juice, and canned or frozen fruit to make a smoothie for breakfast or a snack. Where can you learn more? Go to [email or web address].[email or web address] and sign in to your SumoSkinny account. Enter G244 in the Sp[employer]Vision box to learn more about \"Eating Healthy Foods: Care Instructions. \"     If you do not have an account, please click on the \"Sign Up Now\" link. Current as of: August 22, 2019               Content Version: 12.6  © 5216-5209 [employer]. Care instructions adapted under license by [facility] ([facility]). If you have questions about a medical condition or this instruction, always ask your healthcare professional. [place] [address] any warranty or liability for your use of this information. Personalized Preventive Plan for Krupa Lentz - 11/5/2020  Medicare offers a range of preventive health benefits. Some of the tests and screenings are paid in full while other may be subject to a deductible, co-insurance, and/or copay. Some of these benefits include a comprehensive review of your medical history including lifestyle, illnesses that may run in your family, and various assessments and screenings as appropriate. After reviewing your medical record and screening and assessments performed today your provider may have ordered immunizations, labs, imaging, and/or referrals for you. A list of these orders (if applicable) as well as your Preventive Care list are included within your After Visit Summary for your review.     Other Preventive Recommendations:    · A preventive eye exam performed by an eye specialist is recommended every 1-2 years to screen for glaucoma; cataracts, macular degeneration, and other eye disorders. · A preventive dental visit is recommended every 6 months. · Try to get at least 150 minutes of exercise per week or 10,000 steps per day on a pedometer . · Order or download the FREE \"Exercise & Physical Activity: Your Everyday Guide\" from [employer]. Call 7-710.762.1082 or search [employer] online. · You need 2927-6697 mg of calcium and 2790-7645 IU of vitamin D per day. It is possible to meet your calcium requirement with diet alone, but a vitamin D supplement is usually necessary to meet this goal.  · When exposed to the sun, use a sunscreen that protects against both UVA and UVB radiation with an SPF of 30 or greater. Reapply every 2 to 3 hours or after sweating, drying off with a towel, or swimming. · Always wear a seat belt when traveling in a car. Always wear a helmet when riding a bicycle or motorcycle.

## 2020-11-05 NOTE — PROGRESS NOTES
Medicare Annual Wellness Visit  Are Name: Howard Hebert Date: 2020   MRN: [id number] Sex: Female   Age: 45 y.o. Ethnicity: Non-/Non    : 1970 Race: Black      Foster Mane is here for Medicare AWV (py has scripts for vaccines, has referral for colonscopy and will call and make eye appt)    Screenings for behavioral, psychosocial and functional/safety risks, and cognitive dysfunction are all negative except as indicated below. These results, as well as other patient data from the [address] [facility] [address] form, are documented in Flowsheets linked to this Encounter. Allergies   Allergen Reactions    Januvia [Sitagliptin Phosphate] Hives    Naproxen Sodium Hives    Neurontin [Gabapentin] Nausea Only         Prior to Visit Medications    Medication Sig Taking? Authorizing Provider   insulin aspart (NOVOLOG FLEXPEN) 100 UNIT/ML injection pen Inject 35 Units into the skin 3 times daily (before meals) Yes MARILU Joseph CNP   blood glucose monitor kit and supplies Dispense sufficient amount for indicated testing frequency plus additional to accommodate PRN testing needs. Dispense all needed supplies to include: monitor, strips, lancing device, lancets, control solutions, alcohol swabs.  Yes MARILU Joseph CNP   traZODone (DESYREL) 150 MG tablet Take 1 tablet by mouth nightly Yes MARILU Joseph CNP   nystatin (MYCOSTATIN) 967272 UNIT/GM powder Apply to skin of legs and in folds of skin with irritation Yes MARILU Joseph CNP   albuterol sulfate HFA (PROVENTIL HFA) 108 (90 Base) MCG/ACT inhaler Inhale 2 puffs into the lungs every 6 hours as needed for Wheezing Yes MARILU Joseph CNP   aspirin EC 81 MG EC tablet Take 1 tablet by mouth daily Yes MARILU Joseph CNP   ferrous sulfate (IRON 325) 325 (65 Fe) MG tablet Take 1 tablet by mouth 2 times daily Yes MARILU Joseph CNP   levothyroxine (SYNTHROID) 50 MCG tablet Take 1 tablet by mouth daily Yes Jacqualine Seip, APRN - CNP   lisinopril (PRINIVIL;ZESTRIL) 5 MG tablet Take 1 tablet by mouth daily Yes Jacqualine Seip, APRN - CNP   metFORMIN (GLUCOPHAGE) 500 MG tablet Take 1 tablet by mouth 2 times daily (with meals) Yes Jacqualine Seip, APRN - CNP   simvastatin (ZOCOR) 40 MG tablet Take 1 tablet by mouth nightly Yes Jacqualine Seip, APRN - CNP   diclofenac (VOLTAREN) 50 MG EC tablet Take 1 tablet by mouth 3 times daily (with meals) Yes Jacqualine Seip, APRN - CNP   Insulin Pen Needle 31G X 5 MM MISC 1 each by Does not apply route daily Yes Jacqualine Seip, APRN - CNP   blood glucose test strips (TRUETEST TEST) strip Dx: DM-2 use 2-3 times daily Yes Jacqualine Seip, APRN - CNP   ONE TOUCH ULTRASOFT LANCETS MISC Dx: DM-2 use 2-3 times daily Yes Jacqualine Seip, APRN - CNP   insulin glargine (BASAGLAR KWIKPEN) 100 UNIT/ML injection pen Inject 45 Units into the skin nightly Yes Jacqualine Seip, APRN - CNP   mirabegron (MYRBETRIQ) 25 MG TB24 Take 1 tablet by mouth daily Yes Jacqualine Seip, APRN - CNP   Calcium Citrate-Vitamin D (CALCITRATE/VITAMIN D PO) Take by mouth Yes Historical Provider, MD   zoster recombinant adjuvanted vaccine (SHINGRIX) 50 MCG/0.5ML SUSR injection 50 MCG IM then repeat 2-6 months.   Jacqualine Seip, APRN - CNP   Blood Pressure KIT Use to monitor blood pressure daily and as needed  Jacqualine Seip, APRN - CNP         Past Medical History:   Diagnosis Date    Anxiety     Arthritis of knee, degenerative 1/31/2012    Carpal tunnel syndrome on both sides 1/15/2013    Chronic knee pain     on 4/17/18 pt is presently in pain mgmnt    Depressive disorder, not elsewhere classified 10/14/2014    Hydronephrosis, left 9/13/2016    Hypothyroidism 8/2/2013    Iron deficiency anemia 6/10/2014    Mild intermittent asthma without complication 5/90/7577    Obesity     Osteoarthritis     KNEE    Polyneuropathy 11/14/2012    RAD (reactive airway disease) 2012    Snores     possible apnea but not tested yet    Type II or unspecified type diabetes mellitus without mention of complication, not stated as uncontrolled     Urge incontinence of urine 2017    Wears glasses     Weight loss        Past Surgical History:   Procedure Laterality Date    CARPAL TUNNEL RELEASE      2008 left,  2009 right     SECTION  2007    CYSTOSCOPY  2016    cysto with left ureteral stent placement    HYSTERECTOMY, TOTAL ABDOMINAL  2013    LITHOTRIPSY Left 2016    cysto with ESWL and left ureteral stent placement.  NERVE BLOCK Left 2016    left knee kenalog 80mg    NERVE BLOCK  3/11/16    Rt Knee depo medrol 80     TUBAL LIGATION  3/2012         Family History   Problem Relation Age of Onset    Stroke Father     Diabetes Mother     Hypertension Mother     Breast Cancer Neg Hx     Cancer Neg Hx     Colon Cancer Neg Hx     Eclampsia Neg Hx     Ovarian Cancer Neg Hx      Labor Neg Hx     Spont Abortions Neg Hx        CareTeam (Including outside providers/suppliers regularly involved in providing care):   Patient Care Team:  MARILU Hyatt CNP as PCP - General (Family Nurse Practitioner)  MARILU Hyatt CNP as PCP - [facility] Empaneled Provider  Jorge Luis Preciado MD as Anesthesiologist (Pain Management)  Tahir Vargas MD as Consulting Physician (Urology)  Kasi Peacock MD as Consulting Physician (Internal Medicine)    Wt Readings from Last 3 Encounters:   10/26/20 269 lb 6.4 oz (122.2 kg)   10/22/20 264 lb (119.7 kg)   20 271 lb (122.9 kg)      Patient-Reported Vitals 2020   Patient-Reported Weight 265.0lb   Patient-Reported Height 5 7      There is no height or weight on file to calculate BMI. Based upon direct observation of the patient, evaluation of cognition reveals recent and remote memory intact.     Wt Readings from Last 3 Encounters:   10/26/20 269 lb 6.4 oz (122.2 kg)   10/22/20 264 lb (119.7 kg)   09/02/20 271 lb (122.9 kg)     Temp Readings from Last 3 Encounters:   10/22/20 98.3 °F (36.8 °C) (Oral)   09/02/20 98 °F (36.7 °C) (Temporal)   05/22/20 96.4 °F (35.8 °C)     BP Readings from Last 3 Encounters:   10/26/20 123/74   10/22/20 133/72   09/02/20 123/76     Pulse Readings from Last 3 Encounters:   10/26/20 94   10/22/20 93   09/02/20 91       Patient's complete Health Risk Assessment and screening values have been reviewed and are found in Flowsheets. The following problems were reviewed today and where indicated follow up appointments were made and/or referrals ordered. Positive Risk Factor Screenings with Interventions:     Substance History:  Social History     Tobacco History     Smoking Status  Current Some Day Smoker Smoking Frequency  0.25 packs/day for 15 years (3.75 pk yrs) Smoking Tobacco Type  Cigarettes    Smokeless Tobacco Use  Never Used    Tobacco Comment  occasionaly          Alcohol History     Alcohol Use Status  Not Currently Drinks/Week  0 Standard drinks or equivalent per week Amount  0.0 standard drinks of alcohol/wk Comment  rare          Drug Use     Drug Use Status  No          Sexual Activity     Sexually Active  Yes Partners  Male               Alcohol Screening: Audit-C Score: 1  Total Score: 1    A score of 8 or more is associated with harmful or hazardous drinking. A score of 13 or more in women, and 15 or more in men, is likely to indicate alcohol dependence. Substance Abuse Interventions:  · Tobacco abuse:  patient is not ready to work toward tobacco cessation at this time    [address] [facility] and ACP:  General  In the past 7 days, have you experienced any of the following?  New or Increased Pain, New or Increased Fatigue, Loneliness, Social Isolation, Stress or Anger?: None of These  Do you get the social and emotional support that you need?: Yes  Do you have a Living Will?: (!) No  Advance Directives     Power of  Living Will ACP-Advance Directive ACP-Power of     Not on File Not on File Filed [address] Risk Interventions:  · No Living Will: Advance Care Planning addressed with patient today and info sent with [employer]    Health Habits/Nutrition:  Health Habits/Nutrition  Do you exercise for at least 20 minutes 2-3 times per week?: (!) No  Have you lost any weight without trying in the past 3 months?: No  Do you eat fewer than 2 meals per day?: No  Have you seen a dentist within the past year?: (!) No     Health Habits/Nutrition Interventions:  · Inadequate physical activity:  patient agrees to exercise for at least 150 minutes/week  · Nutritional issues:  educational materials for healthy, well-balanced diet provided  · Dental exam overdue:  info sent with [employer]    Hearing/Vision:  No exam data present  Hearing/Vision  Do you or your family notice any trouble with your hearing?: No  Do you have difficulty driving, watching TV, or doing any of your daily activities because of your eyesight?: No  Have you had an eye exam within the past year?: (!) No  Hearing/Vision Interventions:  · Vision concerns:  info sent with [employer]    Safety:  Safety  Do you have working smoke detectors?: Yes  Have all throw rugs been removed or fastened?: Yes  Do you have non-slip mats or surfaces in all bathtubs/showers?: Yes  Do all of your stairways have a railing or banister?: Yes  Are your doorways, halls and stairs free of clutter?: Yes  Do you always fasten your seatbelt when you are in a car?: Yes  Safety Interventions:  · Home safety tips provided    Personalized Preventive Plan   Current Health Maintenance Status  Immunization History   Administered Date(s) Administered    Hepatitis B Adult (Engerix-B) 10/26/2020    Influenza Vaccine, unspecified formulation 10/09/2017, 10/26/2018    Influenza Virus Vaccine 12/17/2013, 10/09/2014, 12/02/2015, 01/27/2020    Influenza, High Dose (Fluzone 65 yrs and older) 09/27/2012    Influenza, [place], 6 mo and older, IM (Fluzone, Flulaval) 10/09/2017    Influenza, Quadv, IM, (6 mo and older Fluzone, Flulaval, Fluarix and 3 yrs and older Afluria) 01/27/2020, 10/26/2020    Influenza, Quadv, IM, PF (6 mo and older Fluzone, Flulaval, Fluarix, and 3 yrs and older Afluria) 10/26/2018    Pneumococcal Conjugate 13-valent ([id number]) 09/04/2015    Pneumococcal Polysaccharide ([id number]) 05/31/2017        Health Maintenance   Topic Date Due    DTaP/Tdap/Td vaccine (1 - Tdap) 07/06/1988    Diabetic retinal exam  10/01/2016    Diabetic microalbuminuria test  09/11/2018    Annual Wellness Visit (AWV)  05/29/2019    Shingles Vaccine (1 of 2) 07/06/2019    Colon cancer screen colonoscopy  07/06/2019    Hepatitis B vaccine (2 of 3 - Risk 3-dose series) 11/23/2020    A1C test (Diabetic or Prediabetic)  01/26/2021    Lipid screen  01/27/2021    TSH testing  01/27/2021    Breast cancer screen  02/27/2021    Potassium monitoring  05/22/2021    Creatinine monitoring  05/22/2021    Diabetic foot exam  10/26/2021    Flu vaccine  Completed    Pneumococcal 0-64 years Vaccine  Completed    HIV screen  Completed    Hepatitis A vaccine  Aged Out    Hib vaccine  Aged Out    Meningococcal (ACWY) vaccine  Aged Out     Recommendations for [email or web address] Due: see orders and patient instructions/AVS.  . Recommended screening schedule for the next 5-10 years is provided to the patient in written form: see Patient Darling Chun was seen today for medicare awv. Diagnoses and all orders for this visit:    ACP (advance care planning)    BMI 40.0-44.9, adult [facility])    Routine general medical examination at a health care facility  -     [address] [place] [address] Ned Welch is a 45 y.o. female being evaluated by a Virtual Visit (phone) encounter to address concerns as mentioned above. A caregiver was present when appropriate.  Due to this being a TeleHealth encounter (During MRAHT-71 public health emergency), evaluation of the following organ systems was limited: Vitals/Constitutional/EENT/Resp/CV/GI//MS/Neuro/Skin/Heme-Lymph-Imm. Pursuant to the emergency declaration under the [facility], [address] [place] [address] authority and the [employer] Act, this Virtual Visit was conducted with patient's (and/or legal guardian's) consent, to reduce the patient's risk of exposure to COVID-19 and provide necessary medical care. The patient (and/or legal guardian) has also been advised to contact this office for worsening conditions or problems, and seek emergency medical treatment and/or call 911 if deemed necessary. Patient identification was verified at the start of the visit: Yes    Services were provided through phone to substitute for in-person clinic visit. Patient and provider were located at their individual homes. --MARILU Rodriguez - CNP on 11/5/2020 at 9:52 AM    An electronic signature was used to authenticate this note. Obesity Counseling: Assessed behavioral health risks and factors affecting choice of behavior. Suggested weight control approaches, including dietary changes behavioral modification and follow up plan. Provided educational and support documentation.   Time spent (minutes): 3

## 2020-11-08 ASSESSMENT — ENCOUNTER SYMPTOMS
SHORTNESS OF BREATH: 0
ORTHOPNEA: 0
BLURRED VISION: 0

## 2020-11-17 ENCOUNTER — VIRTUAL VISIT (OUTPATIENT)
Dept: INTERNAL MEDICINE | Age: 50
End: 2020-11-17
Payer: COMMERCIAL

## 2020-11-17 PROCEDURE — 99214 OFFICE O/P EST MOD 30 MIN: CPT | Performed by: NURSE PRACTITIONER

## 2020-11-17 RX ORDER — INSULIN GLARGINE 100 [IU]/ML
40 INJECTION, SOLUTION SUBCUTANEOUS NIGHTLY
Qty: 5 PEN | Refills: 3 | Status: ON HOLD | OUTPATIENT
Start: 2020-11-17 | End: 2021-01-09 | Stop reason: SDUPTHER

## 2020-11-17 NOTE — PROGRESS NOTES
lancing device, lancets, control solutions, alcohol swabs.  Yes MARILU Ricks CNP   traZODone (DESYREL) 150 MG tablet Take 1 tablet by mouth nightly Yes MARILU Ricks CNP   nystatin (MYCOSTATIN) 882318 UNIT/GM powder Apply to skin of legs and in folds of skin with irritation Yes MARILU Ricks CNP   albuterol sulfate HFA (PROVENTIL HFA) 108 (90 Base) MCG/ACT inhaler Inhale 2 puffs into the lungs every 6 hours as needed for Wheezing Yes MARILU Ricks CNP   aspirin EC 81 MG EC tablet Take 1 tablet by mouth daily Yes MARILU Ricks CNP   ferrous sulfate (IRON 325) 325 (65 Fe) MG tablet Take 1 tablet by mouth 2 times daily Yes MARILU Ricks CNP   levothyroxine (SYNTHROID) 50 MCG tablet Take 1 tablet by mouth daily Yes MARILU Ricks CNP   lisinopril (PRINIVIL;ZESTRIL) 5 MG tablet Take 1 tablet by mouth daily Yes MARILU Ricks CNP   metFORMIN (GLUCOPHAGE) 500 MG tablet Take 1 tablet by mouth 2 times daily (with meals) Yes MARILU Ricks CNP   simvastatin (ZOCOR) 40 MG tablet Take 1 tablet by mouth nightly Yes MARILU Ricks CNP   diclofenac (VOLTAREN) 50 MG EC tablet Take 1 tablet by mouth 3 times daily (with meals) Yes MARILU Ricks CNP   Insulin Pen Needle 31G X 5 MM MISC 1 each by Does not apply route daily Yes MARILU Ricks CNP   blood glucose test strips (TRUETEST TEST) strip Dx: DM-2 use 2-3 times daily Yes MARILU Ricks CNP   ONE TOUCH ULTRASOFT LANCETS MISC Dx: DM-2 use 2-3 times daily Yes MARILU Ricks CNP   mirabegron (MYRBETRIQ) 25 MG TB24 Take 1 tablet by mouth daily Yes MARILU Ricks CNP   Calcium Citrate-Vitamin D (CALCITRATE/VITAMIN D PO) Take by mouth Yes Historical Provider, MD   Blood Pressure KIT Use to monitor blood pressure daily and as needed  Patient not taking: Reported on 11/17/2020  MARILU Ricks CNP       Social History Tobacco Use    Smoking status: Current Some Day Smoker     Packs/day: 0.25     Years: 15.00     Pack years: 3.75     Types: Cigarettes    Smokeless tobacco: Never Used    Tobacco comment: occasionaly   Substance Use Topics    Alcohol use: Not Currently     Alcohol/week: 0.0 standard drinks     Comment: rare    Drug use: No        Allergies   Allergen Reactions    Januvia [Sitagliptin Phosphate] Hives    Naproxen Sodium Hives    Neurontin [Gabapentin] Nausea Only     Past Medical History:   Diagnosis Date    Anxiety     Arthritis of knee, degenerative 1/31/2012    Carpal tunnel syndrome on both sides 1/15/2013    Chronic knee pain     on 4/17/18 pt is presently in pain mgmnt    Depressive disorder, not elsewhere classified 10/14/2014    Hydronephrosis, left 9/13/2016    Hypothyroidism 8/2/2013    Iron deficiency anemia 6/10/2014    Mild intermittent asthma without complication 4/50/2161    Obesity     Osteoarthritis     KNEE    Polyneuropathy 11/14/2012    RAD (reactive airway disease) 6/26/2012    Snores     possible apnea but not tested yet    Type II or unspecified type diabetes mellitus without mention of complication, not stated as uncontrolled     Urge incontinence of urine 5/31/2017    Wears glasses     Weight loss      Current Outpatient Medications   Medication Sig Dispense Refill    insulin glargine (BASAGLAR KWIKPEN) 100 UNIT/ML injection pen Inject 40 Units into the skin nightly 5 pen 3    insulin aspart (NOVOLOG FLEXPEN) 100 UNIT/ML injection pen Inject 35 Units into the skin 3 times daily (before meals) 5 pen 3    zoster recombinant adjuvanted vaccine (SHINGRIX) 50 MCG/0.5ML SUSR injection 50 MCG IM then repeat 2-6 months. 0.5 mL 1    blood glucose monitor kit and supplies Dispense sufficient amount for indicated testing frequency plus additional to accommodate PRN testing needs.  Dispense all needed supplies to include: monitor, strips, lancing device, lancets, control solutions, alcohol swabs. 1 kit 0    traZODone (DESYREL) 150 MG tablet Take 1 tablet by mouth nightly 30 tablet 3    nystatin (MYCOSTATIN) 463700 UNIT/GM powder Apply to skin of legs and in folds of skin with irritation 1 Bottle 0    albuterol sulfate HFA (PROVENTIL HFA) 108 (90 Base) MCG/ACT inhaler Inhale 2 puffs into the lungs every 6 hours as needed for Wheezing 1 Inhaler 2    aspirin EC 81 MG EC tablet Take 1 tablet by mouth daily 30 tablet 3    ferrous sulfate (IRON 325) 325 (65 Fe) MG tablet Take 1 tablet by mouth 2 times daily 60 tablet 3    levothyroxine (SYNTHROID) 50 MCG tablet Take 1 tablet by mouth daily 30 tablet 2    lisinopril (PRINIVIL;ZESTRIL) 5 MG tablet Take 1 tablet by mouth daily 30 tablet 3    metFORMIN (GLUCOPHAGE) 500 MG tablet Take 1 tablet by mouth 2 times daily (with meals) 60 tablet 3    simvastatin (ZOCOR) 40 MG tablet Take 1 tablet by mouth nightly 30 tablet 3    diclofenac (VOLTAREN) 50 MG EC tablet Take 1 tablet by mouth 3 times daily (with meals) 60 tablet 3    Insulin Pen Needle 31G X 5 MM MISC 1 each by Does not apply route daily 100 each 3    blood glucose test strips (TRUETEST TEST) strip Dx: DM-2 use 2-3 times daily 100 strip 2    ONE TOUCH ULTRASOFT LANCETS MISC Dx: DM-2 use 2-3 times daily 100 each 2    mirabegron (MYRBETRIQ) 25 MG TB24 Take 1 tablet by mouth daily 30 tablet 3    Calcium Citrate-Vitamin D (CALCITRATE/VITAMIN D PO) Take by mouth      Blood Pressure KIT Use to monitor blood pressure daily and as needed (Patient not taking: Reported on 11/17/2020) 1 kit 0     No current facility-administered medications for this visit.           PHYSICAL EXAMINATION:  [ INSTRUCTIONS:  \"[x]\" Indicates a positive item  \"[]\" Indicates a negative item  -- DELETE ALL ITEMS NOT EXAMINED]  Vital Signs: (As obtained by patient/caregiver or practitioner observation)    Blood pressure-  Heart rate-    Respiratory rate-    Temperature-  Pulse oximetry-     Constitutional: [x] Appears well-developed and well-nourished [x] No apparent distress      [] Abnormal-   Mental status  [x] Alert and awake  [x] Oriented to person/place/time [x]Able to follow commands      Eyes:  EOM    []  Normal  [] Abnormal-  Sclera  [x]  Normal  [] Abnormal -         Discharge [x]  None visible  [] Abnormal -    HENT:   [x] Normocephalic, atraumatic. [] Abnormal   [] Mouth/Throat: Mucous membranes are moist.     External Ears [x] Normal  [] Abnormal-     Neck: [x] No visualized mass     Pulmonary/Chest: [x] Respiratory effort normal.  [x] No visualized signs of difficulty breathing or respiratory distress        [] Abnormal-      Musculoskeletal:   [] Normal gait with no signs of ataxia         [x] Normal range of motion of neck        [] Abnormal-       Neurological:        [x] No Facial Asymmetry (Cranial nerve 7 motor function) (limited exam to video visit)          [x] No gaze palsy        [] Abnormal-         Skin:        [x] No significant exanthematous lesions or discoloration noted on facial skin         [] Abnormal-            Psychiatric:       [x] Normal Affect [x] No Hallucinations        [] Abnormal-     Other pertinent observable physical exam findings-     ASSESSMENT/PLAN:  1. Type 2 diabetes mellitus with diabetic polyneuropathy, with long-term current use of insulin (HCC)  - continue other medications as previous  - INCREASE(has been taking 35 at bedtime) insulin glargine (BASAGLAR KWIKPEN) 100 UNIT/ML injection pen; Inject 40 Units into the skin nightly  Dispense: 5 pen; Refill: 3      Return in about 2 weeks (around 12/1/2020). Kate Boswell is a 45 y.o. female being evaluated by a Virtual Visit (video visit) encounter to address concerns as mentioned above. A caregiver was present when appropriate.  Due to this being a TeleHealth encounter (During YYPZI-29 public health emergency), evaluation of the following organ systems was limited: Vitals/Constitutional/EENT/Resp/CV/GI//MS/Neuro/Skin/Heme-Lymph-Imm. Pursuant to the emergency declaration under the [address] [place] [address], [address] [place] [address] and the [employer] Act, this Virtual Visit was conducted with patient's (and/or legal guardian's) consent, to reduce the patient's risk of exposure to COVID-19 and provide necessary medical care. The patient (and/or legal guardian) has also been advised to contact this office for worsening conditions or problems, and seek emergency medical treatment and/or call 911 if deemed necessary. Patient identification was verified at the start of the visit: Yes    Total time spent on this encounter: Not billed by time    Services were provided through a video synchronous discussion virtually to substitute for in-person clinic visit. Patient and provider were located at their individual homes. --MARILU Welsh - CNP on 11/17/2020 at 1:19 PM    An electronic signature was used to authenticate this note.

## 2020-12-03 ENCOUNTER — OFFICE VISIT (OUTPATIENT)
Dept: ORTHOPEDIC SURGERY | Age: 50
End: 2020-12-03
Payer: COMMERCIAL

## 2020-12-03 VITALS — WEIGHT: 269 LBS | HEIGHT: 67 IN | BODY MASS INDEX: 42.22 KG/M2

## 2020-12-03 PROCEDURE — G8482 FLU IMMUNIZE ORDER/ADMIN: HCPCS | Performed by: ORTHOPAEDIC SURGERY

## 2020-12-03 PROCEDURE — 4004F PT TOBACCO SCREEN RCVD TLK: CPT | Performed by: ORTHOPAEDIC SURGERY

## 2020-12-03 PROCEDURE — 99213 OFFICE O/P EST LOW 20 MIN: CPT | Performed by: ORTHOPAEDIC SURGERY

## 2020-12-03 PROCEDURE — G8428 CUR MEDS NOT DOCUMENT: HCPCS | Performed by: ORTHOPAEDIC SURGERY

## 2020-12-03 PROCEDURE — 20610 DRAIN/INJ JOINT/BURSA W/O US: CPT | Performed by: ORTHOPAEDIC SURGERY

## 2020-12-03 PROCEDURE — 3017F COLORECTAL CA SCREEN DOC REV: CPT | Performed by: ORTHOPAEDIC SURGERY

## 2020-12-03 PROCEDURE — G8417 CALC BMI ABV UP PARAM F/U: HCPCS | Performed by: ORTHOPAEDIC SURGERY

## 2020-12-03 RX ORDER — BUPIVACAINE HYDROCHLORIDE 2.5 MG/ML
2 INJECTION, SOLUTION INFILTRATION; PERINEURAL ONCE
Status: COMPLETED | OUTPATIENT
Start: 2020-12-03 | End: 2020-12-03

## 2020-12-03 RX ORDER — METHYLPREDNISOLONE ACETATE 80 MG/ML
80 INJECTION, SUSPENSION INTRA-ARTICULAR; INTRALESIONAL; INTRAMUSCULAR; SOFT TISSUE ONCE
Status: COMPLETED | OUTPATIENT
Start: 2020-12-03 | End: 2020-12-03

## 2020-12-03 RX ADMIN — BUPIVACAINE HYDROCHLORIDE 5 MG: 2.5 INJECTION, SOLUTION INFILTRATION; PERINEURAL at 15:42

## 2020-12-03 RX ADMIN — METHYLPREDNISOLONE ACETATE 80 MG: 80 INJECTION, SUSPENSION INTRA-ARTICULAR; INTRALESIONAL; INTRAMUSCULAR; SOFT TISSUE at 15:43

## 2020-12-03 NOTE — PROGRESS NOTES
[facility]  [facility]  [address] [facility] [address] [place] [id number]  Dept: [phone]  Dept Fax: [phone]        Orthopaedic Clinic Follow Up    Subjective:     Carolyn Encarnacion is a 45y.o. year old female who presents to the clinic today for routine followup regarding her bilateral knee osteoarthritis. Patient is here today for routine follow-up regarding bilateral knee osteoarthritis. Patient's last visit was on 8/27/2020 where she had another round of corticosteroid injections into both knees. Patient reports that she has sustained approximately 2 to 3 months of relief since last visit. She is here today requesting another round of injections. Reports that her pain is the same as last visit before the injection surrounding both her knees. Patient ambulates with a cane due to her arthritic pain. Denies any numbness/tingling peer otherwise, patient has no orthopedic complaints at this time. Review of Systems  Gen: no fever, chills, malaise  CV: no chest pain or palpitations  Resp: no cough or shortness of breath  GI: no nausea, vomiting, diarrhea, or constipation  Neuro: no numbness, tingling, or weakness  Msk: As described by the HPI  10 remaining systems reviewed and negative    Objective : There were no vitals filed for this visit. Body mass index is 42.13 kg/m². General: No acute distress, resting comfortably in the clinic  Neuro: alert. oriented  Eyes: Extra-ocular muscles intact  Pulm: Respirations unlabored and regular. Skin: warm, well perfused  Psych:   Patient has good fund of knowledge and displays understanging of exam, diagnosis, and plan. MSK: Bilateral knees:  Skin intact. Tenderness to palpation appreciated diffusely about the knee. Painful knee range of motion 0 to 90 degrees flexion. Crepitus felt with knee range of motion. Sural, saphenous, superficial as deep peroneal, and plantar nerve distributions intact. EHL/FHL/TA/GS motor complex intact. DP pulse 2+. Radiology:  None taken in clinic     Assessment:   45y.o. year old female with bilateral knee osteoarthritis. Plan:      Patient is here today for routine follow-up regarding bilateral knee osteoarthritis. She is here today for another corticosteroid injection which last was performed on 8/27/2020. I went in length discussion with patient reporting that as she continues to get corticosteroid injections that the relief may become less and less effective. Patient demonstrated understanding of this. I also discussed with the patient that if her pain becomes recalcitrant to steroid injections that we may then have to discuss the option of total knee replacement. Patient demonstrate understanding of this and has opted for steroid injections. Procedure note below. She may follow-up with us in 3 months or as needed. She was amenable to all recommendations and was encouraged to call the office with any questions. Injection procedure note  The alternatives, benefits, and risks were discussed with the patient. After answering all questions to the patient's satisfaction, the patient agreed to proceed forward with injection and gave verbal consent for the procedure. With the patient's permission, appropriate anatomic landmarks were identified and the bilateral knee joint was prepped in a sterile fashion using alcohol and/or betadine. A 21 gauge needle was then used to inject 2cc 1% lidocaine plain and 80mg depo medrol into the joint. The injection was advanced without resistance confirming appropriate position. The patient tolerated the procedure well and the site was dressed with a band-aid. Patient was advised to ice the area for 15-20 minutes to relieve any injection site related pain. Patient was advised to contact nurse if area becomes swollen, hot, erythematous, or painful, or to go to the emergency room after business hours.     Follow up:Return if symptoms worsen or fail to improve.     Orders Placed This Encounter   Medications    methylPREDNISolone acetate (DEPO-MEDROL) injection 80 mg    bupivacaine (MARCAINE) 0.25 % injection 5 mg    methylPREDNISolone acetate (DEPO-MEDROL) injection 80 mg    bupivacaine (MARCAINE) 0.25 % injection 5 mg          Orders Placed This Encounter   Procedures    22985 - DRAIN/INJECT LARGE JOINT/BURSA       Electronically signed by Romayne High, DO on 12/3/2020 at 3:40 PM

## 2020-12-17 ENCOUNTER — OFFICE VISIT (OUTPATIENT)
Dept: PRIMARY CARE CLINIC | Age: 50
End: 2020-12-17
Payer: COMMERCIAL

## 2020-12-17 ENCOUNTER — HOSPITAL ENCOUNTER (OUTPATIENT)
Age: 50
Setting detail: SPECIMEN
Discharge: HOME | End: 2020-12-17
Payer: COMMERCIAL

## 2020-12-17 VITALS
HEIGHT: 68 IN | OXYGEN SATURATION: 98 % | BODY MASS INDEX: 38.8 KG/M2 | DIASTOLIC BLOOD PRESSURE: 84 MMHG | HEART RATE: 104 BPM | TEMPERATURE: 98.2 F | SYSTOLIC BLOOD PRESSURE: 139 MMHG | WEIGHT: 256 LBS

## 2020-12-17 LAB
BILIRUBIN, POC: ABNORMAL
BLOOD URINE, POC: ABNORMAL
CLARITY, POC: ABNORMAL
COLOR, POC: ABNORMAL
DIRECT EXAM: ABNORMAL
GLUCOSE URINE, POC: ABNORMAL
KETONES, POC: ABNORMAL
LEUKOCYTE EST, POC: ABNORMAL
Lab: ABNORMAL
NITRITE, POC: ABNORMAL
PH, POC: 7.5
PROTEIN, POC: 15
SPECIFIC GRAVITY, POC: 1.02
SPECIMEN DESCRIPTION: ABNORMAL
UROBILINOGEN, POC: ABNORMAL

## 2020-12-17 PROCEDURE — 4004F PT TOBACCO SCREEN RCVD TLK: CPT | Performed by: INTERNAL MEDICINE

## 2020-12-17 PROCEDURE — G8417 CALC BMI ABV UP PARAM F/U: HCPCS | Performed by: INTERNAL MEDICINE

## 2020-12-17 PROCEDURE — G8428 CUR MEDS NOT DOCUMENT: HCPCS | Performed by: INTERNAL MEDICINE

## 2020-12-17 PROCEDURE — 96372 THER/PROPH/DIAG INJ SC/IM: CPT | Performed by: INTERNAL MEDICINE

## 2020-12-17 PROCEDURE — 3017F COLORECTAL CA SCREEN DOC REV: CPT | Performed by: INTERNAL MEDICINE

## 2020-12-17 PROCEDURE — 99213 OFFICE O/P EST LOW 20 MIN: CPT | Performed by: INTERNAL MEDICINE

## 2020-12-17 PROCEDURE — G8482 FLU IMMUNIZE ORDER/ADMIN: HCPCS | Performed by: INTERNAL MEDICINE

## 2020-12-17 PROCEDURE — 81003 URINALYSIS AUTO W/O SCOPE: CPT | Performed by: INTERNAL MEDICINE

## 2020-12-17 RX ORDER — CEFTRIAXONE SODIUM 250 MG/1
250 INJECTION, POWDER, FOR SOLUTION INTRAMUSCULAR; INTRAVENOUS ONCE
Status: COMPLETED | OUTPATIENT
Start: 2020-12-17 | End: 2020-12-17

## 2020-12-17 RX ADMIN — CEFTRIAXONE SODIUM 250 MG: 250 INJECTION, POWDER, FOR SOLUTION INTRAMUSCULAR; INTRAVENOUS at 16:36

## 2020-12-17 NOTE — PROGRESS NOTES
[facility] [place] [address]  Dept: [phone]  Dept Fax: [phone]    Alexis Giron is a 45 y.o. female who presents to the urgent care today for her medicalconditions/complaints as noted below. Alexis Giron is c/o of Vaginal Discharge (yellowish discharge, onset yesterday) and Dysuria (onset yesterday)      HPI:     Exposure to STD   The patient's primary symptoms include a discharge and dysuria. This is a new problem. The current episode started yesterday. The vaginal discharge was yellow. She has tried nothing for the symptoms. The treatment provided no relief. Risk factors include history of STDs. Past Medical History:   Diagnosis Date    Anxiety     Arthritis of knee, degenerative 1/31/2012    Carpal tunnel syndrome on both sides 1/15/2013    Chronic knee pain     on 4/17/18 pt is presently in pain mgmnt    Depressive disorder, not elsewhere classified 10/14/2014    Hydronephrosis, left 9/13/2016    Hypothyroidism 8/2/2013    Iron deficiency anemia 6/10/2014    Mild intermittent asthma without complication 4/49/3253    Obesity     Osteoarthritis     KNEE    Polyneuropathy 11/14/2012    RAD (reactive airway disease) 6/26/2012    Snores     possible apnea but not tested yet    Type II or unspecified type diabetes mellitus without mention of complication, not stated as uncontrolled     Urge incontinence of urine 5/31/2017    Wears glasses     Weight loss         Current Outpatient Medications   Medication Sig Dispense Refill    insulin glargine (BASAGLAR KWIKPEN) 100 UNIT/ML injection pen Inject 40 Units into the skin nightly 5 pen 3    insulin aspart (NOVOLOG FLEXPEN) 100 UNIT/ML injection pen Inject 35 Units into the skin 3 times daily (before meals) 5 pen 3    zoster recombinant adjuvanted vaccine (SHINGRIX) 50 MCG/0.5ML SUSR injection 50 MCG IM then repeat 2-6 months.  0.5 mL 1  Blood Pressure KIT Use to monitor blood pressure daily and as needed 1 kit 0    blood glucose monitor kit and supplies Dispense sufficient amount for indicated testing frequency plus additional to accommodate PRN testing needs. Dispense all needed supplies to include: monitor, strips, lancing device, lancets, control solutions, alcohol swabs. 1 kit 0    traZODone (DESYREL) 150 MG tablet Take 1 tablet by mouth nightly 30 tablet 3    nystatin (MYCOSTATIN) 976393 UNIT/GM powder Apply to skin of legs and in folds of skin with irritation 1 Bottle 0    albuterol sulfate HFA (PROVENTIL HFA) 108 (90 Base) MCG/ACT inhaler Inhale 2 puffs into the lungs every 6 hours as needed for Wheezing 1 Inhaler 2    aspirin EC 81 MG EC tablet Take 1 tablet by mouth daily 30 tablet 3    ferrous sulfate (IRON 325) 325 (65 Fe) MG tablet Take 1 tablet by mouth 2 times daily 60 tablet 3    levothyroxine (SYNTHROID) 50 MCG tablet Take 1 tablet by mouth daily 30 tablet 2    lisinopril (PRINIVIL;ZESTRIL) 5 MG tablet Take 1 tablet by mouth daily 30 tablet 3    metFORMIN (GLUCOPHAGE) 500 MG tablet Take 1 tablet by mouth 2 times daily (with meals) 60 tablet 3    simvastatin (ZOCOR) 40 MG tablet Take 1 tablet by mouth nightly 30 tablet 3    Insulin Pen Needle 31G X 5 MM MISC 1 each by Does not apply route daily 100 each 3    blood glucose test strips (TRUETEST TEST) strip Dx: DM-2 use 2-3 times daily 100 strip 2    ONE TOUCH ULTRASOFT LANCETS MISC Dx: DM-2 use 2-3 times daily 100 each 2    mirabegron (MYRBETRIQ) 25 MG TB24 Take 1 tablet by mouth daily 30 tablet 3    diclofenac (VOLTAREN) 50 MG EC tablet Take 1 tablet by mouth 3 times daily (with meals) (Patient not taking: Reported on 12/3/2020) 60 tablet 3    Calcium Citrate-Vitamin D (CALCITRATE/VITAMIN D PO) Take by mouth       No current facility-administered medications for this visit.       Allergies   Allergen Reactions    Januvia [Sitagliptin Phosphate] Hives  Naproxen Sodium Hives    Neurontin [Gabapentin] Nausea Only       Health Maintenance   Topic Date Due    DTaP/Tdap/Td vaccine (1 - Tdap) 07/06/1988    Diabetic retinal exam  10/01/2016    Diabetic microalbuminuria test  09/11/2018    Shingles Vaccine (1 of 2) 07/06/2019    Colon cancer screen colonoscopy  07/06/2019    Diabetic foot exam  03/06/2020    Hepatitis B vaccine (2 of 3 - Risk 3-dose series) 11/23/2020    A1C test (Diabetic or Prediabetic)  01/26/2021    Lipid screen  01/27/2021    TSH testing  01/27/2021    Breast cancer screen  02/27/2021    Potassium monitoring  05/22/2021    Creatinine monitoring  05/22/2021    Annual Wellness Visit (AWV)  11/06/2021    Flu vaccine  Completed    Pneumococcal 0-64 years Vaccine  Completed    HIV screen  Completed    Hepatitis A vaccine  Aged Out    Hib vaccine  Aged Out    Meningococcal (ACWY) vaccine  Aged Out       Subjective:      Review of Systems   Genitourinary: Positive for dysuria. All other systems reviewed and are negative. Objective:     Physical Exam  Vitals signs reviewed. Constitutional:       Appearance: Normal appearance. HENT:      Head: Normocephalic and atraumatic. Skin:     General: Skin is warm and dry. Neurological:      General: No focal deficit present. Mental Status: She is alert and oriented to person, place, and time. Psychiatric:         Mood and Affect: Mood normal.         Behavior: Behavior normal.       /84 (Site: Left Upper Arm, Position: Sitting, Cuff Size: Large Adult)   Pulse 104   Temp 98.2 °F (36.8 °C) (Oral)   Ht 5' 7.5\" (1.715 m)   Wt 256 lb (116.1 kg)   LMP 07/29/2013   SpO2 98%   BMI 39.50 kg/m²       Assessment:       Diagnosis Orders   1. STD (female)  POCT Urinalysis No Micro (Auto)    Chlamydia/GC DNA, Urine    Vaginitis DNA Probe    cefTRIAXone (ROCEPHIN) injection 250 mg       Plan:      No follow-ups on file.     Orders Placed This Encounter   Medications  cefTRIAXone (ROCEPHIN) injection 250 mg     Order Specific Question:   Antimicrobial Indications     Answer:   STD infection     Order Specific Question:   Suspected Organism(s)     Answer:   chlamydia     Orders Placed This Encounter   Procedures    Chlamydia/GC DNA, Urine     Standing Status:   Future     Number of Occurrences:   1     Standing Expiration Date:   12/17/2021    Vaginitis DNA Probe     Standing Status:   Future     Number of Occurrences:   1     Standing Expiration Date:   12/17/2021    POCT Urinalysis No Micro (Auto)            Patient given educational materials - see patient instructions. Discussed use, benefit, and side effects of prescribed medications. All patientquestions answered. Pt voiced understanding.     Electronically signed by Ana Joya MD on 12/18/2020at 8:31 AM

## 2020-12-21 LAB
C. TRACHOMATIS DNA ,URINE: NEGATIVE
N. GONORRHOEAE DNA, URINE: NEGATIVE
SPECIMEN DESCRIPTION: NORMAL

## 2020-12-21 RX ORDER — METRONIDAZOLE 500 MG/1
2000 TABLET ORAL ONCE
Qty: 4 TABLET | Refills: 0 | Status: SHIPPED | OUTPATIENT
Start: 2020-12-21 | End: 2020-12-21

## 2021-01-07 ENCOUNTER — APPOINTMENT (OUTPATIENT)
Dept: CT IMAGING | Age: 51
End: 2021-01-07
Payer: COMMERCIAL

## 2021-01-07 ENCOUNTER — HOSPITAL ENCOUNTER (OUTPATIENT)
Age: 51
Setting detail: OBSERVATION
Discharge: HOME | End: 2021-01-09
Attending: EMERGENCY MEDICINE | Admitting: INTERNAL MEDICINE
Payer: COMMERCIAL

## 2021-01-07 DIAGNOSIS — Z79.4 TYPE 2 DIABETES MELLITUS WITH DIABETIC POLYNEUROPATHY, WITH LONG-TERM CURRENT USE OF INSULIN (HCC): ICD-10-CM

## 2021-01-07 DIAGNOSIS — E11.42 TYPE 2 DIABETES MELLITUS WITH DIABETIC POLYNEUROPATHY, WITH LONG-TERM CURRENT USE OF INSULIN (HCC): ICD-10-CM

## 2021-01-07 DIAGNOSIS — R51.9 ACUTE INTRACTABLE HEADACHE, UNSPECIFIED HEADACHE TYPE: Primary | ICD-10-CM

## 2021-01-07 DIAGNOSIS — H53.9 VISUAL CHANGES: ICD-10-CM

## 2021-01-07 PROBLEM — E66.813 CLASS 3 SEVERE OBESITY IN ADULT: Status: ACTIVE | Noted: 2020-01-29

## 2021-01-07 LAB
ABSOLUTE EOS #: 0.05 K/UL (ref 0–0.44)
ABSOLUTE IMMATURE GRANULOCYTE: 0.02 K/UL (ref 0–0.3)
ABSOLUTE LYMPH #: 1.58 K/UL (ref 1.1–3.7)
ABSOLUTE MONO #: 0.41 K/UL (ref 0.1–1.2)
ANION GAP SERPL CALCULATED.3IONS-SCNC: 11 MMOL/L (ref 9–17)
BASOPHILS # BLD: 0 % (ref 0–2)
BASOPHILS ABSOLUTE: <0.03 K/UL (ref 0–0.2)
BUN BLDV-MCNC: 11 MG/DL (ref 6–20)
BUN/CREAT BLD: 20 (ref 9–20)
CALCIUM SERPL-MCNC: 8.9 MG/DL (ref 8.6–10.4)
CHLORIDE BLD-SCNC: 103 MMOL/L (ref 98–107)
CO2: 24 MMOL/L (ref 20–31)
CREAT SERPL-MCNC: 0.54 MG/DL (ref 0.5–0.9)
DIFFERENTIAL TYPE: ABNORMAL
EOSINOPHILS RELATIVE PERCENT: 1 % (ref 1–4)
GFR AFRICAN AMERICAN: >60 ML/MIN
GFR NON-AFRICAN AMERICAN: >60 ML/MIN
GFR SERPL CREATININE-BSD FRML MDRD: ABNORMAL ML/MIN/{1.73_M2}
GFR SERPL CREATININE-BSD FRML MDRD: ABNORMAL ML/MIN/{1.73_M2}
GLUCOSE BLD-MCNC: 157 MG/DL (ref 70–99)
GLUCOSE BLD-MCNC: 402 MG/DL (ref 65–105)
GLUCOSE BLD-MCNC: 473 MG/DL (ref 65–105)
HCT VFR BLD CALC: 38.9 % (ref 36.3–47.1)
HEMOGLOBIN: 12.4 G/DL (ref 11.9–15.1)
IMMATURE GRANULOCYTES: 0 %
LYMPHOCYTES # BLD: 31 % (ref 24–43)
MCH RBC QN AUTO: 31.5 PG (ref 25.2–33.5)
MCHC RBC AUTO-ENTMCNC: 31.9 G/DL (ref 28.4–34.8)
MCV RBC AUTO: 98.7 FL (ref 82.6–102.9)
MONOCYTES # BLD: 8 % (ref 3–12)
NRBC AUTOMATED: 0 PER 100 WBC
PDW BLD-RTO: 13.3 % (ref 11.8–14.4)
PLATELET # BLD: 288 K/UL (ref 138–453)
PLATELET ESTIMATE: ABNORMAL
PMV BLD AUTO: 9.7 FL (ref 8.1–13.5)
POTASSIUM SERPL-SCNC: 4.2 MMOL/L (ref 3.7–5.3)
RBC # BLD: 3.94 M/UL (ref 3.95–5.11)
RBC # BLD: ABNORMAL 10*6/UL
SEG NEUTROPHILS: 60 % (ref 36–65)
SEGMENTED NEUTROPHILS ABSOLUTE COUNT: 3.08 K/UL (ref 1.5–8.1)
SODIUM BLD-SCNC: 138 MMOL/L (ref 135–144)
WBC # BLD: 5.2 K/UL (ref 3.5–11.3)
WBC # BLD: ABNORMAL 10*3/UL

## 2021-01-07 PROCEDURE — 2580000003 HC RX 258: Performed by: EMERGENCY MEDICINE

## 2021-01-07 PROCEDURE — 6360000002 HC RX W HCPCS: Performed by: INTERNAL MEDICINE

## 2021-01-07 PROCEDURE — 82947 ASSAY GLUCOSE BLOOD QUANT: CPT

## 2021-01-07 PROCEDURE — 80048 BASIC METABOLIC PNL TOTAL CA: CPT

## 2021-01-07 PROCEDURE — 96375 TX/PRO/DX INJ NEW DRUG ADDON: CPT

## 2021-01-07 PROCEDURE — 99219 PR INITIAL OBSERVATION CARE/DAY 50 MINUTES: CPT | Performed by: NURSE PRACTITIONER

## 2021-01-07 PROCEDURE — 99283 EMERGENCY DEPT VISIT LOW MDM: CPT

## 2021-01-07 PROCEDURE — 70450 CT HEAD/BRAIN W/O DYE: CPT

## 2021-01-07 PROCEDURE — 85025 COMPLETE CBC W/AUTO DIFF WBC: CPT

## 2021-01-07 PROCEDURE — 6360000004 HC RX CONTRAST MEDICATION: Performed by: EMERGENCY MEDICINE

## 2021-01-07 PROCEDURE — G0378 HOSPITAL OBSERVATION PER HR: HCPCS

## 2021-01-07 PROCEDURE — 70498 CT ANGIOGRAPHY NECK: CPT

## 2021-01-07 PROCEDURE — 96372 THER/PROPH/DIAG INJ SC/IM: CPT

## 2021-01-07 PROCEDURE — 99219 PR INITIAL OBSERVATION CARE/DAY 50 MINUTES: CPT | Performed by: PSYCHIATRY & NEUROLOGY

## 2021-01-07 PROCEDURE — 6360000002 HC RX W HCPCS: Performed by: EMERGENCY MEDICINE

## 2021-01-07 PROCEDURE — 96374 THER/PROPH/DIAG INJ IV PUSH: CPT

## 2021-01-07 PROCEDURE — 6370000000 HC RX 637 (ALT 250 FOR IP): Performed by: NURSE PRACTITIONER

## 2021-01-07 RX ORDER — ALBUTEROL SULFATE 90 UG/1
2 AEROSOL, METERED RESPIRATORY (INHALATION) EVERY 6 HOURS PRN
Status: DISCONTINUED | OUTPATIENT
Start: 2021-01-07 | End: 2021-01-09 | Stop reason: HOSPADM

## 2021-01-07 RX ORDER — ATORVASTATIN CALCIUM 20 MG/1
20 TABLET, FILM COATED ORAL DAILY
Refills: 3 | Status: DISCONTINUED | OUTPATIENT
Start: 2021-01-08 | End: 2021-01-09 | Stop reason: HOSPADM

## 2021-01-07 RX ORDER — IBUPROFEN 400 MG/1
400 TABLET ORAL
Refills: 3 | Status: DISCONTINUED | OUTPATIENT
Start: 2021-01-08 | End: 2021-01-08

## 2021-01-07 RX ORDER — NICOTINE 21 MG/24HR
1 PATCH, TRANSDERMAL 24 HOURS TRANSDERMAL DAILY PRN
Status: DISCONTINUED | OUTPATIENT
Start: 2021-01-07 | End: 2021-01-09 | Stop reason: HOSPADM

## 2021-01-07 RX ORDER — DEXTROSE MONOHYDRATE 50 MG/ML
100 INJECTION, SOLUTION INTRAVENOUS PRN
Status: DISCONTINUED | OUTPATIENT
Start: 2021-01-07 | End: 2021-01-09 | Stop reason: HOSPADM

## 2021-01-07 RX ORDER — ACETAMINOPHEN 325 MG/1
650 TABLET ORAL EVERY 6 HOURS PRN
Status: DISCONTINUED | OUTPATIENT
Start: 2021-01-07 | End: 2021-01-09 | Stop reason: HOSPADM

## 2021-01-07 RX ORDER — POTASSIUM CHLORIDE 7.45 MG/ML
10 INJECTION INTRAVENOUS PRN
Status: DISCONTINUED | OUTPATIENT
Start: 2021-01-07 | End: 2021-01-09 | Stop reason: HOSPADM

## 2021-01-07 RX ORDER — ACETAMINOPHEN 650 MG/1
650 SUPPOSITORY RECTAL EVERY 6 HOURS PRN
Status: DISCONTINUED | OUTPATIENT
Start: 2021-01-07 | End: 2021-01-09 | Stop reason: HOSPADM

## 2021-01-07 RX ORDER — ONDANSETRON 4 MG/1
4 TABLET, ORALLY DISINTEGRATING ORAL EVERY 8 HOURS PRN
Status: DISCONTINUED | OUTPATIENT
Start: 2021-01-07 | End: 2021-01-09 | Stop reason: HOSPADM

## 2021-01-07 RX ORDER — ASPIRIN 81 MG/1
81 TABLET ORAL DAILY
Status: DISCONTINUED | OUTPATIENT
Start: 2021-01-08 | End: 2021-01-09 | Stop reason: HOSPADM

## 2021-01-07 RX ORDER — INSULIN GLARGINE 100 [IU]/ML
45 INJECTION, SOLUTION SUBCUTANEOUS NIGHTLY
Status: DISCONTINUED | OUTPATIENT
Start: 2021-01-07 | End: 2021-01-08

## 2021-01-07 RX ORDER — SODIUM CHLORIDE 0.9 % (FLUSH) 0.9 %
10 SYRINGE (ML) INJECTION PRN
Status: DISCONTINUED | OUTPATIENT
Start: 2021-01-07 | End: 2021-01-09 | Stop reason: HOSPADM

## 2021-01-07 RX ORDER — LISINOPRIL 5 MG/1
5 TABLET ORAL DAILY
Status: DISCONTINUED | OUTPATIENT
Start: 2021-01-08 | End: 2021-01-09 | Stop reason: HOSPADM

## 2021-01-07 RX ORDER — DIPHENHYDRAMINE HYDROCHLORIDE 50 MG/ML
25 INJECTION INTRAMUSCULAR; INTRAVENOUS ONCE
Status: COMPLETED | OUTPATIENT
Start: 2021-01-07 | End: 2021-01-07

## 2021-01-07 RX ORDER — MAGNESIUM SULFATE 1 G/100ML
1 INJECTION INTRAVENOUS PRN
Status: DISCONTINUED | OUTPATIENT
Start: 2021-01-07 | End: 2021-01-09 | Stop reason: HOSPADM

## 2021-01-07 RX ORDER — SODIUM CHLORIDE 0.9 % (FLUSH) 0.9 %
10 SYRINGE (ML) INJECTION EVERY 12 HOURS SCHEDULED
Status: DISCONTINUED | OUTPATIENT
Start: 2021-01-07 | End: 2021-01-09 | Stop reason: HOSPADM

## 2021-01-07 RX ORDER — FENTANYL CITRATE 50 UG/ML
50 INJECTION, SOLUTION INTRAMUSCULAR; INTRAVENOUS ONCE
Status: COMPLETED | OUTPATIENT
Start: 2021-01-07 | End: 2021-01-07

## 2021-01-07 RX ORDER — POTASSIUM CHLORIDE 20 MEQ/1
40 TABLET, EXTENDED RELEASE ORAL PRN
Status: DISCONTINUED | OUTPATIENT
Start: 2021-01-07 | End: 2021-01-09 | Stop reason: HOSPADM

## 2021-01-07 RX ORDER — NICOTINE POLACRILEX 4 MG
15 LOZENGE BUCCAL PRN
Status: DISCONTINUED | OUTPATIENT
Start: 2021-01-07 | End: 2021-01-09 | Stop reason: HOSPADM

## 2021-01-07 RX ORDER — LEVOTHYROXINE SODIUM 0.05 MG/1
50 TABLET ORAL DAILY
Status: DISCONTINUED | OUTPATIENT
Start: 2021-01-08 | End: 2021-01-09 | Stop reason: HOSPADM

## 2021-01-07 RX ORDER — METOCLOPRAMIDE HYDROCHLORIDE 5 MG/ML
10 INJECTION INTRAMUSCULAR; INTRAVENOUS ONCE
Status: COMPLETED | OUTPATIENT
Start: 2021-01-07 | End: 2021-01-07

## 2021-01-07 RX ORDER — DEXAMETHASONE SODIUM PHOSPHATE 10 MG/ML
6 INJECTION, SOLUTION INTRAMUSCULAR; INTRAVENOUS ONCE
Status: COMPLETED | OUTPATIENT
Start: 2021-01-07 | End: 2021-01-07

## 2021-01-07 RX ORDER — POLYETHYLENE GLYCOL 3350 17 G/17G
17 POWDER, FOR SOLUTION ORAL DAILY PRN
Status: DISCONTINUED | OUTPATIENT
Start: 2021-01-07 | End: 2021-01-09 | Stop reason: HOSPADM

## 2021-01-07 RX ORDER — ONDANSETRON 2 MG/ML
4 INJECTION INTRAMUSCULAR; INTRAVENOUS EVERY 6 HOURS PRN
Status: DISCONTINUED | OUTPATIENT
Start: 2021-01-07 | End: 2021-01-09 | Stop reason: HOSPADM

## 2021-01-07 RX ORDER — SODIUM CHLORIDE 0.9 % (FLUSH) 0.9 %
10 SYRINGE (ML) INJECTION PRN
Status: DISCONTINUED | OUTPATIENT
Start: 2021-01-07 | End: 2021-01-07

## 2021-01-07 RX ORDER — 0.9 % SODIUM CHLORIDE 0.9 %
80 INTRAVENOUS SOLUTION INTRAVENOUS ONCE
Status: COMPLETED | OUTPATIENT
Start: 2021-01-07 | End: 2021-01-07

## 2021-01-07 RX ORDER — DEXTROSE MONOHYDRATE 25 G/50ML
12.5 INJECTION, SOLUTION INTRAVENOUS PRN
Status: DISCONTINUED | OUTPATIENT
Start: 2021-01-07 | End: 2021-01-09 | Stop reason: HOSPADM

## 2021-01-07 RX ADMIN — SODIUM CHLORIDE 80 ML: 9 INJECTION, SOLUTION INTRAVENOUS at 15:48

## 2021-01-07 RX ADMIN — IOPAMIDOL 75 ML: 755 INJECTION, SOLUTION INTRAVENOUS at 15:43

## 2021-01-07 RX ADMIN — DEXAMETHASONE SODIUM PHOSPHATE 6 MG: 10 INJECTION, SOLUTION INTRAMUSCULAR; INTRAVENOUS at 16:16

## 2021-01-07 RX ADMIN — Medication 10 ML: at 15:48

## 2021-01-07 RX ADMIN — DIPHENHYDRAMINE HYDROCHLORIDE 25 MG: 50 INJECTION, SOLUTION INTRAMUSCULAR; INTRAVENOUS at 14:14

## 2021-01-07 RX ADMIN — FENTANYL CITRATE 50 MCG: 50 INJECTION, SOLUTION INTRAMUSCULAR; INTRAVENOUS at 16:15

## 2021-01-07 RX ADMIN — ENOXAPARIN SODIUM 30 MG: 30 INJECTION SUBCUTANEOUS at 21:50

## 2021-01-07 RX ADMIN — METOCLOPRAMIDE 10 MG: 5 INJECTION, SOLUTION INTRAMUSCULAR; INTRAVENOUS at 14:23

## 2021-01-07 RX ADMIN — INSULIN GLARGINE 45 UNITS: 100 INJECTION, SOLUTION SUBCUTANEOUS at 21:50

## 2021-01-07 ASSESSMENT — PAIN SCALES - GENERAL
PAINLEVEL_OUTOF10: 3
PAINLEVEL_OUTOF10: 3

## 2021-01-07 ASSESSMENT — VISUAL ACUITY
OS: 20/70
OU: 20/15
OD: 20/20

## 2021-01-07 NOTE — PROGRESS NOTES
Patient weight is between 101-149kg. For prophylaxis with Enoxaparin, Pharmacy adjusted the dose to account for the patient's increased body weight in accordance with hospital approved protocol. The dose has been changed to 30mg BID. Please contact pharmacy with any concerns @ [phone]. Thank you.    Timur Campos  1/7/2021 5:42 PM

## 2021-01-07 NOTE — ED NOTES
Pt to walk in the hallway to perform visual acuity  Pt states that she still feels dizzy   Pt needed minimal assistance walking     Bryan Crowe RN  01/07/21 0902

## 2021-01-07 NOTE — ED PROVIDER NOTES
EMERGENCY DEPARTMENT ENCOUNTER    Pt Name: Oswaldo Lerner  MRN: [id number]  [place] 1970  Date of evaluation: 1/7/21  CHIEF COMPLAINT       Chief Complaint   Patient presents with    Blurred Vision     left eye and then developed a headache 2 days ago.  Headache     HISTORY OF PRESENT ILLNESS   58-year-old female presents to the emergency room with headache that has been ongoing for the last 2 days along with blurred left vision. Patient reports symptoms started 2 days ago when she woke up. She did not initially seek evaluation. She states that her vision feels like there is something in the eye. She does not have any tearing or redness or irritation to the eye but states that there is a blurred area in the middle of her visual field. She reports that she does not normally have this visual problem. She does use reading glasses. She does not normally get headaches. Patient has no other neurologic complaint such as dizziness numbness or weakness. REVIEW OF SYSTEMS     Review of Systems   Eyes: Positive for visual disturbance. Neurological: Positive for headaches. All other systems reviewed and are negative.       PASTMEDICAL HISTORY     Past Medical History:   Diagnosis Date    Anxiety     Arthritis of knee, degenerative 1/31/2012    Carpal tunnel syndrome on both sides 1/15/2013    Chronic knee pain     on 4/17/18 pt is presently in pain mgmnt    Depressive disorder, not elsewhere classified 10/14/2014    Hydronephrosis, left 9/13/2016    Hypothyroidism 8/2/2013    Iron deficiency anemia 6/10/2014    Mild intermittent asthma without complication 2/45/4551    Obesity     Osteoarthritis     KNEE    Polyneuropathy 11/14/2012    RAD (reactive airway disease) 6/26/2012    Snores     possible apnea but not tested yet    Type II or unspecified type diabetes mellitus without mention of complication, not stated as uncontrolled     Urge incontinence of urine 5/31/2017  Wears glasses     Weight loss      Past Problem List  Patient Active Problem List   Diagnosis Code    Tobacco use Z72.0    Carpal tunnel syndrome on both sides G56.03    Acquired hypothyroidism E03.9    Hyperlipidemia E78.5    Iron deficiency anemia D50.9    Mild episode of recurrent major depressive disorder ([facility] [address] F33.0    Type 2 diabetes mellitus with hyperglycemia, with long-term current use of insulin ([facility]) E11.65, Z79.4    Morbid obesity with BMI of 50.0-59.9, adult ([facility]) E66.01, Z68.43    Primary osteoarthritis of both knees M17.0    Urge incontinence of urine N39.41    Mild intermittent asthma without complication Z19.48    Pure hypercholesterolemia E78.00    Hypertension I10    Pelvic pain in female R10.2    Bilateral ovarian cysts N83.201, N83.202    H/O Hysterectomy () Z90.710    Trichomonal vaginitis A59.01    Chest pain R07.9    Frontal headache R51.9    Frontal sinusitis J32.1    Class 3 severe obesity in adult [facility]) E66.01    Bilateral chronic knee pain M25.561, M25.562, G89.29    Acute headache R51.9    Visual changes H53.9    Blindness of left eye H54.40     SURGICAL HISTORY       Past Surgical History:   Procedure Laterality Date    CARPAL TUNNEL RELEASE      2008 left,  2009 right     SECTION      CYSTOSCOPY  2016    cysto with left ureteral stent placement    HYSTERECTOMY, TOTAL ABDOMINAL  2013    LITHOTRIPSY Left 2016    cysto with ESWL and left ureteral stent placement.     NERVE BLOCK Left 2016    left knee kenalog 80mg    NERVE BLOCK  3/11/16    Rt Knee depo medrol 80     TUBAL LIGATION  3/2012     CURRENT MEDICATIONS       Current Discharge Medication List      CONTINUE these medications which have NOT CHANGED    Details   insulin glargine (BASAGLAR KWIKPEN) 100 UNIT/ML injection pen Inject 40 Units into the skin nightly  Qty: 5 pen, Refills: 3 Associated Diagnoses: Type 2 diabetes mellitus with diabetic polyneuropathy, with long-term current use of insulin ([facility])      insulin aspart (NOVOLOG FLEXPEN) 100 UNIT/ML injection pen Inject 35 Units into the skin 3 times daily (before meals)  Qty: 5 pen, Refills: 3      Blood Pressure KIT Use to monitor blood pressure daily and as needed  Qty: 1 kit, Refills: 0    Associated Diagnoses: Essential hypertension      blood glucose monitor kit and supplies Dispense sufficient amount for indicated testing frequency plus additional to accommodate PRN testing needs. Dispense all needed supplies to include: monitor, strips, lancing device, lancets, control solutions, alcohol swabs. Qty: 1 kit, Refills: 0    Comments: Brand per patient preference. May round up to next available package size.   Associated Diagnoses: Type 2 diabetes mellitus with diabetic polyneuropathy, with long-term current use of insulin ([facility])      traZODone (DESYREL) 150 MG tablet Take 1 tablet by mouth nightly  Qty: 30 tablet, Refills: 3    Associated Diagnoses: Mild episode of recurrent major depressive disorder ([facility])      nystatin (MYCOSTATIN) 636184 UNIT/GM powder Apply to skin of legs and in folds of skin with irritation  Qty: 1 Bottle, Refills: 0    Associated Diagnoses: Rash and nonspecific skin eruption      albuterol sulfate HFA (PROVENTIL HFA) 108 (90 Base) MCG/ACT inhaler Inhale 2 puffs into the lungs every 6 hours as needed for Wheezing  Qty: 1 Inhaler, Refills: 2    Associated Diagnoses: Asthma, intermittent, uncomplicated      aspirin EC 81 MG EC tablet Take 1 tablet by mouth daily  Qty: 30 tablet, Refills: 3    Associated Diagnoses: Essential hypertension      ferrous sulfate (IRON 325) 325 (65 Fe) MG tablet Take 1 tablet by mouth 2 times daily  Qty: 60 tablet, Refills: 3    Associated Diagnoses: Iron deficiency anemia secondary to inadequate dietary iron intake      levothyroxine (SYNTHROID) 50 MCG tablet Take 1 tablet by mouth daily Qty: 30 tablet, Refills: 2    Associated Diagnoses: Acquired hypothyroidism      lisinopril (PRINIVIL;ZESTRIL) 5 MG tablet Take 1 tablet by mouth daily  Qty: 30 tablet, Refills: 3    Associated Diagnoses: Essential hypertension      metFORMIN (GLUCOPHAGE) 500 MG tablet Take 1 tablet by mouth 2 times daily (with meals)  Qty: 60 tablet, Refills: 3    Associated Diagnoses: Type 2 diabetes mellitus with diabetic polyneuropathy, with long-term current use of insulin ([facility])      simvastatin (ZOCOR) 40 MG tablet Take 1 tablet by mouth nightly  Qty: 30 tablet, Refills: 3    Associated Diagnoses: Pure hypercholesterolemia      diclofenac (VOLTAREN) 50 MG EC tablet Take 1 tablet by mouth 3 times daily (with meals)  Qty: 60 tablet, Refills: 3    Associated Diagnoses: Chronic pain of both knees      Insulin Pen Needle 31G X 5 MM MISC 1 each by Does not apply route daily  Qty: 100 each, Refills: 3    Associated Diagnoses: Type 2 diabetes mellitus with diabetic polyneuropathy, with long-term current use of insulin ([facility])      blood glucose test strips (TRUETEST TEST) strip Dx: DM-2 use 2-3 times daily  Qty: 100 strip, Refills: 2    Associated Diagnoses: Type 2 diabetes mellitus with diabetic polyneuropathy, with long-term current use of insulin ([facility])      ONE TOUCH ULTRASOFT LANCETS MISC Dx: DM-2 use 2-3 times daily  Qty: 100 each, Refills: 2    Associated Diagnoses: Type 2 diabetes mellitus with diabetic polyneuropathy, with long-term current use of insulin ([facility])      mirabegron (MYRBETRIQ) 25 MG TB24 Take 1 tablet by mouth daily  Qty: 30 tablet, Refills: 3    Associated Diagnoses: Urge incontinence of urine      Calcium Citrate-Vitamin D (CALCITRATE/VITAMIN D PO) Take by mouth      zoster recombinant adjuvanted vaccine (SHINGRIX) 50 MCG/0.5ML SUSR injection 50 MCG IM then repeat 2-6 months.   Qty: 0.5 mL, Refills: 1    Associated Diagnoses: Need for prophylactic vaccination and inoculation against varicella           ALLERGIES is allergic to Mickiel Erm [sitagliptin phosphate]; naproxen sodium; and neurontin [gabapentin]. FAMILY HISTORY     She indicated that her mother is alive. She indicated that her father is alive. She indicated that the status of her neg hx is unknown. SOCIAL HISTORY       Social History     Tobacco Use    Smoking status: Current Some Day Smoker     Packs/day: 0.25     Years: 15.00     Pack years: 3.75     Types: Cigarettes    Smokeless tobacco: Never Used    Tobacco comment: occasionaly   Substance Use Topics    Alcohol use: Yes     Alcohol/week: 0.0 standard drinks     Comment: rare    Drug use: Yes     Types: Marijuana     PHYSICAL EXAM     INITIAL VITALS: /79   Pulse 83   Temp 97.5 °F (36.4 °C) (Oral)   Resp 16   Ht 5' 7\" (1.702 m)   Wt 273 lb 9.6 oz (124.1 kg)   LMP 2013   SpO2 97%   BMI 42.85 kg/m²    Physical Exam  Constitutional:       General: She is not in acute distress. Appearance: She is well-developed. HENT:      Head: Normocephalic. Eyes:      Pupils: Pupils are equal, round, and reactive to light. Cardiovascular:      Rate and Rhythm: Normal rate and regular rhythm. Heart sounds: Normal heart sounds. Pulmonary:      Effort: Pulmonary effort is normal. No respiratory distress. Breath sounds: Normal breath sounds. Abdominal:      General: Bowel sounds are normal.      Palpations: Abdomen is soft. Tenderness: There is no abdominal tenderness. Musculoskeletal: Normal range of motion. Skin:     General: Skin is warm and dry. Neurological:      Mental Status: She is alert and oriented to person, place, and time. MEDICAL DECISION MAKINyear old female with DM presenting with headache and blurred vision to the left eye for 3 days. Patient has grossly intact neurologic exam however does have significantly reduced vision in the left eye. Nursing assessment for visual acuity was 20/20 in the right eye and 20/70 in the left eye. CT, CTA head and neck show no abnormalities. Given discussion with neurology plan is for admission, further imaging and neurology consultation. Differential considered but not limited to: CVA, optic neuritis, complex migraine, retinal vascular disease, retinal detachment, temporal arteritis,  vitreous hemorrhage or detachement    ED Course as of Jan 08 2338   Thu Jan 07, 2021   1522 Updated patient on results thus far. No change in headache symptoms. Plan for CTA head and neck to rule out vascular etiology. [EG]   1948 Discussed with Dr. Ulises Ortiz from neuro; recommends MRI/MRV as admssion    [EG]      ED Course User Index  [EG] Patrick Wallace MD       CRITICAL CARE:       PROCEDURES:    Procedures    DIAGNOSTIC RESULTS   EKG:All EKG's are interpreted by the Emergency Department Physician who either signs or Co-signs this chart in the absence of a cardiologist.        RADIOLOGY:All plain film, CT, MRI, and formal ultrasound images (except ED bedside ultrasound) are read by the radiologist, see reports below, unless otherwisenoted in MDM or here. MRV HEAD W CONTRAST   Final Result   Unremarkable MRV of the head. MRI BRAIN W WO CONTRAST   Final Result   Chronic microvascular disease without acute intracranial abnormality. No   abnormal postcontrast enhancement. CTA HEAD NECK W CONTRAST   Final Result   Unremarkable CTA of the head and neck without significant stenosis or   evidence for occlusion. CT HEAD WO CONTRAST   Final Result   Negative noncontrast CT examination of the brain. LABS: All lab results were reviewed by myself, and all abnormals are listed below.   Labs Reviewed   CBC WITH AUTO DIFFERENTIAL - Abnormal; Notable for the following components:       Result Value    RBC 3.94 (*)     All other components within normal limits   BASIC METABOLIC PANEL W/ REFLEX TO MG FOR LOW K - Abnormal; Notable for the following components:    Glucose 157 (*) All other components within normal limits   BASIC METABOLIC PANEL W/ REFLEX TO MG FOR LOW K - Abnormal; Notable for the following components:    Glucose 197 (*)     Bun/Cre Ratio 28 (*)     Chloride 108 (*)     All other components within normal limits   CBC - Abnormal; Notable for the following components:    RBC 3.82 (*)     All other components within normal limits   HEMOGLOBIN A1C - Abnormal; Notable for the following components:    Hemoglobin A1C 10.1 (*)     All other components within normal limits   POC GLUCOSE FINGERSTICK - Abnormal; Notable for the following components:    POC Glucose 402 (*)     All other components within normal limits   POC GLUCOSE FINGERSTICK - Abnormal; Notable for the following components:    POC Glucose 473 (*)     All other components within normal limits   POC GLUCOSE FINGERSTICK - Abnormal; Notable for the following components:    POC Glucose 412 (*)     All other components within normal limits   POC GLUCOSE FINGERSTICK - Abnormal; Notable for the following components:    POC Glucose 379 (*)     All other components within normal limits   POC GLUCOSE FINGERSTICK - Abnormal; Notable for the following components:    POC Glucose 216 (*)     All other components within normal limits   POC GLUCOSE FINGERSTICK - Abnormal; Notable for the following components:    POC Glucose 203 (*)     All other components within normal limits   POC GLUCOSE FINGERSTICK - Abnormal; Notable for the following components:    POC Glucose 237 (*)     All other components within normal limits   TSH WITH REFLEX   SEDIMENTATION RATE   C-REACTIVE PROTEIN   POC GLUCOSE FINGERSTICK   POCT GLUCOSE   POCT GLUCOSE   POCT GLUCOSE   POCT GLUCOSE   POCT GLUCOSE   POCT GLUCOSE       EMERGENCY DEPARTMENTCOURSE:         Vitals:    Vitals:    01/07/21 2123 01/08/21 0430 01/08/21 0938 01/08/21 1947   BP: 130/74  127/69 126/79   Pulse: 90  87 83   Resp: 16  16 16 Temp: 98.4 °F (36.9 °C)  97.9 °F (36.6 °C) 97.5 °F (36.4 °C)   TempSrc: Oral  Oral Oral   SpO2: 98%  99% 97%   Weight:  273 lb 9.6 oz (124.1 kg)     Height:           The patient was given the following medications while in the emergency department:  Orders Placed This Encounter   Medications    metoclopramide (REGLAN) injection 10 mg    diphenhydrAMINE (BENADRYL) injection 25 mg    fentaNYL (SUBLIMAZE) injection 50 mcg    dexamethasone (PF) (DECADRON) injection 6 mg    iopamidol (ISOVUE-370) 76 % injection 75 mL    DISCONTD: sodium chloride flush 0.9 % injection 10 mL    0.9 % sodium chloride bolus    sodium chloride flush 0.9 % injection 10 mL    sodium chloride flush 0.9 % injection 10 mL    OR Linked Order Group     potassium chloride (KLOR-CON M) extended release tablet 40 mEq     potassium bicarb-citric acid (EFFER-K) effervescent tablet 40 mEq     potassium chloride 10 mEq/100 mL IVPB (Peripheral Line)    magnesium sulfate 1 g in dextrose 5% 100 mL IVPB    DISCONTD: enoxaparin (LOVENOX) injection 40 mg    polyethylene glycol (GLYCOLAX) packet 17 g    nicotine (NICODERM CQ) 21 MG/24HR 1 patch    OR Linked Order Group     acetaminophen (TYLENOL) tablet 650 mg     acetaminophen (TYLENOL) suppository 650 mg    OR Linked Order Group     ondansetron (ZOFRAN-ODT) disintegrating tablet 4 mg     ondansetron (ZOFRAN) injection 4 mg    enoxaparin (LOVENOX) injection 30 mg    DISCONTD: insulin glargine (LANTUS;BASAGLAR) injection pen 40 Units    insulin lispro (HUMALOG) injection vial 35 Units    glucose (GLUTOSE) 40 % oral gel 15 g    dextrose 50 % IV solution    glucagon (rDNA) injection 1 mg    dextrose 5 % solution    insulin lispro (HUMALOG) injection vial 0-9 Units    DISCONTD: insulin glargine (LANTUS) injection vial 45 Units    albuterol sulfate  (90 Base) MCG/ACT inhaler 2 puff    aspirin EC tablet 81 mg    DISCONTD: ibuprofen (ADVIL;MOTRIN) tablet 400 mg

## 2021-01-08 ENCOUNTER — APPOINTMENT (OUTPATIENT)
Dept: MRI IMAGING | Age: 51
End: 2021-01-08
Payer: COMMERCIAL

## 2021-01-08 PROBLEM — H54.40 BLINDNESS OF LEFT EYE: Status: ACTIVE | Noted: 2021-01-08

## 2021-01-08 LAB
ANION GAP SERPL CALCULATED.3IONS-SCNC: 10 MMOL/L (ref 9–17)
BUN BLDV-MCNC: 17 MG/DL (ref 6–20)
BUN/CREAT BLD: 28 (ref 9–20)
C-REACTIVE PROTEIN: <3 MG/L (ref 0–5)
CALCIUM SERPL-MCNC: 9.1 MG/DL (ref 8.6–10.4)
CHLORIDE BLD-SCNC: 108 MMOL/L (ref 98–107)
CO2: 21 MMOL/L (ref 20–31)
CREAT SERPL-MCNC: 0.6 MG/DL (ref 0.5–0.9)
ESTIMATED AVERAGE GLUCOSE: 243 MG/DL
GFR AFRICAN AMERICAN: >60 ML/MIN
GFR NON-AFRICAN AMERICAN: >60 ML/MIN
GFR SERPL CREATININE-BSD FRML MDRD: ABNORMAL ML/MIN/{1.73_M2}
GFR SERPL CREATININE-BSD FRML MDRD: ABNORMAL ML/MIN/{1.73_M2}
GLUCOSE BLD-MCNC: 197 MG/DL (ref 70–99)
GLUCOSE BLD-MCNC: 203 MG/DL (ref 65–105)
GLUCOSE BLD-MCNC: 216 MG/DL (ref 65–105)
GLUCOSE BLD-MCNC: 237 MG/DL (ref 65–105)
GLUCOSE BLD-MCNC: 379 MG/DL (ref 65–105)
GLUCOSE BLD-MCNC: 412 MG/DL (ref 65–105)
GLUCOSE BLD-MCNC: 86 MG/DL (ref 65–105)
HBA1C MFR BLD: 10.1 % (ref 4–6)
HCT VFR BLD CALC: 37.5 % (ref 36.3–47.1)
HEMOGLOBIN: 12.1 G/DL (ref 11.9–15.1)
MCH RBC QN AUTO: 31.7 PG (ref 25.2–33.5)
MCHC RBC AUTO-ENTMCNC: 32.3 G/DL (ref 28.4–34.8)
MCV RBC AUTO: 98.2 FL (ref 82.6–102.9)
NRBC AUTOMATED: 0 PER 100 WBC
PDW BLD-RTO: 13.4 % (ref 11.8–14.4)
PLATELET # BLD: 286 K/UL (ref 138–453)
PMV BLD AUTO: 9.8 FL (ref 8.1–13.5)
POTASSIUM SERPL-SCNC: 4.2 MMOL/L (ref 3.7–5.3)
RBC # BLD: 3.82 M/UL (ref 3.95–5.11)
SEDIMENTATION RATE, ERYTHROCYTE: 20 MM (ref 0–30)
SODIUM BLD-SCNC: 139 MMOL/L (ref 135–144)
TSH SERPL DL<=0.05 MIU/L-ACNC: 0.43 MIU/L (ref 0.3–5)
WBC # BLD: 8.2 K/UL (ref 3.5–11.3)

## 2021-01-08 PROCEDURE — 2580000003 HC RX 258: Performed by: INTERNAL MEDICINE

## 2021-01-08 PROCEDURE — 97161 PT EVAL LOW COMPLEX 20 MIN: CPT

## 2021-01-08 PROCEDURE — 36415 COLL VENOUS BLD VENIPUNCTURE: CPT

## 2021-01-08 PROCEDURE — 70545 MR ANGIOGRAPHY HEAD W/DYE: CPT

## 2021-01-08 PROCEDURE — 83036 HEMOGLOBIN GLYCOSYLATED A1C: CPT

## 2021-01-08 PROCEDURE — 99226 PR SBSQ OBSERVATION CARE/DAY 35 MINUTES: CPT | Performed by: PSYCHIATRY & NEUROLOGY

## 2021-01-08 PROCEDURE — 70553 MRI BRAIN STEM W/O & W/DYE: CPT

## 2021-01-08 PROCEDURE — 85027 COMPLETE CBC AUTOMATED: CPT

## 2021-01-08 PROCEDURE — 84443 ASSAY THYROID STIM HORMONE: CPT

## 2021-01-08 PROCEDURE — 96372 THER/PROPH/DIAG INJ SC/IM: CPT

## 2021-01-08 PROCEDURE — 2580000003 HC RX 258: Performed by: PSYCHIATRY & NEUROLOGY

## 2021-01-08 PROCEDURE — 82947 ASSAY GLUCOSE BLOOD QUANT: CPT

## 2021-01-08 PROCEDURE — 6360000004 HC RX CONTRAST MEDICATION: Performed by: PSYCHIATRY & NEUROLOGY

## 2021-01-08 PROCEDURE — 99225 PR SBSQ OBSERVATION CARE/DAY 25 MINUTES: CPT | Performed by: INTERNAL MEDICINE

## 2021-01-08 PROCEDURE — 86140 C-REACTIVE PROTEIN: CPT

## 2021-01-08 PROCEDURE — 85652 RBC SED RATE AUTOMATED: CPT

## 2021-01-08 PROCEDURE — G0378 HOSPITAL OBSERVATION PER HR: HCPCS

## 2021-01-08 PROCEDURE — 97165 OT EVAL LOW COMPLEX 30 MIN: CPT

## 2021-01-08 PROCEDURE — 6370000000 HC RX 637 (ALT 250 FOR IP): Performed by: NURSE PRACTITIONER

## 2021-01-08 PROCEDURE — 80048 BASIC METABOLIC PNL TOTAL CA: CPT

## 2021-01-08 PROCEDURE — A9579 GAD-BASE MR CONTRAST NOS,1ML: HCPCS | Performed by: PSYCHIATRY & NEUROLOGY

## 2021-01-08 PROCEDURE — 97116 GAIT TRAINING THERAPY: CPT

## 2021-01-08 PROCEDURE — 6360000002 HC RX W HCPCS: Performed by: INTERNAL MEDICINE

## 2021-01-08 PROCEDURE — 6370000000 HC RX 637 (ALT 250 FOR IP): Performed by: INTERNAL MEDICINE

## 2021-01-08 RX ORDER — INSULIN GLARGINE 100 [IU]/ML
25 INJECTION, SOLUTION SUBCUTANEOUS 2 TIMES DAILY
Status: DISCONTINUED | OUTPATIENT
Start: 2021-01-08 | End: 2021-01-09 | Stop reason: HOSPADM

## 2021-01-08 RX ORDER — IBUPROFEN 400 MG/1
400 TABLET ORAL
Status: DISCONTINUED | OUTPATIENT
Start: 2021-01-08 | End: 2021-01-09 | Stop reason: HOSPADM

## 2021-01-08 RX ORDER — SODIUM CHLORIDE 0.9 % (FLUSH) 0.9 %
10 SYRINGE (ML) INJECTION
Status: COMPLETED | OUTPATIENT
Start: 2021-01-08 | End: 2021-01-08

## 2021-01-08 RX ADMIN — Medication 10 ML: at 09:16

## 2021-01-08 RX ADMIN — GADOTERIDOL 20 ML: 279.3 INJECTION, SOLUTION INTRAVENOUS at 08:55

## 2021-01-08 RX ADMIN — INSULIN LISPRO 3 UNITS: 100 INJECTION, SOLUTION INTRAVENOUS; SUBCUTANEOUS at 21:11

## 2021-01-08 RX ADMIN — Medication 10 ML: at 08:56

## 2021-01-08 RX ADMIN — LISINOPRIL 5 MG: 5 TABLET ORAL at 09:15

## 2021-01-08 RX ADMIN — IBUPROFEN 400 MG: 400 TABLET, FILM COATED ORAL at 09:15

## 2021-01-08 RX ADMIN — IBUPROFEN 400 MG: 400 TABLET, FILM COATED ORAL at 01:16

## 2021-01-08 RX ADMIN — IBUPROFEN 400 MG: 400 TABLET, FILM COATED ORAL at 17:18

## 2021-01-08 RX ADMIN — ATORVASTATIN CALCIUM 20 MG: 20 TABLET, FILM COATED ORAL at 09:15

## 2021-01-08 RX ADMIN — LEVOTHYROXINE SODIUM 50 MCG: 0.05 TABLET ORAL at 09:15

## 2021-01-08 RX ADMIN — INSULIN LISPRO 35 UNITS: 100 INJECTION, SOLUTION INTRAVENOUS; SUBCUTANEOUS at 12:54

## 2021-01-08 RX ADMIN — INSULIN LISPRO 35 UNITS: 100 INJECTION, SOLUTION INTRAVENOUS; SUBCUTANEOUS at 09:16

## 2021-01-08 RX ADMIN — ENOXAPARIN SODIUM 30 MG: 30 INJECTION SUBCUTANEOUS at 09:15

## 2021-01-08 RX ADMIN — ASPIRIN 81 MG: 81 TABLET, COATED ORAL at 09:15

## 2021-01-08 RX ADMIN — INSULIN LISPRO 35 UNITS: 100 INJECTION, SOLUTION INTRAVENOUS; SUBCUTANEOUS at 00:13

## 2021-01-08 RX ADMIN — IBUPROFEN 400 MG: 400 TABLET, FILM COATED ORAL at 12:54

## 2021-01-08 RX ADMIN — ENOXAPARIN SODIUM 30 MG: 30 INJECTION SUBCUTANEOUS at 21:11

## 2021-01-08 RX ADMIN — ACETAMINOPHEN 650 MG: 325 TABLET ORAL at 21:23

## 2021-01-08 RX ADMIN — METFORMIN HYDROCHLORIDE 500 MG: 500 TABLET ORAL at 23:38

## 2021-01-08 ASSESSMENT — PAIN DESCRIPTION - PROGRESSION
CLINICAL_PROGRESSION: NOT CHANGED
CLINICAL_PROGRESSION: GRADUALLY IMPROVING
CLINICAL_PROGRESSION: GRADUALLY IMPROVING

## 2021-01-08 ASSESSMENT — PAIN DESCRIPTION - ORIENTATION: ORIENTATION: OTHER (COMMENT)

## 2021-01-08 ASSESSMENT — PAIN DESCRIPTION - FREQUENCY
FREQUENCY: CONTINUOUS
FREQUENCY: CONTINUOUS

## 2021-01-08 ASSESSMENT — PAIN DESCRIPTION - ONSET: ONSET: ON-GOING

## 2021-01-08 ASSESSMENT — PAIN DESCRIPTION - PAIN TYPE
TYPE: ACUTE PAIN
TYPE: ACUTE PAIN

## 2021-01-08 ASSESSMENT — PAIN SCALES - GENERAL
PAINLEVEL_OUTOF10: 4
PAINLEVEL_OUTOF10: 1

## 2021-01-08 ASSESSMENT — PAIN DESCRIPTION - DESCRIPTORS
DESCRIPTORS: ACHING;HEADACHE
DESCRIPTORS: ACHING;HEADACHE

## 2021-01-08 ASSESSMENT — PAIN - FUNCTIONAL ASSESSMENT: PAIN_FUNCTIONAL_ASSESSMENT: ACTIVITIES ARE NOT PREVENTED

## 2021-01-08 ASSESSMENT — PAIN DESCRIPTION - LOCATION: LOCATION: HEAD

## 2021-01-08 NOTE — PLAN OF CARE
Problem: Pain:  Goal: Patient's pain/discomfort is manageable  Description: Patient's pain/discomfort is manageable  1/8/2021 1215 by Taniya Parrish RN  Outcome: Ongoing  Note: Pt medicated with pain medication prn. Assessed all pain characteristics including level, type, location, frequency, and onset. Non-pharmacologic interventions offered to pt as well. Pt states pain is tolerable at this time. Will continue to monitor      Problem: Serum Glucose Level - Abnormal:  Goal: Ability to maintain appropriate glucose levels will improve  Description: Ability to maintain appropriate glucose levels will improve  Outcome: Ongoing  Note: Patient's blood sugars continue to be checked before meals and before bed. Sliding scale insulin available for blood sugars 140 or greater. Physician updated as needed.

## 2021-01-08 NOTE — PROGRESS NOTES
Pt admitted to room 2016 from ER. Oriented to room and call light/tv controls. Bed in lowest position, wheels locked, 2/4 side rails up  Call light in reach, room free of clutter, adequate lighting provided.

## 2021-01-08 NOTE — PLAN OF CARE
Siderails up x 2  Hourly rounding  Call light in reach  Instructed to call for assist before attempting out of bed. Remains free from falls and accidental injury at this time   Floor free from obstacles  Bed is locked and in lowest position  Adequate lighting provided      Pain level assessment complete.    Patient educated on pain scale and control interventions  PRN pain medication given per patient request  Patient instructed to call out with new onset of pain or unrelieved pain

## 2021-01-08 NOTE — PROGRESS NOTES
Physical Therapy    Facility/Department: [facility]  Initial Assessment    NAME: Guzman Ricketts  : 1970  MRN: [id number]    Date of Service: 2021    Discharge Recommendations:  Home independently        Assessment   Body structures, Functions, Activity limitations: Decreased functional mobility ; Decreased balance;Decreased vision/visual deficit  Assessment: Skilled therapy needed to maximize independence with functional mobility to ensure safe discharge home. Recommend D/C home independently. Prognosis: Good  Decision Making: Medium Complexity  Exam: AROM, strength, balance, AM-PAC, mobility, endurance  Clinical Presentation: evolving  PT Education: Goals;PT Role;Plan of Care;General Safety; Family Education;Transfer Training  REQUIRES PT FOLLOW UP: Yes  Activity Tolerance  Activity Tolerance: Patient Tolerated treatment well       Patient Diagnosis(es): The primary encounter diagnosis was Acute intractable headache, unspecified headache type. A diagnosis of Visual changes was also pertinent to this visit. has a past medical history of Anxiety, Arthritis of knee, degenerative, Carpal tunnel syndrome on both sides, Chronic knee pain, Depressive disorder, not elsewhere classified, Hydronephrosis, left, Hypothyroidism, Iron deficiency anemia, Mild intermittent asthma without complication, Obesity, Osteoarthritis, Polyneuropathy, RAD (reactive airway disease), Snores, Type II or unspecified type diabetes mellitus without mention of complication, not stated as uncontrolled, Urge incontinence of urine, Wears glasses, and Weight loss. has a past surgical history that includes Carpal tunnel release;  section (); Tubal ligation (3/2012); Nerve Block (Left, 2016); Nerve Block (3/11/16); Cystocopy (2016); Lithotripsy (Left, 2016); and Hysterectomy, total abdominal (2013).     Restrictions  Restrictions/Precautions Restrictions/Precautions: Up as Tolerated, General Precautions  Required Braces or Orthoses?: No  Position Activity Restriction  Other position/activity restrictions: L eye blurry, headache  Vision/Hearing  Vision: Impaired(L eye blurry)  Hearing: Within functional limits     Subjective  General  Chart Reviewed: Yes  Patient assessed for rehabilitation services?: Yes  Family / Caregiver Present: No  Follows Commands: Within Functional Limits  General Comment  Comments: ZENAIDA Zhong states patient appropriate for PT  Subjective  Subjective: patient pleasant and agreeable to work with PT          Orientation  Orientation  Overall Orientation Status: Within Functional Limits  Social/Functional History  Social/Functional History  Lives With: Son(17 year old)  Type of Home: House  Home Layout: One level  Home Access: Stairs to enter with rails  Entrance Stairs - Number of Steps: 2  Entrance Stairs - Rails: Both  Bathroom Shower/Tub: Tub/Shower unit  Bathroom Toilet: Standard  ADL Assistance: Independent  Homemaking Assistance: Independent  Homemaking Responsibilities: Yes  Ambulation Assistance: Independent  Transfer Assistance: Independent  Active : Yes  Leisure & Hobbies: taking care of son  Cognition   Cognition  Overall Cognitive Status: WFL    Objective     Observation/Palpation  Posture: Good  Observation: resting in bed    AROM RLE (degrees)  RLE AROM: WFL  AROM LLE (degrees)  LLE AROM : WFL  AROM RUE (degrees)  RUE AROM : WFL  AROM LUE (degrees)  LUE AROM : WFL  Strength RLE  Strength RLE: WFL  Strength LLE  Strength LLE: WFL  Comment: except knee and ankle 4/5, per patient has OA bad in L LE  Strength RUE  Strength RUE: WFL  Strength LUE  Strength LUE: WFL  Motor Control  Gross Motor?: WFL  Sensation  Overall Sensation Status: WFL  Bed mobility  Rolling to Right: Independent  Supine to Sit: Independent  Transfers  Sit to Stand: Stand by assistance  Stand to sit: Stand by assistance Comment: demonstrated good standing balance despite L eye blurry  Ambulation  Ambulation?: Yes  Ambulation 1  Surface: level tile  Device: No Device  Assistance: Stand by assistance  Gait Deviations: Deviated path  Distance: 200'  Comments: due to L eye being blurry patient had slight deviation to walking path     Balance  Sitting - Static: Good  Standing - Static: Good  Standing - Dynamic: Fair;+        Plan   Plan  Times per week: 1-2x/day for 5-6 days/week  Current Treatment Recommendations: Transfer Training, Balance Training, Gait Training, Stair training, Patient/Caregiver Education & Training  Safety Devices  Type of devices: Left in bed, Gait belt, Call light within reach(OT with patient)    G-Code       OutComes Score                                                  AM-PAC Score  AM-PAC Inpatient Mobility Raw Score : 20 (01/08/21 1255)  AM-PAC Inpatient T-Scale Score : 47.67 (01/08/21 1255)  Mobility Inpatient CMS 0-100% Score: 35.83 (01/08/21 1255)  Mobility Inpatient CMS G-Code Modifier : Beto Holman (01/08/21 1255)          Goals  Short term goals  Time Frame for Short term goals: 10 visits  Short term goal 1: Independent sit<>stand  Short term goal 2: Ambulate 300' without device independent  Short term goal 3: patient to ascend/descend 2 steps with B HR Independent       Therapy Time   Individual Concurrent Group Co-treatment   Time In 1121         Time Out 1134(additional 10 minutes chart review)         Minutes 13                 José Antonio Denton, PT

## 2021-01-08 NOTE — H&P
@[facility]@    [facility]    HISTORY AND PHYSICAL EXAMINATION            Date:   1/7/2021  Patient name:  Analilia Wade  Date of admission:  1/7/2021  1:23 PM  MRN:   [id number]  Account:  [de-identified]  YOB: 1970  PCP:    MARILU Last CNP  Room:   2016/2016-02  Code Status:    Full Code    Chief Complaint:     Chief Complaint   Patient presents with   Collie Gammons     left eye and then developed a headache 2 days ago.  Headache     History Obtained From:     patient, electronic medical record    History of Present Illness:     Analilia Wade is a 45 y.o. Non-/non  female who presents with Blurred Vision (left eye and then developed a headache 2 days ago. ) and Headache    Kasia Sommer is a pleasant 58-year-old female with history of type 2 diabetes mellitus with diabetic polyneuropathy with long-term current insulin use, hypertension, hyperlipidemia, and hypothyroidism. She follows closely with her primary care provider MARILU Del Valle CNP. Ms. Boyd reports that she was in her usual state of health until 1/5/2021 when she woke with blurred vision in her left eye, without associated signs and symptoms. She progressively developed left frontal headache without photophobia, nausea or vomiting. Of note she has poorly controlled blood sugars with her last hemoglobin A1c documented in October 2020 being 14%. She reports being compliant with her diabetic regimen \" some of the time\". On 1/7/2021 she presented to the ED for further evaluation and management. While in the ED noncontrast head CT was normal, CT angiogram and head and neck normal as well. She is being admitted to the hospitalist service, observation status, with consultation to neurology.        Past Medical History:     Past Medical History:   Diagnosis Date    Anxiety     Arthritis of knee, degenerative 1/31/2012  Carpal tunnel syndrome on both sides 1/15/2013    Chronic knee pain     on 18 pt is presently in pain mgmnt    Depressive disorder, not elsewhere classified 10/14/2014    Hydronephrosis, left 2016    Hypothyroidism 2013    Iron deficiency anemia 6/10/2014    Mild intermittent asthma without complication 4727    Obesity     Osteoarthritis     KNEE    Polyneuropathy 2012    RAD (reactive airway disease) 2012    Snores     possible apnea but not tested yet    Type II or unspecified type diabetes mellitus without mention of complication, not stated as uncontrolled     Urge incontinence of urine 2017    Wears glasses     Weight loss         Past Surgical History:     Past Surgical History:   Procedure Laterality Date    CARPAL TUNNEL RELEASE      2008 left,  2009 right     SECTION  2007    CYSTOSCOPY  2016    cysto with left ureteral stent placement    HYSTERECTOMY, TOTAL ABDOMINAL  2013    LITHOTRIPSY Left 2016    cysto with ESWL and left ureteral stent placement.  NERVE BLOCK Left 2016    left knee kenalog 80mg    NERVE BLOCK  3/11/16    Rt Knee depo medrol 80     TUBAL LIGATION  3/2012        Medications Prior to Admission:     Prior to Admission medications    Medication Sig Start Date End Date Taking?  Authorizing Provider   insulin glargine (BASAGLAR KWIKPEN) 100 UNIT/ML injection pen Inject 40 Units into the skin nightly 20  Yes MARILU Forde CNP   insulin aspart (NOVOLOG FLEXPEN) 100 UNIT/ML injection pen Inject 35 Units into the skin 3 times daily (before meals) 20  Yes MARILU Forde CNP   Blood Pressure KIT Use to monitor blood pressure daily and as needed 10/26/20  Yes MARILU Forde CNP blood glucose monitor kit and supplies Dispense sufficient amount for indicated testing frequency plus additional to accommodate PRN testing needs. Dispense all needed supplies to include: monitor, strips, lancing device, lancets, control solutions, alcohol swabs.  10/26/20  MARILU Green CNP   traZODone (DESYREL) 150 MG tablet Take 1 tablet by mouth nightly 10/26/20  MARILU Green CNP   nystatin (MYCOSTATIN) 124022 UNIT/GM powder Apply to skin of legs and in folds of skin with irritation 10/26/20  Yes MARILU Heath CNP   albuterol sulfate HFA (PROVENTIL HFA) 108 (90 Base) MCG/ACT inhaler Inhale 2 puffs into the lungs every 6 hours as needed for Wheezing 10/26/20  Yes MARILU Heath CNP   aspirin EC 81 MG EC tablet Take 1 tablet by mouth daily 10/26/20  MARILU Green CNP   ferrous sulfate (IRON 325) 325 (65 Fe) MG tablet Take 1 tablet by mouth 2 times daily 10/26/20  MARILU Green CNP   levothyroxine (SYNTHROID) 50 MCG tablet Take 1 tablet by mouth daily 10/26/20  MARILU Green CNP   lisinopril (PRINIVIL;ZESTRIL) 5 MG tablet Take 1 tablet by mouth daily 10/26/20  MARILU Green CNP   metFORMIN (GLUCOPHAGE) 500 MG tablet Take 1 tablet by mouth 2 times daily (with meals) 10/26/20  MARILU Green CNP   simvastatin (ZOCOR) 40 MG tablet Take 1 tablet by mouth nightly 10/26/20  MARILU Green CNP   diclofenac (VOLTAREN) 50 MG EC tablet Take 1 tablet by mouth 3 times daily (with meals) 10/26/20  MARILU Green CNP   Insulin Pen Needle 31G X 5 MM MISC 1 each by Does not apply route daily 10/26/20  MARILU Green CNP   blood glucose test strips (TRUETEST TEST) strip Dx: DM-2 use 2-3 times daily 10/26/20  MARILU Green CNP   ONE TOUCH ULTRASOFT LANCETS MISC Dx: DM-2 use 2-3 times daily 10/26/20  Yes MARILU Heath - CNP mirabegron (MYRBETRIQ) 25 MG TB24 Take 1 tablet by mouth daily 10/26/20  Yes MARILU Raya CNP   Calcium Citrate-Vitamin D (CALCITRATE/VITAMIN D PO) Take by mouth   Yes Historical Provider, MD   zoster recombinant adjuvanted vaccine (SHINGRIX) 50 MCG/0.5ML SUSR injection 50 MCG IM then repeat 2-6 months. 10/26/20 10/26/21  MARILU Raya CNP        Allergies:     Martin Siobhan [sitagliptin phosphate], Naproxen sodium, and Neurontin [gabapentin]    Social History:     Tobacco:    reports that she has been smoking cigarettes. She has a 3.75 pack-year smoking history. She has never used smokeless tobacco.  Alcohol:      reports current alcohol use. Drug Use:  reports current drug use. Drug: Marijuana. Family History:     Family History   Problem Relation Age of Onset   Veronique Douglas Stroke Father     Diabetes Mother     Hypertension Mother     Breast Cancer Neg Hx     Cancer Neg Hx     Colon Cancer Neg Hx     Eclampsia Neg Hx     Ovarian Cancer Neg Hx      Labor Neg Hx     Spont Abortions Neg Hx        Review of Systems:     Positive and Negative as described in HPI.     CONSTITUTIONAL:  negative for fevers, chills, sweats, fatigue, weight loss  HEENT: Positive for headache and blurred vision, negative photophobia  RESPIRATORY:  negative for shortness of breath, cough, congestion, wheezing  CARDIOVASCULAR:  negative for chest pain, palpitations  GASTROINTESTINAL:  negative for nausea, vomiting, diarrhea, constipation, change in bowel habits, abdominal pain   GENITOURINARY:  negative for difficulty of urination, burning with urination, frequency   INTEGUMENT:  negative for rash, skin lesions, easy bruising   HEMATOLOGIC/LYMPHATIC:  negative for swelling/edema   ALLERGIC/IMMUNOLOGIC:  negative for urticaria , itching  ENDOCRINE:  negative increase in drinking, increase in urination, hot or cold intolerance  MUSCULOSKELETAL:  negative joint pains, muscle aches, swelling of joints NEUROLOGICAL:  negative for headaches, dizziness, lightheadedness, numbness, pain, tingling extremities  BEHAVIOR/PSYCH:  negative for depression, anxiety    Physical Exam:   /74   Pulse 90   Temp 98.4 °F (36.9 °C) (Oral)   Resp 16   Ht 5' 7\" (1.702 m)   Wt 267 lb (121.1 kg)   LMP 2013   SpO2 98%   BMI 41.82 kg/m²   Temp (24hrs), Av.4 °F (36.9 °C), Min:98.4 °F (36.9 °C), Max:98.4 °F (36.9 °C)    Recent Labs     21   POCGLU 402*     No intake or output data in the 24 hours ending 21    General Appearance:  alert, well appearing, and in no acute distress  Mental status: oriented to person, place, and time  Head:  normocephalic, atraumatic  Eye: no icterus, redness, pupils equal and reactive,  conjunctiva clear  Ear: normal external ear,  hearing intact  Nose:  no drainage noted  Mouth: mucous membranes moist  Neck: supple, no carotid bruits, thyroid not palpable  Lungs: Bilateral equal air entry, clear to auscultation, no wheezing, rales or rhonchi, normal effort  Cardiovascular: normal rate, regular rhythm, no murmur, gallop, rub.   Abdomen: Soft, nontender, nondistended, normal bowel sounds, no hepatomegaly or splenomegaly  Neurologic: There are no new focal motor or sensory deficits, normal muscle tone and bulk, no abnormal sensation, normal speech, cranial nerves II through XII grossly intact  Skin: No gross lesions, rashes, bruising or bleeding on exposed skin area  Extremities:  peripheral pulses palpable, no pedal edema or calf pain with palpation  Psych: normal affect     Investigations:      Laboratory Testing:  Recent Results (from the past 24 hour(s))   CBC Auto Differential    Collection Time: 21  2:10 PM   Result Value Ref Range    WBC 5.2 3.5 - 11.3 k/uL    RBC 3.94 (L) 3.95 - 5.11 m/uL    Hemoglobin 12.4 11.9 - 15.1 g/dL    Hematocrit 38.9 36.3 - 47.1 %    MCV 98.7 82.6 - 102.9 fL    MCH 31.5 25.2 - 33.5 pg    MCHC 31.9 28.4 - 34.8 g/dL RDW 13.3 11.8 - 14.4 %    Platelets 397 209 - 556 k/uL    MPV 9.7 8.1 - 13.5 fL    NRBC Automated 0.0 0.0 per 100 WBC    Differential Type NOT REPORTED     Seg Neutrophils 60 36 - 65 %    Lymphocytes 31 24 - 43 %    Monocytes 8 3 - 12 %    Eosinophils % 1 1 - 4 %    Basophils 0 0 - 2 %    Immature Granulocytes 0 0 %    Segs Absolute 3.08 1.50 - 8.10 k/uL    Absolute Lymph # 1.58 1.10 - 3.70 k/uL    Absolute Mono # 0.41 0.10 - 1.20 k/uL    Absolute Eos # 0.05 0.00 - 0.44 k/uL    Basophils Absolute <0.03 0.00 - 0.20 k/uL    Absolute Immature Granulocyte 0.02 0.00 - 0.30 k/uL    WBC Morphology NOT REPORTED     RBC Morphology NOT REPORTED     Platelet Estimate NOT REPORTED    Basic Metabolic Panel w/ Reflex to MG    Collection Time: 01/07/21  2:10 PM   Result Value Ref Range    Glucose 157 (H) 70 - 99 mg/dL    BUN 11 6 - 20 mg/dL    CREATININE 0.54 0.50 - 0.90 mg/dL    Bun/Cre Ratio 20 9 - 20    Calcium 8.9 8.6 - 10.4 mg/dL    Sodium 138 135 - 144 mmol/L    Potassium 4.2 3.7 - 5.3 mmol/L    Chloride 103 98 - 107 mmol/L    CO2 24 20 - 31 mmol/L    Anion Gap 11 9 - 17 mmol/L    GFR Non-African American >60 >60 mL/min    GFR African American >60 >60 mL/min    GFR Comment          GFR Staging NOT REPORTED    POC Glucose Fingerstick    Collection Time: 01/07/21  9:20 PM   Result Value Ref Range    POC Glucose 402 (HH) 65 - 105 mg/dL       Imaging/Diagnostics:  Ct Head Wo Contrast    Result Date: 1/7/2021  Negative noncontrast CT examination of the brain. Cta Head Neck W Contrast    Result Date: 1/7/2021  Unremarkable CTA of the head and neck without significant stenosis or evidence for occlusion.        Assessment :      Hospital Problems           Last Modified POA    * (Principal) Headache 1/7/2021 Yes    Tobacco use 1/7/2021 Yes    Acquired hypothyroidism 1/7/2021 Yes    Hyperlipidemia 1/7/2021 Yes    Type 2 diabetes mellitus with hyperglycemia, with long-term current use of insulin ([address] 1/7/2021 Yes Hypertension 1/7/2021 Yes    Class 3 severe obesity in adult [facility]) 1/7/2021 Yes          Plan:     Patient status observation in the Med/Surge    1. Admit to the hospitalist service, observation status, medical surgical floor  2. CT, CTA findings noted. Appreciate expert neurology recommendations. Plans for MRI/MRV in the a.m. Consult to ophthalmology per neurology recs  3. Last hemoglobin A1c October 2020 was greater than 14%. Currently hyperglycemic, with obvious poor long-term control; however she did receive a one-time dose of 6 mg of Decadron in the emergency room worsening current glycemic control. Increase Lantus to 45 units, nightly. Continue scheduled mealtime Humalog 35 units 3 times daily, sliding scale coverage starting at 140 for nighttime only. Repeat hemoglobin A1c ordered for the morning. 4. Resume home medications. I spent a great deal of time with Ms. Hung Adams discussing the importance of glycemic control. She indicates that she is trying her best and works with her primary care provider closely. Consultations:   IP CONSULT TO NEUROLOGY  IP CONSULT TO INTERNAL MEDICINE    Patient is admitted as inpatient status because of co-morbidities listed above, severity of signs and symptoms as outlined, requirement for current medical therapies and most importantly because of direct risk to patient if care not provided in a hospital setting. Expected length of stay > 48 hours.     MARILU Mendoza NP  1/7/2021  9:48 PM    Copy sent to MARILU Shields - CNP

## 2021-01-08 NOTE — PROGRESS NOTES
UE Dressing: Modified independent   LE Dressing: Supervision  Toileting: Supervision  Additional Comments: Pt able to doff/don B socks with MI. Pt performed functional mob in room and hallway with SBA to SUP for safety as pt felt slightly unsteady d/t blurriness in L eye. Pt reports she is IND with all ADLs at this time and does not require OT services. Tone RUE  RUE Tone: Normotonic  Tone LUE  LUE Tone: Normotonic  Coordination  Movements Are Fluid And Coordinated: Yes     Bed mobility  Sit to Supine: Modified independent  Transfers  Sit to stand: Modified independent  Stand to sit: Modified independent     Cognition  Overall Cognitive Status: WFL  Perception  Overall Perceptual Status: WFL     Sensation  Overall Sensation Status: WFL        LUE AROM (degrees)  LUE AROM : WFL  Left Hand AROM (degrees)  Left Hand AROM: WFL  RUE AROM (degrees)  RUE AROM : WFL  Right Hand AROM (degrees)  Right Hand AROM: WFL  LUE Strength  Gross LUE Strength: WFL  RUE Strength  Gross RUE Strength: WFL                   Plan   Plan  Times per week: Eval only      AM-PAC Score        AM-[facility] Inpatient Daily Activity Raw Score: 24 (01/08/21 1232)  AM-PAC Inpatient ADL T-Scale Score : 57.54 (01/08/21 1232)  ADL Inpatient CMS 0-100% Score: 0 (01/08/21 1232)  ADL Inpatient CMS G-Code Modifier : CH (01/08/21 1232)    Goals  Short term goals  Time Frame for Short term goals: Eval only  Patient Goals   Patient goals : To go home today       Therapy Time   Individual Concurrent Group Co-treatment   Time In 3468         Time Out 1151         Minutes 16               Upon writer exit, call light within reach, pt retired to bed. All lines intact and patient positioned comfortably. All patient needs addressed prior to ending therapy session. Chart reviewed prior to treatment and patient is agreeable for therapy. RN reports patient is medically stable for therapy treatment this date.       Katrina Izaguirre, OT

## 2021-01-08 NOTE — CONSULTS
46 yo lady with blurriness of vision in left eye and headache . She has diabetes mellitus on insulin with polyneuropathy with uncontrolled blood sugars with Hga1c 10.1 being in 14 range in October. Patient awoke 2 days PTA with left eye blurriness developing thereon left frontal that has been of disruptive intensity coming to ER. Gemma Valerio Head CT normal.  CTA head and neck with no large vessel occlusion . MRI of Head with mild chronic periventricular small vessel ischemia. MRV of Head normal . Left frontal headache has eased to grade 3 over although has had ongoing decreased left eye visual acuety able to make out margins of objects and unable to see fine detail . She denies history of headaches . She denies focal motor , bulbar , motor or sensory complaints . Significant medications aspirin 81 mg po qd , zocor 40 mg po qd . Testing  Head CT normal.  CTA head and neck with no large vessel occlusion .  MRI of Head with mild chronic periventricular small vessel ischemia     Past Medical History:   Diagnosis Date    Anxiety     Arthritis of knee, degenerative 2012    Carpal tunnel syndrome on both sides 1/15/2013    Chronic knee pain     on 18 pt is presently in pain mgmnt    Depressive disorder, not elsewhere classified 10/14/2014    Hydronephrosis, left 2016    Hypothyroidism 2013    Iron deficiency anemia 6/10/2014    Mild intermittent asthma without complication 235    Obesity     Osteoarthritis     KNEE    Polyneuropathy 2012    RAD (reactive airway disease) 2012    Snores     possible apnea but not tested yet    Type II or unspecified type diabetes mellitus without mention of complication, not stated as uncontrolled     Urge incontinence of urine 2017    Wears glasses     Weight loss        Past Surgical History:   Procedure Laterality Date    CARPAL TUNNEL RELEASE      2008 left,  2009 right     SECTION  2007    CYSTOSCOPY  2016 cysto with left ureteral stent placement    HYSTERECTOMY, TOTAL ABDOMINAL  2013    LITHOTRIPSY Left 2016    cysto with ESWL and left ureteral stent placement.  NERVE BLOCK Left 2016    left knee kenalog 80mg    NERVE BLOCK  3/11/16    Rt Knee depo medrol [de-identified]     TUBAL LIGATION  3/2012       Family History   Problem Relation Age of Onset    Stroke Father     Diabetes Mother     Hypertension Mother     Breast Cancer Neg Hx     Cancer Neg Hx     Colon Cancer Neg Hx     Eclampsia Neg Hx     Ovarian Cancer Neg Hx      Labor Neg Hx     Spont Abortions Neg Hx        Social History     Socioeconomic History    Marital status: Single     Spouse name: None    Number of children: None    Years of education: None    Highest education level: None   Occupational History    None   Social Needs    Financial resource strain: None    Food insecurity     Worry: None     Inability: None    Transportation needs     Medical: None     Non-medical: None   Tobacco Use    Smoking status: Current Some Day Smoker     Packs/day: 0.25     Years: 15.00     Pack years: 3.75     Types: Cigarettes    Smokeless tobacco: Never Used    Tobacco comment: occasionaly   Substance and Sexual Activity    Alcohol use:  Yes     Alcohol/week: 0.0 standard drinks     Comment: rare    Drug use: Yes     Types: Marijuana    Sexual activity: Yes     Partners: Male   Lifestyle    Physical activity     Days per week: None     Minutes per session: None    Stress: None   Relationships    Social connections     Talks on phone: None     Gets together: None     Attends Jainism service: None     Active member of club or organization: None     Attends meetings of clubs or organizations: None     Relationship status: None    Intimate partner violence     Fear of current or ex partner: None     Emotionally abused: None     Physically abused: None     Forced sexual activity: None   Other Topics Concern    None Social History Narrative    None       Current Facility-Administered Medications   Medication Dose Route Frequency Provider Last Rate Last Admin    ibuprofen (ADVIL;MOTRIN) tablet 400 mg  400 mg Oral TID  MARILU Mclaughlin CNP   400 mg at 01/08/21 1254    sodium chloride flush 0.9 % injection 10 mL  10 mL Intravenous 2 times per day Kory Fine MD   10 mL at 01/08/21 0916    sodium chloride flush 0.9 % injection 10 mL  10 mL Intravenous PRN Kory Fine MD        potassium chloride (KLOR-CON M) extended release tablet 40 mEq  40 mEq Oral PRN Kory Fine MD        Or   [place] potassium bicarb-citric acid (EFFER-K) effervescent tablet 40 mEq  40 mEq Oral PRN Kory Fine MD        Or   [place] potassium chloride 10 mEq/100 mL IVPB (Peripheral Line)  10 mEq Intravenous PRN Kory Fine MD        magnesium sulfate 1 g in dextrose 5% 100 mL IVPB  1 g Intravenous PRN Kory Fine MD        polyethylene glycol (GLYCOLAX) packet 17 g  17 g Oral Daily PRN Kory Fine MD        nicotine (NICODERM CQ) 21 MG/24HR 1 patch  1 patch Transdermal Daily PRN Kory Fine MD        acetaminophen (TYLENOL) tablet 650 mg  650 mg Oral Q6H PRN Kory Fine MD        Or   [place] acetaminophen (TYLENOL) suppository 650 mg  650 mg Rectal Q6H PRN Kory Fine MD        ondansetron (ZOFRAN-ODT) disintegrating tablet 4 mg  4 mg Oral Q8H PRN Kory Fine MD        Or    ondansetron (ZOFRAN) injection 4 mg  4 mg Intravenous Q6H PRN Kory Fine MD        enoxaparin (LOVENOX) injection 30 mg  30 mg Subcutaneous BID Darell DOUGLASS Blood, DO   30 mg at 01/08/21 0915    insulin lispro (HUMALOG) injection vial 35 Units  35 Units Subcutaneous TID  MARILU Mclaughlin - CNP   35 Units at 01/08/21 1254    glucose (GLUTOSE) 40 % oral gel 15 g  15 g Oral PRN MARILU Mclaughlin CNP        dextrose 50 % IV solution  12.5 g Intravenous PRN MARILU Mclaughlin CNP  glucagon (rDNA) injection 1 mg  1 mg Intramuscular PRN Himanshu MARCIA mehtaN - CNP        dextrose 5 % solution  100 mL/hr Intravenous PRN Himanshu Aguila APRN - CNP        insulin lispro (HUMALOG) injection vial 0-9 Units  0-9 Units Subcutaneous Nightly Johniea , APRN - CNP        insulin glargine (LANTUS) injection vial 45 Units  45 Units Subcutaneous Nightly MARILU Espinoza - NP   45 Units at 01/07/21 2150    albuterol sulfate  (90 Base) MCG/ACT inhaler 2 puff  2 puff Inhalation Q6H PRN Vern Bruner APRN - NP        aspirin EC tablet 81 mg  81 mg Oral Daily MARILU Espinoza - NP   81 mg at 01/08/21 0915    levothyroxine (SYNTHROID) tablet 50 mcg  50 mcg Oral Daily MARILU Espinoza - NP   50 mcg at 01/08/21 0915    lisinopril (PRINIVIL;ZESTRIL) tablet 5 mg  5 mg Oral Daily MARILU Espinoza - NP   5 mg at 01/08/21 0915    atorvastatin (LIPITOR) tablet 20 mg  20 mg Oral Daily Vern Bruner APRN - NP   20 mg at 01/08/21 0915       Allergies   Allergen Reactions    Januvia [Sitagliptin Phosphate] Hives    Naproxen Sodium Hives    Neurontin [Gabapentin] Nausea Only       ROS:   Constitutional                  Negative for fever and chills   HEENT                            Negative for ear discharge, ear pain, nosebleed  Eyes                                Negative for photophobia, pain and discharge  Respiratory                      Negative for hemoptysis and sputum  Cardiovascular                Negative for orthopnea, claudication and PND  Gastrointestinal               Negative for abdominal pain, diarrhea, blood in stool  Musculoskeletal               Negative for joint pain, negative for myalgia  Skin                                 Negative for rash or itching  Endo/heme/allergies       Negative for polydipsia, environmental allergy  Psychiatric                       Negative for suicidal ideation.   Patient is not anxious Vitals:    01/08/21 0938   BP: 127/69   Pulse: 87   Resp: 16   Temp: 97.9 °F (36.6 °C)   SpO2: 99%     Admission weight: 267 lb (121.1 kg)    Neurological Examination  Constitutional .General exam well groomed   Head/ Ears /Nose/Throat/external ear . Normal exam  Neck and thyroid . Normal size. No bruits  Cardiovascular: Auscultation of heart with regular rate and rhythm   Musculoskeletal. Muscle bulk and tone normal                                                           Muscle strength 5/5 strength throughout                                                                                No dysmetria or dysdiadokinesis  No tremor   Normal fine motor  Orientation Alert and oriented x 3   Attention and concentration normal  Short term memory normal  Language process and speech normal . No aphasia   Cranial nerve 2 Left eye visual acuety 20/400 . Right eye visual acuety 20/50 . Grossly normal  visual fields  Cranial nerve 3, 4 and 6 . Extraocular muscles are intact . Pupils are equal and reactive   Cranial nerve 5 . Intact corneal reflex. Normal facial sensation  Cranial nerve 7 normal exam   Cranial nerve 8. Grossly intact hearing   Cranial nerve 9 and 10. Symmetric palate elevation   Cranial nerve 11 , 5 out of 5 strength   Cranial Nerve 12 midline tongue . No atrophy  Sensation . Decreased pinprick and light touch distal lower extremities   Deep Tendon Reflexes hypoactive   Plantar response flexor bilaterally    Assessment :    Left eye monocular visual loss with headache . Uncontrolled diabetes mellitus with polyneuropathy     Plan:    ESR , CRP . Ophthalmology consultation . Headache has attenuated .  Fioricet 1 po q 6 hours PRN

## 2021-01-08 NOTE — CONSULTS
[facility], [address], [address] [place] [address]  Ph: [phone] or [phone]  FAX: [phone]        Reason for consult: headache and left eye blurred vision    I had the pleasure of seeing your patient in neurology consultation for her symptoms. As you would recall Javi Michele is a 45 y.o. yo female admitted on 1/7/2021. The history is obtained from the patient, patient medical records and from the family. The patient was at her baseline until 1/5/2021 when she woke up with having a blurred vision in the left eye. The patient denied having any double vision. She did not report any weakness numbness or tingling in the extremities. No pain with eye movement. As the symptoms progressed, she told noticed having left frontal headache, moderate in nature, no significant sensory phobias, nausea or vomiting. The patient reports having history of uncontrolled diabetes with her fingerstick blood glucose running in 300400s routinely. The patient smokes occasionally and uses marijuana. Denies having any IV drug abuse. Social drinking but no drug abuse otherwise.   Past Medical History:   Diagnosis Date    Anxiety     Arthritis of knee, degenerative 1/31/2012    Carpal tunnel syndrome on both sides 1/15/2013    Chronic knee pain     on 4/17/18 pt is presently in pain mgmnt    Depressive disorder, not elsewhere classified 10/14/2014    Hydronephrosis, left 9/13/2016    Hypothyroidism 8/2/2013    Iron deficiency anemia 6/10/2014    Mild intermittent asthma without complication 2/55/2351    Obesity     Osteoarthritis     KNEE    Polyneuropathy 11/14/2012    RAD (reactive airway disease) 6/26/2012    Snores     possible apnea but not tested yet    Type II or unspecified type diabetes mellitus without mention of complication, not stated as uncontrolled     Urge incontinence of urine 5/31/2017    Wears glasses     Weight loss Other Topics Concern    Not on file   Social History Narrative    Not on file     Family History   Problem Relation Age of Onset    Stroke Father     Diabetes Mother     Hypertension Mother     Breast Cancer Neg Hx     Cancer Neg Hx     Colon Cancer Neg Hx     Eclampsia Neg Hx     Ovarian Cancer Neg Hx      Labor Neg Hx     Spont Abortions Neg Hx       Allergies   Allergen Reactions    Januvia [Sitagliptin Phosphate] Hives    Naproxen Sodium Hives    Neurontin [Gabapentin] Nausea Only      BP (!) 145/80   Pulse 86   Temp 98.4 °F (36.9 °C)   Resp 16   Ht 5' 7\" (1.702 m)   Wt 267 lb (121.1 kg)   LMP 2013   SpO2 98%   BMI 41.82 kg/m²      ROS:  Constitutional Negative for fever and chills   HEENT Negative for ear discharge, ear pain, nosebleed   Eyes Negative for photophobia, pain and discharge   Respiratory Negative for hemoptysis and sputum   Cardiovascular Negative for orthopnea, claudication and PND   Gastrointestinal Negative for abdominal pain, diarrhea, blood in stool   Musculoskeletal Negative for joint pain, negative for myalgia   Skin Negative for rash or itching   Endo/heme/allergies Negative for polydipsia, environmental allergy   Psychiatric/behavioral Negative for suicidal ideation.   Patient is not anxious   General examination:    Head: Normocephalic, atraumatic  Eyes: Extraocular movements intact  Lungs: Respirations unlabored, chest wall no deformity  ENT: Normal external ear canals, no sinus tenderness  Heart: Regular rate rhythm  Abdomen: No masses, tenderness  Extremities: No cyanosis or edema, 2+ pulses  Skin: Intact, normal skin color    Neurological examination:    Mental status   Alert and oriented; intact memory with no confusion, speech or language problems; no hallucinations or delusions     Cranial nerves   II -decreased visual acuity left eye with RAPD left eye III, IV, VI  extra-ocular muscles full: no pupillary defect; no SHAUNA, no nystagmus, no ptosis                                                                      V - normal facial sensation                                                               VII - normal facial symmetry                                                             VIII - intact hearing                                                                             IX, X - symmetrical palate                                                                  XI - symmetrical shoulder shrug                                                       XII - midline tongue without atrophy or fasciculation     Motor function  Normal muscle bulk and tone; normal power 5/5, including fine motor movements     Sensory function Intact to touch, pin, vibration, proprioception     Cerebellar Intact fine motor movement. No involuntary movements or tremors     Reflex function Intact 2+ DTR and symmetric.  Negative Babinski     Gait                  Not tested         Lab Results   Component Value Date    LDLCHOLESTEROL 100 01/27/2020     No components found for: CHLPL  Lab Results   Component Value Date    TRIG 100 09/11/2017    TRIG 89 02/27/2016    TRIG 65 02/10/2016     Lab Results   Component Value Date    HDL 82 01/27/2020    HDL 56 09/11/2017    HDL 57 02/27/2016     No results found for: LDLCALC  No results found for: LABVLDL  Lab Results   Component Value Date    LABA1C 14 (>) 10/26/2020     Lab Results   Component Value Date     05/20/2020     No results found for: [id number]   Neurological work up:  CT head normal  CTA head and neck normal  MRI brain   2 D echo     Assessment and Recommendations:   Acute onset of left eye decreased visual acuity and left frontal headache  Patient with uncontrolled diabetes with hemoglobin A1c more than 14 Differential diagnosis includes possible anterior ischemic optic neuropathy secondary to small vessel disease and uncontrolled diabetes. A possibility of left eye optic neuritis cannot be excluded. CT head is normal, CT angiogram head and neck is normal.  I would obtain MRI scan of the brain and MRV scan of the head. We will check hemoglobin A1c again  The patient will benefit from form ophthalmology consult  We will follow. Thank you for the consult. Bibi Rivera MD  Neurology    This note is created with the assistance of a speech-recognition program. While intending to generate a document that actually reflects the content of the visit, the document can still have some errors including those of syntax and sound a- like substitutions which may escape proofreading. In such instances, actual meaning can be extrapolated by contextual derivation.

## 2021-01-08 NOTE — PROGRESS NOTES
[facility]  Office: [address], DO, Jorge Dickerson, DO, Cris Wrae, DO, Cezar Holden Blood, DO, Karolina Freire MD, Martha Hidalgo MD, Daya Bojorquez MD, Silvina Prado MD, Deep Petty MD, Michelle Kincaid MD, Sully Morrell MD, Filomena Harper MD, Roopa Hernandez MD, Gabo Hampton DO, Etienne Kemp MD, Brigid Almazan MD, Dianna Chambers DO, Jane Breen MD,  Christopher Chiang DO, Dhaval Ram MD, Joanna Auguste MD, Patricio Cristobal, CNP, [facility], CNP, Reshma Sahni, CNP, Casper Puente, SANCHEZ, Unruly Chambers, CNP, Elaina Maguire, CNP, Sarah Nguyen, CNP, Jennifer Gordon, CNP, Terrie Rojo, CNP, Shaw Mathis PA-C, Alexandria Milian, [facility], Neelam Harper, CNP, Ifeoma Kimble, CNP, Uriel Florence, CNP, Aye Maloney, CNP, Chris Villa, CNP         [facility]   [address]    Progress Note    1/8/2021    2:24 PM    Name:   Mariposa Lam  MRN:     [id number]     Acct:      [de-identified]   Room:   2016/2016-02  IP Day:  0  Admit Date:  1/7/2021  1:23 PM    PCP:   MARILU Gipson CNP  Code Status:  Full Code    Subjective:     C/C:   Chief Complaint   Patient presents with   Ruthine Reasoner Vision     left eye and then developed a headache 2 days ago.  Headache     Interval History Status:   Patient came back after MRI  She is still having blurry vision  Brief History:   Mariposa Lam is a 45 y.o. Non-/non  female who presents with Blurred Vision (left eye and then developed a headache 2 days ago. ) and Headache     Bijan Singh is a pleasant 48-year-old female with history of type 2 diabetes mellitus with diabetic polyneuropathy with long-term current insulin use, hypertension, hyperlipidemia, and hypothyroidism.   She follows closely with her primary care provider MARILU Esquivel, CNP.    PRN Meds: sodium chloride flush, potassium chloride **OR** potassium alternative oral replacement **OR** potassium chloride, magnesium sulfate, polyethylene glycol, nicotine, acetaminophen **OR** acetaminophen, ondansetron **OR** ondansetron, glucose, dextrose, glucagon (rDNA), dextrose, albuterol sulfate HFA    Data:     Past Medical History:   has a past medical history of Anxiety, Arthritis of knee, degenerative, Carpal tunnel syndrome on both sides, Chronic knee pain, Depressive disorder, not elsewhere classified, Hydronephrosis, left, Hypothyroidism, Iron deficiency anemia, Mild intermittent asthma without complication, Obesity, Osteoarthritis, Polyneuropathy, RAD (reactive airway disease), Snores, Type II or unspecified type diabetes mellitus without mention of complication, not stated as uncontrolled, Urge incontinence of urine, Wears glasses, and Weight loss. Social History:   reports that she has been smoking cigarettes. She has a 3.75 pack-year smoking history. She has never used smokeless tobacco. She reports current alcohol use. She reports current drug use. Drug: Marijuana. Family History:   Family History   Problem Relation Age of Onset    Stroke Father     Diabetes Mother     Hypertension Mother     Breast Cancer Neg Hx     Cancer Neg Hx     Colon Cancer Neg Hx     Eclampsia Neg Hx     Ovarian Cancer Neg Hx      Labor Neg Hx     Spont Abortions Neg Hx        Vitals:  /69   Pulse 87   Temp 97.9 °F (36.6 °C) (Oral)   Resp 16   Ht 5' 7\" (1.702 m)   Wt 273 lb 9.6 oz (124.1 kg)   LMP 2013   SpO2 99%   BMI 42.85 kg/m²   Temp (24hrs), Av.2 °F (36.8 °C), Min:97.9 °F (36.6 °C), Max:98.4 °F (36.9 °C)    Recent Labs     21  0118 21  0230 21  0511 21  1137   POCGLU 412* 379* 216* 203*       I/O (24Hr):   No intake or output data in the 24 hours ending 21 1424    Labs:  Hematology:  Recent Labs     21  1410 21  4031 WBC 5.2 8.2   RBC 3.94* 3.82*   HGB 12.4 12.1   HCT 38.9 37.5   MCV 98.7 98.2   MCH 31.5 31.7   MCHC 31.9 32.3   RDW 13.3 13.4    286   MPV 9.7 9.8     Chemistry:  Recent Labs     01/07/21  1410 01/08/21  0625    139   K 4.2 4.2    108*   CO2 24 21   GLUCOSE 157* 197*   BUN 11 17   CREATININE 0.54 0.60   ANIONGAP 11 10   LABGLOM >60 >60   GFRAA >60 >60   CALCIUM 8.9 9.1     Recent Labs     01/07/21  2120 01/07/21  2311 01/08/21  0118 01/08/21  0230 01/08/21  0511 01/08/21  0625 01/08/21  1137   LABA1C  --   --   --   --   --  10.1*  --    POCGLU 402* 473* 412* 379* 216*  --  203*     ABG:  Lab Results   Component Value Date    POCPH 7.40 08/14/2013    PHART 7.373 08/13/2013    POCPCO2 34 08/14/2013    RUU5FSR 37.4 08/13/2013    POCPO2 71 08/14/2013    PO2ART 170.0 08/13/2013    POCHCO3 20.7 08/14/2013    YZY1NVI 21.3 08/13/2013    NBEA 4 08/14/2013    PBEA NOT REPORTED 08/14/2013    YKF1KBX 22 08/14/2013    UJTZ8PDB 94 08/14/2013    M3YCKPUB 99.0 08/13/2013    FIO2 NOT REPORTED 05/22/2020     Lab Results   Component Value Date/Time    SPECIAL NOT REPORTED 12/17/2020 06:24 PM     Lab Results   Component Value Date/Time    CULTURE (A) 10/22/2020 10:15 AM     STREPTOCOCCI, BETA HEMOLYTIC GROUP B >[id number] CFU/ML       Radiology:  Ct Head Wo Contrast    Result Date: 1/7/2021  Negative noncontrast CT examination of the brain. Mrv Head W Contrast    Result Date: 1/8/2021  Unremarkable MRV of the head. Cta Head Neck W Contrast    Result Date: 1/7/2021  Unremarkable CTA of the head and neck without significant stenosis or evidence for occlusion. Mri Brain W Wo Contrast    Result Date: 1/8/2021  Chronic microvascular disease without acute intracranial abnormality. No abnormal postcontrast enhancement.        Physical Examination:        General appearance:  alert, cooperative and no distress  Mental Status:  oriented to person, place and time and normal affect Lungs:  clear to auscultation bilaterally, normal effort  Heart:  regular rate and rhythm, no murmur  Abdomen:  soft, nontender, nondistended, normal bowel sounds, no masses, hepatomegaly, splenomegaly  Extremities:  no edema, redness, tenderness in the calves  Skin:  no gross lesions, rashes, induration    Assessment:        Hospital Problems           Last Modified POA    * (Principal) Headache 1/7/2021 Yes    Tobacco use 1/7/2021 Yes    Acquired hypothyroidism 1/7/2021 Yes    Hyperlipidemia 1/7/2021 Yes    Type 2 diabetes mellitus with hyperglycemia, with long-term current use of insulin ([facility] [address] 1/7/2021 Yes    Hypertension 1/7/2021 Yes    Class 3 severe obesity in adult [facility]) 1/7/2021 Yes    Visual changes 1/7/2021 Yes          Plan:        1. Increase Lantus insulin to 25 units twice daily  2. Continue insulin sliding scale and home dose of Metformin  3. Consult neurology  4. Neurology to review MRI/MRV  5. Consult ophthalmology if any physician comes to this hospital  6. Discussed diet controlled and regular use of insulins  7.  Likely to be discharge if cleared by neurology    Brittani Lynn MD  1/8/2021  2:24 PM

## 2021-01-09 VITALS
SYSTOLIC BLOOD PRESSURE: 113 MMHG | OXYGEN SATURATION: 99 % | HEIGHT: 67 IN | BODY MASS INDEX: 43.52 KG/M2 | WEIGHT: 277.3 LBS | RESPIRATION RATE: 16 BRPM | TEMPERATURE: 97.5 F | DIASTOLIC BLOOD PRESSURE: 61 MMHG | HEART RATE: 70 BPM

## 2021-01-09 LAB
GLUCOSE BLD-MCNC: 159 MG/DL (ref 65–105)
GLUCOSE BLD-MCNC: 168 MG/DL (ref 65–105)

## 2021-01-09 PROCEDURE — 97116 GAIT TRAINING THERAPY: CPT

## 2021-01-09 PROCEDURE — 2580000003 HC RX 258: Performed by: INTERNAL MEDICINE

## 2021-01-09 PROCEDURE — 6370000000 HC RX 637 (ALT 250 FOR IP): Performed by: NURSE PRACTITIONER

## 2021-01-09 PROCEDURE — 99225 PR SBSQ OBSERVATION CARE/DAY 25 MINUTES: CPT | Performed by: INTERNAL MEDICINE

## 2021-01-09 PROCEDURE — 82947 ASSAY GLUCOSE BLOOD QUANT: CPT

## 2021-01-09 PROCEDURE — 6370000000 HC RX 637 (ALT 250 FOR IP): Performed by: INTERNAL MEDICINE

## 2021-01-09 PROCEDURE — 96372 THER/PROPH/DIAG INJ SC/IM: CPT

## 2021-01-09 PROCEDURE — G0378 HOSPITAL OBSERVATION PER HR: HCPCS

## 2021-01-09 PROCEDURE — 6360000002 HC RX W HCPCS: Performed by: INTERNAL MEDICINE

## 2021-01-09 RX ORDER — INSULIN GLARGINE 100 [IU]/ML
25 INJECTION, SOLUTION SUBCUTANEOUS 2 TIMES DAILY
Qty: 5 PEN | Refills: 3 | Status: SHIPPED | OUTPATIENT
Start: 2021-01-09 | End: 2021-07-16

## 2021-01-09 RX ADMIN — Medication 10 ML: at 08:58

## 2021-01-09 RX ADMIN — LEVOTHYROXINE SODIUM 50 MCG: 0.05 TABLET ORAL at 08:56

## 2021-01-09 RX ADMIN — ENOXAPARIN SODIUM 30 MG: 30 INJECTION SUBCUTANEOUS at 08:55

## 2021-01-09 RX ADMIN — LISINOPRIL 5 MG: 5 TABLET ORAL at 08:56

## 2021-01-09 RX ADMIN — ATORVASTATIN CALCIUM 20 MG: 20 TABLET, FILM COATED ORAL at 08:56

## 2021-01-09 RX ADMIN — INSULIN GLARGINE 25 UNITS: 100 INJECTION, SOLUTION SUBCUTANEOUS at 08:57

## 2021-01-09 RX ADMIN — METFORMIN HYDROCHLORIDE 500 MG: 500 TABLET ORAL at 08:55

## 2021-01-09 RX ADMIN — INSULIN LISPRO 35 UNITS: 100 INJECTION, SOLUTION INTRAVENOUS; SUBCUTANEOUS at 12:16

## 2021-01-09 RX ADMIN — ASPIRIN 81 MG: 81 TABLET, COATED ORAL at 08:56

## 2021-01-09 RX ADMIN — ACETAMINOPHEN 650 MG: 325 TABLET ORAL at 09:07

## 2021-01-09 ASSESSMENT — PAIN DESCRIPTION - LOCATION: LOCATION: HEAD

## 2021-01-09 ASSESSMENT — PAIN DESCRIPTION - FREQUENCY: FREQUENCY: CONTINUOUS

## 2021-01-09 ASSESSMENT — PAIN - FUNCTIONAL ASSESSMENT: PAIN_FUNCTIONAL_ASSESSMENT: ACTIVITIES ARE NOT PREVENTED

## 2021-01-09 ASSESSMENT — PAIN DESCRIPTION - PROGRESSION: CLINICAL_PROGRESSION: GRADUALLY IMPROVING

## 2021-01-09 ASSESSMENT — PAIN DESCRIPTION - DESCRIPTORS: DESCRIPTORS: HEADACHE

## 2021-01-09 NOTE — PLAN OF CARE
Problem: Safety:  Goal: Free from accidental physical injury  Description: Free from accidental physical injury  Outcome: Ongoing     Problem: Pain:  Goal: Patient's pain/discomfort is manageable  Description: Patient's pain/discomfort is manageable  Outcome: Ongoing  Note: Patient has no complaints of pain at this time, will continue to monitor      Problem: Serum Glucose Level - Abnormal:  Goal: Ability to maintain appropriate glucose levels will improve  Description: Ability to maintain appropriate glucose levels will improve  Outcome: Ongoing  Note: Glucose checks q6, appropriate insulin given, will continue to monitor

## 2021-01-09 NOTE — CARE COORDINATION
Case Management Initial Discharge Plan  Kate Verde,             Met with:patient to discuss discharge plans. Information verified: address, contacts, phone number, , insurance Yes  PCP: MARILU Lucas CNP  Date of last visit: 20  VV    Insurance Provider: Toby Camejo Dual    Discharge Planning    Living Arrangements:  SO   Support Systems:  Children, Family Members    Home has 1 stories  3 stairs to climb to get into front door, 0 stairs to climb to reach second floor  Location of bedroom/bathroom in home  - main floor    Patient able to perform ADL's:Independent    Current Services (outpatient & in home) none  DME equipment: cane, glucometer  DME provider: N/A    Ph[facility]acy: [facility]    Potential Assistance Needed:  N/A    Patient agreeable to home care: No  [place] of choice provided:  n/a    Prior SNF/Rehab Placement and Facility: No  Agreeable to SNF/Rehab: No  [place] of choice provided: n/a   Evaluation: n/a    Expected Discharge date:  01/10/21  Patient [facility] to be [facility]  Follow Up Appointment: Best Day/ Time: Monday AM    Transportation provider: ADRIAN  Transportation arrangements needed for discharge: No    Readmission Risk              Risk of Unplanned Readmission:        0             Does patient have a readmission risk score greater than 14?: No  If yes, follow-up appointment must be made within 7 days of discharge. Goal of Care:       Discharge Plan: Met with patient at bedside. Lives with SO, independent and drives. Admitted for headache and states has had H/A and blurred vision lt eye for 3-4 days. Neurology consulted and recommending ophthalmology follow up at D/C. Potential D/C today and pt will F/U with ophthalmologist as directed.            Electronically signed by Juan Manuel Young RN on 21 at 12:01 PM EST

## 2021-01-09 NOTE — DISCHARGE SUMMARY
[facility]  Office: [phone]  Kim Teixeira DO, Veta Mcardle, DO, Ravi Griggs DO, Melina Edgar DO, Gordon Freitas MD, Ashia Richardson MD, Dian Gates MD, Suha Walker MD, Brittani Vásquez MD, Nasreen Ryan MD, Angy Shannon MD, Bertha Loya MD, Roopa Chun MD, Germán Pollack DO, Deshaun Rogers MD, Kirk Palomares MD, Alla Mckeon DO, Teresa Whelan MD,  Debbie Irwin DO, Alexander Yoder MD, Trey Santillan MD, Scarlett Diez, [facility], [facility], CNP, Alyx Ceja, CNP, Xavier Kahn, CNS, Erna Walker, CNP, Laura Pacheco, CNP, Charlotte Amaral, CNP, Garry Meenakshi, CNP, Melissa Red, CNP, Jenifer Benavides PA-C, Jodie Matthews, [facility], Fransisco Lund, CNP, Poly Portal, CNP, Nely Fossa, CNP, Fawad Adams, CNP, Diamond Momin, CNP         [facility] 97    Discharge Summary     Patient ID: Margarita Fong  :  1970   MRN: [id number]     ACCOUNT:  [de-identified]   Patient's PCP: MARILU Sanchez CNP  Admit Date: 2021   Discharge Date: 2021    Length of Stay: 0  Code Status:  Prior  Admitting Physician: Kevyn Tay MD  Discharge Physician: Kevyn Tay MD     Active Discharge Diagnoses:     Hospital Problem Lists:  Principal Problem:    Acute headache  Active Problems:    Tobacco use    Acquired hypothyroidism    Hyperlipidemia    Type 2 diabetes mellitus with hyperglycemia, with long-term current use of insulin (HCC)    Hypertension    Class 3 severe obesity in [facility])    Visual changes    Blindness of left eye  Resolved Problems:    * No resolved hospital problems.  *      Admission Condition:  poor     Discharged Condition: stable    Hospital Stay:   Admitting history:  Tj Rivera a 45 y.o. Non-/non  female who presents with Blurred Vision (left eye and then developed a headache 2 days ago. ) and Headache    Rubin Zamora is a pleasant 58-year-old female with history of type 2 diabetes mellitus with diabetic polyneuropathy with long-term current insulin use, hypertension, hyperlipidemia, and hypothyroidism.  She follows closely with her primary care provider MARILU Rangel, CNP.     Ms. Boyd reports that she was in her usual state of health until 1/5/2021 when she woke with blurred vision in her left eye, without associated signs and symptoms.  She progressively developed left frontal headache without photophobia, nausea or vomiting.  Of note she has poorly controlled blood sugars with her last hemoglobin A1c documented in October 2020 being 14%.   She reports being compliant with her diabetic regimen \" some of the time\".         On 1/7/2021 she presented to the ED for further evaluation and management.  While in the ED noncontrast head CT was normal, CT angiogram and head and neck normal as well. Mary Cruz is being admitted to the hospitalist service, observation status, with consultation to neurology    Hospital Course:   Blood glucose improved 159168, A1c 10.1  MRI/MRV no acute findings  She is still having blurry vision  Headache resolved  Plan:         1. Continue Lantus insulin to 25 units twice daily  2. Continue home dose of Metformin  3. Consult ophthalmology as outpatient   4. discussed diet controlled and regular use of insulins  5.  Discharge home, cleared by neurology      Significant therapeutic interventions: Diabetes and blood pressure control    Significant Diagnostic Studies:   Labs / Micro:  CBC:   Lab Results   Component Value Date    WBC 8.2 01/08/2021    RBC 3.82 01/08/2021    RBC 4.29 04/26/2012    HGB 12.1 01/08/2021    HCT 37.5 01/08/2021    MCV 98.2 01/08/2021    MCH 31.7 01/08/2021    MCHC 32.3 01/08/2021    RDW 13.4 01/08/2021     01/08/2021     04/26/2012     BMP:    Lab Results   Component Value Date    GLUCOSE 197 01/08/2021    GLUCOSE 296 12/29/2011 Component Value Date    TSH 0.43 01/08/2021        Radiology:  Ct Head Wo Contrast    Result Date: 1/7/2021  Negative noncontrast CT examination of the brain. Mrv Head W Contrast    Result Date: 1/8/2021  Unremarkable MRV of the head. Cta Head Neck W Contrast    Result Date: 1/7/2021  Unremarkable CTA of the head and neck without significant stenosis or evidence for occlusion. Mri Brain W Wo Contrast    Result Date: 1/8/2021  Chronic microvascular disease without acute intracranial abnormality. No abnormal postcontrast enhancement. Consultations:    Consults:     Final Specialist Recommendations/Findings:   IP CONSULT TO NEUROLOGY  IP CONSULT TO INTERNAL MEDICINE  IP CONSULT TO NEUROLOGY  IP CONSULT TO OPHTHALMOLOGY      The patient was seen and examined on day of discharge and this discharge summary is in conjunction with any daily progress note from day of discharge. Discharge plan:     Disposition: Home    Physician Follow Up:     MARILU Jasso [address] [phone]    Schedule an appointment as soon as possible for a visit in 1 week  call office to schedule a follow up appointment in one week        Requiring Further Evaluation/Follow Up POST HOSPITALIZATION/Incidental Findings:  Follow-up with ophthalmology for blurry vision    Diet: cardiac diet and diabetic diet    Activity: As tolerated    Instructions to Patient: Continue diabetes and blood pressure medicines as recommended    Discharge Medications:      Medication List      CHANGE how you take these medications    Dorothy Nelson 100 UNIT/ML injection pen  Generic drug: insulin glargine  Inject 25 Units into the skin 2 times daily  What changed:   · how much to take  · when to take this        CONTINUE taking these medications    albuterol sulfate  (90 Base) MCG/ACT inhaler  Commonly known as: Proventil HFA  Inhale 2 puffs into the lungs every 6 hours as needed for Wheezing aspirin EC 81 MG EC tablet  Take 1 tablet by mouth daily     blood glucose monitor kit and supplies  Dispense sufficient amount for indicated testing frequency plus additional to accommodate PRN testing needs. Dispense all needed supplies to include: monitor, strips, lancing device, lancets, control solutions, alcohol swabs. Blood Pressure Kit  Use to monitor blood pressure daily and as needed     CALCITRATE/VITAMIN D PO     diclofenac 50 MG EC tablet  Commonly known as: VOLTAREN  Take 1 tablet by mouth 3 times daily (with meals)     ferrous sulfate 325 (65 Fe) MG tablet  Commonly known as: IRON 325  Take 1 tablet by mouth 2 times daily     Insulin Pen Needle 31G X 5 MM Misc  1 each by Does not apply route daily     levothyroxine 50 MCG tablet  Commonly known as: SYNTHROID  Take 1 tablet by mouth daily     lisinopril 5 MG tablet  Commonly known as: PRINIVIL;ZESTRIL  Take 1 tablet by mouth daily     metFORMIN 500 MG tablet  Commonly known as: GLUCOPHAGE  Take 1 tablet by mouth 2 times daily (with meals)     mirabegron 25 MG Tb24  Commonly known as: MYRBETRIQ  Take 1 tablet by mouth daily     NovoLOG FlexPen 100 UNIT/ML injection pen  Generic drug: insulin aspart  Inject 35 Units into the skin 3 times daily (before meals)     nystatin 987008 UNIT/GM powder  Commonly known as: MYCOSTATIN  Apply to skin of legs and in folds of skin with irritation     ONE TOUCH ULTRASOFT LANCETS Misc  Dx: DM-2 use 2-3 times daily     simvastatin 40 MG tablet  Commonly known as: ZOCOR  Take 1 tablet by mouth nightly     traZODone 150 MG tablet  Commonly known as: DESYREL  Take 1 tablet by mouth nightly        STOP taking these medications    TRUEtest Test strip  Generic drug: blood glucose test strips     zoster recombinant adjuvanted vaccine 50 MCG/0.5ML Susr injection  Commonly known as:  Shingrix           Where to Get Your Medications These medications were sent to [facility] [address], [address] [facility]  [facility], [address] [facility]    Phone: [phone]   · Dorothy ContrerasPen 100 UNIT/ML injection pen         No discharge procedures on file. Time Spent on discharge is  31 mins in patient examination, evaluation, counseling as well as medication reconciliation, prescriptions for required medications, discharge plan and follow up. Electronically signed by   Cathy Quarles MD  1/9/2021  5:58 PM      Thank you Dr. Fani Hauser, APRN - CNP for the opportunity to be involved in this patient's care.

## 2021-01-09 NOTE — PROGRESS NOTES
Patient discharged via wheelchair to home with all her belongings in stable condition.  Patient understood and signed AVS.

## 2021-01-09 NOTE — PROGRESS NOTES
[facility]  Office: [address], DO, Fede Clark, DO, Diana Gunn, DO, [facility], DO, Fito Rodriguez MD, Olivia Devine MD, Chase Rogers MD, Alverto Hernandes MD, Taran Perry MD, Bella Olivier MD, Fatoumata Wilhelm MD, Patricio Bumpers, MD, Roopa Thomas MD, Osiel Guzman DO, Cailin Ortega MD, Garett Goldstein MD, Sixto Drake DO, Jarrell Bustos MD,  Seth Vilchistering, DO, Antonio Rocha MD, Preston Dubois MD, Annabelle Kunz CNP, [facility], CNP, Genesis Conner CNP, Jose David Zhong, CNS, Isabella Vincent, CNP, Camila Nye, CNP, Chago Puente, CNP, Sophia Aguilar CNP, Jay Poe CNP, Porsha Byers PA-C, Marcello Kayser, PILAR, Maximo Casas CNP, Denver Macadam, NORBERT, Julio Dyer, [facility]BERT, Armand Troncoso CNP, Ulysses Santillan [facility]    Progress Note    1/9/2021    12:03 PM    Name:   Adriana Botello  MRN:     [id number]     Acct:      [de-identified]   Room:   2016/2016-02   Day:  0  Admit Date:  1/7/2021  1:23 PM    PCP:   MARILU Byers CNP  Code Status:  Full Code    Subjective:     C/C:   Chief Complaint   Patient presents with   Eboni Billet Vision     left eye and then developed a headache 2 days ago.  Headache     Interval History Status:   Blood glucose improved 159168, A1c 10.1  MRI/MRV no acute findings  She is still having blurry vision  Headache resolved  Brief History:   Adriana Botello is a 45 y.o. Non-/non  female who presents with Blurred Vision (left eye and then developed a headache 2 days ago. ) and Headache     Prabhakar Sykes is a pleasant 18-year-old female with history of type 2 diabetes mellitus with diabetic polyneuropathy with long-term current insulin use, hypertension, hyperlipidemia, and hypothyroidism.   She follows closely with her primary care provider MARILU Kay, NORBERT.    Ms. Boyd reports that she was in her usual state of health until 1/5/2021 when she woke with blurred vision in her left eye, without associated signs and symptoms. She progressively developed left frontal headache without photophobia, nausea or vomiting. Of note she has poorly controlled blood sugars with her last hemoglobin A1c documented in October 2020 being 14%. She reports being compliant with her diabetic regimen \" some of the time\".        On 1/7/2021 she presented to the ED for further evaluation and management. While in the ED noncontrast head CT was normal, CT angiogram and head and neck normal as well. She is being admitted to the hospitalist service, observation status, with consultation to neurology      Review of Systems:     Constitutional:  negative for chills, fevers, sweats  Respiratory:  negative for cough, dyspnea on exertion, shortness of breath, wheezing  Cardiovascular:  negative for chest pain, chest pressure/discomfort, lower extremity edema, palpitations  Gastrointestinal:  negative for abdominal pain, constipation, diarrhea, nausea, vomiting  Neurological:  negative for dizziness,+  blurred vision    Medications: Allergies:     Allergies   Allergen Reactions    Januvia [Sitagliptin Phosphate] Hives    Naproxen Sodium Hives    Neurontin [Gabapentin] Nausea Only       Current Meds:   Scheduled Meds:    ibuprofen  400 mg Oral TID WC    metFORMIN  500 mg Oral BID WC    mirabegron  25 mg Oral Daily    insulin glargine  25 Units Subcutaneous BID    sodium chloride flush  10 mL Intravenous 2 times per day    enoxaparin  30 mg Subcutaneous BID    insulin lispro  35 Units Subcutaneous TID WC    insulin lispro  0-9 Units Subcutaneous Nightly    aspirin EC  81 mg Oral Daily    levothyroxine  50 mcg Oral Daily    lisinopril  5 mg Oral Daily    atorvastatin  20 mg Oral Daily     Continuous Infusions:    dextrose 1727   WBC 5.2 8.2  --    RBC 3.94* 3.82*  --    HGB 12.4 12.1  --    HCT 38.9 37.5  --    MCV 98.7 98.2  --    MCH 31.5 31.7  --    MCHC 31.9 32.3  --    RDW 13.3 13.4  --     286  --    MPV 9.7 9.8  --    SEDRATE  --   --  20   CRP  --   --  <3.0     Chemistry:  Recent Labs     01/07/21  1410 01/08/21  0625    139   K 4.2 4.2    108*   CO2 24 21   GLUCOSE 157* 197*   BUN 11 17   CREATININE 0.54 0.60   ANIONGAP 11 10   LABGLOM >60 >60   GFRAA >60 >60   CALCIUM 8.9 9.1     Recent Labs     01/08/21  0511 01/08/21  0625 01/08/21  1137 01/08/21  1616 01/08/21 2025 01/09/21  0611 01/09/21  1145   LABA1C  --  10.1*  --   --   --   --   --    TSH  --  0.43  --   --   --   --   --    POCGLU 216*  --  203* 86 237* 159* 168*     ABG:  Lab Results   Component Value Date    POCPH 7.40 08/14/2013    PHART 7.373 08/13/2013    POCPCO2 34 08/14/2013    GVM5KWM 37.4 08/13/2013    POCPO2 71 08/14/2013    PO2ART 170.0 08/13/2013    POCHCO3 20.7 08/14/2013    TXV2SQO 21.3 08/13/2013    NBEA 4 08/14/2013    PBEA NOT REPORTED 08/14/2013    ZJJ5BYJ 22 08/14/2013    XZXO4WHS 94 08/14/2013    K9YKOPYF 99.0 08/13/2013    FIO2 NOT REPORTED 05/22/2020     Lab Results   Component Value Date/Time    SPECIAL NOT REPORTED 12/17/2020 06:24 PM     Lab Results   Component Value Date/Time    CULTURE (A) 10/22/2020 10:15 AM     STREPTOCOCCI, BETA HEMOLYTIC GROUP B >[id number] CFU/ML       Radiology:  Ct Head Wo Contrast    Result Date: 1/7/2021  Negative noncontrast CT examination of the brain. Mrv Head W Contrast    Result Date: 1/8/2021  Unremarkable MRV of the head. Cta Head Neck W Contrast    Result Date: 1/7/2021  Unremarkable CTA of the head and neck without significant stenosis or evidence for occlusion. Mri Brain W Wo Contrast    Result Date: 1/8/2021  Chronic microvascular disease without acute intracranial abnormality. No abnormal postcontrast enhancement.        Physical Examination: General appearance:  alert, cooperative and no distress  Mental Status:  oriented to person, place and time and normal affect  Lungs:  clear to auscultation bilaterally, normal effort  Heart:  regular rate and rhythm, no murmur  Abdomen:  soft, nontender, nondistended, normal bowel sounds, no masses, hepatomegaly, splenomegaly  Extremities:  no edema, redness, tenderness in the calves  Skin:  no gross lesions, rashes, induration    Assessment:        Hospital Problems           Last Modified POA    * (Principal) Acute headache 1/8/2021 Yes    Tobacco use 1/7/2021 Yes    Acquired hypothyroidism 1/7/2021 Yes    Hyperlipidemia 1/7/2021 Yes    Type 2 diabetes mellitus with hyperglycemia, with long-term current use of insulin ([facility] [address] 1/7/2021 Yes    Hypertension 1/7/2021 Yes    Class 3 severe obesity in [facility]) 1/7/2021 Yes    Visual changes 1/7/2021 Yes    Blindness of left eye 1/8/2021 Yes          Plan:        1. Continue Lantus insulin to 25 units twice daily  2. Continue home dose of Metformin  3. Consult ophthalmology as outpatient   4. discussed diet controlled and regular use of insulins  5.  Discharge home, cleared by neurology    Yara Pedro MD  1/9/2021  12:03 PM

## 2021-01-09 NOTE — PROGRESS NOTES
Pain Screening  Patient Currently in Pain: Denies(States headache has resolved.)  Vital Signs  Patient Currently in Pain: Denies(States headache has resolved.)       Orientation     Cognition      Objective      Transfers  Sit to Stand: Stand by assistance  Stand to sit: Stand by assistance  Ambulation  Ambulation?: Yes  Ambulation 1  Surface: level tile  Device: No Device  Assistance: Stand by assistance  Gait Deviations: Decreased step length(Occasional UE use on hand rail.)  Distance: 300'  Comments: Patient has no concerns regarding entry or functional mobility at home.                                  G-Code     OutComes Score                                                     AM-PAC Score     AM-PAC Inpatient Mobility without Stair Climbing Raw Score : 20 (01/09/21 1100)  AM-PAC Inpatient without Stair Climbing T-Scale Score : 60.57 (01/09/21 1100)  Mobility Inpatient CMS 0-100% Score: 0 (01/09/21 1100)  Mobility Inpatient without Stair CMS G-Code Modifier : [facility] (01/09/21 1100)       Goals  Short term goals  Time Frame for Short term goals: 10 visits  Short term goal 1: Independent sit<>stand  Short term goal 2: Ambulate 300' without device independent  Short term goal 3: patient to ascend/descend 2 steps with B HR Independent    Plan    Plan  Times per week: 1-2x/day for 5-6 days/week  Current Treatment Recommendations: Transfer Training, Balance Training, Gait Training, Stair training, Patient/Caregiver Education & Training  Safety Devices  Type of devices: Left in bed, Gait belt, Call light within reach     Therapy Time   Individual Concurrent Group Co-treatment   Time In  1039         Time Out 1052         Minutes [address], PTA

## 2021-01-09 NOTE — PLAN OF CARE
Problem: Safety:  Goal: Free from accidental physical injury  Description: Free from accidental physical injury  1/9/2021 1007 by Karen Yeung RN  Outcome: Ongoing  1/9/2021 0329 by Sofie Grossman RN  Outcome: Ongoing     Problem: Daily Care:  Goal: Daily care needs are met  Description: Daily care needs are met  Outcome: Ongoing     Problem: Pain:  Description: Pain management should include both nonpharmacologic and pharmacologic interventions. Goal: Patient's pain/discomfort is manageable  Description: Patient's pain/discomfort is manageable  1/9/2021 1007 by Karen Yeung RN  Outcome: Ongoing  Note: Pain level assessment complete.    Patient educated on pain scale and control interventions  PRN pain medication given per patient request  Patient instructed to call out with new onset of pain or unrelieved pain    1/9/2021 0329 by Sofie Grossman RN  Outcome: Ongoing  Note: Patient has no complaints of pain at this time, will continue to monitor   Goal: Pain level will decrease  Description: Pain level will decrease  Outcome: Ongoing  Goal: Control of acute pain  Description: Control of acute pain  Outcome: Ongoing  Goal: Control of chronic pain  Description: Control of chronic pain  Outcome: Ongoing     Problem: Discharge Planning:  Goal: Patients continuum of care needs are met  Description: Patients continuum of care needs are met  Outcome: Ongoing     Problem: Serum Glucose Level - Abnormal:  Goal: Ability to maintain appropriate glucose levels will improve  Description: Ability to maintain appropriate glucose levels will improve  1/9/2021 1007 by Karen Yeung RN  Outcome: Ongoing  1/9/2021 0329 by Sofie Grossman RN  Outcome: Ongoing  Note: Glucose checks q6, appropriate insulin given, will continue to monitor      Problem: Falls - Risk of:  Goal: Will remain free from falls  Description: Will remain free from falls  Outcome: Ongoing  Goal: Absence of physical injury

## 2021-01-11 ENCOUNTER — TELEPHONE (OUTPATIENT)
Dept: GASTROENTEROLOGY | Age: 51
End: 2021-01-11

## 2021-01-11 DIAGNOSIS — Z12.11 SCREEN FOR COLON CANCER: Primary | ICD-10-CM

## 2021-01-11 RX ORDER — POLYETHYLENE GLYCOL 3350 17 G/17G
POWDER, FOR SOLUTION ORAL
Qty: 238 G | Refills: 0 | Status: ON HOLD | OUTPATIENT
Start: 2021-01-11 | End: 2021-11-08

## 2021-01-11 RX ORDER — BISACODYL 5 MG
TABLET, DELAYED RELEASE (ENTERIC COATED) ORAL
Qty: 2 TABLET | Refills: 0 | Status: ON HOLD | OUTPATIENT
Start: 2021-01-11 | End: 2021-11-08

## 2021-01-11 NOTE — TELEPHONE ENCOUNTER
Called and spoke to pt regarding referral for colon screen. Pt scheduled at [facility] with Dr Ce Arevalo on [address] 1/19/21 at 8am for colon screen. Covid test 1/15/21 at 120pm. Miralax bowel prep instructions given to pt over phone and sent via [facility].

## 2021-01-15 ENCOUNTER — HOSPITAL ENCOUNTER (OUTPATIENT)
Dept: LAB | Age: 51
Setting detail: SPECIMEN
Discharge: HOME | End: 2021-01-15
Payer: COMMERCIAL

## 2021-01-15 DIAGNOSIS — Z01.818 PREOP TESTING: Primary | ICD-10-CM

## 2021-01-18 NOTE — TELEPHONE ENCOUNTER
Called pt and LVM regarding covid test on 1/15/21; no results in Epic. Pt advised to call the office to discuss procedure on 1/19/21. If pt completed bowel prep today, she will need rapid covid test tomorrow.

## 2021-02-18 DIAGNOSIS — R21 RASH AND NONSPECIFIC SKIN ERUPTION: ICD-10-CM

## 2021-02-18 RX ORDER — NYSTATIN 100000 [USP'U]/G
POWDER TOPICAL
Qty: 1 BOTTLE | Refills: 0 | Status: ON HOLD | OUTPATIENT
Start: 2021-02-18 | End: 2021-11-08

## 2021-02-18 NOTE — TELEPHONE ENCOUNTER
Refill request for Nyamyc. If appropriate please send medication(s) to patients pharmacy. Next appt: None currently. Note to pharmacy to have patient contact the office to schedule appointment. Last appt: 11/17/2020    Health Maintenance   Topic Date Due    Hepatitis C screen  1970    DTaP/Tdap/Td vaccine (1 - Tdap) 07/06/1988    Diabetic retinal exam  10/01/2016    Diabetic microalbuminuria test  09/11/2018    Shingles Vaccine (1 of 2) 07/06/2019    Colon cancer screen colonoscopy  07/06/2019    Diabetic foot exam  03/06/2020    Hepatitis B vaccine (2 of 3 - Risk 3-dose series) 11/23/2020    Lipid screen  01/27/2021    Breast cancer screen  02/27/2021    A1C test (Diabetic or Prediabetic)  04/08/2021    Annual Wellness Visit (AWV)  11/06/2021    TSH testing  01/08/2022    Potassium monitoring  01/08/2022    Creatinine monitoring  01/08/2022    Flu vaccine  Completed    Pneumococcal 0-64 years Vaccine  Completed    HIV screen  Completed    Hepatitis A vaccine  Aged Out    Hib vaccine  Aged Out    Meningococcal (ACWY) vaccine  Aged Out       Hemoglobin A1C (%)   Date Value   01/08/2021 10.1 (H)   10/26/2020 14 (>)   05/20/2020 11.2 (H)             ( goal A1C is < 7)   Microalb/Crt.  Ratio (mcg/mg creat)   Date Value   09/11/2017 7     LDL Cholesterol (mg/dL)   Date Value   01/27/2020 100       (goal LDL is <100)   AST (U/L)   Date Value   05/22/2020 15     ALT (U/L)   Date Value   05/22/2020 14     BUN (mg/dL)   Date Value   01/08/2021 17     BP Readings from Last 3 Encounters:   01/09/21 113/61   12/17/20 139/84   10/26/20 123/74          (goal 120/80)          Patient Active Problem List:     Tobacco use     Carpal tunnel syndrome on both sides     Acquired hypothyroidism     Hyperlipidemia     Iron deficiency anemia     Mild episode of recurrent major depressive disorder (HCC)     Type 2 diabetes mellitus with hyperglycemia, with long-term current use of insulin ([address]     Morbid obesity with BMI of 50.0-59.9, adult (HCC)     Primary osteoarthritis of both knees     Urge incontinence of urine     Mild intermittent asthma without complication     Pure hypercholesterolemia     Hypertension     Pelvic pain in female     Bilateral ovarian cysts     H/O Hysterectomy (2013)     Trichomonal vaginitis     Chest pain     Frontal headache     Frontal sinusitis     Class 3 severe obesity in adult [facility])     Bilateral chronic knee pain     Acute headache     Visual changes     Blindness of left eye

## 2021-02-23 RX ORDER — GLUCOSAMINE HCL/CHONDROITIN SU 500-400 MG
1 CAPSULE ORAL 3 TIMES DAILY
Qty: 100 STRIP | Refills: 3 | Status: SHIPPED | OUTPATIENT
Start: 2021-02-23 | End: 2022-03-30 | Stop reason: SDUPTHER

## 2021-02-23 NOTE — TELEPHONE ENCOUNTER
Test strips refills     Pt has a future appointment          Next Visit Date:  Future Appointments   Date Time Provider Eriberto Chirinos   3/4/2021  2:30 PM SCHEDULE, [facility]   3/29/2021  9:00 AM Meng Holman APRN - CNP [id number] [facility]   Topic Date Due    Hepatitis C screen  1970    DTaP/Tdap/Td vaccine (1 - Tdap) 07/06/1988    Diabetic retinal exam  10/01/2016    Diabetic microalbuminuria test  09/11/2018    Shingles Vaccine (1 of 2) 07/06/2019    Colon cancer screen colonoscopy  07/06/2019    Diabetic foot exam  03/06/2020    Hepatitis B vaccine (2 of 3 - Risk 3-dose series) 11/23/2020    Lipid screen  01/27/2021    Breast cancer screen  02/27/2021    A1C test (Diabetic or Prediabetic)  04/08/2021    Annual Wellness Visit (AWV)  11/06/2021    TSH testing  01/08/2022    Potassium monitoring  01/08/2022    Creatinine monitoring  01/08/2022    Flu vaccine  Completed    Pneumococcal 0-64 years Vaccine  Completed    HIV screen  Completed    Hepatitis A vaccine  Aged Out    Hib vaccine  Aged Out    Meningococcal (ACWY) vaccine  Aged Out       Hemoglobin A1C (%)   Date Value   01/08/2021 10.1 (H)   10/26/2020 14 (>)   05/20/2020 11.2 (H)             ( goal A1C is < 7)   Microalb/Crt.  Ratio (mcg/mg creat)   Date Value   09/11/2017 7     LDL Cholesterol (mg/dL)   Date Value   01/27/2020 100   09/11/2017 113       (goal LDL is <100)   AST (U/L)   Date Value   05/22/2020 15     ALT (U/L)   Date Value   05/22/2020 14     BUN (mg/dL)   Date Value   01/08/2021 17     BP Readings from Last 3 Encounters:   01/09/21 113/61   12/17/20 139/84   10/26/20 123/74          (goal 120/80)    All Future Testing planned in [facility]  Lab Frequency Next Occurrence   Microalbumin, Ur Once 04/14/2021   Urinalysis Reflex to Culture Once 02/17/2021   COVID-19 Once 01/15/2021               Patient Active Problem List:     Tobacco use     Carpal tunnel syndrome on both sides     Acquired hypothyroidism     Hyperlipidemia     Iron deficiency anemia     Mild episode of recurrent major depressive disorder (HCC)     Type 2 diabetes mellitus with hyperglycemia, with long-term current use of insulin (HCC)     Morbid obesity with BMI of 50.0-59.9, adult (HCC)     Primary osteoarthritis of both knees     Urge incontinence of urine     Mild intermittent asthma without complication     Pure hypercholesterolemia     Hypertension     Pelvic pain in female     Bilateral ovarian cysts     H/O Hysterectomy (2013)     Trichomonal vaginitis     Chest pain     Frontal headache     Frontal sinusitis     Class 3 severe obesity in adult [facility])     Bilateral chronic knee pain     Acute headache     Visual changes     Blindness of left eye

## 2021-03-08 ENCOUNTER — TELEPHONE (OUTPATIENT)
Dept: INTERNAL MEDICINE | Age: 51
End: 2021-03-08

## 2021-03-08 DIAGNOSIS — E11.42 TYPE 2 DIABETES MELLITUS WITH DIABETIC POLYNEUROPATHY, WITH LONG-TERM CURRENT USE OF INSULIN (HCC): Primary | ICD-10-CM

## 2021-03-08 DIAGNOSIS — Z79.4 TYPE 2 DIABETES MELLITUS WITH DIABETIC POLYNEUROPATHY, WITH LONG-TERM CURRENT USE OF INSULIN (HCC): Primary | ICD-10-CM

## 2021-03-08 RX ORDER — BLOOD-GLUCOSE METER
1 KIT MISCELLANEOUS DAILY
Qty: 1 KIT | Refills: 0 | Status: SHIPPED | OUTPATIENT
Start: 2021-03-08 | End: 2022-01-21

## 2021-03-08 NOTE — TELEPHONE ENCOUNTER
Pharmacy calling One touch ultra mini with strips is covered by pt insurance please fill order pended

## 2021-03-18 ENCOUNTER — OFFICE VISIT (OUTPATIENT)
Dept: ORTHOPEDIC SURGERY | Age: 51
End: 2021-03-18
Payer: COMMERCIAL

## 2021-03-18 VITALS — BODY MASS INDEX: 43.63 KG/M2 | HEIGHT: 67 IN | WEIGHT: 278 LBS

## 2021-03-18 DIAGNOSIS — M17.0 PRIMARY OSTEOARTHRITIS OF BOTH KNEES: Primary | ICD-10-CM

## 2021-03-18 PROCEDURE — G8427 DOCREV CUR MEDS BY ELIG CLIN: HCPCS | Performed by: STUDENT IN AN ORGANIZED HEALTH CARE EDUCATION/TRAINING PROGRAM

## 2021-03-18 PROCEDURE — 4004F PT TOBACCO SCREEN RCVD TLK: CPT | Performed by: STUDENT IN AN ORGANIZED HEALTH CARE EDUCATION/TRAINING PROGRAM

## 2021-03-18 PROCEDURE — G8417 CALC BMI ABV UP PARAM F/U: HCPCS | Performed by: STUDENT IN AN ORGANIZED HEALTH CARE EDUCATION/TRAINING PROGRAM

## 2021-03-18 PROCEDURE — G8482 FLU IMMUNIZE ORDER/ADMIN: HCPCS | Performed by: STUDENT IN AN ORGANIZED HEALTH CARE EDUCATION/TRAINING PROGRAM

## 2021-03-18 PROCEDURE — 3017F COLORECTAL CA SCREEN DOC REV: CPT | Performed by: STUDENT IN AN ORGANIZED HEALTH CARE EDUCATION/TRAINING PROGRAM

## 2021-03-18 PROCEDURE — 20610 DRAIN/INJ JOINT/BURSA W/O US: CPT | Performed by: STUDENT IN AN ORGANIZED HEALTH CARE EDUCATION/TRAINING PROGRAM

## 2021-03-18 PROCEDURE — 99213 OFFICE O/P EST LOW 20 MIN: CPT | Performed by: STUDENT IN AN ORGANIZED HEALTH CARE EDUCATION/TRAINING PROGRAM

## 2021-03-18 RX ORDER — METHYLPREDNISOLONE ACETATE 80 MG/ML
80 INJECTION, SUSPENSION INTRA-ARTICULAR; INTRALESIONAL; INTRAMUSCULAR; SOFT TISSUE ONCE
Status: COMPLETED | OUTPATIENT
Start: 2021-03-18 | End: 2021-03-18

## 2021-03-18 RX ORDER — BUPIVACAINE HYDROCHLORIDE 2.5 MG/ML
2 INJECTION, SOLUTION INFILTRATION; PERINEURAL ONCE
Status: COMPLETED | OUTPATIENT
Start: 2021-03-18 | End: 2021-03-18

## 2021-03-18 RX ADMIN — BUPIVACAINE HYDROCHLORIDE 5 MG: 2.5 INJECTION, SOLUTION INFILTRATION; PERINEURAL at 13:47

## 2021-03-18 RX ADMIN — METHYLPREDNISOLONE ACETATE 80 MG: 80 INJECTION, SUSPENSION INTRA-ARTICULAR; INTRALESIONAL; INTRAMUSCULAR; SOFT TISSUE at 13:47

## 2021-03-18 NOTE — PROGRESS NOTES
[facility]  [facility]  [address] [place] [place] [address]  Dept: [phone]  Dept Fax: [phone]        Orthopaedic Clinic Follow Up    Subjective:     Stacy Yanes is a 45y.o. year old female who presents to the clinic today for routine followup regarding her bilateral knee osteoarthritis. Patient's last visit was on 12/3/2020 where she had another round of corticosteroid injections into both knees. Patient reports that she has sustained approximately 1.5 months of relief since last visit. She is here today requesting another round of injections. Reports that her pain is the same as last visit before the injection surrounding both her knees. Patient ambulates with a cane due to her arthritic pain. Denies any numbness/tingling peer otherwise, patient has no orthopedic complaints at this time. Review of Systems  Gen: no fever, chills, malaise  CV: no chest pain or palpitations  Resp: no cough or shortness of breath  GI: no nausea, vomiting, diarrhea, or constipation  Neuro: no numbness, tingling, or weakness  Msk: As described by the HPI  10 remaining systems reviewed and negative    Objective : There were no vitals filed for this visit. Body mass index is 43.54 kg/m². General: No acute distress, resting comfortably in the clinic  Neuro: alert. oriented  Eyes: Extra-ocular muscles intact  Pulm: Respirations unlabored and regular. Skin: warm, well perfused  Psych:   Patient has good fund of knowledge and displays understanging of exam, diagnosis, and plan. MSK: Bilateral knees:  Skin intact. Tenderness to palpation appreciated diffusely about the knee. Painful knee range of motion 0 to 90 degrees flexion (left); 0-120 degrees (right). Crepitus felt with knee range of motion and palpation. Left knee tender w/ patellar grind test; right knee negative. Sural, saphenous, superficial as deep peroneal, and plantar nerve distributions intact.   EHL/FHL/TA/GS motor complex intact. DP pulse 2+. Radiology:  None taken in clinic today. Assessment:   45y.o. year old female with bilateral knee osteoarthritis. Plan:      Patient is here today for routine follow-up regarding bilateral knee osteoarthritis. She is here today for another corticosteroid injection which last was performed on 12/3/2020. I went in length discussion with patient reporting that as she continues to get corticosteroid injections that the relief may become less and less effective. Patient demonstrated understanding of this. I also discussed with the patient that if her pain becomes recalcitrant to steroid injections that we may then have to discuss the option of total knee replacement. Patient demonstrate understanding of this and has opted for steroid injections. Procedure note below. She may follow-up with us in 3 months or as needed. She was amenable to all recommendations and was encouraged to call the office with any questions. Injection procedure note  The alternatives, benefits, and risks were discussed with the patient. After answering all questions to the patient's satisfaction, the patient agreed to proceed forward with injection and gave verbal consent for the procedure. With the patient's permission, appropriate anatomic landmarks were identified and the bilateral knee joint was prepped in a sterile fashion using alcohol and/or betadine. A 21 gauge needle was then used to inject 2cc 1% lidocaine plain and 80mg depo medrol into the joint. The injection was advanced without resistance confirming appropriate position. The patient tolerated the procedure well and the site was dressed with a band-aid. Patient was advised to ice the area for 15-20 minutes to relieve any injection site related pain. Patient was advised to contact nurse if area becomes swollen, hot, erythematous, or painful, or to go to the emergency room after business hours.  Patient ambulated after injection and noticed immediate improvement compared to prior to the injection. Follow up:No follow-ups on file.     Orders Placed This Encounter   Medications    methylPREDNISolone acetate (DEPO-MEDROL) injection 80 mg    methylPREDNISolone acetate (DEPO-MEDROL) injection 80 mg    bupivacaine (MARCAINE) 0.25 % injection 5 mg    bupivacaine (MARCAINE) 0.25 % injection 5 mg          Orders Placed This Encounter   Procedures    [id number] - DRAIN/INJECT LARGE JOINT/BURSA       Electronically signed by Billy Callahan MD on 3/18/2021 at 1:38 PM

## 2021-05-04 DIAGNOSIS — Z79.4 TYPE 2 DIABETES MELLITUS WITH DIABETIC POLYNEUROPATHY, WITH LONG-TERM CURRENT USE OF INSULIN (HCC): Primary | ICD-10-CM

## 2021-05-04 DIAGNOSIS — E11.42 TYPE 2 DIABETES MELLITUS WITH DIABETIC POLYNEUROPATHY, WITH LONG-TERM CURRENT USE OF INSULIN (HCC): Primary | ICD-10-CM

## 2021-06-29 ENCOUNTER — APPOINTMENT (OUTPATIENT)
Dept: CT IMAGING | Age: 51
End: 2021-06-29
Payer: COMMERCIAL

## 2021-06-29 ENCOUNTER — HOSPITAL ENCOUNTER (EMERGENCY)
Age: 51
Discharge: HOME | End: 2021-06-29
Attending: EMERGENCY MEDICINE
Payer: COMMERCIAL

## 2021-06-29 VITALS
WEIGHT: 272.9 LBS | RESPIRATION RATE: 16 BRPM | HEIGHT: 68 IN | SYSTOLIC BLOOD PRESSURE: 134 MMHG | BODY MASS INDEX: 41.36 KG/M2 | TEMPERATURE: 98.4 F | HEART RATE: 90 BPM | OXYGEN SATURATION: 98 % | DIASTOLIC BLOOD PRESSURE: 81 MMHG

## 2021-06-29 DIAGNOSIS — R20.2 PARESTHESIAS: Primary | ICD-10-CM

## 2021-06-29 DIAGNOSIS — M62.838 SPASM OF MUSCLE: ICD-10-CM

## 2021-06-29 DIAGNOSIS — R73.9 HYPERGLYCEMIA: ICD-10-CM

## 2021-06-29 LAB
-: ABNORMAL
ABSOLUTE EOS #: 0.07 K/UL (ref 0–0.44)
ABSOLUTE IMMATURE GRANULOCYTE: 0.01 K/UL (ref 0–0.3)
ABSOLUTE LYMPH #: 2.37 K/UL (ref 1.1–3.7)
ABSOLUTE MONO #: 0.45 K/UL (ref 0.1–1.2)
ALBUMIN SERPL-MCNC: 4.1 G/DL (ref 3.5–5.2)
ALBUMIN/GLOBULIN RATIO: NORMAL (ref 1–2.5)
ALP BLD-CCNC: 102 U/L (ref 35–104)
ALT SERPL-CCNC: 15 U/L (ref 5–33)
AMORPHOUS: ABNORMAL
ANION GAP SERPL CALCULATED.3IONS-SCNC: 12 MMOL/L (ref 9–17)
AST SERPL-CCNC: 13 U/L
BACTERIA: ABNORMAL
BASOPHILS # BLD: 0 % (ref 0–2)
BASOPHILS ABSOLUTE: <0.03 K/UL (ref 0–0.2)
BETA-HYDROXYBUTYRATE: 0.1 MMOL/L (ref 0.02–0.27)
BILIRUB SERPL-MCNC: 0.32 MG/DL (ref 0.3–1.2)
BILIRUBIN DIRECT: 0.1 MG/DL
BILIRUBIN URINE: NEGATIVE
BILIRUBIN, INDIRECT: 0.22 MG/DL (ref 0–1)
BUN BLDV-MCNC: 12 MG/DL (ref 6–20)
BUN/CREAT BLD: 20 (ref 9–20)
CALCIUM SERPL-MCNC: 9.4 MG/DL (ref 8.6–10.4)
CASTS UA: ABNORMAL /LPF
CHLORIDE BLD-SCNC: 101 MMOL/L (ref 98–107)
CO2: 24 MMOL/L (ref 20–31)
COLOR: YELLOW
COMMENT UA: ABNORMAL
CREAT SERPL-MCNC: 0.6 MG/DL (ref 0.5–0.9)
CRYSTALS, UA: ABNORMAL /HPF
DIFFERENTIAL TYPE: NORMAL
EOSINOPHILS RELATIVE PERCENT: 1 % (ref 1–4)
EPITHELIAL CELLS UA: ABNORMAL /HPF (ref 0–5)
GFR AFRICAN AMERICAN: >60 ML/MIN
GFR NON-AFRICAN AMERICAN: >60 ML/MIN
GFR SERPL CREATININE-BSD FRML MDRD: ABNORMAL ML/MIN/{1.73_M2}
GFR SERPL CREATININE-BSD FRML MDRD: ABNORMAL ML/MIN/{1.73_M2}
GLOBULIN: NORMAL G/DL (ref 1.5–3.8)
GLUCOSE BLD-MCNC: 312 MG/DL (ref 70–99)
GLUCOSE URINE: ABNORMAL
HCT VFR BLD CALC: 42.7 % (ref 36.3–47.1)
HEMOGLOBIN: 13.8 G/DL (ref 11.9–15.1)
IMMATURE GRANULOCYTES: 0 %
KETONES, URINE: NEGATIVE
LEUKOCYTE ESTERASE, URINE: NEGATIVE
LYMPHOCYTES # BLD: 43 % (ref 24–43)
MAGNESIUM: 1.9 MG/DL (ref 1.6–2.6)
MCH RBC QN AUTO: 31.4 PG (ref 25.2–33.5)
MCHC RBC AUTO-ENTMCNC: 32.3 G/DL (ref 28.4–34.8)
MCV RBC AUTO: 97.3 FL (ref 82.6–102.9)
MONOCYTES # BLD: 8 % (ref 3–12)
MUCUS: ABNORMAL
NITRITE, URINE: NEGATIVE
NRBC AUTOMATED: 0 PER 100 WBC
OTHER OBSERVATIONS UA: ABNORMAL
PDW BLD-RTO: 13 % (ref 11.8–14.4)
PH UA: 6.5 (ref 5–8)
PLATELET # BLD: 268 K/UL (ref 138–453)
PLATELET ESTIMATE: NORMAL
PMV BLD AUTO: 10 FL (ref 8.1–13.5)
POTASSIUM SERPL-SCNC: 4.3 MMOL/L (ref 3.7–5.3)
PROTEIN UA: NEGATIVE
RBC # BLD: 4.39 M/UL (ref 3.95–5.11)
RBC # BLD: NORMAL 10*6/UL
RBC UA: ABNORMAL /HPF (ref 0–2)
RENAL EPITHELIAL, UA: ABNORMAL /HPF
SEG NEUTROPHILS: 48 % (ref 36–65)
SEGMENTED NEUTROPHILS ABSOLUTE COUNT: 2.55 K/UL (ref 1.5–8.1)
SODIUM BLD-SCNC: 137 MMOL/L (ref 135–144)
SPECIFIC GRAVITY UA: 1.02 (ref 1–1.03)
TOTAL PROTEIN: 7.1 G/DL (ref 6.4–8.3)
TRICHOMONAS: ABNORMAL
TURBIDITY: ABNORMAL
URINE HGB: NEGATIVE
UROBILINOGEN, URINE: NORMAL
WBC # BLD: 5.5 K/UL (ref 3.5–11.3)
WBC # BLD: NORMAL 10*3/UL
WBC UA: ABNORMAL /HPF (ref 0–5)
YEAST: ABNORMAL

## 2021-06-29 PROCEDURE — 82010 KETONE BODYS QUAN: CPT

## 2021-06-29 PROCEDURE — 80048 BASIC METABOLIC PNL TOTAL CA: CPT

## 2021-06-29 PROCEDURE — 70450 CT HEAD/BRAIN W/O DYE: CPT

## 2021-06-29 PROCEDURE — 85025 COMPLETE CBC W/AUTO DIFF WBC: CPT

## 2021-06-29 PROCEDURE — 81001 URINALYSIS AUTO W/SCOPE: CPT

## 2021-06-29 PROCEDURE — 83735 ASSAY OF MAGNESIUM: CPT

## 2021-06-29 PROCEDURE — 72125 CT NECK SPINE W/O DYE: CPT

## 2021-06-29 PROCEDURE — 99282 EMERGENCY DEPT VISIT SF MDM: CPT

## 2021-06-29 PROCEDURE — 93005 ELECTROCARDIOGRAM TRACING: CPT | Performed by: NURSE PRACTITIONER

## 2021-06-29 PROCEDURE — 81003 URINALYSIS AUTO W/O SCOPE: CPT

## 2021-06-29 PROCEDURE — 80076 HEPATIC FUNCTION PANEL: CPT

## 2021-06-29 PROCEDURE — 81025 URINE PREGNANCY TEST: CPT

## 2021-06-29 RX ORDER — CYCLOBENZAPRINE HCL 10 MG
10 TABLET ORAL 3 TIMES DAILY PRN
Qty: 12 TABLET | Refills: 0 | Status: SHIPPED | OUTPATIENT
Start: 2021-06-29 | End: 2021-07-09

## 2021-06-29 RX ORDER — CYCLOBENZAPRINE HCL 10 MG
10 TABLET ORAL 3 TIMES DAILY PRN
Qty: 12 TABLET | Refills: 0 | Status: SHIPPED | OUTPATIENT
Start: 2021-06-29 | End: 2021-06-29 | Stop reason: SDUPTHER

## 2021-06-29 ASSESSMENT — ENCOUNTER SYMPTOMS
SINUS PRESSURE: 0
RHINORRHEA: 0
COLOR CHANGE: 0
VOMITING: 0
DIARRHEA: 0
BACK PAIN: 0
NAUSEA: 0
COUGH: 0
ABDOMINAL PAIN: 0
SORE THROAT: 0
EYE PAIN: 0
PHOTOPHOBIA: 0
SHORTNESS OF BREATH: 0

## 2021-06-29 NOTE — ED PROVIDER NOTES
[facility] ED  eMERGENCY dEPARTMENT eNCOUnter      Pt Name: Carolyn Encarnacion  MRN: [id number]  [place] 1970  Date of evaluation: 6/29/2021  Provider: MARILU Ashton [id number]       Chief Complaint   Patient presents with    Dizziness    Numbness     intermittent numbness on bilateral extremities    Hyperglycemia     262 at home this morning         HISTORY OF PRESENT ILLNESS  (Location/Symptom, Timing/Onset, Context/Setting, Quality, Duration, Modifying Factors, Severity.)   Carolyn Encarnacion is a 45 y.o. female who presents to the emergency department via private auto. Reports intermittent N/T to her RUE and RLE for the past 2-3 weeks. States it mostly occurs when trying to have a bowel movement. Also states her blood sugars have been in the 200s which concerns here. She has also had intermittent dizziness, cramping to her extremities. Denies fever, chills, weakness, HA, vision changes. Denies chest pain, cough, SOB. Denies abd pain, N/V/D. Denies neck and back pain. Denies urinary symptoms, change in bowel and/or bladder control. Her last BM was 3 days ago. Rates her pain 0/10 at this time. Nursing Notes were reviewed.     ALLERGIES     Januvia [sitagliptin phosphate], Naproxen sodium, and Neurontin [gabapentin]    CURRENT MEDICATIONS       Discharge Medication List as of 6/29/2021  4:23 PM      CONTINUE these medications which have NOT CHANGED    Details   Blood Glucose Monitoring Suppl (ONE TOUCH ULTRA MINI) w/Device KIT DAILY Starting Mon 3/8/2021, Disp-1 kit, R-0, Normal      blood glucose monitor strips 1 strip by Other route three times daily Test___times daily    Diagnosis: 250.0   Diabetes Mellitus____Insulin Dependent____Non-Insulin Dependent, Other, 3 TIMES DAILY Starting Tue 2/23/2021, Disp-100 strip, R-3, Normal      nystatin (NYAMYC) 183485 UNIT/GM powder APPLY TO SKIN OF LEGS & IN FOLDS OF SKIN WITH IRRITATION, Disp-1 Bottle, R-0, Normal polyethylene glycol (GLYCOLAX) 17 GM/SCOOP powder Follow instructions provided to you from physician's office. , Disp-238 g, R-0Normal      bisacodyl (BISACODYL) 5 MG EC tablet Follow instructions provided given by the physician's office. , Disp-2 tablet, R-0Normal      insulin glargine (BASAGLAR KWIKPEN) 100 UNIT/ML injection pen Inject 25 Units into the skin 2 times daily, Disp-5 pen, R-3Normal      insulin aspart (NOVOLOG FLEXPEN) 100 UNIT/ML injection pen Inject 35 Units into the skin 3 times daily (before meals), Disp-5 pen,R-3Normal      Blood Pressure KIT Disp-1 kit,R-0, PrintUse to monitor blood pressure daily and as needed      blood glucose monitor kit and supplies Dispense sufficient amount for indicated testing frequency plus additional to accommodate PRN testing needs.  Dispense all needed supplies to include: monitor, strips, lancing device, lancets, control solutions, alcohol swabs., Disp-1 kit,R-0, Print      traZODone (DESYREL) 150 MG tablet Take 1 tablet by mouth nightly, Disp-30 tablet,R-3Normal      albuterol sulfate HFA (PROVENTIL HFA) 108 (90 Base) MCG/ACT inhaler Inhale 2 puffs into the lungs every 6 hours as needed for Wheezing, Disp-1 Inhaler,R-2Normal      aspirin EC 81 MG EC tablet Take 1 tablet by mouth daily, Disp-30 tablet,R-3Normal      ferrous sulfate (IRON 325) 325 (65 Fe) MG tablet Take 1 tablet by mouth 2 times daily, Disp-60 tablet,R-3Normal      levothyroxine (SYNTHROID) 50 MCG tablet Take 1 tablet by mouth daily, Disp-30 tablet,R-2Normal      lisinopril (PRINIVIL;ZESTRIL) 5 MG tablet Take 1 tablet by mouth daily, Disp-30 tablet,R-3Normal      metFORMIN (GLUCOPHAGE) 500 MG tablet Take 1 tablet by mouth 2 times daily (with meals), Disp-60 tablet,R-3Normal      simvastatin (ZOCOR) 40 MG tablet Take 1 tablet by mouth nightly, Disp-30 tablet,R-3Normal      diclofenac (VOLTAREN) 50 MG EC tablet Take 1 tablet by mouth 3 times daily (with meals), Disp-60 tablet,R-3Normal      Insulin Pen Needle 31G X 5 MM MISC DAILY Starting Mon 10/26/2020, Disp-100 each,R-3, Normal      ONE TOUCH ULTRASOFT LANCETS MISC Disp-100 each,R-2, NormalDx: DM-2 use 2-3 times daily      mirabegron (MYRBETRIQ) 25 MG TB24 Take 1 tablet by mouth daily, Disp-30 tablet,R-3Normal      Calcium Citrate-Vitamin D (CALCITRATE/VITAMIN D PO) Take by mouthHistorical Med             PAST MEDICAL HISTORY         Diagnosis Date    Anxiety     Arthritis of knee, degenerative 2012    Blindness of left eye     hx. of     Blurry vision     left remaines blurry to Left eye to  on 21    Carpal tunnel syndrome on both sides 1/15/2013    Chronic knee pain     on 18 pt is presently in pain mgmnt    Depressive disorder, not elsewhere classified 10/14/2014    Headache     Hx. of     Hydronephrosis, left 2016    Hyperlipidemia     Hypertension     Hypothyroidism 2013    Iron deficiency anemia 6/10/2014    Mild intermittent asthma without complication     Obesity     Osteoarthritis     KNEE    Polyneuropathy 2012    RAD (reactive airway disease) 2012    Snores     possible apnea but not tested yet    Type II or unspecified type diabetes mellitus without mention of complication, not stated as uncontrolled     Urge incontinence of urine 2017    Wears glasses     Weight loss        SURGICAL HISTORY           Procedure Laterality Date    CARPAL TUNNEL RELEASE      2008 left,  2009 right     SECTION  2007    CYSTOSCOPY  2016    cysto with left ureteral stent placement    HYSTERECTOMY, TOTAL ABDOMINAL  2013    LITHOTRIPSY Left 2016    cysto with ESWL and left ureteral stent placement.     NERVE BLOCK Left 2016    left knee kenalog 80mg    NERVE BLOCK  3/11/16    Rt Knee depo medrol 80     TUBAL LIGATION  3/2012         FAMILY HISTORY           Problem Relation Age of Onset    Stroke Father     Diabetes Mother     Hypertension Mother     Breast Cancer Neg Hx     Cancer Neg Hx     Colon Cancer Neg Hx     Eclampsia Neg Hx     Ovarian Cancer Neg Hx      Labor Neg Hx     Spont Abortions Neg Hx      Family Status   Relation Name Status    Father  Alive    Mother  Alive    Neg Hx  (Not Specified)        SOCIAL HISTORY      reports that she has been smoking cigarettes. She has a 3.75 pack-year smoking history. She has never used smokeless tobacco. She reports current alcohol use. She reports current drug use. Drug: Marijuana. REVIEW OF SYSTEMS    (2-9 systems for level 4, 10 or more for level 5)     Review of Systems   Constitutional: Negative for chills, diaphoresis, fatigue and fever. HENT: Negative for congestion, ear discharge, ear pain, postnasal drip, rhinorrhea, sinus pressure and sore throat. Eyes: Negative for photophobia, pain and visual disturbance. Respiratory: Negative for cough and shortness of breath. Cardiovascular: Negative for chest pain and palpitations. Gastrointestinal: Negative for abdominal pain, diarrhea, nausea and vomiting. Genitourinary: Negative for dysuria. Musculoskeletal: Positive for arthralgias and myalgias. Negative for back pain, gait problem, neck pain and neck stiffness. Skin: Negative for color change, rash and wound. Neurological: Positive for dizziness and numbness. Negative for syncope, facial asymmetry, speech difficulty, weakness, light-headedness and headaches. Psychiatric/Behavioral: Negative for confusion. Except as noted above the remainder of the review of systems was reviewed and negative. PHYSICAL EXAM    (up to 7 for level 4, 8 or more for level 5)     ED Triage Vitals [21 1303]   BP Temp Temp Source Pulse Resp SpO2 Height Weight   134/81 98.4 °F (36.9 °C) Oral 90 16 98 % 5' 7.5\" (1.715 m) 272 lb 14.4 oz (123.8 kg)     Physical Exam  Vitals reviewed. Constitutional:       General: She is not in acute distress. Appearance: She is well-developed.  She is not diaphoretic. HENT:      Right Ear: External ear normal.      Left Ear: External ear normal.   Eyes:      General: No scleral icterus. Extraocular Movements: Extraocular movements intact. Conjunctiva/sclera: Conjunctivae normal.      Pupils: Pupils are equal, round, and reactive to light. Cardiovascular:      Rate and Rhythm: Normal rate. Pulmonary:      Effort: Pulmonary effort is normal. No respiratory distress. Breath sounds: Normal breath sounds. No stridor. Musculoskeletal:      Cervical back: No swelling, deformity or tenderness. Thoracic back: No swelling, deformity or tenderness. Lumbar back: No swelling, deformity or tenderness. Comments: Moves extremities   Skin:     General: Skin is warm and dry. Findings: No rash. Neurological:      Mental Status: She is alert and oriented to person, place, and time. GCS: GCS eye subscore is 4. GCS verbal subscore is 5. GCS motor subscore is 6. Cranial Nerves: Cranial nerves are intact. Motor: Motor function is intact. Coordination: Coordination is intact. Gait: Gait is intact. Psychiatric:         Behavior: Behavior normal.         DIAGNOSTIC RESULTS     RADIOLOGY:   Non-plain film images such as CT, Ultrasound and MRI are read by the radiologist. Plain radiographic images are visualized and preliminarily interpreted by the emergency physician with the below findings:    Interpretation per the Radiologist below, if available at the time of this note:    CT HEAD WO CONTRAST    Result Date: 6/29/2021  EXAMINATION: CT OF THE HEAD WITHOUT CONTRAST  6/29/2021 11:30 am TECHNIQUE: CT of the head was performed without the administration of intravenous contrast. Dose modulation, iterative reconstruction, and/or weight based adjustment of the mA/kV was utilized to reduce the radiation dose to as low as reasonably achievable.  COMPARISON: 01/07/2021 HISTORY: ORDERING SYSTEM PROVIDED HISTORY: dizziness, N/T TIAGO SANTOS TECHNOLOGIST PROVIDED HISTORY: dizziness, N/T TIAGO SANTOS Decision Support Exception - unselect if not a suspected or confirmed emergency medical condition->Emergency Medical Condition (MA) Is the patient pregnant?->No Reason for Exam: Pt c/o dizziness and right side numbness and tingling that comes and goes for the past 2 weeks and last about 5 seconds. Pt states it has been coming more frequently. Acuity: Acute Type of Exam: Initial FINDINGS: BRAIN/VENTRICLES: There is no acute intracranial hemorrhage, mass effect or midline shift. No abnormal extra-axial fluid collection. The gray-white differentiation is maintained without evidence of an acute infarct. There is no evidence of hydrocephalus. Calcification of the anterior falx. ORBITS: The visualized portion of the orbits demonstrate no acute abnormality. SINUSES: The visualized paranasal sinuses and mastoid air cells demonstrate no acute abnormality. SOFT TISSUES/SKULL:  No acute abnormality of the visualized skull or soft tissues. No acute intracranial abnormality. CT CERVICAL SPINE WO CONTRAST    Result Date: 6/29/2021  EXAMINATION: CT OF THE CERVICAL SPINE WITHOUT CONTRAST 6/29/2021 2:30 pm TECHNIQUE: CT of the cervical spine was performed without the administration of intravenous contrast. Multiplanar reformatted images are provided for review. Dose modulation, iterative reconstruction, and/or weight based adjustment of the mA/kV was utilized to reduce the radiation dose to as low as reasonably achievable. COMPARISON: None. HISTORY: ORDERING SYSTEM PROVIDED HISTORY: N/T TIAGO SANTOS TECHNOLOGIST PROVIDED HISTORY: N/T TIAGO SANTOS Decision Support Exception - unselect if not a suspected or confirmed emergency medical condition->Emergency Medical Condition (MA) Is the patient pregnant?->No Reason for Exam: Pt c/o dizziness and right side numbness and tingling that comes and goes for the past 2 weeks and last about 5 seconds.  Pt states it has been coming more frequently. Acuity: Acute Type of Exam: Initial FINDINGS: BONES/ALIGNMENT: There is no acute fracture or traumatic malalignment. DEGENERATIVE CHANGES: Mild multilevel degenerative changes, most marked C5-C6 with disc osteophyte complex type change and uncovertebral arthropathy, latter greater on the right, associated with mild-moderate localized impingement on the canal/cord. Mild facet arthropathy, better seen on the left C2-C3 and C4-C5. Small rounded well-marginated cystic lesion at the left C2-C3 synovial joint. SOFT TISSUES: There is no prevertebral soft tissue swelling. Artifact from dental amalgam.  Visualized sinuses and mastoid air cells clear. Visualized intracranial structures unremarkable. Visualized lung apices clear. No acute abnormality of the cervical spine. Mild multilevel degenerative findings, most marked C5-C6 with mild-moderate impingement on the canal. Synovial cystic change left C2-C3. LABS:  Labs Reviewed   URINALYSIS WITH MICROSCOPIC - Abnormal; Notable for the following components:       Result Value    Turbidity UA SLIGHTLY CLOUDY (*)     Glucose, Ur 3+ (*)     Amorphous, UA 1+ (*)     All other components within normal limits   BASIC METABOLIC PANEL - Abnormal; Notable for the following components:    Glucose 312 (*)     All other components within normal limits   CBC WITH AUTO DIFFERENTIAL   HEPATIC FUNCTION PANEL   MAGNESIUM   BETA-HYDROXYBUTYRATE       All other labs were within normal range or not returned as of this dictation. EMERGENCY DEPARTMENT COURSE and DIFFERENTIAL DIAGNOSIS/MDM:   Vitals:    Vitals:    06/29/21 1303   BP: 134/81   Pulse: 90   Resp: 16   Temp: 98.4 °F (36.9 °C)   TempSrc: Oral   SpO2: 98%   Weight: 272 lb 14.4 oz (123.8 kg)   Height: 5' 7.5\" (1.715 m)       CLINICAL DECISION MAKING:  The patient presented alert with a nontoxic appearance and was seen in conjunction with Dr. Ronal Pizarro. Findings were discussed with the patient.  She has a pcp follow up in 2 days. Follow up with pcp, return to ED if condition worsens. A prescription was written for flexeril. The patient was advised to not drink alcohol, drive, or operate heavy machinery while taking the medication. FINAL IMPRESSION      1. Paresthesias    2. Hyperglycemia    3.  Spasm of muscle            Problem List  Patient Active Problem List   Diagnosis Code    Tobacco use Z72.0    Carpal tunnel syndrome on both sides G56.03    Acquired hypothyroidism E03.9    Hyperlipidemia E78.5    Iron deficiency anemia D50.9    Mild episode of recurrent major depressive disorder ([facility] [address] F33.0    Type 2 diabetes mellitus with hyperglycemia, with long-term current use of insulin ([facility] [address] E11.65, Z79.4    Morbid obesity with BMI of 50.0-59.9, adult ([facility] [address] E66.01, Z68.43    Primary osteoarthritis of both knees M17.0    Urge incontinence of urine N39.41    Mild intermittent asthma without complication A67.66    Pure hypercholesterolemia E78.00    Hypertension I10    Pelvic pain in female R10.2    Bilateral ovarian cysts N83.201, N83.202    H/O Hysterectomy (2013) Z90.710    Trichomonal vaginitis A59.01    Chest pain R07.9    Frontal headache R51.9    Frontal sinusitis J32.1    Class 3 severe obesity in adult [facility]) E66.01    Bilateral chronic knee pain M25.561, M25.562, G89.29    Acute headache R51.9    Visual changes H53.9    Blindness of left eye H54.40         DISPOSITION/PLAN   DISPOSITION Decision To Discharge 06/29/2021 04:22:56 PM      PATIENT REFERRED TO:   MARILU Tsang [address] [phone]    Schedule an appointment as soon as possible for a visit       [facility] ED  [address]  [phone]          DISCHARGE MEDICATIONS:     Discharge Medication List as of 6/29/2021  4:23 PM      START taking these medications    Details   cyclobenzaprine (FLEXERIL) 10 MG tablet Take 1 tablet by mouth 3 times daily as needed for Muscle spasms, Disp-12 tablet, R-0Normal                 (Please note that portions of this note were completed with a voice recognition program.  Efforts were made to edit the dictations but occasionally words are mis-transcribed.)    MARILU Macias CNP, APRN - CNP  06/29/21 9781

## 2021-06-30 LAB
EKG ATRIAL RATE: 66 BPM
EKG P AXIS: 40 DEGREES
EKG P-R INTERVAL: 172 MS
EKG Q-T INTERVAL: 390 MS
EKG QRS DURATION: 70 MS
EKG QTC CALCULATION (BAZETT): 408 MS
EKG R AXIS: 22 DEGREES
EKG T AXIS: 31 DEGREES
EKG VENTRICULAR RATE: 66 BPM

## 2021-06-30 PROCEDURE — 93010 ELECTROCARDIOGRAM REPORT: CPT | Performed by: INTERNAL MEDICINE

## 2021-07-01 LAB — HCG, PREGNANCY URINE (POC): NEGATIVE

## 2021-07-16 ENCOUNTER — OFFICE VISIT (OUTPATIENT)
Dept: INTERNAL MEDICINE | Age: 51
End: 2021-07-16
Payer: COMMERCIAL

## 2021-07-16 VITALS
BODY MASS INDEX: 41.62 KG/M2 | DIASTOLIC BLOOD PRESSURE: 77 MMHG | SYSTOLIC BLOOD PRESSURE: 122 MMHG | TEMPERATURE: 97.9 F | HEART RATE: 113 BPM | WEIGHT: 274.6 LBS | HEIGHT: 68 IN

## 2021-07-16 DIAGNOSIS — Z53.20 SCREENING FOR HEPATITIS C DECLINED: ICD-10-CM

## 2021-07-16 DIAGNOSIS — Z79.4 TYPE 2 DIABETES MELLITUS WITH HYPERGLYCEMIA, WITH LONG-TERM CURRENT USE OF INSULIN (HCC): ICD-10-CM

## 2021-07-16 DIAGNOSIS — M17.0 PRIMARY OSTEOARTHRITIS OF BOTH KNEES: ICD-10-CM

## 2021-07-16 DIAGNOSIS — J45.20 MILD INTERMITTENT ASTHMA WITHOUT COMPLICATION: ICD-10-CM

## 2021-07-16 DIAGNOSIS — Z23 NEED FOR PROPHYLACTIC VACCINATION AND INOCULATION AGAINST VARICELLA: ICD-10-CM

## 2021-07-16 DIAGNOSIS — E08.42 DIABETIC POLYNEUROPATHY ASSOCIATED WITH DIABETES MELLITUS DUE TO UNDERLYING CONDITION (HCC): ICD-10-CM

## 2021-07-16 DIAGNOSIS — E11.65 TYPE 2 DIABETES MELLITUS WITH HYPERGLYCEMIA, WITH LONG-TERM CURRENT USE OF INSULIN (HCC): ICD-10-CM

## 2021-07-16 DIAGNOSIS — I10 ESSENTIAL HYPERTENSION: Primary | ICD-10-CM

## 2021-07-16 DIAGNOSIS — E03.9 ACQUIRED HYPOTHYROIDISM: ICD-10-CM

## 2021-07-16 DIAGNOSIS — Z12.31 ENCOUNTER FOR SCREENING MAMMOGRAM FOR MALIGNANT NEOPLASM OF BREAST: ICD-10-CM

## 2021-07-16 DIAGNOSIS — Z23 NEED FOR PROPHYLACTIC VACCINATION AGAINST DIPHTHERIA-TETANUS-PERTUSSIS (DTP): ICD-10-CM

## 2021-07-16 DIAGNOSIS — E66.01 CLASS 3 SEVERE OBESITY DUE TO EXCESS CALORIES WITH BODY MASS INDEX (BMI) OF 40.0 TO 44.9 IN ADULT, UNSPECIFIED WHETHER SERIOUS COMORBIDITY PRESENT (HCC): ICD-10-CM

## 2021-07-16 LAB — HBA1C MFR BLD: 11.2 %

## 2021-07-16 PROCEDURE — 99211 OFF/OP EST MAY X REQ PHY/QHP: CPT | Performed by: STUDENT IN AN ORGANIZED HEALTH CARE EDUCATION/TRAINING PROGRAM

## 2021-07-16 PROCEDURE — 83036 HEMOGLOBIN GLYCOSYLATED A1C: CPT | Performed by: STUDENT IN AN ORGANIZED HEALTH CARE EDUCATION/TRAINING PROGRAM

## 2021-07-16 PROCEDURE — 3046F HEMOGLOBIN A1C LEVEL >9.0%: CPT | Performed by: STUDENT IN AN ORGANIZED HEALTH CARE EDUCATION/TRAINING PROGRAM

## 2021-07-16 PROCEDURE — G8417 CALC BMI ABV UP PARAM F/U: HCPCS | Performed by: STUDENT IN AN ORGANIZED HEALTH CARE EDUCATION/TRAINING PROGRAM

## 2021-07-16 PROCEDURE — 2022F DILAT RTA XM EVC RTNOPTHY: CPT | Performed by: STUDENT IN AN ORGANIZED HEALTH CARE EDUCATION/TRAINING PROGRAM

## 2021-07-16 PROCEDURE — 3017F COLORECTAL CA SCREEN DOC REV: CPT | Performed by: STUDENT IN AN ORGANIZED HEALTH CARE EDUCATION/TRAINING PROGRAM

## 2021-07-16 PROCEDURE — G8427 DOCREV CUR MEDS BY ELIG CLIN: HCPCS | Performed by: STUDENT IN AN ORGANIZED HEALTH CARE EDUCATION/TRAINING PROGRAM

## 2021-07-16 PROCEDURE — 90746 HEPB VACCINE 3 DOSE ADULT IM: CPT | Performed by: STUDENT IN AN ORGANIZED HEALTH CARE EDUCATION/TRAINING PROGRAM

## 2021-07-16 PROCEDURE — 4004F PT TOBACCO SCREEN RCVD TLK: CPT | Performed by: STUDENT IN AN ORGANIZED HEALTH CARE EDUCATION/TRAINING PROGRAM

## 2021-07-16 PROCEDURE — 99213 OFFICE O/P EST LOW 20 MIN: CPT | Performed by: STUDENT IN AN ORGANIZED HEALTH CARE EDUCATION/TRAINING PROGRAM

## 2021-07-16 RX ORDER — DULOXETIN HYDROCHLORIDE 60 MG/1
60 CAPSULE, DELAYED RELEASE ORAL DAILY
Qty: 90 CAPSULE | Refills: 3 | Status: SHIPPED | OUTPATIENT
Start: 2021-07-16 | End: 2021-10-19 | Stop reason: SDUPTHER

## 2021-07-16 RX ORDER — INSULIN ASPART 100 [IU]/ML
30 INJECTION, SOLUTION INTRAVENOUS; SUBCUTANEOUS
Qty: 5 PEN | Refills: 3 | Status: SHIPPED | OUTPATIENT
Start: 2021-07-16 | End: 2022-01-26

## 2021-07-16 RX ORDER — INSULIN GLARGINE 100 [IU]/ML
25 INJECTION, SOLUTION SUBCUTANEOUS 2 TIMES DAILY
Qty: 5 PEN | Refills: 3 | Status: ON HOLD | OUTPATIENT
Start: 2021-07-16 | End: 2021-11-08 | Stop reason: SDUPTHER

## 2021-07-16 RX ORDER — LIRAGLUTIDE 6 MG/ML
INJECTION SUBCUTANEOUS
Qty: 2 PEN | Refills: 3 | Status: SHIPPED | OUTPATIENT
Start: 2021-07-16 | End: 2021-10-19 | Stop reason: SDUPTHER

## 2021-07-16 NOTE — PROGRESS NOTES
[facility]  [facility] [address] [address]  Dept: [phone]  Dept Fax: [phone]    Office Progress/Follow Up Note  Date of patient's visit: 7/16/2021  Patient's Name:  Michael Bangura YOB: 1970            Patient Care Team:  MARILU June CNP as PCP - General (Family Nurse Practitioner)  MARILU June CNP as PCP - [facility] Empaneled Provider  Angela Mcmillan MD as Anesthesiologist (Pain Management)  Governor Teofilo MD as Consulting Physician (Urology)  Alma Saul MD as Consulting Physician (Internal Medicine)    REASON FOR VISIT: Routine outpatient follow up    HISTORY OF PRESENT ILLNESS:      Chief Complaint   Patient presents with   Connee Remak New Doctor     [facility]   [facility] Maintenance     patient has alot of HM due, doctor will look over    Medication Refill       History was obtained from the patient. Michael Bangura is a 45 y.o. is here for a new to provider. Patient with past medical Struve hypertension, well controlled on lisinopril. Patient also with very poorly controlled DM type II. Patient on long-acting insulin glargine, NovoLog premeal, Metformin. A1c today 11.2, up from 10.1 on 1/8/2021. Will adjust medications. Patient also with bilateral knee osteoarthritis, gets knee injections from orthopedics. Patient also with hypothyroidism on Synthroid replacement. Multiple care gaps due, ordered. Patient also with acute complaint of numbness and tingling, appears to be neuropathic pain. Allergic to gabapentin, will start duloxetine.       Patient Active Problem List   Diagnosis    Tobacco use    Carpal tunnel syndrome on both sides    Acquired hypothyroidism    Hyperlipidemia    Iron deficiency anemia    Mild episode of recurrent major depressive disorder ([address]    Type 2 diabetes mellitus with hyperglycemia, with long-term current use of insulin ([address]    Morbid obesity with BMI of 50.0-59.9, adult [facility])    Primary osteoarthritis of both knees    Urge incontinence of urine    Mild intermittent asthma without complication    Pure hypercholesterolemia    Hypertension    Pelvic pain in female    Bilateral ovarian cysts    H/O Hysterectomy (2013)    Trichomonal vaginitis    Chest pain    Frontal headache    Frontal sinusitis    Class 3 severe obesity in adult [facility])    Bilateral chronic knee pain    Acute headache    Visual changes    Blindness of left eye         Health Maintenance Due   Topic Date Due    Hepatitis C screen  Never done    COVID-19 Vaccine (1) Never done    DTaP/Tdap/Td vaccine (1 - Tdap) Never done    Colon cancer screen colonoscopy  Never done    Diabetic retinal exam  10/01/2016    Diabetic microalbuminuria test  09/11/2018    Shingles Vaccine (1 of 2) Never done    Diabetic foot exam  03/06/2020    Hepatitis B vaccine (2 of 3 - Risk 3-dose series) 11/23/2020    Lipid screen  01/27/2021    Breast cancer screen  02/27/2021    A1C test (Diabetic or Prediabetic)  04/08/2021         Allergies   Allergen Reactions    Januvia [Sitagliptin Phosphate] Hives    Naproxen Sodium Hives    Neurontin [Gabapentin] Nausea Only         MEDICATIONS:      Current Outpatient Medications   Medication Sig Dispense Refill    Blood Glucose Monitoring Suppl (ONE TOUCH ULTRA MINI) w/Device KIT 1 kit by Does not apply route Daily 1 kit 0    blood glucose monitor strips 1 strip by Other route three times daily Test___times daily    Diagnosis: 250.0   Diabetes Mellitus____Insulin Dependent____Non-Insulin Dependent 100 strip 3    nystatin (NYAMYC) 250110 UNIT/GM powder APPLY TO SKIN OF LEGS & IN FOLDS OF SKIN WITH IRRITATION 1 Bottle 0    polyethylene glycol (GLYCOLAX) 17 GM/SCOOP powder Follow instructions provided to you from physician's office. 238 g 0    bisacodyl (BISACODYL) 5 MG EC tablet Follow instructions provided given by the physician's office.  2 tablet 0    insulin glargine (BASAGLAR KWIKPEN) 100 UNIT/ML injection pen Inject 25 Units into the skin 2 times daily 5 pen 3    insulin aspart (NOVOLOG FLEXPEN) 100 UNIT/ML injection pen Inject 35 Units into the skin 3 times daily (before meals) 5 pen 3    Blood Pressure KIT Use to monitor blood pressure daily and as needed 1 kit 0    blood glucose monitor kit and supplies Dispense sufficient amount for indicated testing frequency plus additional to accommodate PRN testing needs. Dispense all needed supplies to include: monitor, strips, lancing device, lancets, control solutions, alcohol swabs. 1 kit 0    traZODone (DESYREL) 150 MG tablet Take 1 tablet by mouth nightly 30 tablet 3    albuterol sulfate HFA (PROVENTIL HFA) 108 (90 Base) MCG/ACT inhaler Inhale 2 puffs into the lungs every 6 hours as needed for Wheezing 1 Inhaler 2    aspirin EC 81 MG EC tablet Take 1 tablet by mouth daily 30 tablet 3    ferrous sulfate (IRON 325) 325 (65 Fe) MG tablet Take 1 tablet by mouth 2 times daily 60 tablet 3    levothyroxine (SYNTHROID) 50 MCG tablet Take 1 tablet by mouth daily 30 tablet 2    lisinopril (PRINIVIL;ZESTRIL) 5 MG tablet Take 1 tablet by mouth daily 30 tablet 3    metFORMIN (GLUCOPHAGE) 500 MG tablet Take 1 tablet by mouth 2 times daily (with meals) 60 tablet 3    simvastatin (ZOCOR) 40 MG tablet Take 1 tablet by mouth nightly 30 tablet 3    diclofenac (VOLTAREN) 50 MG EC tablet Take 1 tablet by mouth 3 times daily (with meals) 60 tablet 3    Insulin Pen Needle 31G X 5 MM MISC 1 each by Does not apply route daily 100 each 3    ONE TOUCH ULTRASOFT LANCETS MISC Dx: DM-2 use 2-3 times daily 100 each 2    mirabegron (MYRBETRIQ) 25 MG TB24 Take 1 tablet by mouth daily 30 tablet 3    Calcium Citrate-Vitamin D (CALCITRATE/VITAMIN D PO) Take by mouth       No current facility-administered medications for this visit. SOCIAL HISTORY    Reviewed and no change from previous record.  Nilam Sharma  reports that she has been smoking cigarettes. She has a 3.75 pack-year smoking history.  She has never used smokeless tobacco.    FAMILY HISTORY:    Reviewed and No change from previous visit    REVIEW OF SYSTEMS:    CONSTITUTIONAL: Denies: fever, chills  PSYCH: Denies: anxiety, depression  ALLERGIES: Denies: urticaria  EYES: Denies: blurry vision, decreased vision, photophobia  ENT: Denies: sore throat, nasal congestion  CARDIOVASCULAR: Denies: chest pain, dyspnea on exertion  RESPIRATORY: Denies: cough, hemoptysis, shortness of breath  GI: Denies: Denies: abdominal pain, flank pain  : Denies: Denies: dysuria, frequency/urgency  NEURO: Denies: dizzy/vertigo, headache  MUSCULOSKELETAL: Denies: back pain, joint pain  SKIN: Denies: rash, itching    PHYSICAL EXAM:      Vitals:    07/16/21 1428   BP: 122/77   Site: Left Upper Arm   Position: Sitting   Cuff Size: Large Adult   Pulse: 113   Temp: 97.9 °F (36.6 °C)   TempSrc: Temporal   Weight: 274 lb 9.6 oz (124.6 kg)   Height: 5' 7.5\" (1.715 m)     BP Readings from Last 3 Encounters:   07/16/21 122/77   06/29/21 134/81   01/09/21 113/61      General appearance - alert, well appearing, and in no distress, morbidly obese  Mental status - alert, oriented to person, place, and time  Eyes - pupils equal and reactive, extraocular eye movements intact  Heart - normal rate, regular rhythm, normal S1, S2, no murmurs, rubs, clicks or gallops  Abdomen - soft, nontender, nondistended, no masses or organomegaly  Back exam - full range of motion, no tenderness, palpable spasm or pain on motion  Musculoskeletal - no joint tenderness, deformity or swelling  Extremities - peripheral pulses normal, no pedal edema, no clubbing or cyanosis, no foot ulcers, normal DM foot exam  Skin - normal coloration and turgor, no rashes, no suspicious skin lesions noted    LABORATORY FINDINGS:    CBC:  Lab Results   Component Value Date    WBC 5.5 06/29/2021    HGB 13.8 06/29/2021     06/29/2021     04/26/2012 BMP:    Lab Results   Component Value Date     06/29/2021    K 4.3 06/29/2021     06/29/2021    CO2 24 06/29/2021    BUN 12 06/29/2021    CREATININE 0.60 06/29/2021    GLUCOSE 312 06/29/2021    GLUCOSE 296 12/29/2011       HEMOGLOBIN A1C:   Lab Results   Component Value Date    LABA1C 10.1 01/08/2021       FASTING LIPID PANEL:  Lab Results   Component Value Date    CHOL 189 09/11/2017    HDL 82 01/27/2020    TRIG 100 09/11/2017       ASSESSMENT AND PLAN:    Caitlin Haq was seen today for established new doctor, health maintenance and medication refill. Diagnoses and all orders for this visit:    1. Essential hypertension  Stable. Continue 5 mg daily    2. Mild intermittent asthma without complication  Stable. Albuterol as needed    3. Encounter for screening mammogram for malignant neoplasm of breast    - MICHAEL DIGITAL SCREEN W OR WO CAD BILATERAL; Future    4. Type 2 diabetes mellitus with hyperglycemia, with long-term current use of insulin (HCC)  Poorly controlled. A1c up to 11.2. Increase insulin glargine to 30 mg twice daily, Premeal NovoLogcontinue 30 units 3 times daily with meals  Continue Metformin 500 mg twice daily  Will start victoza  Daily blood sugar checks - return in 2-3 wks to evaluate  We will start Cymbalta for neuropathy    5. Acquired hypothyroidism  Synthroid 50 mcg daily    6. Primary osteoarthritis of both knees  NSAIDs as needed  Steroid injections with orthopedics    7. Class 3 severe obesity due to excess calories with body mass index (BMI) of 40.0 to 44.9 in adult, unspecified whether serious comorbidity present ([facility]  Counseled on diet, exercise          FOLLOW UP AND INSTRUCTIONS:   · No follow-ups on file. · Caitlin Haq received counseling on the following healthy behaviors: nutrition, exercise and tobacco cessation    · Discussed use, benefit, and side effects of prescribed medications. Barriers to medication compliance addressed. All patient questions answered. Pt voiced understanding. · Patient given educational materials - see patient instructions    Bobbi Warren MD  Internal Medicine Resident, PGY-2  [facility];  [place], [place]  7/16/2021, 3:08 PM

## 2021-07-16 NOTE — PROGRESS NOTES
Attending Physician Statement  I have discussed the care of Melani Payment including pertinent history and exam findings,  with the resident. I have reviewed the key elements of all parts of the encounter with the resident. I agree with the assessment, plan and orders as documented by the resident. (GE Modifier)    ADD VICTOZA, increase it to maximum dose in 2 weeks, increase lantus to 25 bid.  Plan to add invokana if the pre prandial blood sugars are still elevated    MD MADELEINE Sosa  Attending Physician, [address] [place] [address], Internal Medicine Residency Program  [address] [place] [address]  7/16/2021, 3:41 PM

## 2021-07-16 NOTE — PATIENT INSTRUCTIONS
Thank you for coming in to see us today!    -The medication list included in this document is our record of what you are currently taking, including any changes that were made at today's visit.  If you find any differences when compared to your medications at home, or have any questions that were not answered at your visit, please contact the office.  -Medications e-scribe to pharmacy of pt's choice. - Patient was given a mammogram order with instructions. Patient needs to call [phone] to set up the mammogram appointment date and time. - Labs given to patient, they will have them done before their next visit. - Return To Clinic 7/30/2021. After Visit Summary  given and reviewed. It is very important for your care that you keep your appointment. If for some reason you are unable to keep your appointment it is equally important that you call our office at [phone] to cancel your appointment and reschedule. Failure to do so may result in your termination from our practice.     MAR Brambila

## 2021-07-22 ENCOUNTER — OFFICE VISIT (OUTPATIENT)
Dept: ORTHOPEDIC SURGERY | Age: 51
End: 2021-07-22
Payer: COMMERCIAL

## 2021-07-22 VITALS — HEIGHT: 67 IN | BODY MASS INDEX: 43 KG/M2 | WEIGHT: 274 LBS

## 2021-07-22 DIAGNOSIS — M17.0 PRIMARY OSTEOARTHRITIS OF BOTH KNEES: Primary | ICD-10-CM

## 2021-07-22 PROCEDURE — 20610 DRAIN/INJ JOINT/BURSA W/O US: CPT | Performed by: ORTHOPAEDIC SURGERY

## 2021-07-22 RX ORDER — METHYLPREDNISOLONE ACETATE 80 MG/ML
80 INJECTION, SUSPENSION INTRA-ARTICULAR; INTRALESIONAL; INTRAMUSCULAR; SOFT TISSUE ONCE
Status: COMPLETED | OUTPATIENT
Start: 2021-07-22 | End: 2021-07-22

## 2021-07-22 RX ORDER — BUPIVACAINE HYDROCHLORIDE 2.5 MG/ML
2 INJECTION, SOLUTION INFILTRATION; PERINEURAL ONCE
Status: COMPLETED | OUTPATIENT
Start: 2021-07-22 | End: 2021-07-22

## 2021-07-22 RX ADMIN — BUPIVACAINE HYDROCHLORIDE 5 MG: 2.5 INJECTION, SOLUTION INFILTRATION; PERINEURAL at 09:43

## 2021-07-22 RX ADMIN — METHYLPREDNISOLONE ACETATE 80 MG: 80 INJECTION, SUSPENSION INTRA-ARTICULAR; INTRALESIONAL; INTRAMUSCULAR; SOFT TISSUE at 09:44

## 2021-07-22 RX ADMIN — METHYLPREDNISOLONE ACETATE 80 MG: 80 INJECTION, SUSPENSION INTRA-ARTICULAR; INTRALESIONAL; INTRAMUSCULAR; SOFT TISSUE at 09:43

## 2021-07-22 NOTE — PROGRESS NOTES
[facility]  [address] [address]  Dept: [phone]    Orthopedic Clinic Follow Up      SUBJECTIVE: Elda Berg is a 45 y.o. female who presents as a follow up for bilateral knee pain. The left knee pain is greater than the right. Patient has been previously diagnosed with bilateral knee primary osteoarthritis. Patient denies any new symptoms today in the office, however still has continued bilateral knee pain. Her last radiographs were obtained roughly 1 year ago. New radiographs were obtained in the office today. She denies any numbness or tingling, fever or chills. She has been given bilateral cortisone injections several times before, last time was on 3/18/2021. She states that she gets only roughly 3 weeks of relief before the pain begins to come back. She has not tried lubrication injections. She has tried physical therapy in the past and she states that exacerbates her symptoms. She has not tried medial  braces before. She has had significant weight loss over the last year, roughly 100 pounds of weight loss, however her BMI is still over 40. She uses a cane to assist her with ambulation. She denies any new trauma or falls. ROS:   Constitutional: Negative for fever and chills. Respiratory: Negative for cough, shortness of breath. Cardiovascular: Negative for chest pain and palpitations. Musculoskeletal: Positive for bilateral knee pain and swelling. Skin: Negative for itching and rash. Neurological: Negative for numbness, tingling, weakness. Psychiatric/Behavioral: Patient display good fund of knowledge, understanding of assessment and plan. OBJECTIVE:  Physical Exam:  General: NAD, sitting comfortably in the room. CV: No dependent edema, regular rate. Pulm: Respirations unlabored and regular. Neuro: Alert. Oriented. Ortho exam:  LLE: Tenderness palpation over the medial joint line. Varus alignment of the knee.   Stable in varus and valgus stress at 0 and 30 degrees of flexion, minimally correctable varus deformity. No gross motor or sensory deficits on exam.  Negative Lachman. Negative Carmencita. Extremity warm and well-perfused. Active range of motion of the left knee 4-100 degrees of flexion. RLE: Tenderness palpation over the medial joint line. Varus alignment of the knee. Stable in varus and valgus stress at 0 and 30 degrees of flexion, minimally correctable varus deformity. No gross motor or sensory deficits on exam.  Negative Lachman. Negative Carmencita. Extremity warm and well-perfused. Active range of motion of the right knee 0-125 degrees of flexion. PROCEDURES: Injection procedure note  The alternatives, benefits, and risks were discussed with the patient. After answering all questions to the patient's satisfaction, the patient agreed to proceed forward with injection and gave verbal consent for the procedure. With the patient's permission, appropriate anatomic landmarks were identified and the bilateral knees were prepped in a sterile fashion using alcohol and/or betadine. A 21 gauge needle was then used to inject 2cc 0.25% marcaine plain and 80mg depo medrol into each joint. The injection was advanced without resistance confirming appropriate position. The patient tolerated the procedure well and the site was dressed with a band-aid. Patient was advised to ice the area for 15-20 minutes to relieve any injection site related pain. Patient was advised to contact nurse if area becomes swollen, hot, erythematous, or painful, or to go to the emergency room after business hours. RADIOLOGY:  Left knee:  History: 45y.o. year old female with a history of bilateral knee pain. Comparison: 8/27/2020    Findings: 4 views including weightbearing AP, tunnel, lateral, sunrise of the left knee demonstrates varus deformity of the knee.   There is demonstratable signs of osteoarthritis including osteophytes, tibial flattening, significant joint space narrowing on the medial side. Notable patellofemoral degenerative changes as well. No acute fracture or dislocation. Impression: Severe tricompartmental osteoarthritis of the left knee as described above. Worse in the medial and patellofemoral compartments. Right knee:  History: 45y.o. year old female with a history of bilateral knee pain. Comparison: 8/27/2020    Findings: 4 views including weightbearing AP, tunnel, lateral, sunrise of the right knee demonstrates significant bicompartmental degenerative changes which include osteophytes, joint space narrowing of the medial and lateral compartments, and subchondral sclerosis. There is mild medial subluxation of the femur on the tibia. No acute fracture or dislocation evident. Impression: Severe tricompartmental osteoarthritis of the right knee as described above. ASSESSMENT:   1. Bilateral knee primary osteoarthritis    PLAN:  Terry Stanley is here for follow-up of her bilateral knee osteoarthritis. She was provided bilateral knee cortisone injections in the office today. We also discussed getting precertification for lubrication injections. Patient is amenable to trying out lubrication injections. She was instructed to call the office when her symptoms return, and find out if she has prior authorization. She can return to the office as needed. We also discussed surgical and nonsurgical options in its entirety. We reviewed her new radiographs that were obtained in the office today. At this time patient is not ready for total knee replacement which is understanding considering her BMI is over 40, and it does not impact her daily activities to a point where she would like surgical intervention. We discussed weight loss, physical therapy, control of her diabetes. Patient is agreeable and plans to continue to work on these.   She can return at any time for her lubrication injections once prior

## 2021-09-21 ENCOUNTER — TELEPHONE (OUTPATIENT)
Dept: FAMILY MEDICINE CLINIC | Age: 51
End: 2021-09-21

## 2021-09-21 NOTE — TELEPHONE ENCOUNTER
Kate Boswell was contacted as part of mammography outreach.  Left voicemail with a reminder for the patient to schedule their mammogram.     Hector Kelly

## 2021-09-28 DIAGNOSIS — E03.9 ACQUIRED HYPOTHYROIDISM: ICD-10-CM

## 2021-09-28 NOTE — TELEPHONE ENCOUNTER
Request for medication refill, levothyrocine. Next Visit Date:last seen 7/16/21  No future appointments. Health Maintenance   Topic Date Due    Hepatitis C screen  Never done    COVID-19 Vaccine (1) Never done    DTaP/Tdap/Td vaccine (1 - Tdap) Never done    Colon cancer screen colonoscopy  Never done    Diabetic retinal exam  10/01/2016    Diabetic microalbuminuria test  09/11/2018    Shingles Vaccine (1 of 2) Never done    Diabetic foot exam  03/06/2020    Lipid screen  01/27/2021    Breast cancer screen  02/27/2021    Flu vaccine (1) 09/01/2021    A1C test (Diabetic or Prediabetic)  10/16/2021    Annual Wellness Visit (AWV)  11/06/2021    Hepatitis B vaccine (3 of 3 - Risk 3-dose series) 11/16/2021    TSH testing  01/08/2022    Potassium monitoring  06/29/2022    Creatinine monitoring  06/29/2022    Pneumococcal 0-64 years Vaccine (2 of 2 - PPSV23) 07/06/2034    HIV screen  Completed    Hepatitis A vaccine  Aged Out    Hib vaccine  Aged Out    Meningococcal (ACWY) vaccine  Aged Out       Hemoglobin A1C (%)   Date Value   07/16/2021 11.2   01/08/2021 10.1 (H)   10/26/2020 14 (>)             ( goal A1C is < 7)   Microalb/Crt.  Ratio (mcg/mg creat)   Date Value   09/11/2017 7     LDL Cholesterol (mg/dL)   Date Value   01/27/2020 100       (goal LDL is <100)   AST (U/L)   Date Value   06/29/2021 13     ALT (U/L)   Date Value   06/29/2021 15     BUN (mg/dL)   Date Value   06/29/2021 12     BP Readings from Last 3 Encounters:   07/16/21 122/77   06/29/21 134/81   01/09/21 113/61          (goal 120/80)    All Future Testing planned in [facility]  Lab Frequency Next Occurrence   Microalbumin, Ur Once 10/23/2021   COVID-19 Once 01/15/2021   Lipid, Fasting Once 10/26/2021   MICHAEL DIGITAL SCREEN W OR WO CAD BILATERAL Once 11/02/2021   Hepatitis C Antibody Once 10/26/2021         Patient Active Problem List:     Tobacco use     Carpal tunnel syndrome on both sides     Acquired hypothyroidism Hyperlipidemia     Iron deficiency anemia     Mild episode of recurrent major depressive disorder (HCC)     Type 2 diabetes mellitus with hyperglycemia, with long-term current use of insulin (HCC)     Morbid obesity with BMI of 50.0-59.9, adult (HCC)     Primary osteoarthritis of both knees     Urge incontinence of urine     Mild intermittent asthma without complication     Pure hypercholesterolemia     Hypertension     Pelvic pain in female     Bilateral ovarian cysts     H/O Hysterectomy (2013)     Trichomonal vaginitis     Chest pain     Frontal headache     Frontal sinusitis     Class 3 severe obesity in adult [facility])     Bilateral chronic knee pain     Acute headache     Visual changes     Blindness of left eye

## 2021-09-30 RX ORDER — LEVOTHYROXINE SODIUM 0.05 MG/1
50 TABLET ORAL DAILY
Qty: 30 TABLET | Refills: 2 | Status: SHIPPED | OUTPATIENT
Start: 2021-09-30 | End: 2021-12-24

## 2021-10-16 ENCOUNTER — HOSPITAL ENCOUNTER (EMERGENCY)
Age: 51
Discharge: HOME | End: 2021-10-16
Attending: EMERGENCY MEDICINE
Payer: COMMERCIAL

## 2021-10-16 VITALS
HEART RATE: 97 BPM | RESPIRATION RATE: 18 BRPM | HEIGHT: 67 IN | OXYGEN SATURATION: 98 % | DIASTOLIC BLOOD PRESSURE: 86 MMHG | SYSTOLIC BLOOD PRESSURE: 138 MMHG | BODY MASS INDEX: 41.12 KG/M2 | WEIGHT: 262 LBS | TEMPERATURE: 98.1 F

## 2021-10-16 DIAGNOSIS — R25.2 CRAMPS OF LEFT LOWER EXTREMITY: Primary | ICD-10-CM

## 2021-10-16 LAB
ANION GAP SERPL CALCULATED.3IONS-SCNC: 12 MMOL/L (ref 9–17)
BUN BLDV-MCNC: 13 MG/DL (ref 6–20)
BUN/CREAT BLD: 26 (ref 9–20)
CALCIUM SERPL-MCNC: 9.4 MG/DL (ref 8.6–10.4)
CHLORIDE BLD-SCNC: 105 MMOL/L (ref 98–107)
CO2: 22 MMOL/L (ref 20–31)
CREAT SERPL-MCNC: 0.5 MG/DL (ref 0.5–0.9)
GFR AFRICAN AMERICAN: >60 ML/MIN
GFR NON-AFRICAN AMERICAN: >60 ML/MIN
GFR SERPL CREATININE-BSD FRML MDRD: ABNORMAL ML/MIN/{1.73_M2}
GFR SERPL CREATININE-BSD FRML MDRD: ABNORMAL ML/MIN/{1.73_M2}
GLUCOSE BLD-MCNC: 295 MG/DL (ref 70–99)
POTASSIUM SERPL-SCNC: 4 MMOL/L (ref 3.7–5.3)
SODIUM BLD-SCNC: 139 MMOL/L (ref 135–144)

## 2021-10-16 PROCEDURE — 99283 EMERGENCY DEPT VISIT LOW MDM: CPT

## 2021-10-16 PROCEDURE — 80048 BASIC METABOLIC PNL TOTAL CA: CPT

## 2021-10-16 PROCEDURE — 6370000000 HC RX 637 (ALT 250 FOR IP): Performed by: EMERGENCY MEDICINE

## 2021-10-16 PROCEDURE — 2580000003 HC RX 258: Performed by: EMERGENCY MEDICINE

## 2021-10-16 RX ORDER — 0.9 % SODIUM CHLORIDE 0.9 %
1000 INTRAVENOUS SOLUTION INTRAVENOUS ONCE
Status: COMPLETED | OUTPATIENT
Start: 2021-10-16 | End: 2021-10-16

## 2021-10-16 RX ORDER — HYDROCODONE BITARTRATE AND ACETAMINOPHEN 5; 325 MG/1; MG/1
1 TABLET ORAL ONCE
Status: COMPLETED | OUTPATIENT
Start: 2021-10-16 | End: 2021-10-16

## 2021-10-16 RX ADMIN — HYDROCODONE BITARTRATE AND ACETAMINOPHEN 1 TABLET: 5; 325 TABLET ORAL at 06:55

## 2021-10-16 RX ADMIN — SODIUM CHLORIDE 1000 ML: 9 INJECTION, SOLUTION INTRAVENOUS at 05:05

## 2021-10-16 ASSESSMENT — ENCOUNTER SYMPTOMS
NAUSEA: 0
VOMITING: 0
BACK PAIN: 0

## 2021-10-16 ASSESSMENT — PAIN SCALES - GENERAL
PAINLEVEL_OUTOF10: 7
PAINLEVEL_OUTOF10: 7

## 2021-10-16 NOTE — ED PROVIDER NOTES
[address] [place] [address]  Emergency Medicine Department    Pt Name: Mary Dyer  MRN: [id number]  [place] 1970  Date of evaluation: 10/16/2021  Provider: Nkechi Painter MD    CHIEF COMPLAINT     Chief Complaint   Patient presents with    Abdominal Pain       HISTORY OF PRESENT ILLNESS  (Location/Symptom, Timing/Onset, Context/Setting,Quality, Duration, Modifying Factors, Severity.)   Mary Dyer is a 45 y.o. female who presents to the emergency department complaining of cramping in bilateral lower extremities and in the right abdomen. She has a history of muscle cramps usually in her right leg and right arm. The spasm this evening was in the left leg which is unusual for her. She also felt very unbalanced. Right now she complains of pain only in her right upper and lower extremity. She rates his pain as a 7 out of 10. She denies any chest pain or shortness of breath. Nursing Notes were reviewed.     ALLERGIES     Januvia [sitagliptin phosphate], Naproxen sodium, and Neurontin [gabapentin]    CURRENT MEDICATIONS       Discharge Medication List as of 10/16/2021  6:47 AM      CONTINUE these medications which have NOT CHANGED    Details   levothyroxine (SYNTHROID) 50 MCG tablet Take 1 tablet by mouth daily, Disp-30 tablet, R-2Normal      DULoxetine (CYMBALTA) 60 MG extended release capsule Take 1 capsule by mouth daily, Disp-90 capsule, R-3Normal      insulin aspart (NOVOLOG FLEXPEN) 100 UNIT/ML injection pen Inject 30 Units into the skin 3 times daily (before meals), Disp-5 pen, R-3Normal      insulin glargine (BASAGLAR KWIKPEN) 100 UNIT/ML injection pen Inject 25 Units into the skin 2 times daily, Disp-5 pen, R-3Normal      Liraglutide (VICTOZA) 18 MG/3ML SOPN SC injection Start at 1.2 mg once a day for 1 week, then increase it to 1.8 mg once a day thereafter., Disp-2 pen, R-3Normal      zoster recombinant adjuvanted vaccine (SHINGRIX) 50 MCG/0.5ML SUSR injection 50 MCG IM then repeat nightly, Disp-30 tablet,R-3Normal      diclofenac (VOLTAREN) 50 MG EC tablet Take 1 tablet by mouth 3 times daily (with meals), Disp-60 tablet,R-3Normal      Insulin Pen Needle 31G X 5 MM MISC DAILY Starting Mon 10/26/2020, Disp-100 each,R-3, Normal      ONE TOUCH ULTRASOFT LANCETS MISC Disp-100 each,R-2, NormalDx: DM-2 use 2-3 times daily      mirabegron (MYRBETRIQ) 25 MG TB24 Take 1 tablet by mouth daily, Disp-30 tablet,R-3Normal      Calcium Citrate-Vitamin D (CALCITRATE/VITAMIN D PO) Take by mouthHistorical Med             PAST MEDICAL HISTORY         Diagnosis Date    Anxiety     Arthritis of knee, degenerative 2012    Blindness of left eye     hx. of     Blurry vision     left remaines blurry to Left eye to  on 21    Carpal tunnel syndrome on both sides 1/15/2013    Chronic knee pain     on 18 pt is presently in pain mgmnt    Depressive disorder, not elsewhere classified 10/14/2014    Headache     Hx. of     Hydronephrosis, left 2016    Hyperlipidemia     Hypertension     Hypothyroidism 2013    Iron deficiency anemia 6/10/2014    Mild intermittent asthma without complication     Obesity     Osteoarthritis     KNEE    Polyneuropathy 2012    RAD (reactive airway disease) 2012    Snores     possible apnea but not tested yet    Type II or unspecified type diabetes mellitus without mention of complication, not stated as uncontrolled     Urge incontinence of urine 2017    Wears glasses     Weight loss        SURGICAL HISTORY           Procedure Laterality Date    CARPAL TUNNEL RELEASE      2008 left,  2009 right     SECTION  2007    CYSTOSCOPY  2016    cysto with left ureteral stent placement    HYSTERECTOMY, TOTAL ABDOMINAL  2013    LITHOTRIPSY Left 2016    cysto with ESWL and left ureteral stent placement.     NERVE BLOCK Left 2016    left knee kenalog 80mg    NERVE BLOCK  3/11/16    Rt Knee depo medrol [de-identified]     TUBAL LIGATION  3/2012       FAMILY HISTORY           Problem Relation Age of Onset   [facility] Stroke Father     Diabetes Mother     Hypertension Mother     Breast Cancer Neg Hx     Cancer Neg Hx     Colon Cancer Neg Hx     Eclampsia Neg Hx     Ovarian Cancer Neg Hx      Labor Neg Hx     Spont Abortions Neg Hx      Family Status   Relation Name Status    Father  Alive    Mother  Alive    Neg Hx  (Not Specified)        SOCIAL HISTORY      reports that she has been smoking cigarettes. She has a 3.75 pack-year smoking history. She has never used smokeless tobacco. She reports current alcohol use. She reports current drug use. Drug: Marijuana. REVIEW OF SYSTEMS    (2-9 systems for level 4, 10 or more for level 5)     Review of Systems   Constitutional: Negative for chills and fever. Cardiovascular: Negative for chest pain. Gastrointestinal: Negative for nausea and vomiting. Musculoskeletal: Positive for gait problem and myalgias. Negative for back pain. Allergic/Immunologic: Negative for immunocompromised state. Neurological: Negative for weakness and numbness. All other systems reviewed and are negative. PHYSICAL EXAM    (up to 7 for level 4, 8 or more for level 5)     ED Triage Vitals [10/16/21 0430]   BP Temp Temp Source Pulse Resp SpO2 Height Weight   138/86 98.1 °F (36.7 °C) Oral 97 18 98 % 5' 7\" (1.702 m) 262 lb (118.8 kg)       Physical Exam  Vitals and nursing note reviewed. Constitutional:       General: She is not in acute distress. Appearance: She is well-developed. HENT:      Head: Normocephalic and atraumatic. Eyes:      Extraocular Movements: Extraocular movements intact. Cardiovascular:      Rate and Rhythm: Normal rate and regular rhythm. Heart sounds: Normal heart sounds. Pulmonary:      Effort: Pulmonary effort is normal. No respiratory distress. Breath sounds: Normal breath sounds. Abdominal:      Palpations: Abdomen is soft. Tenderness: There is abdominal tenderness (mild right abdominal tenderness). Musculoskeletal:         General: No tenderness or deformity. Normal range of motion. Cervical back: Normal range of motion and neck supple. Lymphadenopathy:      Cervical: No cervical adenopathy. Skin:     General: Skin is warm and dry. Findings: No rash. Neurological:      Mental Status: She is alert and oriented to person, place, and time. Psychiatric:         Behavior: Behavior normal.         Thought Content: Thought content normal.         Judgment: Judgment normal.         DIAGNOSTIC RESULTS     RADIOLOGY:   Non-plain film images such as CT, Ultrasound and MRI are read by theradiologist. Plain radiographic images are visualized and preliminarily interpreted by the emergency physician with the below findings:    None indicated    ED BEDSIDE ULTRASOUND:   Performed by ED Physician - none    LABS:  Labs Reviewed   BASIC METABOLIC PANEL W/ REFLEX TO MG FOR LOW K - Abnormal; Notable for the following components:       Result Value    Glucose 295 (*)     Bun/Cre Ratio 26 (*)     All other components within normal limits       All other labs were within normal range or not returned as of this dictation. EMERGENCYDEPARTMENT COURSE and DIFFERENTIAL DIAGNOSIS/MDM:   Vitals:    Vitals:    10/16/21 0430   BP: 138/86   Pulse: 97   Resp: 18   Temp: 98.1 °F (36.7 °C)   TempSrc: Oral   SpO2: 98%   Weight: 262 lb (118.8 kg)   Height: 5' 7\" (1.702 m)     50-year-old female presenting complaining of cramping in her legs and abdomen. She has a normal exam with reassuring vital signs. Unclear cause of her symptoms though it does seem to be similar to previous presentations. Electrolytes are all within normal limits. Will treat with a dose of Norco and plan for discharge with instructions for outpatient follow-up. CONSULTS:  None    PROCEDURES:  None indicated    FINAL IMPRESSION     1.  Cramps of left lower extremity DISPOSITION/PLAN   DISPOSITION      PATIENT REFERRED TO:   Dee Dee Russell MD  [address] [phone]    Schedule an appointment as soon as possible for a visit in 1 week  For Follow up    DISCHARGE MEDICATIONS:     Discharge Medication List as of 10/16/2021  6:47 AM        (Please note that portions of this note were completed with a voice recognition program.  Efforts were made to edit the dictations butoccasionally words are mis-transcribed.)    Jordan Durán MD  Attending Emergency Physician         Jordan Durán MD  10/17/21 2344

## 2021-10-19 ENCOUNTER — OFFICE VISIT (OUTPATIENT)
Dept: INTERNAL MEDICINE | Age: 51
End: 2021-10-19
Payer: COMMERCIAL

## 2021-10-19 VITALS
HEART RATE: 104 BPM | BODY MASS INDEX: 41.65 KG/M2 | WEIGHT: 265.4 LBS | HEIGHT: 67 IN | DIASTOLIC BLOOD PRESSURE: 74 MMHG | SYSTOLIC BLOOD PRESSURE: 129 MMHG

## 2021-10-19 DIAGNOSIS — E08.42 DIABETIC POLYNEUROPATHY ASSOCIATED WITH DIABETES MELLITUS DUE TO UNDERLYING CONDITION (HCC): ICD-10-CM

## 2021-10-19 DIAGNOSIS — Z23 INFLUENZA VACCINATION ADMINISTERED AT CURRENT VISIT: ICD-10-CM

## 2021-10-19 DIAGNOSIS — Z23 NEEDS FLU SHOT: ICD-10-CM

## 2021-10-19 DIAGNOSIS — Z79.4 TYPE 2 DIABETES MELLITUS WITH HYPERGLYCEMIA, WITH LONG-TERM CURRENT USE OF INSULIN (HCC): Primary | ICD-10-CM

## 2021-10-19 DIAGNOSIS — F33.0 MILD EPISODE OF RECURRENT MAJOR DEPRESSIVE DISORDER (HCC): ICD-10-CM

## 2021-10-19 DIAGNOSIS — E11.65 TYPE 2 DIABETES MELLITUS WITH HYPERGLYCEMIA, WITH LONG-TERM CURRENT USE OF INSULIN (HCC): Primary | ICD-10-CM

## 2021-10-19 DIAGNOSIS — E11.42 DIABETIC POLYNEUROPATHY ASSOCIATED WITH TYPE 2 DIABETES MELLITUS (HCC): ICD-10-CM

## 2021-10-19 LAB — HBA1C MFR BLD: 11.1 %

## 2021-10-19 PROCEDURE — G8482 FLU IMMUNIZE ORDER/ADMIN: HCPCS | Performed by: STUDENT IN AN ORGANIZED HEALTH CARE EDUCATION/TRAINING PROGRAM

## 2021-10-19 PROCEDURE — 2022F DILAT RTA XM EVC RTNOPTHY: CPT | Performed by: STUDENT IN AN ORGANIZED HEALTH CARE EDUCATION/TRAINING PROGRAM

## 2021-10-19 PROCEDURE — G8417 CALC BMI ABV UP PARAM F/U: HCPCS | Performed by: STUDENT IN AN ORGANIZED HEALTH CARE EDUCATION/TRAINING PROGRAM

## 2021-10-19 PROCEDURE — 99211 OFF/OP EST MAY X REQ PHY/QHP: CPT | Performed by: INTERNAL MEDICINE

## 2021-10-19 PROCEDURE — 99213 OFFICE O/P EST LOW 20 MIN: CPT | Performed by: STUDENT IN AN ORGANIZED HEALTH CARE EDUCATION/TRAINING PROGRAM

## 2021-10-19 PROCEDURE — G8427 DOCREV CUR MEDS BY ELIG CLIN: HCPCS | Performed by: STUDENT IN AN ORGANIZED HEALTH CARE EDUCATION/TRAINING PROGRAM

## 2021-10-19 PROCEDURE — 4004F PT TOBACCO SCREEN RCVD TLK: CPT | Performed by: STUDENT IN AN ORGANIZED HEALTH CARE EDUCATION/TRAINING PROGRAM

## 2021-10-19 PROCEDURE — 3046F HEMOGLOBIN A1C LEVEL >9.0%: CPT | Performed by: STUDENT IN AN ORGANIZED HEALTH CARE EDUCATION/TRAINING PROGRAM

## 2021-10-19 PROCEDURE — 3017F COLORECTAL CA SCREEN DOC REV: CPT | Performed by: STUDENT IN AN ORGANIZED HEALTH CARE EDUCATION/TRAINING PROGRAM

## 2021-10-19 PROCEDURE — 90688 IIV4 VACCINE SPLT 0.5 ML IM: CPT | Performed by: STUDENT IN AN ORGANIZED HEALTH CARE EDUCATION/TRAINING PROGRAM

## 2021-10-19 PROCEDURE — 83036 HEMOGLOBIN GLYCOSYLATED A1C: CPT | Performed by: STUDENT IN AN ORGANIZED HEALTH CARE EDUCATION/TRAINING PROGRAM

## 2021-10-19 RX ORDER — LIRAGLUTIDE 6 MG/ML
INJECTION SUBCUTANEOUS
Qty: 2 PEN | Refills: 3 | Status: SHIPPED | OUTPATIENT
Start: 2021-10-19 | End: 2022-01-26

## 2021-10-19 RX ORDER — DULOXETIN HYDROCHLORIDE 60 MG/1
60 CAPSULE, DELAYED RELEASE ORAL DAILY
Qty: 90 CAPSULE | Refills: 3 | Status: SHIPPED | OUTPATIENT
Start: 2021-10-19

## 2021-10-19 SDOH — ECONOMIC STABILITY: FOOD INSECURITY: WITHIN THE PAST 12 MONTHS, THE FOOD YOU BOUGHT JUST DIDN'T LAST AND YOU DIDN'T HAVE MONEY TO GET MORE.: NEVER TRUE

## 2021-10-19 SDOH — ECONOMIC STABILITY: FOOD INSECURITY: WITHIN THE PAST 12 MONTHS, YOU WORRIED THAT YOUR FOOD WOULD RUN OUT BEFORE YOU GOT MONEY TO BUY MORE.: NEVER TRUE

## 2021-10-19 ASSESSMENT — ENCOUNTER SYMPTOMS
BACK PAIN: 0
VOMITING: 0
NAUSEA: 0
CONSTIPATION: 0
WHEEZING: 0
ABDOMINAL PAIN: 0
CHEST TIGHTNESS: 0
COUGH: 0
SHORTNESS OF BREATH: 0
BLOOD IN STOOL: 0
DIARRHEA: 0

## 2021-10-19 ASSESSMENT — SOCIAL DETERMINANTS OF HEALTH (SDOH): HOW HARD IS IT FOR YOU TO PAY FOR THE VERY BASICS LIKE FOOD, HOUSING, MEDICAL CARE, AND HEATING?: NOT HARD AT ALL

## 2021-10-19 NOTE — PROGRESS NOTES
[facility] [address] [address]  Dept: [phone]  Dept Fax: [phone]    Office Progress/Follow Up Note  Date ofpatient's visit: 10/19/2021  Patient's Name:  Ari Sharpe YOB: 1970            Patient Care Team:  Douglas Hdz MD as PCP - General (Internal Medicine)  Layla Simmons MD as Anesthesiologist (Pain Management)  Kerry Holden MD as Consulting Physician (Urology)  Edith rCuz MD as Consulting Physician (Internal Medicine)  ================================================================    REASON FOR VISIT/CHIEF COMPLAINT:  ED Follow-up (st ed for muscles cramps), Health Maintenance (A1c Pended, flu pended), and Discuss Medications (lyrica)    HISTORY OF PRESENTING ILLNESS:  History was obtained from: patient, electronic medical record. Delfina Cueva a 45 y.o. is here for a same-day visit. Muscle spasms secondary to diabetic polyneuropathy: Patient has history of type 2 diabetes mellitus currently on insulin glargine 25 units daily, insulin NovoLog 30 units 3 times daily, Victoza once weekly and Metformin 500 mg twice daily. Patient has not been compliant with all her medications. HbA1c 11.1 from 11.2 last visit in July. Patient states that she was prescribed Cymbalta for neuropathy which calmed down her spasms and was given a prescription for 90 days. Patient states that as soon as her medications ran out she felt very severe spasms, numbness and tingling that she had to go to the ER. Patient states that the Desyrel helped calm down the spasms but did not take them away. Discussed in detail with the patient the importance of being compliant to her diabetes medications to help with the neuropathy.       Patient Active Problem List   Diagnosis    Tobacco use    Carpal tunnel syndrome on both sides    Acquired hypothyroidism    Hyperlipidemia    Iron deficiency anemia    Mild episode of recurrent major depressive disorder (HCC)    Type 2 diabetes mellitus with hyperglycemia, with long-term current use of insulin (HCC)    Morbid obesity with BMI of 50.0-59.9, adult (HCC)    Primary osteoarthritis of both knees    Urge incontinence of urine    Mild intermittent asthma without complication    Pure hypercholesterolemia    Hypertension    Pelvic pain in female    Bilateral ovarian cysts    H/O Hysterectomy (2013)    Trichomonal vaginitis    Chest pain    Frontal headache    Frontal sinusitis    Class 3 severe obesity in adult [facility])    Bilateral chronic knee pain    Acute headache    Visual changes    Blindness of left eye    Diabetic polyneuropathy ([facility] [address]       Health Maintenance Due   Topic Date Due    Hepatitis C screen  Never done    DTaP/Tdap/Td vaccine (1 - Tdap) Never done    Colon cancer screen colonoscopy  Never done    Diabetic retinal exam  10/01/2016    Diabetic microalbuminuria test  09/11/2018    Shingles Vaccine (1 of 2) Never done    Diabetic foot exam  03/06/2020    Lipid screen  01/27/2021    Breast cancer screen  02/27/2021    Flu vaccine (1) 09/01/2021    A1C test (Diabetic or Prediabetic)  10/16/2021    Annual Wellness Visit (AWV)  11/06/2021       Allergies   Allergen Reactions    Januvia [Sitagliptin Phosphate] Hives    Naproxen Sodium Hives    Neurontin [Gabapentin] Nausea Only         Current Outpatient Medications   Medication Sig Dispense Refill    levothyroxine (SYNTHROID) 50 MCG tablet Take 1 tablet by mouth daily 30 tablet 2    DULoxetine (CYMBALTA) 60 MG extended release capsule Take 1 capsule by mouth daily 90 capsule 3    insulin aspart (NOVOLOG FLEXPEN) 100 UNIT/ML injection pen Inject 30 Units into the skin 3 times daily (before meals) 5 pen 3    insulin glargine (BASAGLAR KWIKPEN) 100 UNIT/ML injection pen Inject 25 Units into the skin 2 times daily 5 pen 3    Liraglutide (VICTOZA) 18 MG/3ML SOPN SC injection Start at 1.2 mg once a day for 1 week, then increase it to 1.8 mg once a day thereafter. 2 pen 3    Blood Glucose Monitoring Suppl (ONE TOUCH ULTRA MINI) w/Device KIT 1 kit by Does not apply route Daily 1 kit 0    blood glucose monitor strips 1 strip by Other route three times daily Test___times daily    Diagnosis: 250.0   Diabetes Mellitus____Insulin Dependent____Non-Insulin Dependent 100 strip 3    nystatin (NYAMYC) 370964 UNIT/GM powder APPLY TO SKIN OF LEGS & IN FOLDS OF SKIN WITH IRRITATION 1 Bottle 0    polyethylene glycol (GLYCOLAX) 17 GM/SCOOP powder Follow instructions provided to you from physician's office. 238 g 0    bisacodyl (BISACODYL) 5 MG EC tablet Follow instructions provided given by the physician's office. 2 tablet 0    blood glucose monitor kit and supplies Dispense sufficient amount for indicated testing frequency plus additional to accommodate PRN testing needs. Dispense all needed supplies to include: monitor, strips, lancing device, lancets, control solutions, alcohol swabs.  1 kit 0    traZODone (DESYREL) 150 MG tablet Take 1 tablet by mouth nightly 30 tablet 3    albuterol sulfate HFA (PROVENTIL HFA) 108 (90 Base) MCG/ACT inhaler Inhale 2 puffs into the lungs every 6 hours as needed for Wheezing 1 Inhaler 2    aspirin EC 81 MG EC tablet Take 1 tablet by mouth daily 30 tablet 3    ferrous sulfate (IRON 325) 325 (65 Fe) MG tablet Take 1 tablet by mouth 2 times daily 60 tablet 3    lisinopril (PRINIVIL;ZESTRIL) 5 MG tablet Take 1 tablet by mouth daily 30 tablet 3    metFORMIN (GLUCOPHAGE) 500 MG tablet Take 1 tablet by mouth 2 times daily (with meals) 60 tablet 3    simvastatin (ZOCOR) 40 MG tablet Take 1 tablet by mouth nightly 30 tablet 3    diclofenac (VOLTAREN) 50 MG EC tablet Take 1 tablet by mouth 3 times daily (with meals) 60 tablet 3    Insulin Pen Needle 31G X 5 MM MISC 1 each by Does not apply route daily 100 each 3    ONE TOUCH ULTRASOFT LANCETS MISC Dx: DM-2 use 2-3 times daily 100 each 2    mirabegron (MYRBETRIQ) 25 MG TB24 Take 1 tablet by mouth daily 30 tablet 3    Calcium Citrate-Vitamin D (CALCITRATE/VITAMIN D PO) Take by mouth      zoster recombinant adjuvanted vaccine (SHINGRIX) 50 MCG/0.5ML SUSR injection 50 MCG IM then repeat 2-6 months. 0.5 mL 1    Blood Pressure KIT Use to monitor blood pressure daily and as needed 1 kit 0     No current facility-administered medications for this visit. Social History     Tobacco Use    Smoking status: Current Some Day Smoker     Packs/day: 0.25     Years: 15.00     Pack years: 3.75     Types: Cigarettes    Smokeless tobacco: Never Used    Tobacco comment: occasionaly   Vaping Use    Vaping Use: Never used   Substance Use Topics    Alcohol use: Yes     Alcohol/week: 0.0 standard drinks     Comment: rare    Drug use: Yes     Types: Marijuana       Family History   Problem Relation Age of Onset    Stroke Father     Diabetes Mother     Hypertension Mother     Breast Cancer Neg Hx     Cancer Neg Hx     Colon Cancer Neg Hx     Eclampsia Neg Hx     Ovarian Cancer Neg Hx      Labor Neg Hx     Spont Abortions Neg Hx         REVIEW OF SYSTEMS:  Review of Systems   Constitutional: Negative for activity change, appetite change and fever. HENT: Negative for congestion. Respiratory: Negative for cough, chest tightness, shortness of breath and wheezing. Cardiovascular: Negative for chest pain, palpitations and leg swelling. Gastrointestinal: Negative for abdominal pain, blood in stool, constipation, diarrhea, nausea and vomiting. Genitourinary: Negative for difficulty urinating. Musculoskeletal: Positive for arthralgias. Negative for back pain. Neurological: Positive for numbness. Negative for dizziness, light-headedness and headaches. Muscle spasms   Psychiatric/Behavioral: Negative for behavioral problems, confusion and decreased concentration.        PHYSICAL EXAM:  Vitals:    10/19/21 1416   BP: 129/74   Site: Right Upper Arm   Position: Sitting   Cuff Size: Large Adult   Pulse: 104   Weight: 265 lb 6.4 oz (120.4 kg)   Height: 5' 7\" (1.702 m)     BP Readings from Last 3 Encounters:   10/19/21 129/74   10/16/21 138/86   07/16/21 122/77        Physical Exam  Constitutional:       Appearance: Normal appearance. She is obese. HENT:      Head: Normocephalic and atraumatic. Eyes:      Extraocular Movements: Extraocular movements intact. Pupils: Pupils are equal, round, and reactive to light. Cardiovascular:      Rate and Rhythm: Normal rate and regular rhythm. Pulses: Normal pulses. Heart sounds: Normal heart sounds. No murmur heard. Pulmonary:      Effort: Pulmonary effort is normal. No respiratory distress. Breath sounds: Normal breath sounds. No wheezing. Chest:      Chest wall: No tenderness. Abdominal:      General: Abdomen is flat. Bowel sounds are normal. There is no distension. Palpations: Abdomen is soft. Tenderness: There is no abdominal tenderness. There is no guarding. Musculoskeletal:      Right lower leg: No edema. Left lower leg: No edema. Neurological:      Mental Status: She is alert and oriented to person, place, and time.    Psychiatric:         Behavior: Behavior normal.           DIAGNOSTIC FINDINGS:  CBC:  Lab Results   Component Value Date    WBC 5.5 06/29/2021    HGB 13.8 06/29/2021     06/29/2021     04/26/2012       BMP:    Lab Results   Component Value Date     10/16/2021    K 4.0 10/16/2021     10/16/2021    CO2 22 10/16/2021    BUN 13 10/16/2021    CREATININE 0.50 10/16/2021    GLUCOSE 295 10/16/2021    GLUCOSE 296 12/29/2011       HEMOGLOBIN A1C:   Lab Results   Component Value Date    LABA1C 11.2 07/16/2021       FASTING LIPID PANEL:  Lab Results   Component Value Date    CHOL 189 09/11/2017    HDL 82 01/27/2020    TRIG 100 09/11/2017       ASSESSMENT AND PLAN:  Jenifer Reyes was seen today for ed follow-up, health maintenance

## 2021-10-19 NOTE — PROGRESS NOTES
Vaccine Information Sheet, \"Influenza - Inactivated\"  given to Jennifer Shone, or parent/legal guardian of  Jennifer Shone and verbalized understanding. Patient responses:    Is the person being vaccinated sick today? No    Does the person to be vaccinated have an allergy to a component of the vaccine? No    Has the person to be vaccinated ever had a reaction to the flu vaccine in the past?  No    Have you ever had Guillian Bamberg Syndrome? No    Flu vaccine given per order. Please see immunization tab.

## 2021-10-19 NOTE — PATIENT INSTRUCTIONS
Follow-up appointment scheduled for 1/21/22 at 2p, AVS given to patient.     Referral to Diabetes Education was placed, summary of care printed and faxed to office, phone numbers given to pt, they will contact office for an appt    jw

## 2021-10-26 DIAGNOSIS — M25.562 CHRONIC PAIN OF BOTH KNEES: ICD-10-CM

## 2021-10-26 DIAGNOSIS — F33.0 MILD EPISODE OF RECURRENT MAJOR DEPRESSIVE DISORDER (HCC): ICD-10-CM

## 2021-10-26 DIAGNOSIS — M25.561 CHRONIC PAIN OF BOTH KNEES: ICD-10-CM

## 2021-10-26 DIAGNOSIS — I10 ESSENTIAL HYPERTENSION: ICD-10-CM

## 2021-10-26 DIAGNOSIS — E11.42 TYPE 2 DIABETES MELLITUS WITH DIABETIC POLYNEUROPATHY, WITH LONG-TERM CURRENT USE OF INSULIN (HCC): ICD-10-CM

## 2021-10-26 DIAGNOSIS — Z79.4 TYPE 2 DIABETES MELLITUS WITH DIABETIC POLYNEUROPATHY, WITH LONG-TERM CURRENT USE OF INSULIN (HCC): ICD-10-CM

## 2021-10-26 DIAGNOSIS — E78.00 PURE HYPERCHOLESTEROLEMIA: ICD-10-CM

## 2021-10-26 DIAGNOSIS — N39.41 URGE INCONTINENCE OF URINE: ICD-10-CM

## 2021-10-26 DIAGNOSIS — G89.29 CHRONIC PAIN OF BOTH KNEES: ICD-10-CM

## 2021-10-26 NOTE — TELEPHONE ENCOUNTER
Hepatitis C Antibody Once 10/26/2021         Patient Active Problem List:     Tobacco use     Carpal tunnel syndrome on both sides     Acquired hypothyroidism     Hyperlipidemia     Iron deficiency anemia     Mild episode of recurrent major depressive disorder ([address]     Type 2 diabetes mellitus with hyperglycemia, with long-term current use of insulin ([address]     Morbid obesity with BMI of 50.0-59.9, adult (HCC)     Primary osteoarthritis of both knees     Urge incontinence of urine     Mild intermittent asthma without complication     Pure hypercholesterolemia     Hypertension     Pelvic pain in female     Bilateral ovarian cysts     H/O Hysterectomy (2013)     Trichomonal vaginitis     Chest pain     Frontal headache     Frontal sinusitis     Class 3 severe obesity in adult [facility])     Bilateral chronic knee pain     Acute headache     Visual changes     Blindness of left eye     Diabetic polyneuropathy ([address]

## 2021-10-26 NOTE — TELEPHONE ENCOUNTER
Request for medication refill, lancet one touch. Next Visit Date:1/21/33  Future Appointments   Date Time Provider Eriberto Chirinos   11/1/2021 10:00 AM ZENAIDA JeanZ DIAB ED [facility]   1/21/2022  2:00 PM Jag Keane MD [facility] Maintenance   Topic Date Due    Hepatitis C screen  Never done    DTaP/Tdap/Td vaccine (1 - Tdap) Never done    Colon cancer screen colonoscopy  Never done    Diabetic retinal exam  10/01/2016    Diabetic microalbuminuria test  09/11/2018    Shingles Vaccine (1 of 2) Never done    Diabetic foot exam  03/06/2020    Lipid screen  01/27/2021    Breast cancer screen  02/27/2021    Annual Wellness Visit (AWV)  11/06/2021    Hepatitis B vaccine (3 of 3 - Risk 3-dose series) 11/16/2021    TSH testing  01/08/2022    A1C test (Diabetic or Prediabetic)  01/19/2022    COVID-19 Vaccine (3 - Pfizer booster) 04/18/2022    Potassium monitoring  10/16/2022    Creatinine monitoring  10/16/2022    Pneumococcal 0-64 years Vaccine (2 of 2 - PPSV23) 07/06/2034    Flu vaccine  Completed    HIV screen  Completed    Hepatitis A vaccine  Aged Out    Hib vaccine  Aged Out    Meningococcal (ACWY) vaccine  Aged Out       Hemoglobin A1C (%)   Date Value   10/19/2021 11.1   07/16/2021 11.2   01/08/2021 10.1 (H)             ( goal A1C is < 7)   Microalb/Crt.  Ratio (mcg/mg creat)   Date Value   09/11/2017 7     LDL Cholesterol (mg/dL)   Date Value   01/27/2020 100       (goal LDL is <100)   AST (U/L)   Date Value   06/29/2021 13     ALT (U/L)   Date Value   06/29/2021 15     BUN (mg/dL)   Date Value   10/16/2021 13     BP Readings from Last 3 Encounters:   10/19/21 129/74   10/16/21 138/86   07/16/21 122/77          (goal 120/80)    All Future Testing planned in [facility]  Lab Frequency Next Occurrence   Microalbumin, Ur Once 10/23/2021   COVID-19 Once 01/15/2021   Lipid, Fasting Once 10/26/2021   IMCHAEL DIGITAL SCREEN W OR WO CAD BILATERAL Once 11/02/2021   Hepatitis C Antibody Once 10/26/2021         Patient Active Problem List:     Tobacco use     Carpal tunnel syndrome on both sides     Acquired hypothyroidism     Hyperlipidemia     Iron deficiency anemia     Mild episode of recurrent major depressive disorder ([address]     Type 2 diabetes mellitus with hyperglycemia, with long-term current use of insulin ([address]     Morbid obesity with BMI of 50.0-59.9, adult (HCC)     Primary osteoarthritis of both knees     Urge incontinence of urine     Mild intermittent asthma without complication     Pure hypercholesterolemia     Hypertension     Pelvic pain in female     Bilateral ovarian cysts     H/O Hysterectomy (2013)     Trichomonal vaginitis     Chest pain     Frontal headache     Frontal sinusitis     Class 3 severe obesity in adult [facility])     Bilateral chronic knee pain     Acute headache     Visual changes     Blindness of left eye     Diabetic polyneuropathy ([address]

## 2021-10-27 DIAGNOSIS — Z79.4 TYPE 2 DIABETES MELLITUS WITH DIABETIC POLYNEUROPATHY, WITH LONG-TERM CURRENT USE OF INSULIN (HCC): ICD-10-CM

## 2021-10-27 DIAGNOSIS — E11.42 TYPE 2 DIABETES MELLITUS WITH DIABETIC POLYNEUROPATHY, WITH LONG-TERM CURRENT USE OF INSULIN (HCC): ICD-10-CM

## 2021-10-27 RX ORDER — LANCETS
EACH MISCELLANEOUS
Qty: 100 EACH | Refills: 2 | Status: SHIPPED | OUTPATIENT
Start: 2021-10-27

## 2021-10-27 RX ORDER — TRAZODONE HYDROCHLORIDE 150 MG/1
150 TABLET ORAL NIGHTLY
Qty: 30 TABLET | Refills: 3 | Status: SHIPPED | OUTPATIENT
Start: 2021-10-27 | End: 2022-03-28

## 2021-10-27 RX ORDER — LISINOPRIL 5 MG/1
5 TABLET ORAL DAILY
Qty: 30 TABLET | Refills: 3 | Status: SHIPPED | OUTPATIENT
Start: 2021-10-27 | End: 2022-01-21 | Stop reason: SDUPTHER

## 2021-10-27 RX ORDER — SIMVASTATIN 40 MG
40 TABLET ORAL NIGHTLY
Qty: 30 TABLET | Refills: 3 | Status: SHIPPED | OUTPATIENT
Start: 2021-10-27 | End: 2022-03-28

## 2021-10-27 RX ORDER — ASPIRIN 81 MG/1
81 TABLET ORAL DAILY
Qty: 30 TABLET | Refills: 3 | Status: SHIPPED | OUTPATIENT
Start: 2021-10-27 | End: 2022-03-28

## 2021-10-27 RX ORDER — LANCETS 30 GAUGE
1 EACH MISCELLANEOUS DAILY
Qty: 100 EACH | Refills: 11 | Status: SHIPPED | OUTPATIENT
Start: 2021-10-27

## 2021-10-27 NOTE — TELEPHONE ENCOUNTER
Request for Lancets(sent to incorrect pharmacy yesterday) and pen needles . Next Visit Date:  Future Appointments   Date Time Provider Eriberto Duartei   11/1/2021 10:00 AM Peyton Bumpers, RN [facility] ED [facility]   1/21/2022  2:00 PM Rock Lassiter MD [facility] Maintenance   Topic Date Due    Hepatitis C screen  Never done    DTaP/Tdap/Td vaccine (1 - Tdap) Never done    Colon cancer screen colonoscopy  Never done    Diabetic retinal exam  10/01/2016    Diabetic microalbuminuria test  09/11/2018    Shingles Vaccine (1 of 2) Never done    Diabetic foot exam  03/06/2020    Lipid screen  01/27/2021    Breast cancer screen  02/27/2021    Annual Wellness Visit (AWV)  11/06/2021    Hepatitis B vaccine (3 of 3 - Risk 3-dose series) 11/16/2021    TSH testing  01/08/2022    A1C test (Diabetic or Prediabetic)  01/19/2022    COVID-19 Vaccine (3 - Pfizer booster) 04/18/2022    Potassium monitoring  10/16/2022    Creatinine monitoring  10/16/2022    Pneumococcal 0-64 years Vaccine (2 of 2 - PPSV23) 07/06/2034    Flu vaccine  Completed    HIV screen  Completed    Hepatitis A vaccine  Aged Out    Hib vaccine  Aged Out    Meningococcal (ACWY) vaccine  Aged Out       Hemoglobin A1C (%)   Date Value   10/19/2021 11.1   07/16/2021 11.2   01/08/2021 10.1 (H)             ( goal A1C is < 7)   Microalb/Crt.  Ratio (mcg/mg creat)   Date Value   09/11/2017 7     LDL Cholesterol (mg/dL)   Date Value   01/27/2020 100       (goal LDL is <100)   AST (U/L)   Date Value   06/29/2021 13     ALT (U/L)   Date Value   06/29/2021 15     BUN (mg/dL)   Date Value   10/16/2021 13     BP Readings from Last 3 Encounters:   10/19/21 129/74   10/16/21 138/86   07/16/21 122/77          (goal 120/80)    All Future Testing planned in [facility]  Lab Frequency Next Occurrence   COVID-19 Once 01/15/2021   Lipid, Fasting Once 10/26/2021   MICHAEL DIGITAL SCREEN W OR WO CAD BILATERAL Once 11/02/2021   Hepatitis C Antibody Once 10/26/2021         Patient Active Problem List:     Tobacco use     Carpal tunnel syndrome on both sides     Acquired hypothyroidism     Hyperlipidemia     Iron deficiency anemia     Mild episode of recurrent major depressive disorder ([address]     Type 2 diabetes mellitus with hyperglycemia, with long-term current use of insulin ([address]     Morbid obesity with BMI of 50.0-59.9, adult (HCC)     Primary osteoarthritis of both knees     Urge incontinence of urine     Mild intermittent asthma without complication     Pure hypercholesterolemia     Hypertension     Pelvic pain in female     Bilateral ovarian cysts     H/O Hysterectomy (2013)     Trichomonal vaginitis     Chest pain     Frontal headache     Frontal sinusitis     Class 3 severe obesity in adult [facility])     Bilateral chronic knee pain     Acute headache     Visual changes     Blindness of left eye     Diabetic polyneuropathy ([address]

## 2021-11-07 PROCEDURE — 99285 EMERGENCY DEPT VISIT HI MDM: CPT

## 2021-11-07 ASSESSMENT — PAIN SCALES - GENERAL: PAINLEVEL_OUTOF10: 7

## 2021-11-08 ENCOUNTER — APPOINTMENT (OUTPATIENT)
Dept: CT IMAGING | Age: 51
DRG: 103 | End: 2021-11-08
Payer: COMMERCIAL

## 2021-11-08 ENCOUNTER — HOSPITAL ENCOUNTER (INPATIENT)
Age: 51
LOS: 1 days | Discharge: HOME | DRG: 103 | End: 2021-11-09
Attending: EMERGENCY MEDICINE | Admitting: INTERNAL MEDICINE
Payer: COMMERCIAL

## 2021-11-08 ENCOUNTER — APPOINTMENT (OUTPATIENT)
Dept: GENERAL RADIOLOGY | Age: 51
DRG: 103 | End: 2021-11-08
Payer: COMMERCIAL

## 2021-11-08 DIAGNOSIS — R51.9 ACUTE INTRACTABLE HEADACHE, UNSPECIFIED HEADACHE TYPE: ICD-10-CM

## 2021-11-08 DIAGNOSIS — R42 DIZZINESS: ICD-10-CM

## 2021-11-08 DIAGNOSIS — R51.9 FRONTAL HEADACHE: ICD-10-CM

## 2021-11-08 DIAGNOSIS — R07.9 CHEST PAIN, UNSPECIFIED TYPE: Primary | ICD-10-CM

## 2021-11-08 LAB
ABSOLUTE EOS #: 0.06 K/UL (ref 0–0.44)
ABSOLUTE IMMATURE GRANULOCYTE: 0.04 K/UL (ref 0–0.3)
ABSOLUTE LYMPH #: 1.86 K/UL (ref 1.1–3.7)
ABSOLUTE MONO #: 0.62 K/UL (ref 0.1–1.2)
ALBUMIN SERPL-MCNC: 4 G/DL (ref 3.5–5.2)
ALBUMIN/GLOBULIN RATIO: ABNORMAL (ref 1–2.5)
ALP BLD-CCNC: 104 U/L (ref 35–104)
ALT SERPL-CCNC: 21 U/L (ref 5–33)
ANION GAP SERPL CALCULATED.3IONS-SCNC: 11 MMOL/L (ref 9–17)
AST SERPL-CCNC: 14 U/L
BASOPHILS # BLD: 0 % (ref 0–2)
BASOPHILS ABSOLUTE: 0.03 K/UL (ref 0–0.2)
BILIRUB SERPL-MCNC: 0.27 MG/DL (ref 0.3–1.2)
BNP INTERPRETATION: NORMAL
BUN BLDV-MCNC: 10 MG/DL (ref 6–20)
BUN/CREAT BLD: 15 (ref 9–20)
CALCIUM SERPL-MCNC: 9.2 MG/DL (ref 8.6–10.4)
CHLORIDE BLD-SCNC: 100 MMOL/L (ref 98–107)
CO2: 24 MMOL/L (ref 20–31)
CREAT SERPL-MCNC: 0.66 MG/DL (ref 0.5–0.9)
D-DIMER QUANTITATIVE: 0.96 MG/L FEU (ref 0–0.59)
DIFFERENTIAL TYPE: ABNORMAL
EKG ATRIAL RATE: 107 BPM
EKG P AXIS: 64 DEGREES
EKG P-R INTERVAL: 150 MS
EKG Q-T INTERVAL: 350 MS
EKG QRS DURATION: 90 MS
EKG QTC CALCULATION (BAZETT): 467 MS
EKG R AXIS: 55 DEGREES
EKG T AXIS: 62 DEGREES
EKG VENTRICULAR RATE: 107 BPM
EOSINOPHILS RELATIVE PERCENT: 1 % (ref 1–4)
GFR AFRICAN AMERICAN: >60 ML/MIN
GFR NON-AFRICAN AMERICAN: >60 ML/MIN
GFR SERPL CREATININE-BSD FRML MDRD: ABNORMAL ML/MIN/{1.73_M2}
GFR SERPL CREATININE-BSD FRML MDRD: ABNORMAL ML/MIN/{1.73_M2}
GLUCOSE BLD-MCNC: 228 MG/DL (ref 70–99)
GLUCOSE BLD-MCNC: 275 MG/DL (ref 65–105)
GLUCOSE BLD-MCNC: 318 MG/DL (ref 65–105)
GLUCOSE BLD-MCNC: 488 MG/DL (ref 65–105)
GLUCOSE BLD-MCNC: 545 MG/DL (ref 65–105)
HCT VFR BLD CALC: 38.6 % (ref 36.3–47.1)
HEMOGLOBIN: 12.9 G/DL (ref 11.9–15.1)
IMMATURE GRANULOCYTES: 1 %
LIPASE: 33 U/L (ref 13–60)
LYMPHOCYTES # BLD: 24 % (ref 24–43)
MCH RBC QN AUTO: 31.7 PG (ref 25.2–33.5)
MCHC RBC AUTO-ENTMCNC: 33.4 G/DL (ref 28.4–34.8)
MCV RBC AUTO: 94.8 FL (ref 82.6–102.9)
MONOCYTES # BLD: 8 % (ref 3–12)
NRBC AUTOMATED: 0 PER 100 WBC
PDW BLD-RTO: 12.9 % (ref 11.8–14.4)
PLATELET # BLD: 312 K/UL (ref 138–453)
PLATELET ESTIMATE: ABNORMAL
PMV BLD AUTO: 10.3 FL (ref 8.1–13.5)
POTASSIUM SERPL-SCNC: 3.7 MMOL/L (ref 3.7–5.3)
PRO-BNP: <20 PG/ML
RBC # BLD: 4.07 M/UL (ref 3.95–5.11)
RBC # BLD: ABNORMAL 10*6/UL
SEG NEUTROPHILS: 66 % (ref 36–65)
SEGMENTED NEUTROPHILS ABSOLUTE COUNT: 5.32 K/UL (ref 1.5–8.1)
SODIUM BLD-SCNC: 135 MMOL/L (ref 135–144)
TOTAL PROTEIN: 6.9 G/DL (ref 6.4–8.3)
TROPONIN INTERP: NORMAL
TROPONIN INTERP: NORMAL
TROPONIN T: NORMAL NG/ML
TROPONIN T: NORMAL NG/ML
TROPONIN, HIGH SENSITIVITY: <6 NG/L (ref 0–14)
TROPONIN, HIGH SENSITIVITY: <6 NG/L (ref 0–14)
TSH SERPL DL<=0.05 MIU/L-ACNC: 1.77 MIU/L (ref 0.3–5)
WBC # BLD: 7.9 K/UL (ref 3.5–11.3)
WBC # BLD: ABNORMAL 10*3/UL

## 2021-11-08 PROCEDURE — 96374 THER/PROPH/DIAG INJ IV PUSH: CPT

## 2021-11-08 PROCEDURE — 82947 ASSAY GLUCOSE BLOOD QUANT: CPT

## 2021-11-08 PROCEDURE — G0378 HOSPITAL OBSERVATION PER HR: HCPCS

## 2021-11-08 PROCEDURE — 6370000000 HC RX 637 (ALT 250 FOR IP): Performed by: EMERGENCY MEDICINE

## 2021-11-08 PROCEDURE — 85025 COMPLETE CBC W/AUTO DIFF WBC: CPT

## 2021-11-08 PROCEDURE — 83690 ASSAY OF LIPASE: CPT

## 2021-11-08 PROCEDURE — 6370000000 HC RX 637 (ALT 250 FOR IP): Performed by: NURSE PRACTITIONER

## 2021-11-08 PROCEDURE — 93880 EXTRACRANIAL BILAT STUDY: CPT

## 2021-11-08 PROCEDURE — 1200000000 HC SEMI PRIVATE

## 2021-11-08 PROCEDURE — 2580000003 HC RX 258: Performed by: NURSE PRACTITIONER

## 2021-11-08 PROCEDURE — 96375 TX/PRO/DX INJ NEW DRUG ADDON: CPT

## 2021-11-08 PROCEDURE — 6360000004 HC RX CONTRAST MEDICATION: Performed by: EMERGENCY MEDICINE

## 2021-11-08 PROCEDURE — 70450 CT HEAD/BRAIN W/O DYE: CPT

## 2021-11-08 PROCEDURE — 99222 1ST HOSP IP/OBS MODERATE 55: CPT | Performed by: INTERNAL MEDICINE

## 2021-11-08 PROCEDURE — 84443 ASSAY THYROID STIM HORMONE: CPT

## 2021-11-08 PROCEDURE — 80053 COMPREHEN METABOLIC PANEL: CPT

## 2021-11-08 PROCEDURE — 96365 THER/PROPH/DIAG IV INF INIT: CPT

## 2021-11-08 PROCEDURE — 96361 HYDRATE IV INFUSION ADD-ON: CPT

## 2021-11-08 PROCEDURE — 2580000003 HC RX 258: Performed by: EMERGENCY MEDICINE

## 2021-11-08 PROCEDURE — 6360000002 HC RX W HCPCS: Performed by: EMERGENCY MEDICINE

## 2021-11-08 PROCEDURE — 71260 CT THORAX DX C+: CPT

## 2021-11-08 PROCEDURE — APPNB30 APP NON BILLABLE TIME 0-30 MINS: Performed by: NURSE PRACTITIONER

## 2021-11-08 PROCEDURE — 93005 ELECTROCARDIOGRAM TRACING: CPT | Performed by: EMERGENCY MEDICINE

## 2021-11-08 PROCEDURE — 6360000002 HC RX W HCPCS: Performed by: NURSE PRACTITIONER

## 2021-11-08 PROCEDURE — 83880 ASSAY OF NATRIURETIC PEPTIDE: CPT

## 2021-11-08 PROCEDURE — 85379 FIBRIN DEGRADATION QUANT: CPT

## 2021-11-08 PROCEDURE — 71045 X-RAY EXAM CHEST 1 VIEW: CPT

## 2021-11-08 PROCEDURE — APPSS45 APP SPLIT SHARED TIME 31-45 MINUTES: Performed by: NURSE PRACTITIONER

## 2021-11-08 PROCEDURE — 96372 THER/PROPH/DIAG INJ SC/IM: CPT

## 2021-11-08 PROCEDURE — 84484 ASSAY OF TROPONIN QUANT: CPT

## 2021-11-08 PROCEDURE — 96366 THER/PROPH/DIAG IV INF ADDON: CPT

## 2021-11-08 PROCEDURE — 36415 COLL VENOUS BLD VENIPUNCTURE: CPT

## 2021-11-08 RX ORDER — ONDANSETRON 2 MG/ML
4 INJECTION INTRAMUSCULAR; INTRAVENOUS ONCE
Status: COMPLETED | OUTPATIENT
Start: 2021-11-08 | End: 2021-11-08

## 2021-11-08 RX ORDER — ASPIRIN 81 MG/1
81 TABLET ORAL DAILY
Status: DISCONTINUED | OUTPATIENT
Start: 2021-11-08 | End: 2021-11-09 | Stop reason: HOSPADM

## 2021-11-08 RX ORDER — MECLIZINE HCL 12.5 MG/1
12.5 TABLET ORAL 3 TIMES DAILY PRN
Status: DISCONTINUED | OUTPATIENT
Start: 2021-11-08 | End: 2021-11-09

## 2021-11-08 RX ORDER — NICOTINE 21 MG/24HR
1 PATCH, TRANSDERMAL 24 HOURS TRANSDERMAL DAILY
Status: DISCONTINUED | OUTPATIENT
Start: 2021-11-08 | End: 2021-11-09 | Stop reason: HOSPADM

## 2021-11-08 RX ORDER — SUMATRIPTAN 50 MG/1
50 TABLET, FILM COATED ORAL 2 TIMES DAILY
Status: DISCONTINUED | OUTPATIENT
Start: 2021-11-08 | End: 2021-11-08

## 2021-11-08 RX ORDER — DULOXETIN HYDROCHLORIDE 60 MG/1
60 CAPSULE, DELAYED RELEASE ORAL DAILY
Status: DISCONTINUED | OUTPATIENT
Start: 2021-11-08 | End: 2021-11-09 | Stop reason: HOSPADM

## 2021-11-08 RX ORDER — MAGNESIUM SULFATE IN WATER 40 MG/ML
2000 INJECTION, SOLUTION INTRAVENOUS ONCE
Status: COMPLETED | OUTPATIENT
Start: 2021-11-08 | End: 2021-11-08

## 2021-11-08 RX ORDER — TRAZODONE HYDROCHLORIDE 50 MG/1
150 TABLET ORAL NIGHTLY
Status: DISCONTINUED | OUTPATIENT
Start: 2021-11-08 | End: 2021-11-09 | Stop reason: HOSPADM

## 2021-11-08 RX ORDER — MECLIZINE HCL 12.5 MG/1
12.5 TABLET ORAL ONCE
Status: COMPLETED | OUTPATIENT
Start: 2021-11-08 | End: 2021-11-08

## 2021-11-08 RX ORDER — ASPIRIN 81 MG/1
324 TABLET, CHEWABLE ORAL ONCE
Status: COMPLETED | OUTPATIENT
Start: 2021-11-08 | End: 2021-11-08

## 2021-11-08 RX ORDER — MAGNESIUM SULFATE 1 G/100ML
1000 INJECTION INTRAVENOUS PRN
Status: DISCONTINUED | OUTPATIENT
Start: 2021-11-08 | End: 2021-11-08

## 2021-11-08 RX ORDER — LISINOPRIL 10 MG/1
5 TABLET ORAL DAILY
Status: DISCONTINUED | OUTPATIENT
Start: 2021-11-08 | End: 2021-11-09 | Stop reason: HOSPADM

## 2021-11-08 RX ORDER — INSULIN GLARGINE 100 [IU]/ML
25 INJECTION, SOLUTION SUBCUTANEOUS 2 TIMES DAILY
Status: DISCONTINUED | OUTPATIENT
Start: 2021-11-08 | End: 2021-11-09 | Stop reason: HOSPADM

## 2021-11-08 RX ORDER — SODIUM CHLORIDE 0.9 % (FLUSH) 0.9 %
10 SYRINGE (ML) INJECTION PRN
Status: DISCONTINUED | OUTPATIENT
Start: 2021-11-08 | End: 2021-11-08 | Stop reason: SDUPTHER

## 2021-11-08 RX ORDER — NICOTINE POLACRILEX 4 MG
15 LOZENGE BUCCAL PRN
Status: DISCONTINUED | OUTPATIENT
Start: 2021-11-08 | End: 2021-11-09 | Stop reason: HOSPADM

## 2021-11-08 RX ORDER — SODIUM CHLORIDE 0.9 % (FLUSH) 0.9 %
10 SYRINGE (ML) INJECTION EVERY 12 HOURS SCHEDULED
Status: DISCONTINUED | OUTPATIENT
Start: 2021-11-08 | End: 2021-11-09 | Stop reason: HOSPADM

## 2021-11-08 RX ORDER — LORAZEPAM 1 MG/1
1 TABLET ORAL EVERY 4 HOURS PRN
Status: DISCONTINUED | OUTPATIENT
Start: 2021-11-08 | End: 2021-11-09 | Stop reason: HOSPADM

## 2021-11-08 RX ORDER — ONDANSETRON 4 MG/1
4 TABLET, ORALLY DISINTEGRATING ORAL EVERY 8 HOURS PRN
Status: DISCONTINUED | OUTPATIENT
Start: 2021-11-08 | End: 2021-11-09 | Stop reason: HOSPADM

## 2021-11-08 RX ORDER — MORPHINE SULFATE 4 MG/ML
4 INJECTION, SOLUTION INTRAMUSCULAR; INTRAVENOUS ONCE
Status: COMPLETED | OUTPATIENT
Start: 2021-11-08 | End: 2021-11-08

## 2021-11-08 RX ORDER — DIPHENHYDRAMINE HYDROCHLORIDE 50 MG/ML
25 INJECTION INTRAMUSCULAR; INTRAVENOUS ONCE
Status: COMPLETED | OUTPATIENT
Start: 2021-11-08 | End: 2021-11-08

## 2021-11-08 RX ORDER — SODIUM CHLORIDE 0.9 % (FLUSH) 0.9 %
10 SYRINGE (ML) INJECTION PRN
Status: DISCONTINUED | OUTPATIENT
Start: 2021-11-08 | End: 2021-11-09 | Stop reason: HOSPADM

## 2021-11-08 RX ORDER — SODIUM CHLORIDE 9 MG/ML
25 INJECTION, SOLUTION INTRAVENOUS PRN
Status: DISCONTINUED | OUTPATIENT
Start: 2021-11-08 | End: 2021-11-09 | Stop reason: HOSPADM

## 2021-11-08 RX ORDER — SUMATRIPTAN 25 MG/1
50 TABLET, FILM COATED ORAL ONCE
Status: COMPLETED | OUTPATIENT
Start: 2021-11-08 | End: 2021-11-08

## 2021-11-08 RX ORDER — ONDANSETRON 2 MG/ML
4 INJECTION INTRAMUSCULAR; INTRAVENOUS EVERY 6 HOURS PRN
Status: DISCONTINUED | OUTPATIENT
Start: 2021-11-08 | End: 2021-11-09 | Stop reason: HOSPADM

## 2021-11-08 RX ORDER — DEXAMETHASONE SODIUM PHOSPHATE 10 MG/ML
10 INJECTION, SOLUTION INTRAMUSCULAR; INTRAVENOUS ONCE
Status: COMPLETED | OUTPATIENT
Start: 2021-11-08 | End: 2021-11-08

## 2021-11-08 RX ORDER — MAGNESIUM SULFATE 1 G/100ML
1000 INJECTION INTRAVENOUS PRN
Status: DISCONTINUED | OUTPATIENT
Start: 2021-11-08 | End: 2021-11-09 | Stop reason: HOSPADM

## 2021-11-08 RX ORDER — DIAZEPAM 5 MG/1
5 TABLET ORAL ONCE
Status: COMPLETED | OUTPATIENT
Start: 2021-11-08 | End: 2021-11-08

## 2021-11-08 RX ORDER — INSULIN GLARGINE 100 [IU]/ML
35 INJECTION, SOLUTION SUBCUTANEOUS NIGHTLY
COMMUNITY

## 2021-11-08 RX ORDER — ATORVASTATIN CALCIUM 20 MG/1
20 TABLET, FILM COATED ORAL DAILY
Status: DISCONTINUED | OUTPATIENT
Start: 2021-11-08 | End: 2021-11-09 | Stop reason: HOSPADM

## 2021-11-08 RX ORDER — ASPIRIN 300 MG/1
300 SUPPOSITORY RECTAL DAILY
Status: DISCONTINUED | OUTPATIENT
Start: 2021-11-08 | End: 2021-11-09 | Stop reason: HOSPADM

## 2021-11-08 RX ORDER — SUMATRIPTAN 50 MG/1
50 TABLET, FILM COATED ORAL 2 TIMES DAILY PRN
Status: DISCONTINUED | OUTPATIENT
Start: 2021-11-08 | End: 2021-11-09

## 2021-11-08 RX ORDER — MORPHINE SULFATE 2 MG/ML
2 INJECTION, SOLUTION INTRAMUSCULAR; INTRAVENOUS
Status: DISCONTINUED | OUTPATIENT
Start: 2021-11-08 | End: 2021-11-09 | Stop reason: HOSPADM

## 2021-11-08 RX ORDER — DEXTROSE MONOHYDRATE 25 G/50ML
12.5 INJECTION, SOLUTION INTRAVENOUS PRN
Status: DISCONTINUED | OUTPATIENT
Start: 2021-11-08 | End: 2021-11-09 | Stop reason: HOSPADM

## 2021-11-08 RX ORDER — PROCHLORPERAZINE EDISYLATE 5 MG/ML
10 INJECTION INTRAMUSCULAR; INTRAVENOUS ONCE
Status: COMPLETED | OUTPATIENT
Start: 2021-11-08 | End: 2021-11-08

## 2021-11-08 RX ORDER — 0.9 % SODIUM CHLORIDE 0.9 %
1000 INTRAVENOUS SOLUTION INTRAVENOUS ONCE
Status: COMPLETED | OUTPATIENT
Start: 2021-11-08 | End: 2021-11-08

## 2021-11-08 RX ORDER — DEXTROSE MONOHYDRATE 50 MG/ML
100 INJECTION, SOLUTION INTRAVENOUS PRN
Status: DISCONTINUED | OUTPATIENT
Start: 2021-11-08 | End: 2021-11-09 | Stop reason: HOSPADM

## 2021-11-08 RX ORDER — 0.9 % SODIUM CHLORIDE 0.9 %
80 INTRAVENOUS SOLUTION INTRAVENOUS ONCE
Status: COMPLETED | OUTPATIENT
Start: 2021-11-08 | End: 2021-11-08

## 2021-11-08 RX ORDER — TROSPIUM CHLORIDE 20 MG/1
20 TABLET, FILM COATED ORAL
Status: DISCONTINUED | OUTPATIENT
Start: 2021-11-08 | End: 2021-11-09 | Stop reason: HOSPADM

## 2021-11-08 RX ORDER — LANOLIN ALCOHOL/MO/W.PET/CERES
325 CREAM (GRAM) TOPICAL 2 TIMES DAILY
Status: DISCONTINUED | OUTPATIENT
Start: 2021-11-08 | End: 2021-11-09 | Stop reason: HOSPADM

## 2021-11-08 RX ADMIN — DIPHENHYDRAMINE HYDROCHLORIDE 25 MG: 50 INJECTION INTRAMUSCULAR; INTRAVENOUS at 05:10

## 2021-11-08 RX ADMIN — ENOXAPARIN SODIUM 30 MG: 30 INJECTION SUBCUTANEOUS at 13:16

## 2021-11-08 RX ADMIN — METFORMIN HYDROCHLORIDE 500 MG: 500 TABLET ORAL at 16:48

## 2021-11-08 RX ADMIN — INSULIN LISPRO 6 UNITS: 100 INJECTION, SOLUTION INTRAVENOUS; SUBCUTANEOUS at 16:48

## 2021-11-08 RX ADMIN — ENOXAPARIN SODIUM 30 MG: 30 INJECTION SUBCUTANEOUS at 19:34

## 2021-11-08 RX ADMIN — MAGNESIUM SULFATE HEPTAHYDRATE 2000 MG: 2 INJECTION, SOLUTION INTRAVENOUS at 05:11

## 2021-11-08 RX ADMIN — SUMATRIPTAN SUCCINATE 50 MG: 50 TABLET ORAL at 19:35

## 2021-11-08 RX ADMIN — INSULIN GLARGINE 25 UNITS: 100 INJECTION, SOLUTION SUBCUTANEOUS at 21:57

## 2021-11-08 RX ADMIN — MORPHINE SULFATE 4 MG: 4 INJECTION INTRAVENOUS at 00:47

## 2021-11-08 RX ADMIN — IOPAMIDOL 75 ML: 755 INJECTION, SOLUTION INTRAVENOUS at 02:24

## 2021-11-08 RX ADMIN — ASPIRIN 81 MG CHEWABLE TABLET 324 MG: 81 TABLET CHEWABLE at 00:47

## 2021-11-08 RX ADMIN — MECLIZINE 12.5 MG: 12.5 TABLET ORAL at 16:47

## 2021-11-08 RX ADMIN — SUMATRIPTAN SUCCINATE 50 MG: 25 TABLET ORAL at 16:47

## 2021-11-08 RX ADMIN — ATORVASTATIN CALCIUM 20 MG: 20 TABLET, FILM COATED ORAL at 16:47

## 2021-11-08 RX ADMIN — INSULIN LISPRO 8 UNITS: 100 INJECTION, SOLUTION INTRAVENOUS; SUBCUTANEOUS at 13:15

## 2021-11-08 RX ADMIN — SODIUM CHLORIDE 80 ML: 9 INJECTION, SOLUTION INTRAVENOUS at 02:24

## 2021-11-08 RX ADMIN — MECLIZINE 12.5 MG: 12.5 TABLET ORAL at 00:47

## 2021-11-08 RX ADMIN — INSULIN GLARGINE 25 UNITS: 100 INJECTION, SOLUTION SUBCUTANEOUS at 13:15

## 2021-11-08 RX ADMIN — ONDANSETRON 4 MG: 2 INJECTION INTRAMUSCULAR; INTRAVENOUS at 00:47

## 2021-11-08 RX ADMIN — DIAZEPAM 5 MG: 5 TABLET ORAL at 05:11

## 2021-11-08 RX ADMIN — DULOXETINE HYDROCHLORIDE 60 MG: 60 CAPSULE, DELAYED RELEASE ORAL at 16:48

## 2021-11-08 RX ADMIN — TROSPIUM CHLORIDE 20 MG: 20 TABLET, FILM COATED ORAL at 16:47

## 2021-11-08 RX ADMIN — TRAZODONE HYDROCHLORIDE 150 MG: 50 TABLET ORAL at 22:23

## 2021-11-08 RX ADMIN — SODIUM CHLORIDE, PRESERVATIVE FREE 10 ML: 5 INJECTION INTRAVENOUS at 13:18

## 2021-11-08 RX ADMIN — LISINOPRIL 5 MG: 10 TABLET ORAL at 22:22

## 2021-11-08 RX ADMIN — ASPIRIN 81 MG: 81 TABLET, COATED ORAL at 16:47

## 2021-11-08 RX ADMIN — SODIUM CHLORIDE, PRESERVATIVE FREE 10 ML: 5 INJECTION INTRAVENOUS at 13:16

## 2021-11-08 RX ADMIN — SODIUM CHLORIDE, PRESERVATIVE FREE 10 ML: 5 INJECTION INTRAVENOUS at 02:24

## 2021-11-08 RX ADMIN — PROCHLORPERAZINE EDISYLATE 10 MG: 5 INJECTION INTRAMUSCULAR; INTRAVENOUS at 05:10

## 2021-11-08 RX ADMIN — FERROUS SULFATE TAB EC 325 MG (65 MG FE EQUIVALENT) 325 MG: 325 (65 FE) TABLET DELAYED RESPONSE at 16:47

## 2021-11-08 RX ADMIN — INSULIN LISPRO 6 UNITS: 100 INJECTION, SOLUTION INTRAVENOUS; SUBCUTANEOUS at 21:57

## 2021-11-08 RX ADMIN — DEXAMETHASONE SODIUM PHOSPHATE 10 MG: 10 INJECTION, SOLUTION INTRAMUSCULAR; INTRAVENOUS at 05:11

## 2021-11-08 RX ADMIN — SODIUM CHLORIDE 1000 ML: 9 INJECTION, SOLUTION INTRAVENOUS at 00:45

## 2021-11-08 ASSESSMENT — PAIN SCALES - GENERAL
PAINLEVEL_OUTOF10: 0
PAINLEVEL_OUTOF10: 8
PAINLEVEL_OUTOF10: 0
PAINLEVEL_OUTOF10: 0
PAINLEVEL_OUTOF10: 3

## 2021-11-08 ASSESSMENT — ENCOUNTER SYMPTOMS
TROUBLE SWALLOWING: 0
SINUS PRESSURE: 0
NAUSEA: 1
COLOR CHANGE: 0
APNEA: 0
ABDOMINAL PAIN: 0
SHORTNESS OF BREATH: 1
VOICE CHANGE: 0
PHOTOPHOBIA: 0
BACK PAIN: 0
NAUSEA: 0
DIARRHEA: 0
SINUS PAIN: 0
SHORTNESS OF BREATH: 0
VOMITING: 1
ABDOMINAL DISTENTION: 0
EYE PAIN: 0

## 2021-11-08 ASSESSMENT — PAIN DESCRIPTION - PROGRESSION: CLINICAL_PROGRESSION: NOT CHANGED

## 2021-11-08 ASSESSMENT — PAIN DESCRIPTION - PAIN TYPE: TYPE: ACUTE PAIN

## 2021-11-08 ASSESSMENT — PAIN DESCRIPTION - DESCRIPTORS: DESCRIPTORS: ACHING

## 2021-11-08 ASSESSMENT — HEART SCORE: ECG: 0

## 2021-11-08 ASSESSMENT — PAIN DESCRIPTION - LOCATION: LOCATION: HEAD

## 2021-11-08 ASSESSMENT — PAIN DESCRIPTION - FREQUENCY: FREQUENCY: INTERMITTENT

## 2021-11-08 NOTE — ED PROVIDER NOTES
[facility]  Emergency Department Encounter     Pt Name: Marizol Narayan  MRN: [id number]  [place] 1970  Date of evaluation: 11/8/21  PCP:  Umesh Garcia MD    [address] [place] [address]       Chief Complaint   Patient presents with    Chest Pain    Dizziness     lightheaded    Emesis       HISTORY OF PRESENT ILLNESS  (Location/Symptom, Timing/Onset, Context/Setting, Quality, Duration, Modifying Factors, Severity.)    Marizol Narayan is a 45 y.o. female who presents with dizziness and lightheadedness, headache, multiple episodes of nausea and nonbilious nonbloody vomiting, pressuring midsternal chest pain. Patient states that these all started about 2 days ago and has been progressively worsening today which is when she actually vomited. She states that whenever she turns her head it makes her feel worse. She states that she does have a history of vertigo many many years ago but does not recall it being this bad. No cardiac history. No history of stroke. No recent head trauma. No sensitivity to light or sound, no ringing in her ears. No shortness of breath back or abdominal pain. She is not a smoker but does have a history of hypertension, hyperlipidemia, diabetes. PAST MEDICAL / SURGICAL / SOCIAL / FAMILY HISTORY    has a past medical history of Anxiety, Arthritis of knee, degenerative, Blindness of left eye, Blurry vision, Carpal tunnel syndrome on both sides, Chronic knee pain, Depressive disorder, not elsewhere classified, Headache, Hydronephrosis, left, Hyperlipidemia, Hypertension, Hypothyroidism, Iron deficiency anemia, Mild intermittent asthma without complication, Obesity, Osteoarthritis, Polyneuropathy, RAD (reactive airway disease), Snores, Type II or unspecified type diabetes mellitus without mention of complication, not stated as uncontrolled, Urge incontinence of urine, Wears glasses, and Weight loss.      has a past surgical history that includes Carpal tunnel release;  section (); Tubal ligation (3/2012); Nerve Block (Left, 2016); Nerve Block (3/11/16); Cystocopy (2016); Lithotripsy (Left, 2016); and Hysterectomy, total abdominal (2013). Social History     Socioeconomic History    Marital status: Single     Spouse name: Not on file    Number of children: Not on file    Years of education: Not on file    Highest education level: Not on file   Occupational History    Not on file   Tobacco Use    Smoking status: Current Some Day Smoker     Packs/day: 0.25     Years: 15.00     Pack years: 3.75     Types: Cigarettes    Smokeless tobacco: Never Used    Tobacco comment: occasionaly   Vaping Use    Vaping Use: Never used   Substance and Sexual Activity    Alcohol use: Yes     Alcohol/week: 0.0 standard drinks     Comment: rare    Drug use: Yes     Types: Marijuana [facility])    Sexual activity: Yes     Partners: Male   Other Topics Concern    Not on file   Social History Narrative    Not on file     Social Determinants of Health     Financial Resource Strain: Low Risk     Difficulty of Paying Living Expenses: Not hard at all   Food Insecurity: No Food Insecurity    Worried About [address] in the Last Year: Never true    [address] in the Last Year: Never true   Transportation Needs:     Lack of Transportation (Medical): Not on file    Lack of Transportation (Non-Medical):  Not on file   Physical Activity:     Days of Exercise per Week: Not on file    Minutes of Exercise per Session: Not on file   Stress:     Feeling of Stress : Not on file   Social Connections:     Frequency of Communication with Friends and Family: Not on file    Frequency of Social Gatherings with Friends and Family: Not on file    Attends Mormonism Services: Not on file    Active Member of Clubs or Organizations: Not on file    Attends Club or Organization Meetings: Not on file    Marital Status: Not on file   Intimate Partner Violence:     Fear of Current or Ex-Partner: Not on file    Emotionally Abused: Not on file    Physically Abused: Not on file    Sexually Abused: Not on file   Housing Stability:     Unable to Pay for Housing in the Last Year: Not on file    Number of Places Lived in the Last Year: Not on file    Unstable Housing in the Last Year: Not on file       Family History   Problem Relation Age of Onset    Stroke Father     Diabetes Mother     Hypertension Mother     Breast Cancer Neg Hx     Cancer Neg Hx     Colon Cancer Neg Hx     Eclampsia Neg Hx     Ovarian Cancer Neg Hx      Labor Neg Hx     Spont Abortions Neg Hx        Allergies:    Januvia [sitagliptin phosphate], Naproxen sodium, and Neurontin [gabapentin]    Home Medications:  Prior to Admission medications    Medication Sig Start Date End Date Taking?  Authorizing Provider   ONE TOUCH ULTRASOFT LANCETS MISC Dx: DM-2 use 2-3 times daily 10/27/21   Randell Ford MD   metFORMIN (GLUCOPHAGE) 500 MG tablet Take 1 tablet by mouth 2 times daily (with meals) 10/27/21   Randell Ford MD   diclofenac (VOLTAREN) 50 MG EC tablet Take 1 tablet by mouth 3 times daily (with meals) 10/27/21   Randell Ford MD   traZODone (DESYREL) 150 MG tablet Take 1 tablet by mouth nightly 10/27/21   Randell oFrd MD   mirabegron [facility]) 25 MG TB24 Take 1 tablet by mouth daily 10/27/21   Randell Ford MD   aspirin EC 81 MG EC tablet Take 1 tablet by mouth daily 10/27/21   Randell Ford MD   lisinopril (PRINIVIL;ZESTRIL) 5 MG tablet Take 1 tablet by mouth daily 10/27/21   Randell Ford MD   simvastatin (ZOCOR) 40 MG tablet Take 1 tablet by mouth nightly 10/27/21   Randell Ford MD   Insulin Pen Needle 31G X 5 MM MISC 1 each by Does not apply route daily 10/27/21   Randell Ford MD   Lancets MISC 1 each by Does not apply route daily Use 3-4 times daily 10/27/21   Randell Ford MD   Liraglutide (VICTOZA) 18 MG/3ML SOPN SC injection Start at 1.2 mg once a day for 1 week, then increase it to 1.8 mg once a day thereafter. 10/19/21   Valeria Mora MD   DULoxetine (CYMBALTA) 60 MG extended release capsule Take 1 capsule by mouth daily 10/19/21   aVleria Mora MD   levothyroxine (SYNTHROID) 50 MCG tablet Take 1 tablet by mouth daily 9/30/21   Kerry Connors MD   insulin aspart (NOVOLOG FLEXPEN) 100 UNIT/ML injection pen Inject 30 Units into the skin 3 times daily (before meals) 7/16/21   Orlando Mitchell MD   insulin glargine (BASAGLAR KWIKPEN) 100 UNIT/ML injection pen Inject 25 Units into the skin 2 times daily 7/16/21   Orlando Mitchell MD   zoster recombinant adjuvanted vaccine (SHINGRIX) 50 MCG/0.5ML SUSR injection 50 MCG IM then repeat 2-6 months. 7/16/21 7/16/22  Orlando Mitchell MD   Blood Glucose Monitoring Suppl (ONE TOUCH ULTRA MINI) w/Device KIT 1 kit by Does not apply route Daily 3/8/21   MARILU Rainey CNP   blood glucose monitor strips 1 strip by Other route three times daily Test___times daily    Diagnosis: 250.0   Diabetes Mellitus____Insulin Dependent____Non-Insulin Dependent 2/23/21   MARILU Rainey CNP   nystatin [facility]) 735334 UNIT/GM powder APPLY TO SKIN OF LEGS & IN FOLDS OF SKIN WITH IRRITATION 2/18/21   MARILU Rainey CNP   polyethylene glycol [facility]) 17 GM/SCOOP powder Follow instructions provided to you from physician's office. 1/11/21   Carmelo Gilmore MD   bisacodyl (BISACODYL) 5 MG EC tablet Follow instructions provided given by the physician's office. 1/11/21   Carmelo Gilmore MD   Blood Pressure KIT Use to monitor blood pressure daily and as needed 10/26/20   MARILU Rainey CNP   blood glucose monitor kit and supplies Dispense sufficient amount for indicated testing frequency plus additional to accommodate PRN testing needs. Dispense all needed supplies to include: monitor, strips, lancing device, lancets, control solutions, alcohol swabs.  10/26/20   MARILU Rainey CNP   albuterol sulfate HFA (PROVENTIL HFA) 108 (90 Base) MCG/ACT inhaler Inhale 2 puffs into the lungs every 6 hours as needed for Wheezing 10/26/20   MARILU De Jesus CNP   ferrous sulfate (IRON 325) 325 (65 Fe) MG tablet Take 1 tablet by mouth 2 times daily 10/26/20   MARILU De Jesus CNP   Calcium Citrate-Vitamin D (CALCITRATE/VITAMIN D PO) Take by mouth    Historical Provider, MD       REVIEW OF SYSTEMS    (2-9 systems for level 4, 10 or more for level 5)    Review of Systems   Constitutional: Negative for chills, diaphoresis and fever. HENT: Negative for tinnitus, trouble swallowing and voice change. Eyes: Negative for pain and visual disturbance. Respiratory: Positive for shortness of breath. Cardiovascular: Positive for chest pain. Negative for palpitations and leg swelling. Gastrointestinal: Positive for nausea and vomiting. Negative for abdominal pain. Genitourinary: Negative for flank pain. Musculoskeletal: Negative for back pain. Neurological: Positive for dizziness, light-headedness and headaches. Negative for syncope, facial asymmetry, speech difficulty, weakness and numbness. Psychiatric/Behavioral: Negative for confusion. PHYSICAL EXAM   (up to 7 for level 4, 8 or more for level 5)    VITALS:   Vitals:    11/07/21 2347 11/08/21 0030 11/08/21 0530   BP: (!) 146/81 (!) 128/94 130/79   Pulse: 103 96 81   Resp: 16 16 29   Temp: 98.1 °F (36.7 °C)     TempSrc: Oral     SpO2: 96% 98% 96%   Weight: 262 lb (118.8 kg)     Height: 5' 8\" (1.727 m)         Physical Exam  Vitals and nursing note reviewed. Constitutional:       General: She is not in acute distress. Appearance: She is well-developed. She is not diaphoretic. HENT:      Head: Normocephalic and atraumatic.       Right Ear: External ear normal.      Left Ear: External ear normal.      Nose: Nose normal.      Mouth/Throat:      Mouth: Mucous membranes are moist.   Eyes:      Extraocular Movements: Extraocular movements intact. Conjunctiva/sclera: Conjunctivae normal.      Pupils: Pupils are equal, round, and reactive to light. Cardiovascular:      Rate and Rhythm: Normal rate and regular rhythm. Heart sounds: Normal heart sounds. No murmur heard. Pulmonary:      Effort: Pulmonary effort is normal. No respiratory distress. Breath sounds: Normal breath sounds. No wheezing, rhonchi or rales. Abdominal:      General: There is no distension. Palpations: Abdomen is soft. Tenderness: There is no abdominal tenderness. Musculoskeletal:         General: Normal range of motion. Cervical back: Normal range of motion and neck supple. Right lower leg: No edema. Left lower leg: No edema. Skin:     General: Skin is warm and dry. Neurological:      General: No focal deficit present. Mental Status: She is alert and oriented to person, place, and time. GCS: GCS eye subscore is 4. GCS verbal subscore is 5. GCS motor subscore is 6. Cranial Nerves: Cranial nerves are intact. Sensory: Sensation is intact. Motor: Motor function is intact. Coordination: Coordination is intact. Deep Tendon Reflexes: Babinski sign absent on the right side. Babinski sign absent on the left side.       Comments: NIH 0   Psychiatric:         Behavior: Behavior normal.         DIFFERENTIAL  DIAGNOSIS   PLAN (LABS / IMAGING / EKG):  Orders Placed This Encounter   Procedures    XR CHEST 1 VIEW    CT HEAD WO CONTRAST    CT CHEST PULMONARY EMBOLISM W CONTRAST    CBC with DIFF    Comprehensive Metabolic Panel w/ Reflex to MG    Lipase    D-Dimer Test    Troponin    Brain Natriuretic Peptide    TSH w/reflex to FT4    Telemetry monitoring - continuous duration    EKG 12 Lead    Insert peripheral IV    PATIENT STATUS (FROM ED OR OR/PROCEDURAL) Inpatient       MEDICATIONS ORDERED:  Orders Placed This Encounter   Medications    0.9 % sodium chloride bolus    ondansetron (ZOFRAN) injection 4 mg    morphine sulfate (PF) injection 4 mg    aspirin chewable tablet 324 mg    meclizine (ANTIVERT) tablet 12.5 mg    0.9 % sodium chloride bolus    iopamidol (ISOVUE-370) 76 % injection 75 mL    sodium chloride flush 0.9 % injection 10 mL    diazePAM (VALIUM) tablet 5 mg    prochlorperazine (COMPAZINE) injection 10 mg    diphenhydrAMINE (BENADRYL) injection 25 mg    magnesium sulfate 2000 mg in 50 mL IVPB premix    dexamethasone (PF) (DECADRON) injection 10 mg     DIAGNOSTIC RESULTS / EMERGENCYDEPARTMENT COURSE / MDM   LABS:  Labs Reviewed   CBC WITH AUTO DIFFERENTIAL - Abnormal; Notable for the following components:       Result Value    Seg Neutrophils 66 (*)     Immature Granulocytes 1 (*)     All other components within normal limits   COMPREHENSIVE METABOLIC PANEL W/ REFLEX TO MG FOR LOW K - Abnormal; Notable for the following components:    Glucose 228 (*)     Total Bilirubin 0.27 (*)     All other components within normal limits   D-DIMER, QUANTITATIVE - Abnormal; Notable for the following components:    D-Dimer, Quant 0.96 (*)     All other components within normal limits   LIPASE   TROPONIN   BRAIN NATRIURETIC PEPTIDE   TSH WITH REFLEX       RADIOLOGY:  CT HEAD WO CONTRAST    Result Date: 11/8/2021  No acute intracranial abnormality. XR CHEST 1 VIEW    Result Date: 11/8/2021  EXAMINATION: ONE XRAY VIEW OF THE CHEST 11/8/2021 12:39 am COMPARISON: 05/22/2020 HISTORY: ORDERING SYSTEM PROVIDED HISTORY: CP and dizziness TECHNOLOGIST PROVIDED HISTORY: CP and dizziness Reason for Exam: Middle chest pains and vertigo. Acuity: Acute Type of Exam: Initial FINDINGS: The cardiac silhouette and mediastinal contours are normal.  The lungs are clear. No parenchymal lung infiltrate. No pleural effusion. The visualized osseous structures are unremarkable. 1. No acute cardiopulmonary disease.      CT CHEST PULMONARY EMBOLISM W CONTRAST    Result Date: 11/8/2021  EXAMINATION: CTA OF THE CHEST 11/8/2021 2:21 am TECHNIQUE: CTA of the chest was performed after the administration of intravenous contrast.  Multiplanar reformatted images are provided for review. MIP images are provided for review. Dose modulation, iterative reconstruction, and/or weight based adjustment of the mA/kV was utilized to reduce the radiation dose to as low as reasonably achievable. COMPARISON: None. HISTORY: ORDERING SYSTEM PROVIDED HISTORY: dizziness, tachy, +ddimer TECHNOLOGIST PROVIDED HISTORY: dizziness, tachy, +ddimer Decision Support Exception - unselect if not a suspected or confirmed emergency medical condition->Emergency Medical Condition (MA) Reason for Exam: dizziness, tachy, +d-dimer Acuity: Acute Type of Exam: Initial FINDINGS: Pulmonary Arteries: Pulmonary arteries are adequately opacified for evaluation. No evidence of intraluminal filling defect to suggest pulmonary embolism. Main pulmonary artery is normal in caliber. Mediastinum: No evidence of mediastinal lymphadenopathy. The heart and pericardium demonstrate no acute abnormality. There is no acute abnormality of the thoracic aorta. Lungs/pleura: The lungs are without acute process. No focal consolidation or pulmonary edema. Minimal bibasilar dependent atelectasis. No evidence of pleural effusion or pneumothorax. Upper Abdomen: Limited images of the upper abdomen are unremarkable. Soft Tissues/Bones: A very small fat containing right Bochdalek hernia. No acute bone or soft tissue abnormality. No evidence of pulmonary embolism or acute pulmonary abnormality.        EKG    EKG Interpretation    Interpreted by emergency department physician    Rhythm: sinus tachycardia  Rate: 100-110  Axis: normal  Ectopy: none  Conduction: normal  ST Segments: no acute change  T Waves: no acute change  Q Waves: II, III and aVf    Clinical Impression: non-specific EKG    All EKG's are interpreted by the Emergency Department Physician whoeither signs or Co-signs this chest pain. She evaluation she has no acute distress with sinus tachycardia but no other abnormalities of vital signs. NIH of 0. No focal neurological findings. Heart regular rate and rhythm with clear lungs. No peripheral edema. Negative troponin. EKG unremarkable. Chest x-ray clear. D-dimer positive, CT of the chest negative for pulmonary embolism. CT head negative. However after medication patient is still dizzy, still having headache. Chest pain feels better. Feel like she would benefit from admission for cardiology and neurology evaluation. Amount and/or Complexity of Data Reviewed  Clinical lab tests: ordered and reviewed  Tests in the radiology section of CPT®: ordered and reviewed  Review and summarize past medical records: yes  Discuss the patient with other providers: yes  Independent visualization of images, tracings, or specimens: yes    Patient Progress  Patient progress: stable      PROCEDURES:  Procedures     CONSULTS:  None    CRITICAL CARE:  NONE    FINAL IMPRESSION     1. Chest pain, unspecified type    2. Dizziness    3. Acute intractable headache, unspecified headache type       DISPOSITION / PLAN   DISPOSITION Admitted 11/08/2021 05:02:34 AM    ADMIT    Lila Concepcion DO  Emergency Medicine Physician    (Please note that portions of this note were completed with a voice recognition program.  Efforts were made to edit the dictations but occasionally words are mis-transcribed.)        Ramya Tineo 1721, DO  11/08/21 1575

## 2021-11-08 NOTE — PLAN OF CARE
Problem: Infection:  Goal: Will remain free from infection  Description: Will remain free from infection  Outcome: Ongoing     Problem: Safety:  Goal: Free from accidental physical injury  Description: Free from accidental physical injury  Outcome: Ongoing     Problem: Daily Care:  Goal: Daily care needs are met  Description: Daily care needs are met  Outcome: Ongoing     Problem: Pain:  Goal: Patient's pain/discomfort is manageable  Description: Patient's pain/discomfort is manageable  Outcome: Ongoing     Problem: Discharge Planning:  Goal: Patients continuum of care needs are met  Description: Patients continuum of care needs are met  Outcome: Ongoing

## 2021-11-08 NOTE — H&P
[facility]  Office: [address], DO, Dawit China, DO, Ruben Baldwinjacinta, DO, Panda Redman Blood, DO, Greg Lobato MD, Luis Felipe Acosta MD, Calos Byrne MD, Marina Jones MD, Nader Min MD, Roman Calderon MD, Bethany Singh MD, Cely Kaplan MD, Dajuan Wilhelm, DO, Marla Castro DO, Blake Salazar MD,  Palak Stapleton DO, Marina Lockhart MD, Zion Cox MD, Leona Del Toro MD, Eugene Rendon MD, Roxane Way MD, Bolivar Nogueira MD, Charity Mena [facility], [facility], CNP, Eliana Parahm, CNP, Rachel Islas, CNS, Charmaine Dhaliwal, CNP, Eve Nelson, CNP, Johnny Allen, CNP, Luli Hdez, CNP, Rosio Laws, CNP, Caitlin Gonzalez, CNP, Macie Trujillo PA-C, Charmel Osgood, PILAR, Moira Berrios, CNP, Deysi Hernandez, CNP, Nakita Lawrence, CNP, Rubi Stapleton, CNP, Tiney Schilder, CNP, Marlyne Osgood, CNP, Bobby Blackwell, CNP         [address]    HISTORY AND PHYSICAL EXAMINATION            Date:   11/8/2021  Patient name:  Funmilayo Martinez  Date of admission:  11/8/2021 12:16 AM  MRN:   [id number]  Account:  [de-identified]  YOB: 1970  PCP:    Selvin Bernstein MD  Room:   [place] [address]  Code Status:    Full Code    Chief Complaint:     Chief Complaint   Patient presents with    Chest Pain    Dizziness     lightheaded    Emesis       History Obtained From:     patient    History of Present Illness:     Funmilayo Martinez is a 45 y.o. Non- / non  female who presents with Chest Pain, Dizziness (lightheaded), and Emesis   and is admitted to the hospital for the management of Dizziness. Patient reports over the past 72 hours she has been having severe headache and dizziness with vertigo. Patient has a remote history of benign vertigo but reports that her symptoms today are significantly worsened and she has experienced before.   Patient reports that her dizziness dissipates/resolves when she holds her head still and is significantly Sig Start Date End Date Taking? Authorizing Provider   ONE TOUCH ULTRASOFT LANCETS MISC Dx: DM-2 use 2-3 times daily 10/27/21   Sue Gibbs MD   metFORMIN (GLUCOPHAGE) 500 MG tablet Take 1 tablet by mouth 2 times daily (with meals) 10/27/21   Sue Gibbs MD   diclofenac (VOLTAREN) 50 MG EC tablet Take 1 tablet by mouth 3 times daily (with meals) 10/27/21   Sue Gibbs MD   traZODone (DESYREL) 150 MG tablet Take 1 tablet by mouth nightly 10/27/21   Sue Gibbs MD   mirabegron [facility]) 25 MG TB24 Take 1 tablet by mouth daily 10/27/21   Sue Gibbs MD   aspirin EC 81 MG EC tablet Take 1 tablet by mouth daily 10/27/21   Sue Gibbs MD   lisinopril (PRINIVIL;ZESTRIL) 5 MG tablet Take 1 tablet by mouth daily 10/27/21   Sue Gibbs MD   simvastatin (ZOCOR) 40 MG tablet Take 1 tablet by mouth nightly 10/27/21   Sue Gibbs MD   Insulin Pen Needle 31G X 5 MM MISC 1 each by Does not apply route daily 10/27/21   Sue Gibbs MD   Lancets MISC 1 each by Does not apply route daily Use 3-4 times daily 10/27/21   Sue Gibbs MD   Liraglutide (VICTOZA) 18 MG/3ML SOPN SC injection Start at 1.2 mg once a day for 1 week, then increase it to 1.8 mg once a day thereafter. 10/19/21   Meera Zacarias MD   DULoxetine (CYMBALTA) 60 MG extended release capsule Take 1 capsule by mouth daily 10/19/21   Meera Zacarias MD   levothyroxine (SYNTHROID) 50 MCG tablet Take 1 tablet by mouth daily 9/30/21   Sue Gibbs MD   insulin aspart (NOVOLOG FLEXPEN) 100 UNIT/ML injection pen Inject 30 Units into the skin 3 times daily (before meals) 7/16/21   Aletha Weldon MD   insulin glargine (BASAGLAR KWIKPEN) 100 UNIT/ML injection pen Inject 25 Units into the skin 2 times daily 7/16/21   Aletha Weldon MD   zoster recombinant adjuvanted vaccine (SHINGRIX) 50 MCG/0.5ML SUSR injection 50 MCG IM then repeat 2-6 months.  7/16/21 7/16/22  Aletha Weldon MD   Blood Glucose Monitoring Suppl (ONE TOUCH ULTRA MINI) w/Device KIT 1 kit by Does not apply route Daily 3/8/21   MARILU Duvall CNP   blood glucose monitor strips 1 strip by Other route three times daily Test___times daily    Diagnosis: 250.0   Diabetes Mellitus____Insulin Dependent____Non-Insulin Dependent 2/23/21   MARILU Duvall CNP   nystatin [facility]) 772222 UNIT/GM powder APPLY TO SKIN OF LEGS & IN FOLDS OF SKIN WITH IRRITATION 2/18/21   MARILU Duvall CNP   polyethylene glycol [facility]) 17 GM/SCOOP powder Follow instructions provided to you from physician's office. 1/11/21   Darshana Mustafa MD   bisacodyl (BISACODYL) 5 MG EC tablet Follow instructions provided given by the physician's office. 1/11/21   Darshana Mustafa MD   Blood Pressure KIT Use to monitor blood pressure daily and as needed 10/26/20   MARILU Duvall CNP   blood glucose monitor kit and supplies Dispense sufficient amount for indicated testing frequency plus additional to accommodate PRN testing needs. Dispense all needed supplies to include: monitor, strips, lancing device, lancets, control solutions, alcohol swabs. 10/26/20   MARILU Duvall CNP   albuterol sulfate HFA (PROVENTIL HFA) 108 (90 Base) MCG/ACT inhaler Inhale 2 puffs into the lungs every 6 hours as needed for Wheezing 10/26/20   MARILU Duvall CNP   ferrous sulfate (IRON 325) 325 (65 Fe) MG tablet Take 1 tablet by mouth 2 times daily 10/26/20   MARILU Duvall CNP   Calcium Citrate-Vitamin D (CALCITRATE/VITAMIN D PO) Take by mouth    Historical Provider, MD        Allergies:     Januvia [sitagliptin phosphate], Naproxen sodium, and Neurontin [gabapentin]    Social History:     Tobacco:    reports that she has been smoking cigarettes. She has a 3.75 pack-year smoking history. She has never used smokeless tobacco.  Alcohol:      reports current alcohol use. Drug Use:  reports current drug use. Drug: Marijuana [facility] Members).     Family History:     Family History   Problem Relation Age of Onset Cardiovascular:      Rate and Rhythm: Normal rate and regular rhythm. Pulses: Normal pulses. Heart sounds: Normal heart sounds. No murmur heard. Pulmonary:      Effort: Pulmonary effort is normal. No respiratory distress. Breath sounds: Normal breath sounds. Abdominal:      General: Abdomen is flat. Bowel sounds are normal. There is no distension. Palpations: Abdomen is soft. Tenderness: There is no abdominal tenderness. Musculoskeletal:         General: No swelling, tenderness or deformity. Normal range of motion. Cervical back: Normal range of motion and neck supple. No rigidity. Lymphadenopathy:      Cervical: No cervical adenopathy. Skin:     General: Skin is warm and dry. Capillary Refill: Capillary refill takes less than 2 seconds. Coloration: Skin is not jaundiced. Neurological:      General: No focal deficit present. Mental Status: She is alert and oriented to person, place, and time. Cranial Nerves: No cranial nerve deficit. Sensory: No sensory deficit. Motor: No weakness.       Coordination: Coordination normal.      Gait: Gait normal.      Deep Tendon Reflexes: Reflexes normal.   Psychiatric:         Mood and Affect: Mood normal.         Behavior: Behavior normal.         Investigations:      Laboratory Testing:  Recent Results (from the past 24 hour(s))   EKG 12 Lead    Collection Time: 11/08/21 12:38 AM   Result Value Ref Range    Ventricular Rate 107 BPM    Atrial Rate 107 BPM    P-R Interval 150 ms    QRS Duration 90 ms    Q-T Interval 350 ms    QTc Calculation (Bazett) 467 ms    P Axis 64 degrees    R Axis 55 degrees    T Axis 62 degrees   CBC with DIFF    Collection Time: 11/08/21  1:21 AM   Result Value Ref Range    WBC 7.9 3.5 - 11.3 k/uL    RBC 4.07 3.95 - 5.11 m/uL    Hemoglobin 12.9 11.9 - 15.1 g/dL    Hematocrit 38.6 36.3 - 47.1 %    MCV 94.8 82.6 - 102.9 fL    MCH 31.7 25.2 - 33.5 pg    MCHC 33.4 28.4 - 34.8 g/dL RDW 12.9 11.8 - 14.4 %    Platelets 378 491 - 273 k/uL    MPV 10.3 8.1 - 13.5 fL    NRBC Automated 0.0 0.0 per 100 WBC    Differential Type NOT REPORTED     Seg Neutrophils 66 (H) 36 - 65 %    Lymphocytes 24 24 - 43 %    Monocytes 8 3 - 12 %    Eosinophils % 1 1 - 4 %    Basophils 0 0 - 2 %    Immature Granulocytes 1 (H) 0 %    Segs Absolute 5.32 1.50 - 8.10 k/uL    Absolute Lymph # 1.86 1.10 - 3.70 k/uL    Absolute Mono # 0.62 0.10 - 1.20 k/uL    Absolute Eos # 0.06 0.00 - 0.44 k/uL    Basophils Absolute 0.03 0.00 - 0.20 k/uL    Absolute Immature Granulocyte 0.04 0.00 - 0.30 k/uL    WBC Morphology NOT REPORTED     RBC Morphology NOT REPORTED     Platelet Estimate NOT REPORTED    Comprehensive Metabolic Panel w/ Reflex to MG    Collection Time: 11/08/21  1:21 AM   Result Value Ref Range    Glucose 228 (H) 70 - 99 mg/dL    BUN 10 6 - 20 mg/dL    CREATININE 0.66 0.50 - 0.90 mg/dL    Bun/Cre Ratio 15 9 - 20    Calcium 9.2 8.6 - 10.4 mg/dL    Sodium 135 135 - 144 mmol/L    Potassium 3.7 3.7 - 5.3 mmol/L    Chloride 100 98 - 107 mmol/L    CO2 24 20 - 31 mmol/L    Anion Gap 11 9 - 17 mmol/L    Alkaline Phosphatase 104 35 - 104 U/L    ALT 21 5 - 33 U/L    AST 14 <32 U/L    Total Bilirubin 0.27 (L) 0.3 - 1.2 mg/dL    Total Protein 6.9 6.4 - 8.3 g/dL    Albumin 4.0 3.5 - 5.2 g/dL    Albumin/Globulin Ratio NOT REPORTED 1.0 - 2.5    GFR Non-African American >60 >60 mL/min    GFR African American >60 >60 mL/min    GFR Comment          GFR Staging NOT REPORTED    Lipase    Collection Time: 11/08/21  1:21 AM   Result Value Ref Range    Lipase 33 13 - 60 U/L   D-Dimer Test    Collection Time: 11/08/21  1:21 AM   Result Value Ref Range    D-Dimer, Quant 0.96 (H) 0.00 - 0.59 mg/L FEU   Troponin    Collection Time: 11/08/21  1:21 AM   Result Value Ref Range    Troponin, High Sensitivity <6 0 - 14 ng/L    Troponin T NOT REPORTED <0.03 ng/mL    Troponin Interp NOT REPORTED    Brain Natriuretic Peptide    Collection Time: 11/08/21  1:21 AM   Result Value Ref Range    Pro-BNP <20 <300 pg/mL    BNP Interpretation NOT REPORTED    TSH w/reflex to FT4    Collection Time: 11/08/21  1:21 AM   Result Value Ref Range    TSH 1.77 0.30 - 5.00 mIU/L       Imaging/Diagnostics:  CT HEAD WO CONTRAST    Result Date: 11/8/2021  No acute intracranial abnormality. XR CHEST 1 VIEW    Result Date: 11/8/2021  1. No acute cardiopulmonary disease. CT CHEST PULMONARY EMBOLISM W CONTRAST    Result Date: 11/8/2021  No evidence of pulmonary embolism or acute pulmonary abnormality. Assessment :      Hospital Problems           Last Modified POA    * (Principal) Dizziness 11/8/2021 Yes    Acquired hypothyroidism 11/8/2021 Yes    Type 2 diabetes mellitus, with long-term current use of insulin ([facility] [address] 11/8/2021 Yes    Class 1 obesity due to excess calories with serious comorbidity in adult 11/8/2021 Yes    Dyslipidemia 11/8/2021 Yes    Primary hypertension 11/8/2021 Yes          Plan:     Patient status inpatient in the  Med/Surge    1. Dizziness with vertigo and headache  1. Continue Antivert  2. Add benzodiazepine  3. Add sumatriptan  4. IV hydration and pain management provided by ER with moderate relief  5. May benefit from outpatient vestibular rehab  2. Type 2 diabetes with current use of insulin and a personal history of obesity  1. Continue home medication regiment  2. Increase corrective insulin to medium scale  3. Education on need for diet and lifestyle modifications  3. Dyslipidemia with primary hypertension and hypothyroidism  1. Continue home medication on current regimen  2. BP stable on current regimen  3. TSH normal on current regimen    Consultations:   None    Patient is admitted as inpatient status because of co-morbidities listed above, severity of signs and symptoms as outlined, requirement for current medical therapies and most importantly because of direct risk to patient if care not provided in a hospital setting.   Expected length of stay > 48 hours.     Nelly Ho, APRN - NP  11/8/2021  10:27 AM    Copy sent to Dr. Fawad Jennings MD

## 2021-11-08 NOTE — PROGRESS NOTES
Patient weight is between 101-149kg. For prophylaxis with Enoxaparin, Pharmacy adjusted the dose to account for the patient's increased body weight in accordance with hospital approved protocol. The dose has been changed to 30mg BID. Please contact pharmacy with any concerns @ [phone]. Thank you.    Chris Dominguez [facility]  11/8/2021 9:59 AM

## 2021-11-08 NOTE — PROGRESS NOTES
Transitions of Care Pharmacy Service   Medication Review    The patient's list of current home medications has been reviewed. Her PCP office prescribes in [address] Source(s) of information: patient, Epic, Surescripts refill report      PROVIDER ACTION REQUESTED  Medications that need to be addressed by a physician/nurse practitioner:    Medication Action Requested   Lantus 25 units BID     Current home dose is 20 units nightly only -- please adjust as appropriate         Please feel free to call me with any questions about this encounter. Thank you. Danica Kerr [address] [place] [address]   Transitions [facility] Pharm[facility] Service  Phone:  [phone]  Fax: [phone]      Electronically signed by Danica Kerr [address] [place] [address] on 11/8/2021 at 2:42 PM           Medications Prior to Admission:   insulin glargine (BASAGLAR KWIKPEN) 100 UNIT/ML injection pen, Inject 20 Units into the skin nightly  metFORMIN (GLUCOPHAGE) 500 MG tablet, Take 1 tablet by mouth 2 times daily (with meals)  diclofenac (VOLTAREN) 50 MG EC tablet, Take 1 tablet by mouth 3 times daily (with meals)  traZODone (DESYREL) 150 MG tablet, Take 1 tablet by mouth nightly  mirabegron (MYRBETRIQ) 25 MG TB24, Take 1 tablet by mouth daily  aspirin EC 81 MG EC tablet, Take 1 tablet by mouth daily  lisinopril (PRINIVIL;ZESTRIL) 5 MG tablet, Take 1 tablet by mouth daily  simvastatin (ZOCOR) 40 MG tablet, Take 1 tablet by mouth nightly  Insulin Pen Needle 31G X 5 MM MISC, 1 each by Does not apply route daily  Lancets MISC, 1 each by Does not apply route daily Use 3-4 times daily  Liraglutide (VICTOZA) 18 MG/3ML SOPN SC injection, Start at 1.2 mg once a day for 1 week, then increase it to 1.8 mg once a day thereafter.   DULoxetine (CYMBALTA) 60 MG extended release capsule, Take 1 capsule by mouth daily  levothyroxine (SYNTHROID) 50 MCG tablet, Take 1 tablet by mouth daily  insulin aspart (NOVOLOG FLEXPEN) 100 UNIT/ML injection pen, Inject 30 Units into the skin 3 times daily (before meals)  zoster recombinant adjuvanted vaccine (SHINGRIX) 50 MCG/0.5ML SUSR injection, 50 MCG IM then repeat 2-6 months.   albuterol sulfate HFA (PROVENTIL HFA) 108 (90 Base) MCG/ACT inhaler, Inhale 2 puffs into the lungs every 6 hours as needed for Wheezing  ferrous sulfate (IRON 325) 325 (65 Fe) MG tablet, Take 1 tablet by mouth 2 times daily  Calcium Citrate-Vitamin D (CALCITRATE/VITAMIN D PO), Take 1 tablet by mouth daily

## 2021-11-08 NOTE — FLOWSHEET NOTE
Patient was sleeping. Got  Niko Caulk ice water. He thanked  for visit, but said family and [place] family all praying for patient. Hope to be discharged soon. Spiritual care will continue to be available as needed.         11/08/21 1327   Encounter Summary   Services provided to: Family   Referral/Consult From: [facility]   (11/8/21)   Complexity of Encounter Low   Length of Encounter 15 minutes   Spiritual Assessment Completed Yes   Routine   Type Initial   Assessment Sleeping   Intervention Sustaining presence/ Ministry of presence   Outcome Did not respond

## 2021-11-09 VITALS
SYSTOLIC BLOOD PRESSURE: 94 MMHG | BODY MASS INDEX: 39.9 KG/M2 | HEART RATE: 87 BPM | RESPIRATION RATE: 18 BRPM | HEIGHT: 68 IN | DIASTOLIC BLOOD PRESSURE: 42 MMHG | TEMPERATURE: 98 F | OXYGEN SATURATION: 94 % | WEIGHT: 263.3 LBS

## 2021-11-09 LAB
ALBUMIN SERPL-MCNC: 3.6 G/DL (ref 3.5–5.2)
ALBUMIN/GLOBULIN RATIO: ABNORMAL (ref 1–2.5)
ALP BLD-CCNC: 100 U/L (ref 35–104)
ALT SERPL-CCNC: 18 U/L (ref 5–33)
ANION GAP SERPL CALCULATED.3IONS-SCNC: 13 MMOL/L (ref 9–17)
AST SERPL-CCNC: 12 U/L
BILIRUB SERPL-MCNC: 0.21 MG/DL (ref 0.3–1.2)
BUN BLDV-MCNC: 11 MG/DL (ref 6–20)
BUN/CREAT BLD: 21 (ref 9–20)
CALCIUM SERPL-MCNC: 8.9 MG/DL (ref 8.6–10.4)
CHLORIDE BLD-SCNC: 102 MMOL/L (ref 98–107)
CHOLESTEROL/HDL RATIO: 3
CHOLESTEROL: 179 MG/DL
CO2: 20 MMOL/L (ref 20–31)
CREAT SERPL-MCNC: 0.52 MG/DL (ref 0.5–0.9)
ESTIMATED AVERAGE GLUCOSE: 275 MG/DL
GFR AFRICAN AMERICAN: >60 ML/MIN
GFR NON-AFRICAN AMERICAN: >60 ML/MIN
GFR SERPL CREATININE-BSD FRML MDRD: ABNORMAL ML/MIN/{1.73_M2}
GFR SERPL CREATININE-BSD FRML MDRD: ABNORMAL ML/MIN/{1.73_M2}
GLUCOSE BLD-MCNC: 198 MG/DL (ref 65–105)
GLUCOSE BLD-MCNC: 213 MG/DL (ref 65–105)
GLUCOSE BLD-MCNC: 240 MG/DL (ref 70–99)
HBA1C MFR BLD: 11.2 % (ref 4–6)
HCT VFR BLD CALC: 36.6 % (ref 36.3–47.1)
HDLC SERPL-MCNC: 59 MG/DL
HEMOGLOBIN: 12.1 G/DL (ref 11.9–15.1)
LDL CHOLESTEROL: 102 MG/DL (ref 0–130)
MCH RBC QN AUTO: 31.4 PG (ref 25.2–33.5)
MCHC RBC AUTO-ENTMCNC: 33.1 G/DL (ref 28.4–34.8)
MCV RBC AUTO: 95.1 FL (ref 82.6–102.9)
NRBC AUTOMATED: 0 PER 100 WBC
PDW BLD-RTO: 12.6 % (ref 11.8–14.4)
PLATELET # BLD: 291 K/UL (ref 138–453)
PMV BLD AUTO: 10.3 FL (ref 8.1–13.5)
POTASSIUM SERPL-SCNC: 4.2 MMOL/L (ref 3.7–5.3)
RBC # BLD: 3.85 M/UL (ref 3.95–5.11)
SODIUM BLD-SCNC: 135 MMOL/L (ref 135–144)
TOTAL PROTEIN: 5.8 G/DL (ref 6.4–8.3)
TRIGL SERPL-MCNC: 89 MG/DL
VLDLC SERPL CALC-MCNC: NORMAL MG/DL (ref 1–30)
WBC # BLD: 8.7 K/UL (ref 3.5–11.3)

## 2021-11-09 PROCEDURE — 6370000000 HC RX 637 (ALT 250 FOR IP): Performed by: NURSE PRACTITIONER

## 2021-11-09 PROCEDURE — 82947 ASSAY GLUCOSE BLOOD QUANT: CPT

## 2021-11-09 PROCEDURE — 80061 LIPID PANEL: CPT

## 2021-11-09 PROCEDURE — G0378 HOSPITAL OBSERVATION PER HR: HCPCS

## 2021-11-09 PROCEDURE — 96372 THER/PROPH/DIAG INJ SC/IM: CPT

## 2021-11-09 PROCEDURE — 85027 COMPLETE CBC AUTOMATED: CPT

## 2021-11-09 PROCEDURE — 96361 HYDRATE IV INFUSION ADD-ON: CPT

## 2021-11-09 PROCEDURE — 83036 HEMOGLOBIN GLYCOSYLATED A1C: CPT

## 2021-11-09 PROCEDURE — 80053 COMPREHEN METABOLIC PANEL: CPT

## 2021-11-09 PROCEDURE — APPSS45 APP SPLIT SHARED TIME 31-45 MINUTES: Performed by: NURSE PRACTITIONER

## 2021-11-09 PROCEDURE — 2580000003 HC RX 258: Performed by: NURSE PRACTITIONER

## 2021-11-09 PROCEDURE — 6360000002 HC RX W HCPCS: Performed by: NURSE PRACTITIONER

## 2021-11-09 PROCEDURE — APPNB30 APP NON BILLABLE TIME 0-30 MINS: Performed by: NURSE PRACTITIONER

## 2021-11-09 PROCEDURE — 36415 COLL VENOUS BLD VENIPUNCTURE: CPT

## 2021-11-09 RX ORDER — PROMETHAZINE HYDROCHLORIDE 12.5 MG/1
12.5 TABLET ORAL EVERY 6 HOURS PRN
Status: DISCONTINUED | OUTPATIENT
Start: 2021-11-09 | End: 2021-11-09 | Stop reason: HOSPADM

## 2021-11-09 RX ORDER — SUMATRIPTAN 100 MG/1
100 TABLET, FILM COATED ORAL 2 TIMES DAILY PRN
Qty: 9 TABLET | Refills: 3 | Status: SHIPPED | OUTPATIENT
Start: 2021-11-09

## 2021-11-09 RX ORDER — PROMETHAZINE HYDROCHLORIDE 12.5 MG/1
12.5 TABLET ORAL EVERY 6 HOURS PRN
Qty: 28 TABLET | Refills: 0 | Status: SHIPPED | OUTPATIENT
Start: 2021-11-09 | End: 2021-11-16

## 2021-11-09 RX ORDER — 0.9 % SODIUM CHLORIDE 0.9 %
1000 INTRAVENOUS SOLUTION INTRAVENOUS ONCE
Status: COMPLETED | OUTPATIENT
Start: 2021-11-09 | End: 2021-11-09

## 2021-11-09 RX ORDER — MECLIZINE HYDROCHLORIDE CHEWABLE TABLETS 25 MG/1
25 TABLET, CHEWABLE ORAL 3 TIMES DAILY PRN
Status: DISCONTINUED | OUTPATIENT
Start: 2021-11-09 | End: 2021-11-09 | Stop reason: HOSPADM

## 2021-11-09 RX ORDER — SUMATRIPTAN 50 MG/1
100 TABLET, FILM COATED ORAL 2 TIMES DAILY PRN
Status: DISCONTINUED | OUTPATIENT
Start: 2021-11-09 | End: 2021-11-09 | Stop reason: HOSPADM

## 2021-11-09 RX ORDER — MECLIZINE HYDROCHLORIDE 25 MG/1
25 TABLET ORAL 3 TIMES DAILY PRN
Qty: 30 TABLET | Refills: 0 | Status: SHIPPED | OUTPATIENT
Start: 2021-11-09 | End: 2021-11-19

## 2021-11-09 RX ADMIN — ASPIRIN 81 MG: 81 TABLET, COATED ORAL at 08:18

## 2021-11-09 RX ADMIN — MECLIZINE 12.5 MG: 12.5 TABLET ORAL at 08:18

## 2021-11-09 RX ADMIN — INSULIN LISPRO 4 UNITS: 100 INJECTION, SOLUTION INTRAVENOUS; SUBCUTANEOUS at 08:18

## 2021-11-09 RX ADMIN — ATORVASTATIN CALCIUM 20 MG: 20 TABLET, FILM COATED ORAL at 08:18

## 2021-11-09 RX ADMIN — ENOXAPARIN SODIUM 30 MG: 30 INJECTION SUBCUTANEOUS at 08:17

## 2021-11-09 RX ADMIN — SODIUM CHLORIDE 1000 ML: 9 INJECTION, SOLUTION INTRAVENOUS at 09:47

## 2021-11-09 RX ADMIN — MAGNESIUM OXIDE TAB 400 MG (241.3 MG ELEMENTAL MG) 400 MG: 400 (241.3 MG) TAB at 09:46

## 2021-11-09 RX ADMIN — SODIUM CHLORIDE, PRESERVATIVE FREE 10 ML: 5 INJECTION INTRAVENOUS at 08:22

## 2021-11-09 RX ADMIN — SODIUM CHLORIDE, PRESERVATIVE FREE 10 ML: 5 INJECTION INTRAVENOUS at 08:18

## 2021-11-09 RX ADMIN — INSULIN GLARGINE 25 UNITS: 100 INJECTION, SOLUTION SUBCUTANEOUS at 08:18

## 2021-11-09 RX ADMIN — DULOXETINE HYDROCHLORIDE 60 MG: 60 CAPSULE, DELAYED RELEASE ORAL at 08:17

## 2021-11-09 RX ADMIN — FERROUS SULFATE TAB EC 325 MG (65 MG FE EQUIVALENT) 325 MG: 325 (65 FE) TABLET DELAYED RESPONSE at 08:18

## 2021-11-09 RX ADMIN — METFORMIN HYDROCHLORIDE 500 MG: 500 TABLET ORAL at 08:18

## 2021-11-09 RX ADMIN — INSULIN LISPRO 2 UNITS: 100 INJECTION, SOLUTION INTRAVENOUS; SUBCUTANEOUS at 12:23

## 2021-11-09 RX ADMIN — TROSPIUM CHLORIDE 20 MG: 20 TABLET, FILM COATED ORAL at 09:47

## 2021-11-09 NOTE — DISCHARGE SUMMARY
[facility]  Office: [address], DO, Alex Jessicaet, DO, Savannah Reyes, DO, Kim Ricardo Edgar, DO, Beatrice Huff MD, Kathya Hernandes MD, Jv Peace MD, Abhi Valenzuela MD, Marcos Haro MD, Terri Galloway MD, Brooke Eaton MD, Amparo Bay, DO, Krunal Eaton, DO, Zaniab Marcus MD,  Claudetta Shilling, DO, Kandace Joshi MD, Nelly Peace MD, Emma Hammonds MD, Kitty Jung MD, Kit Craig MD, Dania Archer MD, Brice Hubbard MD, Delvis Hannon, [facility], [facility], CNP, Kimberly Avery, CNP, Raymundo Moran, CNS, Paula Rodriguez, CNP, Lucy Clubs, CNP, Sue Gordon, CNP, Mariangel Woodard, CNP, Deborah Simon, CNP, Yvette Britton PA-C, Saul Hollins, [facility], Teto Coppola, CNP, Mirtha Moreno, CNP, Martha Womack, CNP, Conrado Win, CNP, Brittany Mott, CNP, Bernardo Quinn, CNP, Gely Pagan, [facility]    Discharge Summary     Patient ID: Darell Hodges  :  1970   MRN: [id number]     ACCOUNT:  [de-identified]   Patient's PCP: Nohemy Escobar MD  Admit Date: 2021   Discharge Date: 2021     Length of Stay: 1  Code Status:  Full Code  Admitting Physician: Mracos Haro MD  Discharge Physician: MARILU Miller NP     Active Discharge Diagnoses:     Hospital Problem Lists:  Principal Problem:    Dizziness  Active Problems:    Acquired hypothyroidism    Type 2 diabetes mellitus, with long-term current use of insulin (HCC)    Class 1 obesity due to excess calories with serious comorbidity in adult    Dyslipidemia    Primary hypertension  Resolved Problems:    * No resolved hospital problems.  *      Admission Condition:  fair     Discharged Condition: good    Hospital Stay:     Hospital Course:  Darell Hodges is a 45 y.o. female who was admitted for the management of  Dizziness , presented to ER with Chest Pain, Dizziness (lightheaded), and Emesis     - Patient reports over the past 72 hours she has been having severe headache and dizziness with vertigo.  Patient has a remote history of benign vertigo but reports that her symptoms today are significantly worsened and she has experienced before.  Patient reports that her dizziness dissipates/resolves when she holds her head still and is significantly exacerbated by standing or turning her head rapidly to the side.  Patient additionally reports severe headache with photophobia and intolerance to loud noises.  Patient reports that this headache coincide with vertigo.       11/9 - Mild improvement, consistent with migraine, working toward discharge with out pt follow up with neurology and vestibular rehab.         Significant therapeutic interventions: Hydration, migraine management, orthostatics    Significant Diagnostic Studies:   Labs / Micro:  CBC:   Lab Results   Component Value Date    WBC 8.7 11/09/2021    RBC 3.85 11/09/2021    RBC 4.29 04/26/2012    HGB 12.1 11/09/2021    HCT 36.6 11/09/2021    MCV 95.1 11/09/2021    MCH 31.4 11/09/2021    MCHC 33.1 11/09/2021    RDW 12.6 11/09/2021     11/09/2021     04/26/2012     BMP:    Lab Results   Component Value Date    GLUCOSE 240 11/09/2021    GLUCOSE 296 12/29/2011     11/09/2021    K 4.2 11/09/2021     11/09/2021    CO2 20 11/09/2021    ANIONGAP 13 11/09/2021    BUN 11 11/09/2021    CREATININE 0.52 11/09/2021    BUNCRER 21 11/09/2021    CALCIUM 8.9 11/09/2021    LABGLOM >60 11/09/2021    GFRAA >60 11/09/2021    GFR      11/09/2021    GFR NOT REPORTED 11/09/2021     U/A:    Lab Results   Component Value Date    COLORU YELLOW 06/29/2021    TURBIDITY SLIGHTLY CLOUDY 06/29/2021    SPECGRAV 1.020 06/29/2021    HGBUR NEGATIVE 06/29/2021    PHUR 6.5 06/29/2021    PROTEINU NEGATIVE 06/29/2021    GLUCOSEU 3+ 06/29/2021    GLUCOSEU TRACE 12/29/2011    KETUA NEGATIVE 06/29/2021    BILIRUBINUR NEGATIVE 06/29/2021    BILIRUBINUR neg 12/17/2020    BILIRUBINUR NEGATIVE  Verified by ictotest. 12/29/2011 UROBILINOGEN Normal 06/29/2021    NITRU NEGATIVE 06/29/2021    LEUKOCYTESUR NEGATIVE 06/29/2021        Radiology:  CT HEAD WO CONTRAST    Result Date: 11/8/2021  No acute intracranial abnormality. XR CHEST 1 VIEW    Result Date: 11/8/2021  1. No acute cardiopulmonary disease. CT CHEST PULMONARY EMBOLISM W CONTRAST    Result Date: 11/8/2021  No evidence of pulmonary embolism or acute pulmonary abnormality. Consultations:    Consults:     Final Specialist Recommendations/Findings:   None      The patient was seen and examined on day of discharge and this discharge summary is in conjunction with any daily progress note from day of discharge. Discharge plan:     Disposition: Home    Physician Follow Up:     Elliot Stiles MD  [address] [facility] [address] [facility] [address] [phone]    In 1 week      Yohannes Landeros [facility]. [address] [place] [address],  Lindsay Capo [address] [facility]  [phone]    In 1 month      [address] [facility]  [address], [facility] [place] [facility]  [phone]    In 1 day  vestibular rehab       Requiring Further Evaluation/Follow Up POST HOSPITALIZATION/Incidental Findings: vestibular rehab    Diet: regular diet    Activity: As tolerated    Instructions to Patient: Take the meds as ordered. See the vestibular rehab center today or tomorrow.      Discharge Medications:      Medication List      START taking these medications    magnesium oxide 400 (241.3 Mg) MG Tabs tablet  Commonly known as: MAG-OX  Take 1 tablet by mouth daily     meclizine 25 MG tablet  Commonly known as: ANTIVERT  Take 1 tablet by mouth 3 times daily as needed for Dizziness     promethazine 12.5 MG tablet  Commonly known as: PHENERGAN  Take 1 tablet by mouth every 6 hours as needed for Nausea     SUMAtriptan 100 MG tablet  Commonly known as: IMITREX  Take 1 tablet by mouth 2 times daily as needed for Migraine        CONTINUE taking these medications    albuterol sulfate  (90 Base) MCG/ACT inhaler  Commonly known as: Proventil HFA  Inhale 2 puffs into the lungs every 6 hours as needed for Wheezing     aspirin EC 81 MG EC tablet  Take 1 tablet by mouth daily     Basaglar KwikPen 100 UNIT/ML injection pen  Generic drug: insulin glargine     * blood glucose monitor kit and supplies  Dispense sufficient amount for indicated testing frequency plus additional to accommodate PRN testing needs. Dispense all needed supplies to include: monitor, strips, lancing device, lancets, control solutions, alcohol swabs.      * ONE TOUCH ULTRA MINI w/Device Kit  1 kit by Does not apply route Daily     blood glucose test strips  1 strip by Other route three times daily Test___times daily    Diagnosis: 250.0   Diabetes Mellitus____Insulin Dependent____Non-Insulin Dependent     Blood Pressure Kit  Use to monitor blood pressure daily and as needed     CALCITRATE/VITAMIN D PO     diclofenac 50 MG EC tablet  Commonly known as: VOLTAREN  Take 1 tablet by mouth 3 times daily (with meals)     DULoxetine 60 MG extended release capsule  Commonly known as: CYMBALTA  Take 1 capsule by mouth daily     ferrous sulfate 325 (65 Fe) MG tablet  Commonly known as: IRON 325  Take 1 tablet by mouth 2 times daily     Insulin Pen Needle 31G X 5 MM Misc  1 each by Does not apply route daily     levothyroxine 50 MCG tablet  Commonly known as: SYNTHROID  Take 1 tablet by mouth daily     lisinopril 5 MG tablet  Commonly known as: PRINIVIL;ZESTRIL  Take 1 tablet by mouth daily     metFORMIN 500 MG tablet  Commonly known as: GLUCOPHAGE  Take 1 tablet by mouth 2 times daily (with meals)     mirabegron 25 MG Tb24  Commonly known as: MYRBETRIQ  Take 1 tablet by mouth daily     NovoLOG FlexPen 100 UNIT/ML injection pen  Generic drug: insulin aspart  Inject 30 Units into the skin 3 times daily (before meals)     * ONE TOUCH ULTRASOFT LANCETS Misc  Dx: DM-2 use 2-3 times daily     * Lancets Misc  1 each by Does not apply route daily Use 3-4 times daily

## 2021-11-09 NOTE — PROGRESS NOTES
CLINICAL PHARMACY NOTE: MEDS TO BEDS    Total # of Prescriptions Filled: 0   The following medications were delivered to the patient:  · SUMATRIPTAN SUCC 100MG  · MECLIZINE 25MG CHEW  · PROMETHAZINE 12.5MG    Additional Documentation:

## 2021-11-09 NOTE — PLAN OF CARE
Siderails up x 2  Hourly rounding  Call light in reach  Instructed to call for assist before attempting out of bed. Remains free from falls and accidental injury at this time   Floor free from obstacles  Bed is locked and in lowest position  Adequate lighting provided  Bed alarm on, [place] star and Stay with Me signs posted    Pain level assessment complete. Patient educated on pain scale and control interventions  PRN pain medication given per patient request  Patient instructed to call out with new onset of pain or unrelieved pain    Checked for incontinence every 2 hours and prn. Pericare as needed. Assisted to reposition off back frequently. On waffle mattress. Heels off bed with pillows.

## 2021-11-09 NOTE — PROGRESS NOTES
[facility]  Office: [address], DO, Antionette Din, DO, Troy Acron, DO, Karson Chaidez Blood, DO, Juan Antonio Bah MD, Sancho Duncan MD, Isabel Mackey MD, Alexandra Mead MD, Jack Mccrary MD, Manas Zayas MD, Napoleon العلي MD, Kosta Momin, DO, Adams Portillo, DO, Marlena Marroquin MD,  Parish Wheat, DO, Brent Stephen MD, Sherita Kiser MD, Elidia Abbasi MD, Rk Hopper MD, Temo Trujillo MD, Palak العلي MD, Yung Dolan MD, Suzi Grimaldo, [facility], [facility], CNP, Julia Alexander, CNP, Diamond Mcneill, CNS, Kevan Parker, CNP, Adelia Alpers, CNP, Placido Wells, CNP, Macie Parra, CNP, Avis Fuller, CNP, Andres Pena PA-C, Maureen Lyn, [facility], Sterling Pandya, CNP, Go Orellana, CNP, Irasema Rhoades, CNP, Florencia Ahuja, CNP, Owen Prather, CNP, Ryland Arguello, [facility], Dai Dalton, [facility]    Progress Note    11/9/2021    9:40 AM    Name:   Marizol Lara  MRN:     [id number]     [place]:      [de-identified]   Room:   [address] [place] [address] Day:  1  Admit Date:  11/8/2021 12:16 AM    PCP:   Aly Montiel MD  Code Status:  Full Code    Subjective:     C/C:   Chief Complaint   Patient presents with    Chest Pain    Dizziness     lightheaded    Emesis     Interval History Status: improved. Mild improvement, consistent with migraine, working toward discharge with out pt follow up with neurology and vestibular rehab. Brief History:     11/8 - Patient reports over the past 72 hours she has been having severe headache and dizziness with vertigo. Patient has a remote history of benign vertigo but reports that her symptoms today are significantly worsened and she has experienced before. Patient reports that her dizziness dissipates/resolves when she holds her head still and is significantly exacerbated by standing or turning her head rapidly to the side.   Patient additionally reports severe headache with photophobia and intolerance to loud noises. Patient reports that this headache coincide with vertigo. 11/9 - Mild improvement, consistent with migraine, working toward discharge with out pt follow up with neurology and vestibular rehab. Review of Systems:     Constitutional:  negative for chills, fevers, sweats  Respiratory:  negative for cough, dyspnea on exertion, shortness of breath, wheezing  Cardiovascular:  negative for chest pain, chest pressure/discomfort, lower extremity edema, palpitations  Gastrointestinal:  negative for abdominal pain, constipation, diarrhea, nausea, vomiting  Neurological: Positive for dizziness and headaches. - improved from yesterday    Medications: Allergies:     Allergies   Allergen Reactions    Januvia [Sitagliptin Phosphate] Hives    Naproxen Sodium Hives    Neurontin [Gabapentin] Nausea Only       Current Meds:   Scheduled Meds:    sodium chloride  1,000 mL IntraVENous Once    magnesium oxide  400 mg Oral Daily    DULoxetine  60 mg Oral Daily    ferrous sulfate  325 mg Oral BID    insulin glargine  25 Units SubCUTAneous BID    lisinopril  5 mg Oral Daily    Liraglutide  1.2 mg SubCUTAneous Daily    metFORMIN  500 mg Oral BID WC    trospium  20 mg Oral BID AC    atorvastatin  20 mg Oral Daily    traZODone  150 mg Oral Nightly    sodium chloride flush  10 mL IntraVENous 2 times per day    enoxaparin  30 mg SubCUTAneous BID    aspirin  81 mg Oral Daily    Or    aspirin  300 mg Rectal Daily    insulin lispro  0-12 Units SubCUTAneous TID WC    insulin lispro  0-6 Units SubCUTAneous Nightly    nicotine  1 patch TransDERmal Daily     Continuous Infusions:    sodium chloride      dextrose       PRN Meds: promethazine, SUMAtriptan, meclizine, sodium chloride flush, sodium chloride, ondansetron **OR** ondansetron, magnesium hydroxide, glucose, dextrose, glucagon (rDNA), dextrose, LORazepam, morphine, magnesium sulfate    Data:     Past Medical History:   has a past medical history of Anxiety, Arthritis of knee, degenerative, Blindness of left eye, Blurry vision, Carpal tunnel syndrome on both sides, Chronic knee pain, Depressive disorder, not elsewhere classified, Headache, Hydronephrosis, left, Hyperlipidemia, Hypertension, Hypothyroidism, Iron deficiency anemia, Mild intermittent asthma without complication, Obesity, Osteoarthritis, Polyneuropathy, RAD (reactive airway disease), Snores, Type II or unspecified type diabetes mellitus without mention of complication, not stated as uncontrolled, Urge incontinence of urine, Wears glasses, and Weight loss. Social History:   reports that she has been smoking cigarettes. She has a 3.75 pack-year smoking history. She has never used smokeless tobacco. She reports current alcohol use. She reports current drug use. Drug: Marijuana Roamler). Family History:   Family History   Problem Relation Age of Onset    Stroke Father     Diabetes Mother     Hypertension Mother     Breast Cancer Neg Hx     Cancer Neg Hx     Colon Cancer Neg Hx     Eclampsia Neg Hx     Ovarian Cancer Neg Hx      Labor Neg Hx     Spont Abortions Neg Hx        Vitals:  BP (!) 98/50   Pulse 91   Temp 98.2 °F (36.8 °C) (Oral)   Resp 17   Ht 5' 8\" (1.727 m)   Wt 263 lb 4.8 oz (119.4 kg)   LMP 2013   SpO2 95%   BMI 40.03 kg/m²   Temp (24hrs), Av.1 °F (36.7 °C), Min:97.7 °F (36.5 °C), Max:98.2 °F (36.8 °C)    Recent Labs     21  1552 21  2134 21  0636   POCGLU 275* 488* 545* 213*       I/O (24Hr):   No intake or output data in the 24 hours ending 21 0940    Labs:  Hematology:  Recent Labs     21  01221  0553   WBC 7.9 8.7   RBC 4.07 3.85*   HGB 12.9 12.1   HCT 38.6 36.6   MCV 94.8 95.1   MCH 31.7 31.4   MCHC 33.4 33.1   RDW 12.9 12.6    291   MPV 10.3 10.3   DDIMER 0.96*  --      Chemistry:  Recent Labs     21  0121 21  1414 21  0553     --  135   K 3.7 --  4.2     --  102   CO2 24  --  20   GLUCOSE 228*  --  240*   BUN 10  --  11   CREATININE 0.66  --  0.52   ANIONGAP 11  --  13   LABGLOM >60  --  >60   GFRAA >60  --  >60   CALCIUM 9.2  --  8.9   PROBNP <20  --   --    TROPHS <6 <6  --      Recent Labs     11/08/21  0121 11/08/21  1219 11/08/21  1552 11/08/21 2014 11/08/21  2134 11/09/21  0553 11/09/21  0636   PROT 6.9  --   --   --   --  5.8*  --    LABALBU 4.0  --   --   --   --  3.6  --    LABA1C  --   --   --   --   --  11.2*  --    TSH 1.77  --   --   --   --   --   --    AST 14  --   --   --   --  12  --    ALT 21  --   --   --   --  18  --    ALKPHOS 104  --   --   --   --  100  --    BILITOT 0.27*  --   --   --   --  0.21*  --    LIPASE 33  --   --   --   --   --   --    CHOL  --   --   --   --   --  179  --    HDL  --   --   --   --   --  59  --    LDLCHOLESTEROL  --   --   --   --   --  102  --    CHOLHDLRATIO  --   --   --   --   --  3.0  --    TRIG  --   --   --   --   --  89  --    VLDL  --   --   --   --   --  NOT REPORTED  --    POCGLU  --  318* 275* 488* 545*  --  213*     ABG:  Lab Results   Component Value Date    POCPH 7.40 08/14/2013    PHART 7.373 08/13/2013    POCPCO2 34 08/14/2013    HIQ9OTZ 37.4 08/13/2013    POCPO2 71 08/14/2013    PO2ART 170.0 08/13/2013    POCHCO3 20.7 08/14/2013    VXC5LQH 21.3 08/13/2013    NBEA 4 08/14/2013    PBEA NOT REPORTED 08/14/2013    NYZ4SXF 22 08/14/2013    KWZT5DDG 94 08/14/2013    H2CFFJPA 99.0 08/13/2013    FIO2 NOT REPORTED 05/22/2020     Lab Results   Component Value Date/Time    SPECIAL NOT REPORTED 12/17/2020 06:24 PM     Lab Results   Component Value Date/Time    CULTURE (A) 10/22/2020 10:15 AM     STREPTOCOCCI, BETA HEMOLYTIC GROUP B >[id number] CFU/ML       Radiology:  CT HEAD WO CONTRAST    Result Date: 11/8/2021  No acute intracranial abnormality. XR CHEST 1 VIEW    Result Date: 11/8/2021  1. No acute cardiopulmonary disease.      CT CHEST PULMONARY EMBOLISM W CONTRAST    Result Date: 11/8/2021  No evidence of pulmonary embolism or acute pulmonary abnormality. Physical Examination:        General appearance:  alert, cooperative and no distress  Mental Status:  oriented to person, place and time and normal affect  Lungs:  clear to auscultation bilaterally, normal effort  Heart:  regular rate and rhythm, no murmur  Abdomen:  soft, nontender, nondistended, normal bowel sounds, no masses, hepatomegaly, splenomegaly  Extremities:  no edema, redness, tenderness in the calves  Skin:  no gross lesions, rashes, induration    Assessment:        Hospital Problems           Last Modified POA    * (Principal) Dizziness 11/8/2021 Yes    Acquired hypothyroidism 11/8/2021 Yes    Type 2 diabetes mellitus, with long-term current use of insulin ([facility] [address] 11/8/2021 Yes    Class 1 obesity due to excess calories with serious comorbidity in adult 11/8/2021 Yes    Dyslipidemia 11/8/2021 Yes    Primary hypertension 11/8/2021 Yes          Plan:        1. Dizziness with vertigo and headache  1. Increase Antivert  2. Increase sumatriptan  3. IV hydration   4.  May benefit from outpatient vestibular rehab        MARILU Bourne NP  11/9/2021  9:40 AM

## 2021-11-10 ENCOUNTER — TELEPHONE (OUTPATIENT)
Dept: INTERNAL MEDICINE | Age: 51
End: 2021-11-10

## 2021-11-10 NOTE — TELEPHONE ENCOUNTER
[facility] Transitions Initial Follow Up Call    Outreach made within 2 business days of discharge: Yes    Patient: Tao Castillo   Patient : 1970   MRN: [id number]    Reason for Admission: Dizziness/HA  Discharge Date: 2021       Spoke with: Jovita Hylton    Discharge department/facility: [address] [place] [address] Interactive Patient Contact:  Was patient able to fill all prescriptions: Yes  Was patient instructed to bring all medications to the follow-up visit: Yes  Is patient taking all medications as directed in the discharge summary? Yes  Does patient understand their discharge instructions: Yes  Does patient have questions or concerns that need addressed prior to 7-14 day follow up office visit: no    Scheduled appointment with PCP within 7-14 days    Scheduled for , pt denies questions/concerns re: discharge instructions and recommendations for f/u appts PT and neurology.     Follow Up  Future Appointments   Date Time Provider Eriberto Chirinos   2022  2:00 PM Poly Hutchinson MD [facility] [place], RN

## 2021-11-23 ENCOUNTER — TELEPHONE (OUTPATIENT)
Dept: INTERNAL MEDICINE | Age: 51
End: 2021-11-23

## 2021-11-23 NOTE — LETTER
[address] [facility] [place] [address] [address]  Phone: [phone]  Fax: [phone]    Garrett Russell MD        November 23, 2021    [address] [place] [id number]      Dear Ortega Norwood: This letter is a reminder that you may have diagnostic Mammogram that has not been completed. It is important to your well-being that this test is performed. Please find the outstanding order attached. You can have the test completed at [address]. [facility] or [facility]. Please see the order for scheduling instructions. Please call [phone] to schedule an appointment. We are also now offering service at our [facility] for more information or to schedule your mammogram call [phone])  Please call our office at Dept: [phone] for additional information on the outstanding test or let us know if they have been completed so we may update your chart. If you have any questions or concerns, please don't hesitate to call.     Sincerely,        Garrett Russell MD

## 2021-11-23 NOTE — TELEPHONE ENCOUNTER
PC to patient no answer, LM to contact office. Patient overdue for Mammogram order Reprinted and mailed to patient with instructions.

## 2021-11-24 DIAGNOSIS — D50.8 IRON DEFICIENCY ANEMIA SECONDARY TO INADEQUATE DIETARY IRON INTAKE: ICD-10-CM

## 2021-11-24 NOTE — TELEPHONE ENCOUNTER
Request for medication refill, ferosul. Next Visit Date:12/3/21  Future Appointments   Date Time Provider Eriberto Chirinos   12/3/2021  2:00 PM Roly Claudio MD [facility]   1/21/2022  2:00 PM Roly Claudio MD [address] [facility]   Topic Date Due    Hepatitis C screen  Never done    DTaP/Tdap/Td vaccine (1 - Tdap) Never done    Colon cancer screen colonoscopy  Never done    Diabetic retinal exam  10/01/2016    Diabetic microalbuminuria test  09/11/2018    Shingles Vaccine (1 of 2) Never done    Diabetic foot exam  03/06/2020    Breast cancer screen  02/27/2021    Annual Wellness Visit (AWV)  11/06/2021    Hepatitis B vaccine (3 of 3 - Risk 3-dose series) 11/16/2021    A1C test (Diabetic or Prediabetic)  02/09/2022    COVID-19 Vaccine (3 - Booster for Pfizer series) 04/18/2022    TSH testing  11/08/2022    Lipid screen  11/09/2022    Potassium monitoring  11/09/2022    Creatinine monitoring  11/09/2022    Pneumococcal 0-64 years Vaccine (2 of 2 - PPSV23) 07/06/2034    Flu vaccine  Completed    HIV screen  Completed    Hepatitis A vaccine  Aged Out    Hib vaccine  Aged Out    Meningococcal (ACWY) vaccine  Aged Out       Hemoglobin A1C (%)   Date Value   11/09/2021 11.2 (H)   10/19/2021 11.1   07/16/2021 11.2             ( goal A1C is < 7)   Microalb/Crt.  Ratio (mcg/mg creat)   Date Value   09/11/2017 7     LDL Cholesterol (mg/dL)   Date Value   11/09/2021 102       (goal LDL is <100)   AST (U/L)   Date Value   11/09/2021 12     ALT (U/L)   Date Value   11/09/2021 18     BUN (mg/dL)   Date Value   11/09/2021 11     BP Readings from Last 3 Encounters:   11/09/21 (!) 94/42   10/19/21 129/74   10/16/21 138/86          (goal 120/80)    All Future Testing planned in [facility]  Lab Frequency Next Occurrence   COVID-19 Once 01/15/2021   MICHAEL DIGITAL SCREEN W OR WO CAD BILATERAL Once 02/23/2022   Hepatitis C Antibody Once 02/03/2022         Patient Active Problem List:

## 2021-11-29 RX ORDER — FERROUS SULFATE 325(65) MG
325 TABLET ORAL 2 TIMES DAILY
Qty: 60 TABLET | Refills: 3 | Status: SHIPPED | OUTPATIENT
Start: 2021-11-29 | End: 2022-05-27

## 2021-12-09 ENCOUNTER — OFFICE VISIT (OUTPATIENT)
Dept: ORTHOPEDIC SURGERY | Age: 51
End: 2021-12-09
Payer: COMMERCIAL

## 2021-12-09 VITALS — WEIGHT: 270 LBS | HEIGHT: 68 IN | BODY MASS INDEX: 40.92 KG/M2

## 2021-12-09 DIAGNOSIS — M17.0 PRIMARY OSTEOARTHRITIS OF BOTH KNEES: Primary | ICD-10-CM

## 2021-12-09 PROCEDURE — G8427 DOCREV CUR MEDS BY ELIG CLIN: HCPCS | Performed by: STUDENT IN AN ORGANIZED HEALTH CARE EDUCATION/TRAINING PROGRAM

## 2021-12-09 PROCEDURE — 1111F DSCHRG MED/CURRENT MED MERGE: CPT | Performed by: STUDENT IN AN ORGANIZED HEALTH CARE EDUCATION/TRAINING PROGRAM

## 2021-12-09 PROCEDURE — G8482 FLU IMMUNIZE ORDER/ADMIN: HCPCS | Performed by: STUDENT IN AN ORGANIZED HEALTH CARE EDUCATION/TRAINING PROGRAM

## 2021-12-09 PROCEDURE — 3017F COLORECTAL CA SCREEN DOC REV: CPT | Performed by: STUDENT IN AN ORGANIZED HEALTH CARE EDUCATION/TRAINING PROGRAM

## 2021-12-09 PROCEDURE — 4004F PT TOBACCO SCREEN RCVD TLK: CPT | Performed by: STUDENT IN AN ORGANIZED HEALTH CARE EDUCATION/TRAINING PROGRAM

## 2021-12-09 PROCEDURE — 20610 DRAIN/INJ JOINT/BURSA W/O US: CPT | Performed by: STUDENT IN AN ORGANIZED HEALTH CARE EDUCATION/TRAINING PROGRAM

## 2021-12-09 PROCEDURE — G8417 CALC BMI ABV UP PARAM F/U: HCPCS | Performed by: STUDENT IN AN ORGANIZED HEALTH CARE EDUCATION/TRAINING PROGRAM

## 2021-12-09 RX ORDER — METHYLPREDNISOLONE ACETATE 80 MG/ML
80 INJECTION, SUSPENSION INTRA-ARTICULAR; INTRALESIONAL; INTRAMUSCULAR; SOFT TISSUE ONCE
Status: COMPLETED | OUTPATIENT
Start: 2021-12-09 | End: 2021-12-09

## 2021-12-09 RX ORDER — BUPIVACAINE HYDROCHLORIDE 2.5 MG/ML
2 INJECTION, SOLUTION INFILTRATION; PERINEURAL ONCE
Status: COMPLETED | OUTPATIENT
Start: 2021-12-09 | End: 2021-12-09

## 2021-12-09 RX ADMIN — BUPIVACAINE HYDROCHLORIDE 5 MG: 2.5 INJECTION, SOLUTION INFILTRATION; PERINEURAL at 11:40

## 2021-12-09 RX ADMIN — METHYLPREDNISOLONE ACETATE 80 MG: 80 INJECTION, SUSPENSION INTRA-ARTICULAR; INTRALESIONAL; INTRAMUSCULAR; SOFT TISSUE at 11:40

## 2021-12-09 RX ADMIN — BUPIVACAINE HYDROCHLORIDE 5 MG: 2.5 INJECTION, SOLUTION INFILTRATION; PERINEURAL at 11:39

## 2021-12-09 NOTE — PROGRESS NOTES
[facility]  [facility]  [address] [facility] [address]  Dept: [phone]  Dept Fax: [phone]        [facility] Clinic Follow Up      Subjective:     Verenice Slaughter is a 45y.o. year old female who presents to the clinic today for routine followup regarding her bilateral knee pain. Patient states that her knee pain is worsening and recently, she has noticed that her knees feel more unstable. She reports that she has received corticosteroid injections in the past which provide about 1 month of relief. She states that stairs are the worst activity for her, going both up and down. The left knee is worse than the right. She states that she also has pain with exercise. She reports that now she has throbbing and pain at night. She has tried voltaren cream, Meloxicam, corticosteroid injections, and physical therapy. She is a current day smoker and reports that her smoking has increased secondary to pain. She also states she has been working on weight loss. Her most recent BMI was 41.05. She is a diabetic and her most recent A1c was 11. She reports that lab was drawn about 1 month ago. Since then, her blood sugars have been running anywhere from the 150-210 range. She denies numbness and tingling in the extremity. ROS:  Gen: No fevers, chills   Cardio: no chest pain   Lungs: no shortness of breath   MSK: Pain in bilateral knees   Neuro: no numbness, tingling, weakness     Objective :   Physical exam  Gen: AAOx3, no acute distress  Cardio: regular rate   Lungs: symmetrical chest expansion, no audible wheezing   MSK: Bilateral knees: Bilateral knees resting in slight varus position. No lacerations, abrasions, or ecchymoses. Mild amount of swelling bilaterally. Tenderness to palpation both medially and laterally, worse on the medial aspect of the left knee. No instability noted at 0 and 30 degrees of varus and valgus stress testing. Negative Carmencita maneuver.  Extremities warm and well perfused. Active range of motion of left knee approximately 5-120 degrees and right knee 0-125 degrees. Radiology:  No x-rays taken from today's visit. X-rays from 7/22/2021 were reviewed demonstrating bilateral knee osteoarthritis worse on the left. Assessment:   45y.o. year old female with bilateral knee osteoarthritis   Plan:      Patient seen and evaluated today. Discussed with patient that she does have end stage osteoarthritis of the knees worse on the left, and in the future will knee a knee replacement bilaterally. She is not yet at the point where she wants surgery and is requesting continuation of conservative treatment. We discussed visco supplementation but also discussed with the patient that given the severity of her osteoarthritis, she has less than a 50% chance of this actually helping her with her pain. Given that, the patient said she would like to defer and not pursue viscosupplementation prior authorization at this time. We instead proceeded with a corticosteroid injection today without difficulty. In addition, given worsening varus deformity, we prescribed the patient with a medial  brace for her left knee. If this provides her with relief, we will have her call the office and will order a medial  brace for the right knee as well. In addition, we will provide referral to PM&R for possible pain management as she is still not wishing to pursue surgery and we have exhausted other means of conservative care. We did have a long discussion regarding BMI, smoking cessation, and A1c/diabetes glucose control and encouraged the patient to continue working on weight loss, glucose control, and smoking cessation. She verbalized an understanding of this and was amenable to trying. We will see the patient back in about 4 months. Injection procedure note  The alternatives, benefits, and risks were discussed with the patient.  After answering all questions to the patient's satisfaction, the patient agreed to proceed forward with injection and gave verbal consent for the procedure. With the patient's permission, appropriate anatomic landmarks were identified and the bilateral knees were prepped in a sterile fashion using alcohol and/or betadine. A 21 gauge needle was then used to inject 2cc 0.25% marcaine plain and 80mg depo medrol into each joint. The injection was advanced without resistance confirming appropriate position. The patient tolerated the procedure well and the site was dressed with a band-aid. Patient was advised to ice the area for 15-20 minutes to relieve any injection site related pain. Patient was advised to contact nurse if area becomes swollen, hot, erythematous, or painful, or to go to the emergency room after business hours. Follow up:Return in about 4 months (around 4/9/2022) for reassess bilateral knee OA . Orders Placed This Encounter   Medications    Misc.  Devices MISC     Sig: Left knee medial un- brace     Dispense:  1 each     Refill:  0    methylPREDNISolone acetate (DEPO-MEDROL) injection 80 mg    methylPREDNISolone acetate (DEPO-MEDROL) injection 80 mg    bupivacaine (MARCAINE) 0.25 % injection 5 mg    bupivacaine (MARCAINE) 0.25 % injection 5 mg          Orders Placed This Encounter   Procedures   Yanci Carroll MD, Physical Medicine and Rehabilitation, [place]     Referral Priority:   Routine     Referral Type:   Eval and Treat     Referral Reason:   Specialty Services Required     Referred to Provider:   Jaycee Kumari MD     Requested Specialty:   Physical Medicine and Rehab     Number of Visits Requested:   1  [id number] - DRAIN/INJECT LARGE JOINT/BURSA       Electronically signed by Jay Luther DO on 12/9/2021 at 12:06 PM

## 2021-12-20 ENCOUNTER — TELEPHONE (OUTPATIENT)
Dept: INTERNAL MEDICINE | Age: 51
End: 2021-12-20

## 2021-12-20 NOTE — TELEPHONE ENCOUNTER
----- Message from Bernadette Lassiter sent at 12/20/2021 12:16 PM EST -----  Subject: Message to Provider    QUESTIONS  Information for Provider? Wanting to verify if patient had a flu shot, if   so, when, then also wondering if there is an RX number with it. Can   someone please follow up. Her Ext is #[phone].   ---------------------------------------------------------------------------  --------------  Yo PLASENCIA  What is the best way for the office to contact you? OK to leave message on   voicemail  Preferred Call Back Phone Number? [phone]  ---------------------------------------------------------------------------  --------------  SCRIPT ANSWERS  Relationship to Patient? Third Party  Representative Name? Chetan Quintana from [facility]. Insurance.

## 2021-12-23 DIAGNOSIS — E03.9 ACQUIRED HYPOTHYROIDISM: ICD-10-CM

## 2021-12-23 NOTE — TELEPHONE ENCOUNTER
Request for Synthroid. Next Visit Date:  Future Appointments   Date Time Provider Eriberto Candis   1/21/2022  2:00 PM Eleuterio Magallanes MD [facility] IM [facility]   4/14/2022  8:40 AM SCHEDULE, [facility] ORTHO [address] Maintenance   Topic Date Due    Hepatitis C screen  Never done    DTaP/Tdap/Td vaccine (1 - Tdap) Never done    Colon cancer screen colonoscopy  Never done    Diabetic retinal exam  10/01/2016    Diabetic microalbuminuria test  09/11/2018    Shingles Vaccine (1 of 2) Never done    Diabetic foot exam  03/06/2020    Breast cancer screen  02/27/2021    Annual Wellness Visit (AWV)  11/06/2021    Hepatitis B vaccine (3 of 3 - Risk 3-dose series) 11/16/2021    A1C test (Diabetic or Prediabetic)  02/09/2022    COVID-19 Vaccine (3 - Booster for Pfizer series) 04/18/2022    TSH testing  11/08/2022    Lipid screen  11/09/2022    Potassium monitoring  11/09/2022    Creatinine monitoring  11/09/2022    Pneumococcal 0-64 years Vaccine (2 of 2 - PPSV23) 07/06/2034    Flu vaccine  Completed    HIV screen  Completed    Hepatitis A vaccine  Aged Out    Hib vaccine  Aged Out    Meningococcal (ACWY) vaccine  Aged Out       Hemoglobin A1C (%)   Date Value   11/09/2021 11.2 (H)   10/19/2021 11.1   07/16/2021 11.2             ( goal A1C is < 7)   Microalb/Crt.  Ratio (mcg/mg creat)   Date Value   09/11/2017 7     LDL Cholesterol (mg/dL)   Date Value   11/09/2021 102       (goal LDL is <100)   AST (U/L)   Date Value   11/09/2021 12     ALT (U/L)   Date Value   11/09/2021 18     BUN (mg/dL)   Date Value   11/09/2021 11     BP Readings from Last 3 Encounters:   11/09/21 (!) 94/42   10/19/21 129/74   10/16/21 138/86          (goal 120/80)    All Future Testing planned in [facility]  Lab Frequency Next Occurrence   COVID-19 Once 01/15/2021   MICHAEL DIGITAL SCREEN W OR WO CAD BILATERAL Once 02/23/2022   Hepatitis C Antibody Once 02/03/2022         Patient Active Problem List:     Tobacco use     Carpal tunnel syndrome on both sides     Acquired hypothyroidism     Hyperlipidemia     Iron deficiency anemia     Mild episode of recurrent major depressive disorder (HCC)     Type 2 diabetes mellitus, with long-term current use of insulin ([facility])     Class 1 obesity due to excess calories with serious comorbidity in adult     Primary osteoarthritis of both knees     Urge incontinence of urine     Mild intermittent asthma without complication     Dyslipidemia     Primary hypertension     Pelvic pain in female     Bilateral ovarian cysts     H/O Hysterectomy (2013)     Trichomonal vaginitis     Chest pain     Frontal headache     Frontal sinusitis     Class 3 severe obesity in adult [facility])     Bilateral chronic knee pain     Acute headache     Visual changes     Blindness of left eye     Diabetic polyneuropathy (HCC)     Dizziness

## 2021-12-24 RX ORDER — LEVOTHYROXINE SODIUM 0.05 MG/1
TABLET ORAL
Qty: 30 TABLET | Refills: 2 | Status: SHIPPED | OUTPATIENT
Start: 2021-12-24 | End: 2022-05-27

## 2022-01-21 ENCOUNTER — VIRTUAL VISIT (OUTPATIENT)
Dept: INTERNAL MEDICINE | Age: 52
End: 2022-01-21
Payer: COMMERCIAL

## 2022-01-21 ENCOUNTER — TELEPHONE (OUTPATIENT)
Dept: INTERNAL MEDICINE | Age: 52
End: 2022-01-21

## 2022-01-21 DIAGNOSIS — E11.65 UNCONTROLLED TYPE 2 DIABETES MELLITUS WITH HYPERGLYCEMIA (HCC): Primary | ICD-10-CM

## 2022-01-21 DIAGNOSIS — I10 PRIMARY HYPERTENSION: ICD-10-CM

## 2022-01-21 DIAGNOSIS — M17.0 PRIMARY OSTEOARTHRITIS OF BOTH KNEES: ICD-10-CM

## 2022-01-21 DIAGNOSIS — Z12.11 COLON CANCER SCREENING: ICD-10-CM

## 2022-01-21 DIAGNOSIS — J45.20 MILD INTERMITTENT ASTHMA WITHOUT COMPLICATION: ICD-10-CM

## 2022-01-21 DIAGNOSIS — E03.9 ACQUIRED HYPOTHYROIDISM: ICD-10-CM

## 2022-01-21 PROCEDURE — 99442 PR PHYS/QHP TELEPHONE EVALUATION 11-20 MIN: CPT | Performed by: INTERNAL MEDICINE

## 2022-01-21 RX ORDER — FLASH GLUCOSE SCANNING READER
EACH MISCELLANEOUS
Qty: 2 EACH | Refills: 11 | Status: SHIPPED | OUTPATIENT
Start: 2022-01-21

## 2022-01-21 RX ORDER — FLASH GLUCOSE SENSOR
KIT MISCELLANEOUS
Qty: 2 EACH | Refills: 5 | Status: SHIPPED | OUTPATIENT
Start: 2022-01-21

## 2022-01-21 RX ORDER — LISINOPRIL 5 MG/1
5 TABLET ORAL DAILY
Qty: 30 TABLET | Refills: 3 | Status: SHIPPED | OUTPATIENT
Start: 2022-01-21 | End: 2022-07-26

## 2022-01-21 ASSESSMENT — PATIENT HEALTH QUESTIONNAIRE - PHQ9
9. THOUGHTS THAT YOU WOULD BE BETTER OFF DEAD, OR OF HURTING YOURSELF: 0
SUM OF ALL RESPONSES TO PHQ QUESTIONS 1-9: 0
3. TROUBLE FALLING OR STAYING ASLEEP: 0
SUM OF ALL RESPONSES TO PHQ QUESTIONS 1-9: 0
6. FEELING BAD ABOUT YOURSELF - OR THAT YOU ARE A FAILURE OR HAVE LET YOURSELF OR YOUR FAMILY DOWN: 0
4. FEELING TIRED OR HAVING LITTLE ENERGY: 0
5. POOR APPETITE OR OVEREATING: 0
8. MOVING OR SPEAKING SO SLOWLY THAT OTHER PEOPLE COULD HAVE NOTICED. OR THE OPPOSITE, BEING SO FIGETY OR RESTLESS THAT YOU HAVE BEEN MOVING AROUND A LOT MORE THAN USUAL: 0
2. FEELING DOWN, DEPRESSED OR HOPELESS: 0
10. IF YOU CHECKED OFF ANY PROBLEMS, HOW DIFFICULT HAVE THESE PROBLEMS MADE IT FOR YOU TO DO YOUR WORK, TAKE CARE OF THINGS AT HOME, OR GET ALONG WITH OTHER PEOPLE: 0
SUM OF ALL RESPONSES TO PHQ9 QUESTIONS 1 & 2: 0
1. LITTLE INTEREST OR PLEASURE IN DOING THINGS: 0
7. TROUBLE CONCENTRATING ON THINGS, SUCH AS READING THE NEWSPAPER OR WATCHING TELEVISION: 0

## 2022-01-21 NOTE — PROGRESS NOTES
Patient has poorly controlled diabetes with an A1c around 11 for the last year and a half. Prior to that it was almost 15. She is on multiple injections with glargine and short-acting insulin with her meals and GLP-1 agonist.  Not very compliant with checking blood sugars. She says sometimes she gets hypoglycemia. She is going to increase her Lantus and I think that the best thing would be for her to have a continuous glucose monitor. Patient has very poorly controlled insulin-dependent diabetes with hyperand hypoglycemia and multiple injections through the day and needs a continuous glucose monitor to address her poorly controlled diabetes and for her safety  To come in a month with her blood sugars for adjustment of her insulin. Attending Physician Statement  I have discussed the care of John Farnsworth, including pertinent history and exam findings,  with the resident. I have reviewed the key elements of all parts of the encounter with the resident. I agree with the assessment, plan and orders as documented by the resident.   (GE Modifier)

## 2022-01-21 NOTE — PATIENT INSTRUCTIONS
Follow-up appointment scheduled for   03/04/22, AVS given to patient. Labs given to patient, they will have them done before their next visit. Medications e-scribed to pharmacy of patient's choice.     Printed script bp cuff       Freestyle maritza and sensor faxed to [address] [place] [address] equipment   # [phone]  tv

## 2022-01-21 NOTE — PROGRESS NOTES
Irwin Perry is a 45 y.o. female evaluated via telephone on 1/21/2022. Consent:  She and/or health care decision maker is aware that that she may receive a bill for this telephone service, which includes applicable co-pays, depending on her insurance coverage, and has provided verbal consent to proceed. Documentation:  Details of this discussion including any medical advice provided:     Patient spoken to over phone for virtual visit. No acute complaints today aside from fluctuating neuropathy/numbness. Patient is currently on Cymbalta and states it helps, however poor glucose control likely contributing to numbness. Patient with past medical history of essential hypertension, well controlled on lisinopril. Patient also with aforementioned DM type II for which he takes glargine 30 units twice daily, NovoLog 30 units 3 times daily with meals, metformin 500 mg twice daily and Victoza 1.8 units daily. We'll increase patient's glargine as she states her blood sugars been running around 160. Patient also with bilateral knee osteoarthritis for which she sees orthopedic surgery and gets injections. Patient's mild intermittent asthma is well tolerated. Patient due for diabetic eye exam, mammogram, Cologuard. No acute complaints per patient. 1. Essential hypertension  Lisinopril 5 mg daily    3. Mild intermittent asthma without complication  Stable. Albuterol as needed    4. Acquired hypothyroidism  Synthroid 50    5. Type 2 diabetes mellitus without complication, with long-term current use of insulin (HCC)  increase glargine to 35 units twice daily  NovoLog 30 units 3 times daily with meals   metformin 500 mg twice daily   Victoza 1.8 units daily  Patient BG poorly controlled and with occasional hypoglycemic episodes  Will benefit from continuous glucose monitoring    6.  Primary osteoarthritis of both knees  NSAIDs  Follows with orthopedics, knee injections        Due for cologard, mammogram, diabetic eye exam      I affirm this is a Patient Initiated Episode with a Patient who has not had a related appointment within my department in the past 7 days or scheduled within the next 24 hours. Patient identification was verified at the start of the visit: Yes    Total Time: minutes: 11-20 minutes    France Cranker, was evaluated through a synchronous (real-time) audio-video encounter. The patient (or guardian if applicable) is aware that this is a billable service, which includes applicable co-pays. This Virtual Visit was conducted with patient's (and/or legal guardian's) consent. The visit was conducted pursuant to the emergency declaration under the Formerly named [facility], [address] [place] [address] authority and the [employer] Act. Patient identification was verified, and a caregiver was present when appropriate. The patient was located at home in a state where the provider was licensed to provide care.        Note: not billable if this call serves to triage the patient into an appointment for the relevant concern      Naomy Falcon MD

## 2022-01-25 ENCOUNTER — TELEPHONE (OUTPATIENT)
Dept: INTERNAL MEDICINE | Age: 52
End: 2022-01-25

## 2022-01-25 DIAGNOSIS — E11.65 TYPE 2 DIABETES MELLITUS WITH HYPERGLYCEMIA, WITH LONG-TERM CURRENT USE OF INSULIN (HCC): ICD-10-CM

## 2022-01-25 DIAGNOSIS — Z79.4 TYPE 2 DIABETES MELLITUS WITH HYPERGLYCEMIA, WITH LONG-TERM CURRENT USE OF INSULIN (HCC): ICD-10-CM

## 2022-01-25 NOTE — TELEPHONE ENCOUNTER
[employer] [facility] sent the Rx back to our office states, \" Please send to [facility], they're the only ones that can bill for scar, writer send Rx to [facility]. thanks

## 2022-01-25 NOTE — TELEPHONE ENCOUNTER
Request for Novolog Flexpen. Next Visit Date:  Future Appointments   Date Time Provider Eriberto Candis   3/4/2022  1:20 PM Andrzej Pink MD [facility] IM [facility]   4/14/2022  8:40 AM SCHEDULE, [facility] OR[facility] [address] Maintenance   Topic Date Due    Hepatitis C screen  Never done    DTaP/Tdap/Td vaccine (1 - Tdap) Never done    Colon cancer screen colonoscopy  Never done    Diabetic retinal exam  10/01/2016    Diabetic microalbuminuria test  09/11/2018    Shingles Vaccine (1 of 2) Never done    Diabetic foot exam  03/06/2020    Breast cancer screen  02/27/2021    Annual Wellness Visit (AWV)  11/06/2021    Hepatitis B vaccine (3 of 3 - Risk 3-dose series) 11/16/2021    A1C test (Diabetic or Prediabetic)  02/09/2022    COVID-19 Vaccine (3 - Booster for Pfizer series) 03/18/2022    TSH testing  11/08/2022    Lipid screen  11/09/2022    Potassium monitoring  11/09/2022    Creatinine monitoring  11/09/2022    Depression Monitoring  01/21/2023    Pneumococcal 0-64 years Vaccine (2 of 2 - PPSV23) 07/06/2034    Flu vaccine  Completed    HIV screen  Completed    Hepatitis A vaccine  Aged Out    Hib vaccine  Aged Out    Meningococcal (ACWY) vaccine  Aged Out       Hemoglobin A1C (%)   Date Value   11/09/2021 11.2 (H)   10/19/2021 11.1   07/16/2021 11.2             ( goal A1C is < 7)   Microalb/Crt.  Ratio (mcg/mg creat)   Date Value   09/11/2017 7     LDL Cholesterol (mg/dL)   Date Value   11/09/2021 102       (goal LDL is <100)   AST (U/L)   Date Value   11/09/2021 12     ALT (U/L)   Date Value   11/09/2021 18     BUN (mg/dL)   Date Value   11/09/2021 11     BP Readings from Last 3 Encounters:   11/09/21 (!) 94/42   10/19/21 129/74   10/16/21 138/86          (goal 120/80)    All Future Testing planned in [facility]  Lab Frequency Next Occurrence   MICHAEL DIGITAL SCREEN W OR WO CAD BILATERAL Once 02/23/2022   Hepatitis C Antibody Once 02/03/2022   TSH with Reflex Once 04/24/2022 Microalbumin / Creatinine Urine Ratio Once 04/24/2022         Patient Active Problem List:     Tobacco use     Carpal tunnel syndrome on both sides     Acquired hypothyroidism     Hyperlipidemia     Iron deficiency anemia     Mild episode of recurrent major depressive disorder (HCC)     Type 2 diabetes mellitus, with long-term current use of insulin (HCC)     Class 1 obesity due to excess calories with serious comorbidity in adult     Primary osteoarthritis of both knees     Urge incontinence of urine     Mild intermittent asthma without complication     Dyslipidemia     Primary hypertension     Pelvic pain in female     Bilateral ovarian cysts     H/O Hysterectomy (2013)     Trichomonal vaginitis     Chest pain     Frontal headache     Frontal sinusitis     Class 3 severe obesity in adult [facility])     Bilateral chronic knee pain     Acute headache     Visual changes     Blindness of left eye     Diabetic polyneuropathy (HCC)     Dizziness

## 2022-01-25 NOTE — TELEPHONE ENCOUNTER
Request for Victoza. Next Visit Date:  Future Appointments   Date Time Provider Eriberto Chirinos   3/4/2022  1:20 PM Lacey Shaw MD [facility] IM [facility]   4/14/2022  8:40 AM SCHEDULE, [facility] ORTH[facility] [address] Maintenance   Topic Date Due    Hepatitis C screen  Never done    DTaP/Tdap/Td vaccine (1 - Tdap) Never done    Colon cancer screen colonoscopy  Never done    Diabetic retinal exam  10/01/2016    Diabetic microalbuminuria test  09/11/2018    Shingles Vaccine (1 of 2) Never done    Diabetic foot exam  03/06/2020    Breast cancer screen  02/27/2021    Annual Wellness Visit (AWV)  11/06/2021    Hepatitis B vaccine (3 of 3 - Risk 3-dose series) 11/16/2021    A1C test (Diabetic or Prediabetic)  02/09/2022    COVID-19 Vaccine (3 - Booster for Pfizer series) 03/18/2022    TSH testing  11/08/2022    Lipid screen  11/09/2022    Potassium monitoring  11/09/2022    Creatinine monitoring  11/09/2022    Depression Monitoring  01/21/2023    Pneumococcal 0-64 years Vaccine (2 of 2 - PPSV23) 07/06/2034    Flu vaccine  Completed    HIV screen  Completed    Hepatitis A vaccine  Aged Out    Hib vaccine  Aged Out    Meningococcal (ACWY) vaccine  Aged Out       Hemoglobin A1C (%)   Date Value   11/09/2021 11.2 (H)   10/19/2021 11.1   07/16/2021 11.2             ( goal A1C is < 7)   Microalb/Crt.  Ratio (mcg/mg creat)   Date Value   09/11/2017 7     LDL Cholesterol (mg/dL)   Date Value   11/09/2021 102       (goal LDL is <100)   AST (U/L)   Date Value   11/09/2021 12     ALT (U/L)   Date Value   11/09/2021 18     BUN (mg/dL)   Date Value   11/09/2021 11     BP Readings from Last 3 Encounters:   11/09/21 (!) 94/42   10/19/21 129/74   10/16/21 138/86          (goal 120/80)    All Future Testing planned in [facility]  Lab Frequency Next Occurrence   MICHAEL DIGITAL SCREEN W OR WO CAD BILATERAL Once 02/23/2022   Hepatitis C Antibody Once 02/03/2022   TSH with Reflex Once 04/24/2022

## 2022-01-26 RX ORDER — INSULIN ASPART 100 [IU]/ML
INJECTION, SOLUTION INTRAVENOUS; SUBCUTANEOUS
Qty: 15 ML | Refills: 3 | Status: SHIPPED | OUTPATIENT
Start: 2022-01-26 | End: 2022-07-26

## 2022-01-26 RX ORDER — LIRAGLUTIDE 6 MG/ML
INJECTION SUBCUTANEOUS
Qty: 6 ML | Refills: 3 | Status: SHIPPED | OUTPATIENT
Start: 2022-01-26 | End: 2022-03-28

## 2022-01-26 NOTE — TELEPHONE ENCOUNTER
Writer Resend to [employer] because they're the only ones that can bill for [facility] and [facility] can not.     Scanned the confirmation to pt chart

## 2022-02-11 ENCOUNTER — TELEPHONE (OUTPATIENT)
Dept: INTERNAL MEDICINE | Age: 52
End: 2022-02-11

## 2022-02-11 NOTE — LETTER
[address] [address]  Phone: [phone]  Fax: [phone]    Tejinder Aleman MD        February 11, 2022    [address] [place] [id number]      Dear Nessa Jade: This letter is a reminder that you may have diagnostic testing that has not been completed. It is important to your well-being that these test(s) are performed. Please find the outstanding test(s) attached. If you could please have these completed before your next appointment. You can have the test completed at any [facility] facility or Lab. Please see the order for scheduling instructions. Any testing that needs completed other than blood work or xray's please call [phone] to schedule an appointment. Otherwise can be done at any [facility]. Please call our office at Dept: [phone] for additional information on the outstanding tests or let us know if they have been completed so we may update your chart. If you have any questions or concerns, please don't hesitate to call.     Sincerely,        Tejinder Aleman MD

## 2022-03-27 DIAGNOSIS — Z79.4 TYPE 2 DIABETES MELLITUS WITH DIABETIC POLYNEUROPATHY, WITH LONG-TERM CURRENT USE OF INSULIN (HCC): ICD-10-CM

## 2022-03-27 DIAGNOSIS — G89.29 CHRONIC PAIN OF BOTH KNEES: ICD-10-CM

## 2022-03-27 DIAGNOSIS — E11.65 TYPE 2 DIABETES MELLITUS WITH HYPERGLYCEMIA, WITH LONG-TERM CURRENT USE OF INSULIN (HCC): ICD-10-CM

## 2022-03-27 DIAGNOSIS — E11.42 TYPE 2 DIABETES MELLITUS WITH DIABETIC POLYNEUROPATHY, WITH LONG-TERM CURRENT USE OF INSULIN (HCC): ICD-10-CM

## 2022-03-27 DIAGNOSIS — M25.561 CHRONIC PAIN OF BOTH KNEES: ICD-10-CM

## 2022-03-27 DIAGNOSIS — Z79.4 TYPE 2 DIABETES MELLITUS WITH HYPERGLYCEMIA, WITH LONG-TERM CURRENT USE OF INSULIN (HCC): ICD-10-CM

## 2022-03-27 DIAGNOSIS — N39.41 URGE INCONTINENCE OF URINE: ICD-10-CM

## 2022-03-27 DIAGNOSIS — F33.0 MILD EPISODE OF RECURRENT MAJOR DEPRESSIVE DISORDER (HCC): ICD-10-CM

## 2022-03-27 DIAGNOSIS — M25.562 CHRONIC PAIN OF BOTH KNEES: ICD-10-CM

## 2022-03-27 DIAGNOSIS — I10 ESSENTIAL HYPERTENSION: ICD-10-CM

## 2022-03-27 DIAGNOSIS — E78.00 PURE HYPERCHOLESTEROLEMIA: ICD-10-CM

## 2022-03-28 RX ORDER — SIMVASTATIN 40 MG
TABLET ORAL
Qty: 30 TABLET | Refills: 3 | Status: SHIPPED | OUTPATIENT
Start: 2022-03-28

## 2022-03-28 RX ORDER — LIRAGLUTIDE 6 MG/ML
INJECTION SUBCUTANEOUS
Qty: 6 ML | Refills: 3 | Status: SHIPPED | OUTPATIENT
Start: 2022-03-28

## 2022-03-28 RX ORDER — MIRABEGRON 25 MG/1
TABLET, FILM COATED, EXTENDED RELEASE ORAL
Qty: 30 TABLET | Refills: 3 | Status: SHIPPED | OUTPATIENT
Start: 2022-03-28

## 2022-03-28 RX ORDER — TRAZODONE HYDROCHLORIDE 150 MG/1
TABLET ORAL
Qty: 30 TABLET | Refills: 3 | Status: SHIPPED | OUTPATIENT
Start: 2022-03-28

## 2022-03-28 RX ORDER — ASPIRIN 81 MG/1
TABLET, COATED ORAL
Qty: 30 TABLET | Refills: 3 | Status: SHIPPED | OUTPATIENT
Start: 2022-03-28

## 2022-03-28 NOTE — TELEPHONE ENCOUNTER
Multiple meds pended   Pt has future appt     Next Visit Date:  Future Appointments   Date Time Provider Eriberto Chirinos   4/1/2022  2:40 PM Torito Paige MD [facility] IM [facility]   4/4/2022  9:45 AM STA DIAG MAMMO RM 3 STAZ MAMMO STA Radiolog   4/14/2022  8:40 AM SCHEDULE, [facility] OR[facility] [address] Maintenance   Topic Date Due    Hepatitis C screen  Never done    DTaP/Tdap/Td vaccine (1 - Tdap) Never done    Colorectal Cancer Screen  Never done    Diabetic retinal exam  10/01/2016    Diabetic microalbuminuria test  09/11/2018    Diabetic foot exam  03/06/2020    Breast cancer screen  02/27/2021    Annual Wellness Visit (AWV)  11/06/2021    Hepatitis B vaccine (3 of 3 - Risk 3-dose series) 11/16/2021    A1C test (Diabetic or Prediabetic)  02/09/2022    COVID-19 Vaccine (3 - Booster for Pfizer series) 03/18/2022    Shingles Vaccine (1 of 2) 03/04/2023 (Originally 7/6/2019)    TSH testing  11/08/2022    Lipid screen  11/09/2022    Potassium monitoring  11/09/2022    Creatinine monitoring  11/09/2022    Depression Monitoring  01/21/2023    Pneumococcal 0-64 years Vaccine (2 of 2 - PPSV23) 07/06/2034    Flu vaccine  Completed    HIV screen  Completed    Hepatitis A vaccine  Aged Out    Hib vaccine  Aged Out    Meningococcal (ACWY) vaccine  Aged Out       Hemoglobin A1C (%)   Date Value   11/09/2021 11.2 (H)   10/19/2021 11.1   07/16/2021 11.2             ( goal A1C is < 7)   Microalb/Crt.  Ratio (mcg/mg creat)   Date Value   09/11/2017 7     LDL Cholesterol (mg/dL)   Date Value   11/09/2021 102   01/27/2020 100       (goal LDL is <100)   AST (U/L)   Date Value   11/09/2021 12     ALT (U/L)   Date Value   11/09/2021 18     BUN (mg/dL)   Date Value   11/09/2021 11     BP Readings from Last 3 Encounters:   11/09/21 (!) 94/42   10/19/21 129/74   10/16/21 138/86          (goal 120/80)    All Future Testing planned in [facility]  Lab Frequency Next Occurrence   MICHAEL DIGITAL SCREEN W OR WO CAD BILATERAL Once 06/10/2022   Hepatitis C Antibody Once 05/11/2022   TSH with Reflex Once 04/24/2022   Microalbumin / Creatinine Urine Ratio Once 04/24/2022               Patient Active Problem List:     Tobacco use     Carpal tunnel syndrome on both sides     Acquired hypothyroidism     Hyperlipidemia     Iron deficiency anemia     Mild episode of recurrent major depressive disorder (HCC)     Type 2 diabetes mellitus, with long-term current use of insulin (HCC)     Class 1 obesity due to excess calories with serious comorbidity in adult     Primary osteoarthritis of both knees     Urge incontinence of urine     Mild intermittent asthma without complication     Dyslipidemia     Primary hypertension     Pelvic pain in female     Bilateral ovarian cysts     H/O Hysterectomy (2013)     Trichomonal vaginitis     Chest pain     Frontal headache     Frontal sinusitis     Class 3 severe obesity in adult [facility])     Bilateral chronic knee pain     Acute headache     Visual changes     Blindness of left eye     Diabetic polyneuropathy (HCC)     Dizziness

## 2022-03-28 NOTE — TELEPHONE ENCOUNTER
Request for Victoza Injection. Next Visit Date:  Future Appointments   Date Time Provider Eriberto Chirinos   4/1/2022  2:40 PM Pedro Herr MD [facility] IM [facility]   4/4/2022  9:45 AM STA DIAG MAMMO RM 3 STAZ MAMMO STA Radiolog   4/14/2022  8:40 AM SCHEDULE, [facility] OR[facility] [address] Maintenance   Topic Date Due    Hepatitis C screen  Never done    DTaP/Tdap/Td vaccine (1 - Tdap) Never done    Colorectal Cancer Screen  Never done    Diabetic retinal exam  10/01/2016    Diabetic microalbuminuria test  09/11/2018    Diabetic foot exam  03/06/2020    Breast cancer screen  02/27/2021    Annual Wellness Visit (AWV)  11/06/2021    Hepatitis B vaccine (3 of 3 - Risk 3-dose series) 11/16/2021    A1C test (Diabetic or Prediabetic)  02/09/2022    COVID-19 Vaccine (3 - Booster for Pfizer series) 03/18/2022    Shingles Vaccine (1 of 2) 03/04/2023 (Originally 7/6/2019)    TSH testing  11/08/2022    Lipid screen  11/09/2022    Potassium monitoring  11/09/2022    Creatinine monitoring  11/09/2022    Depression Monitoring  01/21/2023    Pneumococcal 0-64 years Vaccine (2 of 2 - PPSV23) 07/06/2034    Flu vaccine  Completed    HIV screen  Completed    Hepatitis A vaccine  Aged Out    Hib vaccine  Aged Out    Meningococcal (ACWY) vaccine  Aged Out       Hemoglobin A1C (%)   Date Value   11/09/2021 11.2 (H)   10/19/2021 11.1   07/16/2021 11.2             ( goal A1C is < 7)   Microalb/Crt.  Ratio (mcg/mg creat)   Date Value   09/11/2017 7     LDL Cholesterol (mg/dL)   Date Value   11/09/2021 102       (goal LDL is <100)   AST (U/L)   Date Value   11/09/2021 12     ALT (U/L)   Date Value   11/09/2021 18     BUN (mg/dL)   Date Value   11/09/2021 11     BP Readings from Last 3 Encounters:   11/09/21 (!) 94/42   10/19/21 129/74   10/16/21 138/86          (goal 120/80)    All Future Testing planned in [facility]  Lab Frequency Next Occurrence   MICHAEL DIGITAL SCREEN W OR WO CAD BILATERAL Once 06/10/2022   Hepatitis C Antibody Once 05/11/2022   TSH with Reflex Once 04/24/2022   Microalbumin / Creatinine Urine Ratio Once 04/24/2022         Patient Active Problem List:     Tobacco use     Carpal tunnel syndrome on both sides     Acquired hypothyroidism     Hyperlipidemia     Iron deficiency anemia     Mild episode of recurrent major depressive disorder (HCC)     Type 2 diabetes mellitus, with long-term current use of insulin (HCC)     Class 1 obesity due to excess calories with serious comorbidity in adult     Primary osteoarthritis of both knees     Urge incontinence of urine     Mild intermittent asthma without complication     Dyslipidemia     Primary hypertension     Pelvic pain in female     Bilateral ovarian cysts     H/O Hysterectomy (2013)     Trichomonal vaginitis     Chest pain     Frontal headache     Frontal sinusitis     Class 3 severe obesity in adult [facility])     Bilateral chronic knee pain     Acute headache     Visual changes     Blindness of left eye     Diabetic polyneuropathy (HCC)     Dizziness

## 2022-03-30 DIAGNOSIS — R21 RASH AND NONSPECIFIC SKIN ERUPTION: ICD-10-CM

## 2022-03-30 RX ORDER — NYSTATIN 100000 [USP'U]/G
POWDER TOPICAL
Qty: 1 EACH | Refills: 2 | Status: SHIPPED | OUTPATIENT
Start: 2022-03-30

## 2022-03-30 RX ORDER — GLUCOSAMINE HCL/CHONDROITIN SU 500-400 MG
1 CAPSULE ORAL 3 TIMES DAILY
Qty: 100 STRIP | Refills: 3 | Status: SHIPPED | OUTPATIENT
Start: 2022-03-30 | End: 2022-05-04 | Stop reason: SDUPTHER

## 2022-03-30 NOTE — TELEPHONE ENCOUNTER
Request for Nyamyc and test strips. Next Visit Date:  Future Appointments   Date Time Provider Eriberto Chirinos   4/1/2022  2:40 PM Satnam England MD [facility] IM [facility]   4/4/2022  9:45 AM STA DIAG MAMMO RM 3 STAZ MAMMO STA Radiolog   4/14/2022  8:40 AM SCHEDULE, [facility] [address] Maintenance   Topic Date Due    Hepatitis C screen  Never done    DTaP/Tdap/Td vaccine (1 - Tdap) Never done    Colorectal Cancer Screen  Never done    Diabetic retinal exam  10/01/2016    Diabetic microalbuminuria test  09/11/2018    Diabetic foot exam  03/06/2020    Breast cancer screen  02/27/2021    Annual Wellness Visit (AWV)  11/06/2021    Hepatitis B vaccine (3 of 3 - Risk 3-dose series) 11/16/2021    A1C test (Diabetic or Prediabetic)  02/09/2022    COVID-19 Vaccine (3 - Booster for Pfizer series) 03/18/2022    Shingles Vaccine (1 of 2) 03/04/2023 (Originally 7/6/2019)    TSH testing  11/08/2022    Lipid screen  11/09/2022    Potassium monitoring  11/09/2022    Creatinine monitoring  11/09/2022    Depression Monitoring  01/21/2023    Pneumococcal 0-64 years Vaccine (2 of 2 - PPSV23) 07/06/2034    Flu vaccine  Completed    HIV screen  Completed    Hepatitis A vaccine  Aged Out    Hib vaccine  Aged Out    Meningococcal (ACWY) vaccine  Aged Out       Hemoglobin A1C (%)   Date Value   11/09/2021 11.2 (H)   10/19/2021 11.1   07/16/2021 11.2             ( goal A1C is < 7)   Microalb/Crt.  Ratio (mcg/mg creat)   Date Value   09/11/2017 7     LDL Cholesterol (mg/dL)   Date Value   11/09/2021 102       (goal LDL is <100)   AST (U/L)   Date Value   11/09/2021 12     ALT (U/L)   Date Value   11/09/2021 18     BUN (mg/dL)   Date Value   11/09/2021 11     BP Readings from Last 3 Encounters:   11/09/21 (!) 94/42   10/19/21 129/74   10/16/21 138/86          (goal 120/80)    All Future Testing planned in [facility]  Lab Frequency Next Occurrence   MICHAEL DIGITAL SCREEN W OR WO CAD BILATERAL Once 06/10/2022   Hepatitis C Antibody Once 05/11/2022   TSH with Reflex Once 04/24/2022   Microalbumin / Creatinine Urine Ratio Once 04/24/2022         Patient Active Problem List:     Tobacco use     Carpal tunnel syndrome on both sides     Acquired hypothyroidism     Hyperlipidemia     Iron deficiency anemia     Mild episode of recurrent major depressive disorder (HCC)     Type 2 diabetes mellitus, with long-term current use of insulin (HCC)     Class 1 obesity due to excess calories with serious comorbidity in adult     Primary osteoarthritis of both knees     Urge incontinence of urine     Mild intermittent asthma without complication     Dyslipidemia     Primary hypertension     Pelvic pain in female     Bilateral ovarian cysts     H/O Hysterectomy (2013)     Trichomonal vaginitis     Chest pain     Frontal headache     Frontal sinusitis     Class 3 severe obesity in adult [facility])     Bilateral chronic knee pain     Acute headache     Visual changes     Blindness of left eye     Diabetic polyneuropathy (HCC)     Dizziness

## 2022-04-04 ENCOUNTER — HOSPITAL ENCOUNTER (OUTPATIENT)
Dept: MAMMOGRAPHY | Age: 52
Discharge: HOME | End: 2022-04-06
Payer: COMMERCIAL

## 2022-04-04 ENCOUNTER — TELEPHONE (OUTPATIENT)
Dept: INTERNAL MEDICINE | Age: 52
End: 2022-04-04

## 2022-04-04 DIAGNOSIS — Z12.31 ENCOUNTER FOR SCREENING MAMMOGRAM FOR MALIGNANT NEOPLASM OF BREAST: ICD-10-CM

## 2022-04-04 PROCEDURE — 77063 BREAST TOMOSYNTHESIS BI: CPT

## 2022-04-04 NOTE — TELEPHONE ENCOUNTER
Fax received that pt has not returned cologuard test. PC to pt to see if pt received test and if pt had any questions or concerns about the test. PC to pt; Pt states she recieved the test and has no further questions. If pt needs a new test Please call [phone] to get a new one sent to them.

## 2022-04-21 ENCOUNTER — OFFICE VISIT (OUTPATIENT)
Dept: ORTHOPEDIC SURGERY | Age: 52
End: 2022-04-21
Payer: COMMERCIAL

## 2022-04-21 VITALS — HEIGHT: 68 IN | WEIGHT: 259 LBS | BODY MASS INDEX: 39.25 KG/M2

## 2022-04-21 DIAGNOSIS — M17.0 PRIMARY OSTEOARTHRITIS OF BOTH KNEES: Primary | ICD-10-CM

## 2022-04-21 PROCEDURE — G8417 CALC BMI ABV UP PARAM F/U: HCPCS | Performed by: STUDENT IN AN ORGANIZED HEALTH CARE EDUCATION/TRAINING PROGRAM

## 2022-04-21 PROCEDURE — 3017F COLORECTAL CA SCREEN DOC REV: CPT | Performed by: STUDENT IN AN ORGANIZED HEALTH CARE EDUCATION/TRAINING PROGRAM

## 2022-04-21 PROCEDURE — 20610 DRAIN/INJ JOINT/BURSA W/O US: CPT | Performed by: STUDENT IN AN ORGANIZED HEALTH CARE EDUCATION/TRAINING PROGRAM

## 2022-04-21 PROCEDURE — 4004F PT TOBACCO SCREEN RCVD TLK: CPT | Performed by: STUDENT IN AN ORGANIZED HEALTH CARE EDUCATION/TRAINING PROGRAM

## 2022-04-21 PROCEDURE — G8427 DOCREV CUR MEDS BY ELIG CLIN: HCPCS | Performed by: STUDENT IN AN ORGANIZED HEALTH CARE EDUCATION/TRAINING PROGRAM

## 2022-04-21 NOTE — PROGRESS NOTES
[address]  [address] [facility] Ceasar James  Dept: [phone]  Dept Fax: [phone]        Ambulatory Follow Up    Subjective:     Chief Complaint   Patient presents with    Follow-up     B knee pain        Tarah Stewart is a 45y.o. year old female who presents to our office today for routine followup regarding her bilateral knee pain secondary to a bilateral knee osteoarthritis. She has trialed therapy, activity modification, over-the-counter knee sleeves, anti-inflammatory medications, and corticosteroid injections in the past with moderate relief. She states her most recent bilateral knee injections were in December at her last visit which provided 50% relief of her symptoms for roughly 1 month. She has been attempting to avoid any form of surgical treatment and has wanted to continue with conservative measures. She states she continues with a home exercise regiment with her son keeping herself active. She takes anti-inflammatory medications as needed for pain control. She denies any numbness or tingling. She did state that recently she did lose some weight and her recent BMI reading was 39.4. She is a diabetic and does not have a up-to-date A1c since her previous reading which was 11 back in November. She states her knee pain is constant. Describes the knee pain as dull and achy and global to both her right and left knee. She rates her left knee as being worse than her right. She does admit to clicking and popping of the knees and occasional feelings of instability. She states she was not able to receive the left knee medial  brace since her last visit. She is also not seen PM&R as she said she did not receive a consult from us. 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 Review of Systems:   General: Negative for fever and chills.      Cardiovascular: Negative for chest pain and palpitations. Musculoskeletal: Positive for joint pain (bilateral knee). Neurological: Negative for numbness & tingling. 10 remaining systems reviewed and negative. Objective :   General: AAOx3, NAD, appears appropriate stated age  Ortho Exam  MS:   BLE: Left knee range of motion 0 to 120 degrees. Right knee range of motion 0 to 125 degrees. Tender to palpation globally to both knees. Mild joint effusion is noted bilaterally. Compartments are soft and compressible. EHL/FHL/TA/GS complex motor intact. Sural, saphenous, superificial/deep peroneal, and plantar nerve distribution SILT. Dorsalis pedis/posterior tibial pulses 2+ with BCR. Negatvie Anterior/Posterior Drawer knee exam. Negative Lachman's test. No Varus/valgus instability without pain. CV: no obvious JVD, no dependent edema, distal pulses 2+  Respiratory: chest rise symmetric, unlabored respirations, no audible wheezing  Skin: warm, well perfused, no obvious rashes or lesions  Psych: Patient displays understanding of exam, diagnosis, and plan. Radiology:   No new imaging obtained in the office today. Assessment:      1. Primary osteoarthritis of both knees         Plan:      We discussed the natural course and etiology of bilateral knee osteoarthritis with the patient today. We discussed that definitive treatment of her symptoms would be total knee replacement. We discussed at this time we do not feel she is an appropriate candidate given her risk factors which we discussed in detail including her hemoglobin A1c, BMI which she did recently lower, and smoking history. She displayed understanding of this and was still adamant that she would like to avoid any form of surgery. She states she still gets relief from corticosteroid injections and would like to continue to pursue these. She also would like to attempt trialing a medial  brace in the left knee which we prescribed again for her today.   She would also like to discuss chronic pain management options with PM and R which we provided a referral for. We did administer bilateral corticosteroid knee injection counts today. See procedure note for details. She may follow-up in our office as needed regarding her bilateral knee pain. We discussed that corticosteroid knee injections typically are not done any sooner than every 3 to 4 months and she displayed understanding of this. Injection procedure note  The alternatives, benefits, and risks were discussed with the patient. After answering all questions to the patient's satisfaction, the patient agreed to proceed forward with injection and gave verbal consent for the procedure. With the patient's permission, appropriate anatomic landmarks were identified and the bilateral knee joint was prepped in a sterile fashion using alcohol and/or betadine. A 21 gauge needle was then used to inject 2cc 0.25% marcaine plain and 80mg depo medrol into the joint. The injection was advanced without resistance confirming appropriate position. The patient tolerated the procedure well and the site was dressed with a band-aid. Patient was advised to ice the area for 15-20 minutes to relieve any injection site related pain. Patient was advised to contact nurse if area becomes swollen, hot, erythematous, or painful, or to go to the emergency room after business hours. Follow up:Return if symptoms worsen or fail to improve. Orders Placed This Encounter   Medications    Misc.  Devices MISC     Si each by Does not apply route once as needed (left knee-medial  brace)     Dispense:  1 each     Refill:  0          Orders Placed This Encounter   Procedures   Kathy Donald MD, Physical Medicine and Rehabilitation, [place]     Referral Priority:   Routine     Referral Type:   Eval and Treat     Referral Reason:   Specialty Services Required     Referred to Provider:   Rayna Diaz MD     Requested Specialty: Physical Medicine and Rehab     Number of Visits Requested:   Victorina Snowden 55, DO  Orthopedic Surgery Resident, PGY-3  [facility]

## 2022-04-29 NOTE — TELEPHONE ENCOUNTER
Request for Test Strips. Per pharmacy medication was never recieved 3/30/2022    Next Visit Date:  Future Appointments   Date Time Provider Eriberto Candis   6/30/2022  2:30 PM Rayna Diaz MD [facility]   7/21/2022 10:10 AM SCHEDULE, [facility] [address] Maintenance   Topic Date Due    Hepatitis C screen  Never done    DTaP/Tdap/Td vaccine (1 - Tdap) Never done    Colorectal Cancer Screen  Never done    Diabetic microalbuminuria test  09/11/2018    Diabetic foot exam  03/06/2020    Annual Wellness Visit (AWV)  11/06/2021    Hepatitis B vaccine (3 of 3 - Risk 3-dose series) 11/16/2021    A1C test (Diabetic or Prediabetic)  02/09/2022    Diabetic retinal exam  03/10/2022    COVID-19 Vaccine (3 - Booster for Pfizer series) 03/18/2022    Shingles vaccine (1 of 2) 03/04/2023 (Originally 7/6/2019)    TSH  11/08/2022    Lipids  11/09/2022    Potassium  11/09/2022    Creatinine  11/09/2022    Depression Monitoring  01/21/2023    Breast cancer screen  04/04/2024    Pneumococcal 0-64 years Vaccine (3 - PPSV23 or PCV20) 07/06/2034    Flu vaccine  Completed    HIV screen  Completed    Hepatitis A vaccine  Aged Out    Hib vaccine  Aged Out    Meningococcal (ACWY) vaccine  Aged Out       Hemoglobin A1C (%)   Date Value   11/09/2021 11.2 (H)   10/19/2021 11.1   07/16/2021 11.2             ( goal A1C is < 7)   Microalb/Crt.  Ratio (mcg/mg creat)   Date Value   09/11/2017 7     LDL Cholesterol (mg/dL)   Date Value   11/09/2021 102       (goal LDL is <100)   AST (U/L)   Date Value   11/09/2021 12     ALT (U/L)   Date Value   11/09/2021 18     BUN (mg/dL)   Date Value   11/09/2021 11     BP Readings from Last 3 Encounters:   11/09/21 (!) 94/42   10/19/21 129/74   10/16/21 138/86          (goal 120/80)    All Future Testing planned in [facility]  Lab Frequency Next Occurrence   Hepatitis C Antibody Once 05/11/2022   TSH with Reflex Once 04/24/2022 Microalbumin / Creatinine Urine Ratio Once 04/24/2022   MICHAEL SIERRA DIGITAL SCREEN BILATERAL Once 04/04/2022         Patient Active Problem List:     Tobacco use     Carpal tunnel syndrome on both sides     Acquired hypothyroidism     Hyperlipidemia     Iron deficiency anemia     Mild episode of recurrent major depressive disorder (HCC)     Type 2 diabetes mellitus, with long-term current use of insulin (HCC)     Class 1 obesity due to excess calories with serious comorbidity in adult     Primary osteoarthritis of both knees     Urge incontinence of urine     Mild intermittent asthma without complication     Dyslipidemia     Primary hypertension     Pelvic pain in female     Bilateral ovarian cysts     H/O Hysterectomy (2013)     Trichomonal vaginitis     Chest pain     Frontal headache     Frontal sinusitis     Class 3 severe obesity in adult [facility])     Bilateral chronic knee pain     Acute headache     Visual changes     Blindness of left eye     Diabetic polyneuropathy (HCC)     Dizziness

## 2022-05-03 RX ORDER — BUPIVACAINE HYDROCHLORIDE 2.5 MG/ML
2 INJECTION, SOLUTION INFILTRATION; PERINEURAL ONCE
Status: COMPLETED | OUTPATIENT
Start: 2022-05-03 | End: 2022-05-03

## 2022-05-03 RX ORDER — METHYLPREDNISOLONE ACETATE 80 MG/ML
80 INJECTION, SUSPENSION INTRA-ARTICULAR; INTRALESIONAL; INTRAMUSCULAR; SOFT TISSUE ONCE
Status: COMPLETED | OUTPATIENT
Start: 2022-05-03 | End: 2022-05-03

## 2022-05-03 RX ADMIN — BUPIVACAINE HYDROCHLORIDE 5 MG: 2.5 INJECTION, SOLUTION INFILTRATION; PERINEURAL at 15:12

## 2022-05-03 RX ADMIN — METHYLPREDNISOLONE ACETATE 80 MG: 80 INJECTION, SUSPENSION INTRA-ARTICULAR; INTRALESIONAL; INTRAMUSCULAR; SOFT TISSUE at 15:13

## 2022-05-03 RX ADMIN — METHYLPREDNISOLONE ACETATE 80 MG: 80 INJECTION, SUSPENSION INTRA-ARTICULAR; INTRALESIONAL; INTRAMUSCULAR; SOFT TISSUE at 15:14

## 2022-05-03 RX ADMIN — BUPIVACAINE HYDROCHLORIDE 5 MG: 2.5 INJECTION, SOLUTION INFILTRATION; PERINEURAL at 15:13

## 2022-05-04 RX ORDER — GLUCOSAMINE HCL/CHONDROITIN SU 500-400 MG
1 CAPSULE ORAL 3 TIMES DAILY
Qty: 100 STRIP | Refills: 3 | OUTPATIENT
Start: 2022-05-04

## 2022-05-18 ENCOUNTER — TELEPHONE (OUTPATIENT)
Dept: FAMILY MEDICINE CLINIC | Age: 52
End: 2022-05-18

## 2022-05-18 ENCOUNTER — HOSPITAL ENCOUNTER (EMERGENCY)
Age: 52
Discharge: HOME | End: 2022-05-18
Attending: EMERGENCY MEDICINE
Payer: COMMERCIAL

## 2022-05-18 VITALS
SYSTOLIC BLOOD PRESSURE: 132 MMHG | OXYGEN SATURATION: 98 % | HEIGHT: 67 IN | BODY MASS INDEX: 39.39 KG/M2 | DIASTOLIC BLOOD PRESSURE: 81 MMHG | TEMPERATURE: 98.1 F | RESPIRATION RATE: 17 BRPM | HEART RATE: 91 BPM | WEIGHT: 251 LBS

## 2022-05-18 DIAGNOSIS — N76.0 BV (BACTERIAL VAGINOSIS): Primary | ICD-10-CM

## 2022-05-18 DIAGNOSIS — B96.89 BV (BACTERIAL VAGINOSIS): Primary | ICD-10-CM

## 2022-05-18 LAB
CANDIDA SPECIES, DNA PROBE: NEGATIVE
GARDNERELLA VAGINALIS, DNA PROBE: POSITIVE
SOURCE: ABNORMAL
TRICHOMONAS VAGINALIS DNA: NEGATIVE

## 2022-05-18 PROCEDURE — 99283 EMERGENCY DEPT VISIT LOW MDM: CPT

## 2022-05-18 PROCEDURE — 6370000000 HC RX 637 (ALT 250 FOR IP): Performed by: NURSE PRACTITIONER

## 2022-05-18 PROCEDURE — 87510 GARDNER VAG DNA DIR PROBE: CPT

## 2022-05-18 PROCEDURE — 87480 CANDIDA DNA DIR PROBE: CPT

## 2022-05-18 PROCEDURE — 87591 N.GONORRHOEAE DNA AMP PROB: CPT

## 2022-05-18 PROCEDURE — 87660 TRICHOMONAS VAGIN DIR PROBE: CPT

## 2022-05-18 PROCEDURE — 87491 CHLMYD TRACH DNA AMP PROBE: CPT

## 2022-05-18 RX ORDER — METRONIDAZOLE 500 MG/1
500 TABLET ORAL 2 TIMES DAILY
Qty: 14 TABLET | Refills: 0 | Status: SHIPPED | OUTPATIENT
Start: 2022-05-18 | End: 2022-05-25

## 2022-05-18 RX ORDER — FLUCONAZOLE 150 MG/1
150 TABLET ORAL ONCE
Status: COMPLETED | OUTPATIENT
Start: 2022-05-18 | End: 2022-05-18

## 2022-05-18 RX ORDER — HYDROXYZINE HYDROCHLORIDE 25 MG/1
25 TABLET, FILM COATED ORAL EVERY 8 HOURS PRN
Qty: 12 TABLET | Refills: 0 | Status: SHIPPED | OUTPATIENT
Start: 2022-05-18

## 2022-05-18 RX ORDER — FLUCONAZOLE 100 MG/1
100 TABLET ORAL EVERY OTHER DAY
Qty: 3 TABLET | Refills: 0 | Status: SHIPPED | OUTPATIENT
Start: 2022-05-19 | End: 2022-05-24

## 2022-05-18 RX ADMIN — FLUCONAZOLE 150 MG: 150 TABLET ORAL at 18:49

## 2022-05-18 ASSESSMENT — PAIN SCALES - GENERAL: PAINLEVEL_OUTOF10: 5

## 2022-05-18 ASSESSMENT — PAIN - FUNCTIONAL ASSESSMENT: PAIN_FUNCTIONAL_ASSESSMENT: 0-10

## 2022-05-18 ASSESSMENT — ENCOUNTER SYMPTOMS
COLOR CHANGE: 0
ABDOMINAL PAIN: 0

## 2022-05-18 NOTE — ED PROVIDER NOTES
[facility] ED  eMERGENCY dEPARTMENT eNCOUnter      Pt Name: Kiki Menendez  MRN: [id number]  [place] 1970  Date of evaluation: 5/18/2022  Provider: MARILU Mcgowan CNP    CHIEF COMPLAINT       Chief Complaint   Patient presents with    Vaginitis     Pt reports vaginal itching and swelling on inside and outside of vagina; pt believes she has a yeast infection; denies discharge         HISTORY OF PRESENT ILLNESS  (Location/Symptom, Timing/Onset, Context/Setting, Quality, Duration, Modifying Factors, Severity.)   Kiki Menendez is a 45 y.o. female who presents to the emergency department via private auto for vaginal itching. Onset was 3 weeks ago. She is concerned about a yeast infection. Reports trying OTC medication without relief. Denies dysuria, hematuria. Denies vaginal discharge. Denies concern for STDs. Rates her pain 5/10 at this time. Nursing Notes were reviewed.     ALLERGIES     Januvia [sitagliptin phosphate], Naproxen sodium, and Neurontin [gabapentin]    CURRENT MEDICATIONS       Previous Medications    ALBUTEROL SULFATE HFA (PROVENTIL HFA) 108 (90 BASE) MCG/ACT INHALER    Inhale 2 puffs into the lungs every 6 hours as needed for Wheezing    ASPIRIN LOW DOSE 81 MG EC TABLET    TAKE 1 TABLET BY MOUTH ONE TIME A DAY    BLOOD GLUCOSE MONITOR STRIPS    1 strip by Other route three times daily Test___times daily    Diagnosis: 250.0   Diabetes Mellitus____Insulin Dependent____Non-Insulin Dependent    CALCIUM CITRATE-VITAMIN D (CALCITRATE/VITAMIN D PO)    Take 1 tablet by mouth daily     CONTINUOUS BLOOD GLUC  (FREESTYLE JUAN CARLOS 14 DAY READER) MERNA    Test blood sugars 5-6/day    CONTINUOUS BLOOD GLUC SENSOR (FREESTYLE JUAN CARLOS 14 DAY SENSOR) MISC    Test blood sugars 5-6/day    DICLOFENAC (VOLTAREN) 50 MG EC TABLET    TAKE 1 TABLET BY MOUTH 3 TIMES A DAY WITH MEALS    DULOXETINE (CYMBALTA) 60 MG EXTENDED RELEASE CAPSULE    Take 1 capsule by mouth daily    FERROUS SULFATE (IRON 325) 325 (65 FE) MG TABLET    Take 1 tablet by mouth 2 times daily    INSULIN GLARGINE (BASAGLAR KWIKPEN) 100 UNIT/ML INJECTION PEN    Inject 35 Units into the skin nightly    INSULIN PEN NEEDLE 31G X 5 MM MISC    1 each by Does not apply route daily    LANCETS MISC    1 each by Does not apply route daily Use 3-4 times daily    LEVOTHYROXINE (SYNTHROID) 50 MCG TABLET    TAKE 1 TABLET BY MOUTH ONE TIME A DAY    LISINOPRIL (PRINIVIL;ZESTRIL) 5 MG TABLET    Take 1 tablet by mouth daily    MAGNESIUM OXIDE (MAG-OX) 400 (241.3 MG) MG TABS TABLET    Take 1 tablet by mouth daily    METFORMIN (GLUCOPHAGE) 500 MG TABLET    TAKE 1 TABLET BY MOUTH 2 TIMES A DAY WITH MEALS    MISC. DEVICES MISC    1 each by Does not apply route once as needed (left knee-medial  brace)    MYRBETRIQ 25 MG TB24    TAKE 1 TABLET BY MOUTH ONE TIME A DAY    NOVOLOG FLEXPEN 100 UNIT/ML INJECTION PEN    INJECT 30 UNITS INTO THE SKIN 3 TIMES DAILY BEFORE MEALS    NYSTATIN (NYAMYC) 174188 UNIT/GM POWDER    APPLY TO SKIN OF LEGS & IN FOLDS OF SKIN WITH IRRITATION    ONE TOUCH ULTRASOFT LANCETS MISC    Dx: DM-2 use 2-3 times daily    SIMVASTATIN (ZOCOR) 40 MG TABLET    TAKE ONE TABLET BY MOUTH NIGHTLY    SUMATRIPTAN (IMITREX) 100 MG TABLET    Take 1 tablet by mouth 2 times daily as needed for Migraine    TRAZODONE (DESYREL) 150 MG TABLET    TAKE ONE TABLET BY MOUTH NIGHTLY    VICTOZA 18 MG/3ML SOPN SC INJECTION    START WITH INJECTING 1.2MG INTO THE SKIN ONCE A DAY FOR FOR 1 WEEK, THEN INCREASE AND INJECT 1.8MG INTO THE SKIN ONCE A DAY THEREAFTER.        PAST MEDICAL HISTORY         Diagnosis Date    Anxiety     Arthritis of knee, degenerative 1/31/2012    Blindness of left eye     hx. of     Blurry vision     left remaines blurry to Left eye to  on 1-14-21    Carpal tunnel syndrome on both sides 1/15/2013    Chronic knee pain     on 4/17/18 pt is presently in pain mgmnt    Depressive disorder, not elsewhere classified 10/14/2014    Headache     Hx. of     Hydronephrosis, left 2016    Hyperlipidemia     Hypertension     Hypothyroidism 2013    Iron deficiency anemia 6/10/2014    Mild intermittent asthma without complication     Obesity     Osteoarthritis     KNEE    Polyneuropathy 2012    RAD (reactive airway disease) 2012    Snores     possible apnea but not tested yet    Type II or unspecified type diabetes mellitus without mention of complication, not stated as uncontrolled     Urge incontinence of urine 2017    Wears glasses     Weight loss        SURGICAL HISTORY           Procedure Laterality Date    CARPAL TUNNEL RELEASE      2008 left,  2009 right     SECTION  2007    CYSTOSCOPY  2016    cysto with left ureteral stent placement    HYSTERECTOMY, TOTAL ABDOMINAL  2013    LITHOTRIPSY Left 2016    cysto with ESWL and left ureteral stent placement.  NERVE BLOCK Left 2016    left knee kenalog 80mg    NERVE BLOCK  3/11/16    Rt Knee depo medrol 80     TUBAL LIGATION  3/2012         FAMILY HISTORY           Problem Relation Age of Onset    Stroke Father     Diabetes Mother     Hypertension Mother     Breast Cancer Neg Hx     Cancer Neg Hx     Colon Cancer Neg Hx     Eclampsia Neg Hx     Ovarian Cancer Neg Hx      Labor Neg Hx     Spont Abortions Neg Hx      Family Status   Relation Name Status    Father  Alive    Mother  Alive    Neg Hx  (Not Specified)        SOCIAL HISTORY      reports that she has been smoking cigarettes. She has a 3.75 pack-year smoking history. She has never used smokeless tobacco. She reports current alcohol use. She reports current drug use. Drug: Marijuana Margret Beat). REVIEW OF SYSTEMS    (2-9 systems for level 4, 10 or more for level 5)     Review of Systems   Constitutional: Negative for chills, diaphoresis, fatigue and fever. Gastrointestinal: Negative for abdominal pain.    Genitourinary: Negative for dysuria, flank pain, frequency, hematuria, pelvic pain and urgency. Vaginal itching   Skin: Positive for rash. Negative for color change and wound. Neurological: Negative for weakness. Except as noted above the remainder of the review of systems was reviewed and negative. PHYSICAL EXAM    (up to 7 for level 4, 8 or more for level 5)     ED Triage Vitals [05/18/22 1735]   BP Temp Temp Source Pulse Resp SpO2 Height Weight   132/81 98.1 °F (36.7 °C) Oral 91 17 98 % 5' 7\" (1.702 m) 251 lb (113.9 kg)     Physical Exam  Vitals reviewed. Exam conducted with a chaperone present VÍCTOR Herbert). Constitutional:       General: She is not in acute distress. Appearance: She is well-developed. She is not diaphoretic. Eyes:      General: No scleral icterus. Conjunctiva/sclera: Conjunctivae normal.   Cardiovascular:      Rate and Rhythm: Normal rate. Pulmonary:      Effort: Pulmonary effort is normal. No respiratory distress. Breath sounds: No stridor. Abdominal:      General: There is no distension. Palpations: Abdomen is soft. Tenderness: There is no abdominal tenderness. Genitourinary:     Labia:         Right: Rash and tenderness present. No lesion. Left: Rash and tenderness present. No lesion. Vagina: No foreign body. Vaginal discharge (white) present. No erythema or bleeding. Comments: Irritation, excoriations noted to external labia. Musculoskeletal:      Cervical back: Neck supple. Skin:     General: Skin is warm and dry. Findings: No rash. Neurological:      Mental Status: She is alert and oriented to person, place, and time.    Psychiatric:         Behavior: Behavior normal.             DIAGNOSTIC RESULTS     LABS:  Labs Reviewed   VAGINITIS DNA PROBE - Abnormal; Notable for the following components:       Result Value    GARDNERELLA VAGINALIS, DNA PROBE POSITIVE (*)     All other components within normal limits C. TRACHOMATIS N.GONORRHOEAE DNA   URINALYSIS WITH MICROSCOPIC       All other labs were within normal range or not returned as of this dictation. EMERGENCY DEPARTMENT COURSE and DIFFERENTIAL DIAGNOSIS/MDM:   Vitals:    Vitals:    05/18/22 1735   BP: 132/81   Pulse: 91   Resp: 17   Temp: 98.1 °F (36.7 °C)   TempSrc: Oral   SpO2: 98%   Weight: 251 lb (113.9 kg)   Height: 5' 7\" (1.702 m)         MEDICATIONS GIVEN IN THE ED:  Medications   fluconazole (DIFLUCAN) tablet 150 mg (150 mg Oral Given 5/18/22 1849)       CLINICAL DECISION MAKING:  The patient presented alert with a nontoxic appearance and was seen in conjunction with Dr. Boni Estrada. She tested positive for gardnerella; GC/chlamydia cultures were pending. prescriptions were written for flagyl, diflucan, and atarax. Follow up with pcp and/or OB/GYN for a recheck . Evaluation and treatment course in the ED, and plan of care upon discharge was discussed in length with the patient. Patient had no further questions prior to being discharged and was instructed to return to the ED for new or worsening symptoms. Care was provided during an unprecedented national emergency due to the novel coronavirus, Covid-19.          FINAL IMPRESSION      1. BV (bacterial vaginosis)            Problem List  Patient Active Problem List   Diagnosis Code    Tobacco use Z72.0    Carpal tunnel syndrome on both sides G56.03    Acquired hypothyroidism E03.9    Hyperlipidemia E78.5    Iron deficiency anemia D50.9    Mild episode of recurrent major depressive disorder ([address] F33.0    Type 2 diabetes mellitus, with long-term current use of insulin ([address] E11.9, Z79.4    Class 1 obesity due to excess calories with serious comorbidity in adult E66.09    Primary osteoarthritis of both knees M17.0    Urge incontinence of urine N39.41    Mild intermittent asthma without complication Q88.45    Dyslipidemia E78.5    Primary hypertension I10    Pelvic pain in female R10.2    Bilateral

## 2022-05-18 NOTE — ED PROVIDER NOTES
eMERGENCY dEPARTMENT eNCOUnter   Independent Attestation     Pt Name: Michael Bangura  MRN: [id number]  [place] 1970  Date of evaluation: 5/18/22     Michael Bangura is a 45 y.o. female with CC: Vaginitis (Pt reports vaginal itching and swelling on inside and outside of vagina; pt believes she has a yeast infection; denies discharge)        This visit was performed by both a physician and an APC. I performed all aspects of the MDM as documented.       The care is provided during an unprecedented national emergency due to the novel coronavirus, Dominick Elizabeth MD  Attending Emergency Physician           Marielle Miramontes MD  05/18/22 [id number]

## 2022-05-18 NOTE — TELEPHONE ENCOUNTER
Pt called said she has a yeast infection outside of her vagina,symptoms are red,puffy,itchy x 2 wks,pt uses the [address] [place] [address] pharmacy,last seen 9/2/2020,please review and sapna

## 2022-05-24 DIAGNOSIS — D50.8 IRON DEFICIENCY ANEMIA SECONDARY TO INADEQUATE DIETARY IRON INTAKE: ICD-10-CM

## 2022-05-24 DIAGNOSIS — E03.9 ACQUIRED HYPOTHYROIDISM: ICD-10-CM

## 2022-05-25 NOTE — TELEPHONE ENCOUNTER
E-scribe request for Levothyroxine, Ferrous Sulfate . Please review and e-scribe if applicable. Next Visit Date:  Future Appointments   Date Time Provider Eriberto Chirinos   6/7/2022  3:30 PM Tomi Mccoy [facility]   6/30/2022  2:30 PM Aaron Gutierrez MD [facility] med[facility]reha [facility]   7/20/2022 10:10 AM SCHEDULE, [facility] [address] Maintenance   Topic Date Due    Hepatitis C screen  Never done    DTaP/Tdap/Td vaccine (1 - Tdap) Never done    Colorectal Cancer Screen  Never done    Diabetic microalbuminuria test  09/11/2018    Diabetic foot exam  03/06/2020    Annual Wellness Visit (AWV)  11/06/2021    Hepatitis B vaccine (3 of 3 - Risk 3-dose series) 11/16/2021    A1C test (Diabetic or Prediabetic)  02/09/2022    Diabetic retinal exam  03/10/2022    COVID-19 Vaccine (3 - Booster for Pfizer series) 03/18/2022    Shingles vaccine (1 of 2) 03/04/2023 (Originally 7/6/2019)    Lipids  11/09/2022    Depression Monitoring  01/21/2023    Breast cancer screen  04/04/2024    Pneumococcal 0-64 years Vaccine (3 - PPSV23 or PCV20) 07/06/2034    Flu vaccine  Completed    HIV screen  Completed    Hepatitis A vaccine  Aged Out    Hib vaccine  Aged Out    Meningococcal (ACWY) vaccine  Aged Out               (applicable per patient's age: Cancer Screenings, Depression Screening, Fall Risk Screening, Immunizations)    Hemoglobin A1C (%)   Date Value   11/09/2021 11.2 (H)   10/19/2021 11.1   07/16/2021 11.2     Microalb/Crt.  Ratio (mcg/mg creat)   Date Value   09/11/2017 7     LDL Cholesterol (mg/dL)   Date Value   11/09/2021 102     AST (U/L)   Date Value   11/09/2021 12     ALT (U/L)   Date Value   11/09/2021 18     BUN (mg/dL)   Date Value   11/09/2021 11      (goal A1C is < 7)   (goal LDL is <100) need 30-50% reduction from baseline     BP Readings from Last 3 Encounters:   05/18/22 132/81   11/09/21 (!) 94/42   10/19/21 129/74    (goal /80)      All Future Testing planned in [facility]:  Lab Frequency Next Occurrence   Hepatitis C Antibody Once 05/11/2022   TSH with Reflex Once 04/24/2022   Microalbumin / Creatinine Urine Ratio Once 04/24/2022   MICHAEL SIERRA DIGITAL SCREEN BILATERAL Once 04/04/2022            Patient Active Problem List:     Tobacco use     Carpal tunnel syndrome on both sides     Acquired hypothyroidism     Hyperlipidemia     Iron deficiency anemia     Mild episode of recurrent major depressive disorder (HCC)     Type 2 diabetes mellitus, with long-term current use of insulin (HCC)     Class 1 obesity due to excess calories with serious comorbidity in adult     Primary osteoarthritis of both knees     Urge incontinence of urine     Mild intermittent asthma without complication     Dyslipidemia     Primary hypertension     Pelvic pain in female     Bilateral ovarian cysts     H/O Hysterectomy (2013)     Trichomonal vaginitis     Chest pain     Frontal headache     Frontal sinusitis     Class 3 severe obesity in adult [facility])     Bilateral chronic knee pain     Acute headache     Visual changes     Blindness of left eye     Diabetic polyneuropathy (HCC)     Dizziness

## 2022-05-27 RX ORDER — FERROUS SULFATE 325(65) MG
TABLET ORAL
Qty: 60 TABLET | Refills: 3 | Status: SHIPPED | OUTPATIENT
Start: 2022-05-27

## 2022-05-27 RX ORDER — LEVOTHYROXINE SODIUM 0.05 MG/1
TABLET ORAL
Qty: 30 TABLET | Refills: 2 | Status: SHIPPED | OUTPATIENT
Start: 2022-05-27

## 2022-06-07 ENCOUNTER — OFFICE VISIT (OUTPATIENT)
Dept: OBGYN | Age: 52
End: 2022-06-07
Payer: COMMERCIAL

## 2022-06-07 ENCOUNTER — HOSPITAL ENCOUNTER (OUTPATIENT)
Age: 52
Setting detail: SPECIMEN
Discharge: HOME | End: 2022-06-07

## 2022-06-07 VITALS
SYSTOLIC BLOOD PRESSURE: 120 MMHG | HEART RATE: 93 BPM | HEIGHT: 67 IN | DIASTOLIC BLOOD PRESSURE: 78 MMHG | BODY MASS INDEX: 39.08 KG/M2 | WEIGHT: 249 LBS

## 2022-06-07 DIAGNOSIS — B37.31 YEAST INFECTION INVOLVING THE VAGINA AND SURROUNDING AREA: Primary | ICD-10-CM

## 2022-06-07 DIAGNOSIS — B37.31 YEAST INFECTION INVOLVING THE VAGINA AND SURROUNDING AREA: ICD-10-CM

## 2022-06-07 LAB
CANDIDA SPECIES, DNA PROBE: NEGATIVE
GARDNERELLA VAGINALIS, DNA PROBE: NEGATIVE
SOURCE: NORMAL
TRICHOMONAS VAGINALIS DNA: NEGATIVE

## 2022-06-07 PROCEDURE — 99213 OFFICE O/P EST LOW 20 MIN: CPT | Performed by: OBSTETRICS & GYNECOLOGY

## 2022-06-07 PROCEDURE — G8417 CALC BMI ABV UP PARAM F/U: HCPCS | Performed by: OBSTETRICS & GYNECOLOGY

## 2022-06-07 PROCEDURE — 4004F PT TOBACCO SCREEN RCVD TLK: CPT | Performed by: OBSTETRICS & GYNECOLOGY

## 2022-06-07 PROCEDURE — 3017F COLORECTAL CA SCREEN DOC REV: CPT | Performed by: OBSTETRICS & GYNECOLOGY

## 2022-06-07 PROCEDURE — G8427 DOCREV CUR MEDS BY ELIG CLIN: HCPCS | Performed by: OBSTETRICS & GYNECOLOGY

## 2022-06-07 RX ORDER — LIDOCAINE AND PRILOCAINE 25; 25 MG/G; MG/G
CREAM TOPICAL
Qty: 5 G | Refills: 1 | Status: SHIPPED | OUTPATIENT
Start: 2022-06-07

## 2022-06-07 RX ORDER — METRONIDAZOLE 500 MG/1
500 TABLET ORAL 2 TIMES DAILY
Qty: 14 TABLET | Refills: 0 | Status: CANCELLED | OUTPATIENT
Start: 2022-06-07 | End: 2022-06-14

## 2022-06-07 RX ORDER — CLOTRIMAZOLE AND BETAMETHASONE DIPROPIONATE 10; .64 MG/G; MG/G
CREAM TOPICAL
Qty: 45 G | Refills: 1 | Status: SHIPPED | OUTPATIENT
Start: 2022-06-07 | End: 2022-07-19 | Stop reason: SDUPTHER

## 2022-06-07 RX ORDER — FLUCONAZOLE 150 MG/1
150 TABLET ORAL ONCE
Qty: 3 TABLET | Refills: 0 | Status: SHIPPED | OUTPATIENT
Start: 2022-06-07 | End: 2022-06-07

## 2022-06-07 NOTE — PROGRESS NOTES
diflc    William Lu  2022    YOB: 1970          The patient was seen today. She is here regarding vaginal itching . Her bowels are regular and she is voiding without difficulty. HPI:  William Lu is a 45 y.o. female [id number]     Patient is complaining of vaginal discharge. She had this a few weeks ago and went to the ER on 22. Patient had negative GC/chlam, and her vaginitis probe then was positive for BV. She was treated with diflucan and flagyl. Pt reports the symptoms stopped for about a week and the returned. She is having itching mostly on the outside. Pt denies vaginal odor, denies pelvic pain. Pt denies new sexual partners. Pt was counseled again on keeping her sugars better controlled. She had a hgbA1c 2021 of 11. She is working with her PCP to better control her DM. OB History    Para Term  AB Living   5 1 1 0 4 1   SAB IAB Ectopic Molar Multiple Live Births   4 0 0 0 0 0      # Outcome Date GA Lbr Eric/2nd Weight Sex Delivery Anes PTL Lv   5 Term    7 lb 6 oz (3.345 kg)  CS-Unspec      4 SAB            3 SAB            2 SAB            1 SAB                Past Medical History:   Diagnosis Date    Anxiety     Arthritis of knee, degenerative 2012    Blindness of left eye     hx. of     Blurry vision     left remaines blurry to Left eye to  on 21    Carpal tunnel syndrome on both sides 1/15/2013    Chronic knee pain     on 18 pt is presently in pain mgmnt    Depressive disorder, not elsewhere classified 10/14/2014    Headache     Hx.  of     Hydronephrosis, left 2016    Hyperlipidemia     Hypertension     Hypothyroidism 2013    Iron deficiency anemia 6/10/2014    Mild intermittent asthma without complication     Obesity     Osteoarthritis     KNEE    Polyneuropathy 2012    RAD (reactive airway disease) 2012    Snores     possible apnea but not tested yet    Type II or unspecified type diabetes mellitus without mention of complication, not stated as uncontrolled     Urge incontinence of urine 2017    Wears glasses     Weight loss        Past Surgical History:   Procedure Laterality Date    CARPAL TUNNEL RELEASE      2008 left,  2009 right     SECTION  2007    CYSTOSCOPY  2016    cysto with left ureteral stent placement    HYSTERECTOMY, TOTAL ABDOMINAL  2013    LITHOTRIPSY Left 2016    cysto with ESWL and left ureteral stent placement.     NERVE BLOCK Left 2016    left knee kenalog 80mg    NERVE BLOCK  3/11/16    Rt Knee depo medrol [de-identified]     TUBAL LIGATION  3/2012       Family History   Problem Relation Age of Onset    Stroke Father     Diabetes Mother     Hypertension Mother     Breast Cancer Neg Hx     Cancer Neg Hx     Colon Cancer Neg Hx     Eclampsia Neg Hx     Ovarian Cancer Neg Hx      Labor Neg Hx     Spont Abortions Neg Hx        Social History     Socioeconomic History    Marital status: Single     Spouse name: Not on file    Number of children: Not on file    Years of education: Not on file    Highest education level: Not on file   Occupational History    Not on file   Tobacco Use    Smoking status: Light Tobacco Smoker     Packs/day: 0.25     Years: 15.00     Pack years: 3.75     Types: Cigarettes    Smokeless tobacco: Never Used    Tobacco comment: 4 per day   Vaping Use    Vaping Use: Never used   Substance and Sexual Activity    Alcohol use: Not Currently     Comment: rare    Drug use: Yes     Types: Marijuana (Weed)     Comment: 3 times per week    Sexual activity: Yes     Partners: Male   Other Topics Concern    Not on file   Social History Narrative    Not on file     Social Determinants of Health     Financial Resource Strain: Low Risk     Difficulty of Paying Living Expenses: Not hard at all   Food Insecurity: No Food Insecurity    Worried About Running Out of Food in the Last Year: Never true    Ran Out of Food in the Last Year: Never true   Transportation Needs:     Lack of Transportation (Medical): Not on file    Lack of Transportation (Non-Medical): Not on file   Physical Activity:     Days of Exercise per Week: Not on file    Minutes of Exercise per Session: Not on file   Stress:     Feeling of Stress : Not on file   Social Connections:     Frequency of Communication with Friends and Family: Not on file    Frequency of Social Gatherings with Friends and Family: Not on file    Attends Zoroastrian Services: Not on file    Active Member of [address] [place] [address] [employer] or Organizations: Not on file    Attends Club or Organization Meetings: Not on file    Marital Status: Not on file   Intimate Partner Violence:     Fear of Current or Ex-Partner: Not on file    Emotionally Abused: Not on file    Physically Abused: Not on file    Sexually Abused: Not on file   Housing Stability:     Unable to Pay for Housing in the Last Year: Not on file    Number of [place] in the Last Year: Not on file    Unstable Housing in the Last Year: Not on file         MEDICATIONS:  Current Outpatient Medications   Medication Sig Dispense Refill    fluconazole (DIFLUCAN) 150 MG tablet Take 1 tablet by mouth once for 1 dose Take one tablet every 3 days 3 tablet 0    clotrimazole-betamethasone (LOTRISONE) 1-0.05 % cream Apply to affected area bid externally x 4 days then daily for 3 days 45 g 1    lidocaine-prilocaine (EMLA) 2.5-2.5 % cream Apply topically as needed.  5 g 1    levothyroxine (SYNTHROID) 50 MCG tablet TAKE 1 TABLET BY MOUTH ONE TIME A DAY 30 tablet 2    FEROSUL 325 (65 Fe) MG tablet TAKE 1 TABLET BY MOUTH 2 TIMES A DAY 60 tablet 3    hydrOXYzine (ATARAX) 25 MG tablet Take 1 tablet by mouth every 8 hours as needed for Itching 12 tablet 0    VICTOZA 18 MG/3ML SOPN SC injection START WITH INJECTING 1.2MG INTO THE SKIN ONCE A DAY FOR FOR 1 WEEK, THEN INCREASE AND INJECT 1.8MG INTO THE SKIN ONCE A DAY 5-6/day 2 each 5    Continuous Blood Gluc  (FREESTYLE JUAN CARLOS 14 DAY READER) MERNA Test blood sugars 5-6/day 2 each 11    ONE TOUCH ULTRASOFT LANCETS MISC Dx: DM-2 use 2-3 times daily 100 each 2    Insulin Pen Needle 31G X 5 MM MISC 1 each by Does not apply route daily 100 each 3    Lancets MISC 1 each by Does not apply route daily Use 3-4 times daily 100 each 11     No current facility-administered medications for this visit. ALLERGIES:  Allergies as of 06/07/2022 - Fully Reviewed 06/07/2022   Allergen Reaction Noted    Januvia [sitagliptin phosphate] Hives 12/06/2011    Naproxen sodium Hives 12/05/2011    Neurontin [gabapentin] Nausea Only 06/03/2013         REVIEW OF SYSTEMS:       A minimum of an eleven point review of systems was completed. Review Of Systems (11 point):  Constitutional: No fever, chills or malaise; No weight change or fatigue  Head and Eyes: No vision, Headache, Dizziness or trauma in last 12 months  ENT ROS: No hearing, Tinnitis, sinus or taste problems  Hematological and Lymphatic ROS:No Lymphoma, Von Willebrand's, Hemophillia or Bleeding History  Psych ROS: No Depression, Homicidal thoughts,suicidal thoughts, or anxiety  Breast ROS: No prior breast abnormalities or lumps  Respiratory ROS: No SOB, Pneumoniae,Cough, or Pulmonary Embolism History  Cardiovascular ROS: No Chest Pain with Exertion, Palpitations, Syncope, Edema, Arrhythmia  Gastrointestinal ROS: No Indigestion, Heartburn, Nausea, vomiting, Diarrhea, Constipation,or Bowel Changes; No Bloody Stools or melena  Genito-Urinary ROS:+vulvar itching. No Dysuria, Hematuria or Nocturia.  No Urinary Incontinence or Vaginal Discharge  Musculoskeletal ROS: No Arthralgia, Arthritis,Gout,Osteoporosis or Rheumatism  Neurological ROS: No CVA, Migraines, Epilepsy, Seizure Hx, or Limb Weakness  Dermatological ROS: No Rash, Itching, Hives, Mole Changes or Cancer          Blood pressure 120/78, pulse 93, height 5' 7\" (1.702 m), weight 249 lb (112.9 kg), last menstrual period 07/29/2013, not currently breastfeeding. Chaperone for Intimate Exam   Chaperone was offered and accepted as part of the rooming process.  Chaperone: Hailey Nj MA         Abdomen: Soft non-tender; good bowel sounds. No guarding, rebound or rigidity. No CVA tenderness bilaterally. Extremities: No calf tenderness, DTR 2/4, and No edema bilaterally    Pelvic: External genitalia: pt has a large area covering her entire vulva and onto her inner thigh and white patches consistent with vulvar yeast infection  Urinary system: urethral meatus normal  Vaginal: normal mucosa without prolapse or lesions, normal rugae and small amount of thin white discharge, vaginitis probe obtained  Cervix: normal appearance  Adnexa: normal bimanual exam  Uterus: normal single, nontender, anteverted and mid-position  Negative bladder sweep, no lymphadenopathy, negative CMT        Lab Results:  Results for orders placed or performed during the hospital encounter of 05/18/22   Vaginitis DNA Probe    Specimen: Vaginal   Result Value Ref Range    Source . VAGINAL SWAB     Trichomonas Vaginalis DNA NEGATIVE NEGATIVE    GARDNERELLA VAGINALIS, DNA PROBE POSITIVE (A) NEGATIVE    CANDIDA SPECIES, DNA PROBE NEGATIVE NEGATIVE   C.trachomatis N.gonorrhoeae DNA    Specimen: Cervix   Result Value Ref Range    Specimen Description . CERVIX     C. trachomatis DNA NEGATIVE NEGATIVE    N. gonorrhoeae DNA NEGATIVE NEGATIVE         Assessment:   Diagnosis Orders   1.  Yeast infection involving the vagina and surrounding area  Vaginitis DNA Probe    fluconazole (DIFLUCAN) 150 MG tablet    clotrimazole-betamethasone (LOTRISONE) 1-0.05 % cream    lidocaine-prilocaine (EMLA) 2.5-2.5 % cream     Patient Active Problem List    Diagnosis Date Noted    Acquired hypothyroidism      Priority: High    Tobacco use 04/19/2011     Priority: High    Dizziness 11/08/2021    Diabetic polyneuropathy ([facility] [address] 10/19/2021    Blindness of left eye 01/08/2021    Acute headache 01/07/2021    Visual changes     Class 3 severe obesity in adult [facility]) 01/29/2020    Bilateral chronic knee pain 01/29/2020    Trichomonal vaginitis 12/04/2018     Flagyl Rx and EXPED[employer] PARTNER Rx for partner Joe Mir 6/8/1966      Pelvic pain in female 05/10/2018    Bilateral ovarian cysts 05/10/2018     Seen on CT on 4/2018   Left ovarian cysts seen on TVUS on 05/2018   Repeat TVUS in 3 months (09/2018)       H/O Hysterectomy (2013) 05/10/2018    Dyslipidemia     Primary hypertension     Frontal headache 06/24/2017    Frontal sinusitis 06/24/2017    Chest pain 06/23/2017    Urge incontinence of urine 05/31/2017    Mild intermittent asthma without complication 98/65/6824    Primary osteoarthritis of both knees 06/10/2016    Type 2 diabetes mellitus, with long-term current use of insulin ([facility])     Class 1 obesity due to excess calories with serious comorbidity in adult     Mild episode of recurrent major depressive disorder ([facility] [address] 10/14/2014    Hyperlipidemia 06/10/2014    Iron deficiency anemia 06/10/2014    Carpal tunnel syndrome on both sides 01/15/2013           PLAN:  Gave Rx for diflucan and lotrisone to help with yeast infection - pt has nystatin powder at home and it was not helping. Also gave pt Rx for EMLA cream to help numb up the area and prevent her from itching so a secondary infection does not occur. Pt overdue for annual exam, will have her return for that and to see how she is doing with the yeast infection. Pt instructed to call if she needs to be seen sooner. Encouraged her to continue working on better control of her sugars. Return 6 weeks, for Annual Exam.    Repeat Annual every 1 year  Cervical Cytology Evaluation begins at 23years old. If Negative Cytology, Follow-up screening per current guidelines. Counseled on preventative health maintenance follow-up.           The patient, Marizol Stewart

## 2022-06-28 ENCOUNTER — TELEPHONE (OUTPATIENT)
Dept: INTERNAL MEDICINE | Age: 52
End: 2022-06-28

## 2022-06-28 NOTE — LETTER
[facility] [address]  [address] [id number]  Phone: [phone]  Fax: [phone]    Libby oMnge MD        June 28, 2022     [address] [place] [id number]      Dear Rizwan Worrell: We missed seeing you for a scheduled appointment at [address] [place] [address] with Libby Monge MD on 6/28/2022. We're sorry you were unable to keep your appointment and hope that you are doing well. We ask that you please call 24 hours in advance if you are unable to make your appointment, so that we can give that time to another patient in need. We care about you and the management of your healthcare and want to make sure that you follow up as recommended. To provide quality care and timely appointments to all our patients, you may be dismissed from the practice if you do not show for three (3) scheduled appointments within a 12-month period. We would like to continue treating your healthcare needs. Please call the office to reschedule your appointment, if needed.      Sincerely,        Libby Monge MD

## 2022-06-30 ENCOUNTER — INITIAL CONSULT (OUTPATIENT)
Dept: PHYSICAL MEDICINE AND REHAB | Age: 52
End: 2022-06-30
Payer: COMMERCIAL

## 2022-06-30 VITALS
BODY MASS INDEX: 40.1 KG/M2 | DIASTOLIC BLOOD PRESSURE: 75 MMHG | WEIGHT: 256 LBS | HEART RATE: 92 BPM | TEMPERATURE: 97.2 F | SYSTOLIC BLOOD PRESSURE: 128 MMHG

## 2022-06-30 DIAGNOSIS — M17.0 PRIMARY OSTEOARTHRITIS OF BOTH KNEES: Primary | ICD-10-CM

## 2022-06-30 PROCEDURE — G8417 CALC BMI ABV UP PARAM F/U: HCPCS | Performed by: PHYSICAL MEDICINE & REHABILITATION

## 2022-06-30 PROCEDURE — 4004F PT TOBACCO SCREEN RCVD TLK: CPT | Performed by: PHYSICAL MEDICINE & REHABILITATION

## 2022-06-30 PROCEDURE — 99203 OFFICE O/P NEW LOW 30 MIN: CPT | Performed by: PHYSICAL MEDICINE & REHABILITATION

## 2022-06-30 PROCEDURE — G8427 DOCREV CUR MEDS BY ELIG CLIN: HCPCS | Performed by: PHYSICAL MEDICINE & REHABILITATION

## 2022-06-30 PROCEDURE — 3017F COLORECTAL CA SCREEN DOC REV: CPT | Performed by: PHYSICAL MEDICINE & REHABILITATION

## 2022-06-30 NOTE — PROGRESS NOTES
[address] [facility] PHYSICAL MEDICINE & REHABILITATION  [address] [place] [address]  [place] [id number]  Dept: [phone]  Dept Fax: [phone]    New Patient Rachael May 68, 45 y.o., female, is c/o of Knee Pain (osteoarthritis, consult )   and request for evaluation of B knee pain by Lee Ann LAGUNA DO.    HPI:     Knee Pain   The incident occurred more than 1 week ago. There was no injury mechanism. The pain is present in the right knee and left knee. The quality of the pain is described as aching and stabbing. The pain is at a severity of 8/10. The pain is moderate. The pain has been fluctuating since onset. Associated symptoms include an inability to bear weight, a loss of motion and muscle weakness. Pertinent negatives include no loss of sensation. She reports no foreign bodies present. The symptoms are aggravated by movement and weight bearing. She has tried ice, heat, acetaminophen and NSAIDs for the symptoms. The treatment provided no relief. She has had 10 years of chronic B knee pain worse in the L knee than the R. She has received multiple different types of conservative management including physical therapy (land and aquatic), NSAIDs (currently on diclofenac per her PCP with no significant relief), Ice, Heat, TENS, acupuncture, corticosteroid injections with no lasting relief. Corticosteroid injections last 2-4 weeks. Past Medical History:   Diagnosis Date    Anxiety     Arthritis of knee, degenerative 1/31/2012    Blindness of left eye     hx. of     Blurry vision     left remaines blurry to Left eye to  on 1-14-21    Carpal tunnel syndrome on both sides 1/15/2013    Chronic knee pain     on 4/17/18 pt is presently in pain mgmnt    Depressive disorder, not elsewhere classified 10/14/2014    Headache     Hx.  of     Hydronephrosis, left 9/13/2016    Hyperlipidemia     Hypertension     Hypothyroidism 8/2/2013    Iron deficiency anemia 6/10/2014    Mild intermittent asthma without complication     Obesity     Osteoarthritis     KNEE    Polyneuropathy 2012    RAD (reactive airway disease) 2012    Snores     possible apnea but not tested yet    Type II or unspecified type diabetes mellitus without mention of complication, not stated as uncontrolled     Urge incontinence of urine 2017    Wears glasses     Weight loss       Past Surgical History:   Procedure Laterality Date    CARPAL TUNNEL RELEASE      2008 left,  2009 right     SECTION  2007    CYSTOSCOPY  2016    cysto with left ureteral stent placement    HYSTERECTOMY, TOTAL ABDOMINAL (CERVIX REMOVED)  2013    LITHOTRIPSY Left 2016    cysto with ESWL and left ureteral stent placement.     NERVE BLOCK Left 2016    left knee kenalog 80mg    NERVE BLOCK  3/11/16    Rt Knee depo medrol [de-identified]     TUBAL LIGATION  3/2012     Family History   Problem Relation Age of Onset    Stroke Father     Diabetes Mother     Hypertension Mother     Breast Cancer Neg Hx     Cancer Neg Hx     Colon Cancer Neg Hx     Eclampsia Neg Hx     Ovarian Cancer Neg Hx      Labor Neg Hx     Spont Abortions Neg Hx      Social History     Socioeconomic History    Marital status: Single     Spouse name: None    Number of children: None    Years of education: None    Highest education level: None   Occupational History    None   Tobacco Use    Smoking status: Light Tobacco Smoker     Packs/day: 0.25     Years: 15.00     Pack years: 3.75     Types: Cigarettes    Smokeless tobacco: Never Used    Tobacco comment: 4 per day   Vaping Use    Vaping Use: Never used   Substance and Sexual Activity    Alcohol use: Not Currently     Comment: rare    Drug use: Yes     Types: Marijuana Darliss Meeter)     Comment: 3 times per week    Sexual activity: Yes     Partners: Male   Other Topics Concern    None   Social History Narrative    None Social Determinants of Health     Financial Resource Strain: Low Risk     Difficulty of Paying Living Expenses: Not hard at all   Food Insecurity: No Food Insecurity    Worried About Running Out of Food in the Last Year: Never true    Ester of Food in the Last Year: Never true   Transportation Needs:     Lack of Transportation (Medical): Not on file    Lack of Transportation (Non-Medical): Not on file   Physical Activity:     Days of Exercise per Week: Not on file    Minutes of Exercise per Session: Not on file   Stress:     Feeling of Stress : Not on file   Social Connections:     Frequency of Communication with Friends and Family: Not on file    Frequency of Social Gatherings with Friends and Family: Not on file    Attends Orthodox Services: Not on file    Active Member of [address] [place] [address] [employer]qui or Organizations: Not on file    Attends Club or Organization Meetings: Not on file    Marital Status: Not on file   Intimate Partner Violence:     Fear of Current or Ex-Partner: Not on file    Emotionally Abused: Not on file    Physically Abused: Not on file    Sexually Abused: Not on file   Housing Stability:     Unable to Pay for Housing in the Last Year: Not on file    Number of [place] in the Last Year: Not on file    Unstable Housing in the Last Year: Not on file          Current Outpatient Medications   Medication Sig Dispense Refill    clotrimazole-betamethasone (LOTRISONE) 1-0.05 % cream Apply to affected area bid externally x 4 days then daily for 3 days 45 g 1    lidocaine-prilocaine (EMLA) 2.5-2.5 % cream Apply topically as needed.  5 g 1    levothyroxine (SYNTHROID) 50 MCG tablet TAKE 1 TABLET BY MOUTH ONE TIME A DAY 30 tablet 2    FEROSUL 325 (65 Fe) MG tablet TAKE 1 TABLET BY MOUTH 2 TIMES A DAY 60 tablet 3    hydrOXYzine (ATARAX) 25 MG tablet Take 1 tablet by mouth every 8 hours as needed for Itching 12 tablet 0    blood glucose monitor strips 1 strip by Other route three times daily Test___times daily    Diagnosis: 250.0   Diabetes Mellitus____Insulin Dependent____Non-Insulin Dependent 100 strip 3    VICTOZA 18 MG/3ML SOPN SC injection START WITH INJECTING 1.2MG INTO THE SKIN ONCE A DAY FOR FOR 1 WEEK, THEN INCREASE AND INJECT 1.8MG INTO THE SKIN ONCE A DAY THEREAFTER. 6 mL 3    diclofenac (VOLTAREN) 50 MG EC tablet TAKE 1 TABLET BY MOUTH 3 TIMES A DAY WITH MEALS 60 tablet 3    metFORMIN (GLUCOPHAGE) 500 MG tablet TAKE 1 TABLET BY MOUTH 2 TIMES A DAY WITH MEALS 60 tablet 3    ASPIRIN LOW DOSE 81 MG EC tablet TAKE 1 TABLET BY MOUTH ONE TIME A DAY 30 tablet 3    MYRBETRIQ 25 MG TB24 TAKE 1 TABLET BY MOUTH ONE TIME A DAY 30 tablet 3    traZODone (DESYREL) 150 MG tablet TAKE ONE TABLET BY MOUTH NIGHTLY 30 tablet 3    simvastatin (ZOCOR) 40 MG tablet TAKE ONE TABLET BY MOUTH NIGHTLY 30 tablet 3    NOVOLOG FLEXPEN 100 UNIT/ML injection pen INJECT 30 UNITS INTO THE SKIN 3 TIMES DAILY BEFORE MEALS 15 mL 3    lisinopril (PRINIVIL;ZESTRIL) 5 MG tablet Take 1 tablet by mouth daily 30 tablet 3    Continuous Blood Gluc Sensor (FREESTYLE JUAN CARLOS 14 DAY SENSOR) MISC Test blood sugars 5-6/day 2 each 5    Continuous Blood Gluc  (FREESTYLE JUAN CARLOS 14 DAY READER) MERNA Test blood sugars 5-6/day 2 each 11    SUMAtriptan (IMITREX) 100 MG tablet Take 1 tablet by mouth 2 times daily as needed for Migraine 9 tablet 3    magnesium oxide (MAG-OX) 400 (241.3 Mg) MG TABS tablet Take 1 tablet by mouth daily 30 tablet 3    insulin glargine (BASAGLAR KWIKPEN) 100 UNIT/ML injection pen Inject 35 Units into the skin nightly      ONE TOUCH ULTRASOFT LANCETS MISC Dx: DM-2 use 2-3 times daily 100 each 2    Insulin Pen Needle 31G X 5 MM MISC 1 each by Does not apply route daily 100 each 3    Lancets MISC 1 each by Does not apply route daily Use 3-4 times daily 100 each 11    DULoxetine (CYMBALTA) 60 MG extended release capsule Take 1 capsule by mouth daily 90 capsule 3    albuterol sulfate HFA (PROVENTIL HFA) 108 (90 Base) MCG/ACT inhaler Inhale 2 puffs into the lungs every 6 hours as needed for Wheezing 1 Inhaler 2    Calcium Citrate-Vitamin D (CALCITRATE/VITAMIN D PO) Take 1 tablet by mouth daily       Misc. Devices MISC 1 each by Does not apply route once as needed (left knee-medial  brace) 1 each 0    nystatin (NYAMYC) 577889 UNIT/GM powder APPLY TO SKIN OF LEGS & IN FOLDS OF SKIN WITH IRRITATION (Patient not taking: Reported on 6/7/2022) 1 each 2     No current facility-administered medications for this visit. Allergies   Allergen Reactions    Januvia [Sitagliptin Phosphate] Hives    Naproxen Sodium Hives    Neurontin [Gabapentin] Nausea Only       Subjective:     Review of Systems  Constitutional: Negative for fever, chills and unexpected weight change. HEENT: Negative for trouble swallowing. No acute vision changes. Respiratory: Negative for cough and shortness of breath. Cardiovascular: Negative for chest pain. Endocrine: Negative for polyuria. Genitourinary: Negative for dysuria, urgency,frequency and difficulty urinating. Musculoskeletal: Positive for stiff joints. Neurological: Negative for headaches. Dermatologic: Negative rashes, ulcers, or lesions. Psychiatric: Negative for depressed mood or anxiety. Objective:     Physical Exam  /75   Pulse 92   Temp 97.2 °F (36.2 °C)   Wt 256 lb (116.1 kg)   LMP 07/29/2013   BMI 40.10 kg/m²   Constitutional: She is in no distress. She appears well-developed and well-nourished. HEENT:  Normocephalic. EOMI. PERRL. Mucous membranes pink and moist.   Pulmonary/Chest: Respirations WNL and unlabored. Skin: Skin is warm, dry and intact with good turgor. Psychiatric: She has a normal mood and affect.  Her behavior is normal.Thought content normal.   MSK: Knee Assessment:  Gait:  abnormal        If abnormal gait: antalgic   Swelling: Left: partially present  Right:  partially present       Varicosities: Left: Absent  Right: Absent       Erythma/Scars/Rash: Left:  Absent  Right:  Absent         Warmth:  Left:  Absent  Right:  Absent       Crepitus:  Left: Present  Right: Present                                Joint Line Pain:  Left: Absent  Right: Absent                                   If present:  Left: If present:                  Right:        Stress Pain:  varus Left and Right       Lachman:  Left:  negative  With endpoint: Yes  Without endpoint: No    Right:  negative  With endpoint: Yes  Without endpoint: No       Anterior drawer:  Left: Not Indicated  Right: Not Indicated       Carmencita's Test: Left: Negative  Right: Negative       Patella Ballotment Test: Left: negative  Right: negative       Neuro Exam:    Flexion extension Left: 5/5  Right: 5/5   Sensory Intact: Left: Yes  Right: Yes       MSR:          Knee:  Left: Decreased  Right: Decreased        Ankle: Left: Normal  Right: Normal           Neurological: She is alert and oriented to person, place, and time. She has DTRs 2+. Sensation intact to light touch. EXTREMITIES: No edema. No calf tenderness to palpation bilaterally. Medical assistant note and vitals for today's encounter reviewed. Diagnostic Studies:    Results for Yimi Lara (MRN [id number]) as of 6/30/2022 14:56   Ref. Range 11/9/2021 05:53   Sodium Latest Ref Range: 135 - 144 mmol/L 135   Potassium Latest Ref Range: 3.7 - 5.3 mmol/L 4.2   Chloride Latest Ref Range: 98 - 107 mmol/L 102   CO2 Latest Ref Range: 20 - 31 mmol/L 20   BUN,BUNPL Latest Ref Range: 6 - 20 mg/dL 11   Creatinine Latest Ref Range: 0.50 - 0.90 mg/dL 0.52   Bun/Cre Ratio Latest Ref Range: 9 - 20  21 (H)   Anion Gap Latest Ref Range: 9 - 17 mmol/L 13   GFR Non- Latest Ref Range: >60 mL/min >60   GFR African American Latest Ref Range: >60 mL/min >60   GLUCOSE, FASTING,GF Latest Ref Range: 70 - 99 mg/dL 240 (H)       Assessment:       Diagnosis Orders   1.  Primary osteoarthritis of both knees  External Referral To Pain Clinic          Plan:      Discussed further conservative options. She is interested in pursuing visco supplementation injections as she has not had lasting response to corticosteroid injection and failed multiple other conservative measures to treat knee pain. Ordered Compound cream 8E from [employer] (diclofenac, gabapentin, baclofen, lidocaine, prilocaine, DMSO). Will refer to pain management for evaluation of geniculate nerve block to treat pain. She requests physician that a family member is seeing - referral placed. Orders Placed This Encounter   Procedures    External Referral To Pain Clinic     Referral Priority:   Routine     Referral Type:   Eval and Treat     Referral Reason:   Specialty Services Required     Referred to Provider:   Margret Chang MD     Requested Specialty:   Pain Management     Number of Visits Requested:   1     No orders of the defined types were placed in this encounter. Return in about 6 weeks (around 8/11/2022) for viscosupplementation injections. Time Spent 40 minutes    Electronically signed by Maria Ines Casillas MD on 6/30/2022 at 4:47 PM.     Please note that this chart was generated using voicerecognition Dragon dictation software. Although every effort was made to ensurethe accuracy of this automated transcription, some errors in transcription may haveoccurred.

## 2022-07-14 ENCOUNTER — TELEPHONE (OUTPATIENT)
Dept: PHYSICAL MEDICINE AND REHAB | Age: 52
End: 2022-07-14

## 2022-07-14 NOTE — TELEPHONE ENCOUNTER
Verena from Dr. Brigitte Ahumada office called regarding recent pain management referral for the patient. She wanted to let you know that their office is not accepting new patients right now due to the volume of patients they already have, as it could be 4-6 months before they could schedule anything. She wanted to know if you would like them to hold onto the referral until they starting resuming with new patients or if you'd prefer to send the referral somewhere else? I did let Park Soriano know you were out of the office at the moment but we could get back with them early next week.

## 2022-07-19 ENCOUNTER — OFFICE VISIT (OUTPATIENT)
Dept: OBGYN | Age: 52
End: 2022-07-19
Payer: COMMERCIAL

## 2022-07-19 ENCOUNTER — HOSPITAL ENCOUNTER (OUTPATIENT)
Age: 52
Setting detail: SPECIMEN
Discharge: HOME | End: 2022-07-19

## 2022-07-19 VITALS
SYSTOLIC BLOOD PRESSURE: 126 MMHG | HEART RATE: 98 BPM | WEIGHT: 251.2 LBS | HEIGHT: 68 IN | BODY MASS INDEX: 38.07 KG/M2 | DIASTOLIC BLOOD PRESSURE: 80 MMHG

## 2022-07-19 DIAGNOSIS — N89.8 VAGINAL ITCHING: ICD-10-CM

## 2022-07-19 DIAGNOSIS — Z01.419 WELL WOMAN EXAM WITH ROUTINE GYNECOLOGICAL EXAM: Primary | ICD-10-CM

## 2022-07-19 DIAGNOSIS — B37.31 YEAST INFECTION INVOLVING THE VAGINA AND SURROUNDING AREA: ICD-10-CM

## 2022-07-19 PROCEDURE — G0101 CA SCREEN;PELVIC/BREAST EXAM: HCPCS | Performed by: OBSTETRICS & GYNECOLOGY

## 2022-07-19 PROCEDURE — 99211 OFF/OP EST MAY X REQ PHY/QHP: CPT | Performed by: OBSTETRICS & GYNECOLOGY

## 2022-07-19 RX ORDER — CLOTRIMAZOLE AND BETAMETHASONE DIPROPIONATE 10; .64 MG/G; MG/G
CREAM TOPICAL
Qty: 45 G | Refills: 1 | Status: SHIPPED | OUTPATIENT
Start: 2022-07-19

## 2022-07-19 NOTE — TELEPHONE ENCOUNTER
Called patient again this afternoon and was able to talk with her. I let her know about the timeframe for Dr. Regan Johnson office. Patient requested referral be re-sent to Dr. Ari Argueta office, who works with the [address] [place] [address] for Pain Management. Referral has been faxed along with most recent office note. Called Dr. Kerline Banks office to let them know they can disregard the original referral as patient decided to go elsewhere.

## 2022-07-19 NOTE — TELEPHONE ENCOUNTER
Attempted to reach patient to let her know what was going on with the pain management referral and to see if she'd like us to send it somewhere else. However, was unable to reach her as her voicemail box was full. Last Eunice login was over 2 years ago so unlikely to reach her via [address] Will try to call again later.

## 2022-07-19 NOTE — PROGRESS NOTES
History and Physical  Marizol Narayan  2022              47 y.o. Chief Complaint   Patient presents with    Annual Exam       Patient's last menstrual period was 2013. Primary Care Physician: Umesh Garcia MD    The patient was seen and examined. She has no chief complaint today and is here for her annual exam.  Her bowels are regular. There are no voiding complaints. She denies any bloating. She denies vaginal discharge and was counseled on STD's and the need for barrier contraception. HPI : Marizol Narayan is a 47 y.o. female [id number]    Pt here for annual exam.  She reports her vulvar yeast infection that she had prior has improved. It still comes and goes. Encouraged her to continue working on better control of her blood glucose. She states she has been running in the 200s. Encouraged her to call her PCP to get scheduled for an appointment regarding her diabetes.       Pt has no other issues/concerns today.        ________________________________________________________________________  OB History    Para Term  AB Living   5 1 1 0 4 1   SAB IAB Ectopic Molar Multiple Live Births   4 0 0 0 0 0      # Outcome Date GA Lbr Eric/2nd Weight Sex Delivery Anes PTL Lv   5 Term    7 lb 6 oz (3.345 kg)  CS-Unspec      4 SAB            3 SAB            2 SAB            1 SAB              Past Medical History:   Diagnosis Date    Anxiety     Arthritis of knee, degenerative 2012    Blindness of left eye     hx. of     Blurry vision     left remaines blurry to Left eye to  on 20    Carpal tunnel syndrome on both sides 1/15/2013    Chronic knee pain     on 17 pt is presently in pain mgmnt    Depressive disorder, not elsewhere classified 10/14/2014    Headache     Hx. of     Hydronephrosis, left 2016    Hyperlipidemia     Hypertension     Hypothyroidism 2013    Iron deficiency anemia 6/10/2014    Mild intermittent asthma without complication 2017    Obesity     Osteoarthritis     KNEE    Polyneuropathy 2012    RAD (reactive airway disease) 2012    Snores     possible apnea but not tested yet    Type II or unspecified type diabetes mellitus without mention of complication, not stated as uncontrolled     Urge incontinence of urine 2017    Wears glasses     Weight loss                                                                    Past Surgical History:   Procedure Laterality Date    CARPAL TUNNEL RELEASE      2008 left,  2009 right     SECTION  2007    CYSTOSCOPY  2016    cysto with left ureteral stent placement    HYSTERECTOMY, TOTAL ABDOMINAL (CERVIX REMOVED)  2013    LITHOTRIPSY Left 2016    cysto with ESWL and left ureteral stent placement.     NERVE BLOCK Left 2016    left knee kenalog 80mg    NERVE BLOCK  3/11/16    Rt Knee depo medrol 80     TUBAL LIGATION  3/2012     Family History   Problem Relation Age of Onset    Stroke Father     Diabetes Mother     Hypertension Mother     Breast Cancer Neg Hx     Cancer Neg Hx     Colon Cancer Neg Hx     Eclampsia Neg Hx     Ovarian Cancer Neg Hx      Labor Neg Hx     Spont Abortions Neg Hx      Social History     Socioeconomic History    Marital status: Single     Spouse name: Not on file    Number of children: Not on file    Years of education: Not on file    Highest education level: Not on file   Occupational History    Not on file   Tobacco Use    Smoking status: Light Smoker     Packs/day: 0.25     Years: 15.00     Pack years: 3.75     Types: Cigarettes    Smokeless tobacco: Never    Tobacco comments:     4 per day   Vaping Use    Vaping Use: Never used   Substance and Sexual Activity    Alcohol use: Not Currently     Comment: rare    Drug use: Yes     Types: Marijuana (Weed)     Comment: 1 times per week    Sexual activity: Yes     Partners: Male   Other Topics Concern    Not on file   Social History Narrative    Not on file     Social MOUTH NIGHTLY 30 tablet 3    simvastatin (ZOCOR) 40 MG tablet TAKE ONE TABLET BY MOUTH NIGHTLY 30 tablet 3    NOVOLOG FLEXPEN 100 UNIT/ML injection pen INJECT 30 UNITS INTO THE SKIN 3 TIMES DAILY BEFORE MEALS 15 mL 3    lisinopril (PRINIVIL;ZESTRIL) 5 MG tablet Take 1 tablet by mouth daily 30 tablet 3    Continuous Blood Gluc Sensor (FREESTYLE JUAN CARLOS 14 DAY SENSOR) MISC Test blood sugars 5-6/day 2 each 5    Continuous Blood Gluc  (FREESTYLE JUAN CARLOS 14 DAY READER) MERNA Test blood sugars 5-6/day 2 each 11    SUMAtriptan (IMITREX) 100 MG tablet Take 1 tablet by mouth 2 times daily as needed for Migraine 9 tablet 3    magnesium oxide (MAG-OX) 400 (241.3 Mg) MG TABS tablet Take 1 tablet by mouth daily 30 tablet 3    insulin glargine (BASAGLAR KWIKPEN) 100 UNIT/ML injection pen Inject 35 Units into the skin nightly      ONE TOUCH ULTRASOFT LANCETS MISC Dx: DM-2 use 2-3 times daily 100 each 2    Insulin Pen Needle 31G X 5 MM MISC 1 each by Does not apply route daily 100 each 3    Lancets MISC 1 each by Does not apply route daily Use 3-4 times daily 100 each 11    DULoxetine (CYMBALTA) 60 MG extended release capsule Take 1 capsule by mouth daily 90 capsule 3    albuterol sulfate HFA (PROVENTIL HFA) 108 (90 Base) MCG/ACT inhaler Inhale 2 puffs into the lungs every 6 hours as needed for Wheezing 1 Inhaler 2    Calcium Citrate-Vitamin D (CALCITRATE/VITAMIN D PO) Take 1 tablet by mouth daily       Misc. Devices MISC 1 each by Does not apply route once as needed (left knee-medial  brace) 1 each 0     No current facility-administered medications for this visit.            ALLERGIES:  Allergies as of 07/19/2022 - Fully Reviewed 07/19/2022   Allergen Reaction Noted    Januvia [sitagliptin phosphate] Hives 12/06/2011    Naproxen sodium Hives 12/05/2011    Neurontin [gabapentin] Nausea Only 06/03/2013       Symptoms of decreased mood absent  Symptoms of anhedonia absent      Immunization status: up to date and anxiety  Breast ROS: No breast abnormalities or lumps  Respiratory ROS: No SOB, Pneumoniae,Cough, or Pulmonary Embolism   Cardiovascular ROS: No Chest Pain with Exertion, Palpitations, Syncope, Edema, Arrhythmia  Gastrointestinal ROS: No Indigestion, Heartburn, Nausea, vomiting, Diarrhea, Constipation,or Bowel Changes; No Bloody Stools or melena  Genito-Urinary ROS:+vaginal itching. No Dysuria, Hematuria or Nocturia. No Urinary Incontinence or Vaginal Discharge  Musculoskeletal ROS: No Arthralgia, Arthritis,Gout,Osteoporosis or Rheumatism  Neurological ROS: No CVA, Migraines, Epilepsy, Seizure Hx, or Limb Weakness  Dermatological ROS: No Rash, Itching, Hives, Mole Changes or Cancer                                                                                                                                                                                                                                  PHYSICAL Exam:     Constitutional:  Vitals:    07/19/22 1308   BP: 126/80   Pulse: 98   Weight: 251 lb 3.2 oz (113.9 kg)   Height: 5' 7.5\" (1.715 m)       Chaperone for Intimate Exam  Chaperone was offered and accepted as part of the rooming process. Chaperone: Susan Richard MA          General Appearance: This  is a well Developed, well Nourished, well groomed female. Her BMI was reviewed. Nutritional decision making was discussed. Skin:  There was a Normal Inspection of the skin without rashes or lesions. There were no rashes. (Papular, Maculopapular, Hives, Pustular, Macular)     There were no lesions (Ulcers, Erythema, Abn. Appearing Nevi)            Lymphatic:  No Lymph Nodes were Palpable in the neck , axilla or groin.  0 # Of Lymph Nodes; Location ; Character [Normal]  [Shotty] [Tender] [Enlarged]     Neck and EENT:  The neck was supple. There were no masses   The thyroid was not enlarged and had no masses. Perrla, EOMI B/L, TMI B/L No Abnormalities.    Throat inspected-No exudates or Masses, Nares Patent No Masses        Respiratory: The lungs were auscultated and found to be clear. There were no rales, rhonchi or wheezes. There was a good respiratory effort. Cardiovascular: The heart was in a regular rate and rhythm. . No S3 or S4. There was no murmur appreciated. Location, grade, and radiation are not applicable. Extremities: The patients extremities were without calf tenderness, edema, or varicosities. There was full range of motion in all four extremities. Pulses in all four extremities were appreciated and are 2/4. Abdomen: The abdomen was soft and non-tender. There were good bowel sounds in all quadrants and there was no guarding, rebound or rigidity. On evaluation there was no evidence of hepatosplenomegaly and there was no costal vertebral arnaldo tenderness bilaterally. No hernias were appreciated. Abdominal Scars: pfannenstiel from csection and hysterectomy    Psych: The patient had a normal Orientation to: Time, Place, Person, and Situation  There is no Mood / Affect changes    Breast:  (Chest) pt declined breast exam today, states she already had one done and her mammogram was normal earlier this year  Self breast exams were reviewed in detail. Pelvic Exam:  External genitalia: normal general appearance  Urinary system: urethral meatus normal  Vaginal: normal mucosa without prolapse or lesions, atrophic mucosa, and small amount of thin white discharge noted, vaginitis probe obtained  Cervix: removed surgically  Adnexa: difficult exam secondary to body habitus, no obvious masses palpated  Uterus: removed surgically        Musculosk:  Normal Gait and station was noted. Digits were evaluated without abnormal findings. Range of motion, stability and strength were evaluated and found to be appropriate for the patients age. ASSESSMENT:      47 y.o. Annual   Diagnosis Orders   1. Well woman exam with routine gynecological exam        2.  Yeast infection involving the Frontal sinusitis 06/24/2017    Chest pain 06/23/2017    Urge incontinence of urine 05/31/2017    Mild intermittent asthma without complication 42/20/4891    Primary osteoarthritis of both knees 06/10/2016    Type 2 diabetes mellitus, with long-term current use of insulin ([facility])     Class 1 obesity due to excess calories with serious comorbidity in adult     Mild episode of recurrent major depressive disorder ([facility] [address] 10/14/2014    Hyperlipidemia 06/10/2014    Iron deficiency anemia 06/10/2014    Carpal tunnel syndrome on both sides 01/15/2013          Hereditary Breast, Ovarian, Colon and Uterine Cancer screening Done. Tobacco & Secondary smoke risks reviewed; instructed on cessation and avoidance      Counseling Completed:  Preventative Health Recommendations and Follow up. The patient was informed of the recommended preventative health recommendations. 1. Annuals every year; Cytology collections per prevailing guidelines. 2. Mammograms begin every year at 35 yo if no abnormalities are found and no family history. 3. Bone density studies every 2-3 years. Begin at 73 yo. If no fracture history or osteoporosis family history. (significant). 4. Colonoscopy begin at 40 yo. Repeat every ten years if negative and no family history. 5. Calcium of 6543-1209 mg/day in split dosing  6. Vitamin D 400-800 IU/day  7. All other preventative health recommendations will be managed by the patients Primary care physician. The patient was instructed to stop smoking. Morbidity, mortality, and cessation programs were reviewed. Worsening of long and short term medical health risks were discussed with the addition of tobacco. Secondary smoke risks were reviewed with respect to children and newborns. Increases in Sudden Infant Death Syndrome, asthma, respiratory problems, and cancers were reviewed.              PLAN:  Pt is s/p hysterectomy with removal of cervix - she no longer needs a pap smear because she has no hx of KYLE-2 or higher  Vaginitis probe obtained - will treat if appropriate  Next mammogram due 4/2023  Gave refill on lotrisone cream because it has helped in the past with her vulvar yeast infections  Pt encouraged to follow up with PCP asap regarding her diabetes and better glucose control. Return in about 1 year (around 7/19/2023) for Annual Exam.  Repeat Annual every 1 year  Cervical Cytology Evaluation begins at 24years old. If Negative Cytology, Follow-up screening per current guidelines. Mammograms every 1 year. If 35 yo and last mammogram was negative. Calcium and Vitamin D dosing reviewed. Colonoscopy screening reviewed as well as onset for bone density testing. Birth control and barrier recommendations discussed. STD counseling and prevention reviewed. Gardisil counseling completed for all patients 10-35 yo. Routine health maintenance per patients PCP. The patient, Marizol Narayan is a 47 y.o. female, was seen with a total time spent of 20 minutes for the visit on this date of service by the E/M provider. The time component had both face to face and non face to face time spent in determining the total time component. Counseling and education regarding her diagnosis listed below and her options regarding those diagnoses were also included in determining her time component. Diagnosis Orders   1. Well woman exam with routine gynecological exam        2. Yeast infection involving the vagina and surrounding area  clotrimazole-betamethasone (LOTRISONE) 1-0.05 % cream      3. Vaginal itching  Vaginitis DNA Probe           The patient had her preventative health maintenance recommendations and follow-up reviewed with her at the completion of her visit.     Tyson Terry DO

## 2022-07-20 RX ORDER — METRONIDAZOLE 500 MG/1
500 TABLET ORAL 2 TIMES DAILY
Qty: 14 TABLET | Refills: 0 | Status: SHIPPED | OUTPATIENT
Start: 2022-07-20 | End: 2022-07-27

## 2022-07-25 ENCOUNTER — PROCEDURE VISIT (OUTPATIENT)
Dept: PHYSICAL MEDICINE AND REHAB | Age: 52
End: 2022-07-25
Payer: COMMERCIAL

## 2022-07-25 VITALS
TEMPERATURE: 97.9 F | BODY MASS INDEX: 38.89 KG/M2 | SYSTOLIC BLOOD PRESSURE: 129 MMHG | WEIGHT: 252 LBS | DIASTOLIC BLOOD PRESSURE: 83 MMHG | HEART RATE: 87 BPM

## 2022-07-25 DIAGNOSIS — E11.65 TYPE 2 DIABETES MELLITUS WITH HYPERGLYCEMIA, WITH LONG-TERM CURRENT USE OF INSULIN (HCC): ICD-10-CM

## 2022-07-25 DIAGNOSIS — Z79.4 TYPE 2 DIABETES MELLITUS WITH HYPERGLYCEMIA, WITH LONG-TERM CURRENT USE OF INSULIN (HCC): ICD-10-CM

## 2022-07-25 DIAGNOSIS — M17.0 PRIMARY OSTEOARTHRITIS OF BOTH KNEES: Primary | ICD-10-CM

## 2022-07-25 DIAGNOSIS — Z79.4 TYPE 2 DIABETES MELLITUS WITH DIABETIC POLYNEUROPATHY, WITH LONG-TERM CURRENT USE OF INSULIN (HCC): ICD-10-CM

## 2022-07-25 DIAGNOSIS — E11.42 TYPE 2 DIABETES MELLITUS WITH DIABETIC POLYNEUROPATHY, WITH LONG-TERM CURRENT USE OF INSULIN (HCC): ICD-10-CM

## 2022-07-25 PROCEDURE — 20610 DRAIN/INJ JOINT/BURSA W/O US: CPT | Performed by: PHYSICAL MEDICINE & REHABILITATION

## 2022-07-25 NOTE — PROGRESS NOTES
[address] [facility] PHYSICAL MEDICINE AND REHABILITATION  [facility] [address]  Dept: [phone]  Dept Fax: [phone]    Outpatient Followup Note    Nallely Nye, 47 y.o., female, presents for follow up c/o of Injections (Syvinsc injections, bilaterally )  . HPI:     HPI  Patient with B knee osteoarthritis with chronic severe pain that has not responded to conservative treatment or to corticosteroid injections. She is here today for B viscosupplementation injections with Synvisc One. Past Medical History:   Diagnosis Date    Anxiety     Arthritis of knee, degenerative 2012    Blindness of left eye     hx. of     Blurry vision     left remaines blurry to Left eye to  on 20    Carpal tunnel syndrome on both sides 1/15/2013    Chronic knee pain     on 18 pt is presently in pain mgmnt    Depressive disorder, not elsewhere classified 10/14/2014    Headache     Hx. of     Hydronephrosis, left 2016    Hyperlipidemia     Hypertension     Hypothyroidism 2013    Iron deficiency anemia 6/10/2014    Mild intermittent asthma without complication     Obesity     Osteoarthritis     KNEE    Polyneuropathy 2012    RAD (reactive airway disease) 2012    Snores     possible apnea but not tested yet    Type II or unspecified type diabetes mellitus without mention of complication, not stated as uncontrolled     Urge incontinence of urine 2017    Wears glasses     Weight loss       Past Surgical History:   Procedure Laterality Date    CARPAL TUNNEL RELEASE       left,  2009 right     SECTION  2007    CYSTOSCOPY  2016    cysto with left ureteral stent placement    HYSTERECTOMY, TOTAL ABDOMINAL (CERVIX REMOVED)  2013    LITHOTRIPSY Left 2016    cysto with ESWL and left ureteral stent placement.     NERVE BLOCK Left 2016    left knee kenalog 80mg    NERVE BLOCK 3/11/16    Rt Knee depo medrol 80     TUBAL LIGATION  3/2012     Family History   Problem Relation Age of Onset    Stroke Father     Diabetes Mother     Hypertension Mother     Breast Cancer Neg Hx     Cancer Neg Hx     Colon Cancer Neg Hx     Eclampsia Neg Hx     Ovarian Cancer Neg Hx      Labor Neg Hx     Spont Abortions Neg Hx      Social History     Socioeconomic History    Marital status: Single     Spouse name: None    Number of children: None    Years of education: None    Highest education level: None   Tobacco Use    Smoking status: Light Smoker     Packs/day: 0.25     Years: 15.00     Pack years: 3.75     Types: Cigarettes    Smokeless tobacco: Never    Tobacco comments:     4 per day   Vaping Use    Vaping Use: Never used   Substance and Sexual Activity    Alcohol use: Not Currently     Comment: rare    Drug use: Yes     Types: Marijuana (Weed)     Comment: 1 times per week    Sexual activity: Yes     Partners: Male     Social Determinants of Health     Financial Resource Strain: Low Risk     Difficulty of Paying Living Expenses: Not hard at all   Food Insecurity: No Food Insecurity    Worried About Running Out of Food in the Last Year: Never true    Ran Out of Food in the Last Year: Never true       Current Outpatient Medications   Medication Sig Dispense Refill    metroNIDAZOLE (FLAGYL) 500 MG tablet Take 1 tablet by mouth in the morning and 1 tablet in the evening. Do all this for 7 days. 14 tablet 0    clotrimazole-betamethasone (LOTRISONE) 1-0.05 % cream Apply to affected area bid externally x 4 days then daily for 3 days 45 g 1    lidocaine-prilocaine (EMLA) 2.5-2.5 % cream Apply topically as needed.  5 g 1    levothyroxine (SYNTHROID) 50 MCG tablet TAKE 1 TABLET BY MOUTH ONE TIME A DAY 30 tablet 2    FEROSUL 325 (65 Fe) MG tablet TAKE 1 TABLET BY MOUTH 2 TIMES A DAY 60 tablet 3    hydrOXYzine (ATARAX) 25 MG tablet Take 1 tablet by mouth every 8 hours as needed for Itching 12 tablet 0    blood glucose monitor strips 1 strip by Other route three times daily Test___times daily    Diagnosis: 250.0   Diabetes Mellitus____Insulin Dependent____Non-Insulin Dependent 100 strip 3    nystatin (NYAMYC) 733959 UNIT/GM powder APPLY TO SKIN OF LEGS & IN FOLDS OF SKIN WITH IRRITATION 1 each 2    VICTOZA 18 MG/3ML SOPN SC injection START WITH INJECTING 1.2MG INTO THE SKIN ONCE A DAY FOR FOR 1 WEEK, THEN INCREASE AND INJECT 1.8MG INTO THE SKIN ONCE A DAY THEREAFTER. 6 mL 3    diclofenac (VOLTAREN) 50 MG EC tablet TAKE 1 TABLET BY MOUTH 3 TIMES A DAY WITH MEALS 60 tablet 3    metFORMIN (GLUCOPHAGE) 500 MG tablet TAKE 1 TABLET BY MOUTH 2 TIMES A DAY WITH MEALS 60 tablet 3    ASPIRIN LOW DOSE 81 MG EC tablet TAKE 1 TABLET BY MOUTH ONE TIME A DAY 30 tablet 3    MYRBETRIQ 25 MG TB24 TAKE 1 TABLET BY MOUTH ONE TIME A DAY 30 tablet 3    traZODone (DESYREL) 150 MG tablet TAKE ONE TABLET BY MOUTH NIGHTLY 30 tablet 3    simvastatin (ZOCOR) 40 MG tablet TAKE ONE TABLET BY MOUTH NIGHTLY 30 tablet 3    NOVOLOG FLEXPEN 100 UNIT/ML injection pen INJECT 30 UNITS INTO THE SKIN 3 TIMES DAILY BEFORE MEALS 15 mL 3    lisinopril (PRINIVIL;ZESTRIL) 5 MG tablet Take 1 tablet by mouth daily 30 tablet 3    Continuous Blood Gluc Sensor (FREESTYLE JUAN CARLOS 14 DAY SENSOR) MISC Test blood sugars 5-6/day 2 each 5    Continuous Blood Gluc  (FREESTYLE JUAN CARLOS 14 DAY READER) MERNA Test blood sugars 5-6/day 2 each 11    SUMAtriptan (IMITREX) 100 MG tablet Take 1 tablet by mouth 2 times daily as needed for Migraine 9 tablet 3    magnesium oxide (MAG-OX) 400 (241.3 Mg) MG TABS tablet Take 1 tablet by mouth daily 30 tablet 3    insulin glargine (BASAGLAR KWIKPEN) 100 UNIT/ML injection pen Inject 35 Units into the skin nightly      ONE TOUCH ULTRASOFT LANCETS MISC Dx: DM-2 use 2-3 times daily 100 each 2    Insulin Pen Needle 31G X 5 MM MISC 1 each by Does not apply route daily 100 each 3    Lancets MISC 1 each by Does not apply route daily Use 3-4 times daily 100 each 11    DULoxetine (CYMBALTA) 60 MG extended release capsule Take 1 capsule by mouth daily 90 capsule 3    albuterol sulfate HFA (PROVENTIL HFA) 108 (90 Base) MCG/ACT inhaler Inhale 2 puffs into the lungs every 6 hours as needed for Wheezing 1 Inhaler 2    Calcium Citrate-Vitamin D (CALCITRATE/VITAMIN D PO) Take 1 tablet by mouth daily       Misc. Devices MISC 1 each by Does not apply route once as needed (left knee-medial  brace) 1 each 0     Current Facility-Administered Medications   Medication Dose Route Frequency Provider Last Rate Last Admin    Hylan injection 48 mg  48 mg Intra-artICUlar Once Anil Triana MD        Hylan injection 48 mg  48 mg Intra-artICUlar Once Anil Triana MD         Allergies   Allergen Reactions    Januvia [Sitagliptin Phosphate] Hives    Naproxen Sodium Hives    Neurontin [Gabapentin] Nausea Only       Subjective:      Review of Systems  Constitutional: Negative for fever, chills and unexpected weight change. HENT: Negative for trouble swallowing. Respiratory: Negative for cough and shortness of breath. Cardiovascular: Negative for chest pain. Endocrine: Negative for polyuria. Genitourinary: Negative for dysuria, urgency, frequency, incontinence and difficulty urinating. Gastrointestinal: Negative for constipation or diarrhea. Musculoskeletal: Positive for arthralgias. Neurological: Negative for headaches, numbness, or tingling. Psychiatric: Negative for depressed mood or anxiety. Objective:     Physical Exam  /83   Pulse 87   Temp 97.9 °F (36.6 °C)   Wt 252 lb (114.3 kg)   LMP 07/29/2013   BMI 38.89 kg/m²   Constitutional: She appears well-developed and well-nourished. In no distress. HEENT: NCAT, PERRL, EOMI. Mucous membranes pink and moist.  Pulmonary/Chest: Respirations WNL and unlabored. MSK: Functional ROM B knees but with pain on terminal ends of flexion/extension. Strength 5/5 key muscles BLEs.  No palpable effusion within joint itself. Positive crepitus bilaterally. Neurological: She is alert and oriented to person, place, and time. DTRs 2+ and symmetric   Skin: Skin is warm dry and intact with good turgor. Psychiatric: She has a normal mood and affect. Her behavior is normal. Thought content normal.   Medical assistant note and vitals for today's encounter reviewed. Diagnostic Studies: None new    PROCEDURE:  After consent was obtained from patient, the Bilateral lateral infrapatellar region of the knees were cleansed with iodine and alcohol swabs then anesthetized with ethyl chloride. Each knee was injected with 6 ml of Synvisc without difficulty. No evidence of bleeding or adverse effect. Band aids applied. Patient encouraged to apply ice today prn pain. Assessment:       Diagnosis Orders   1. Primary osteoarthritis of both knees             Plan:      SynVisc injections as above. Can repeat in 6 months if needed. Pain management eval pending for possible genicular nerve block. No orders of the defined types were placed in this encounter. Orders Placed This Encounter   Medications    Hylan injection 48 mg    Hylan injection 48 mg     Return in about 8 weeks (around 9/19/2022). Electronically signedby Johny Pugh MD on 7/25/2022 at 4:14 PM.     Please note that this chartwas generated using voice recognition Dragon dictation software. Although everyeffort was made to ensure the accuracy of this automated transcription, some errorsin transcription may have occurred.

## 2022-07-25 NOTE — TELEPHONE ENCOUNTER
E-scribe request for Lisinopril. Please review and e-scribe if applicable. Previous Dr Amina Peñaloza patient     Next Visit Date:  Future Appointments   Date Time Provider Department Center   7/25/2022  3:30 PM Hussein Lynne MD [facility]   8/19/2022  1:00 PM Mike Mcclain  [address] [place] [address] Maintenance   Topic Date Due    Hepatitis C screen  Never done    DTaP/Tdap/Td vaccine (1 - Tdap) Never done    Colorectal Cancer Screen  Never done    Diabetic microalbuminuria test  09/11/2018    Diabetic foot exam  03/06/2020    Annual Wellness Visit (AWV)  11/06/2021    Hepatitis B vaccine (3 of 3 - Risk 3-dose series) 11/16/2021    A1C test (Diabetic or Prediabetic)  02/09/2022    Diabetic retinal exam  03/10/2022    COVID-19 Vaccine (3 - Booster for Pfizer series) 03/18/2022    Shingles vaccine (1 of 2) 03/04/2023 (Originally 7/6/2019)    Flu vaccine (1) 09/01/2022    Lipids  11/09/2022    Depression Monitoring  01/21/2023    Breast cancer screen  04/04/2024    Pneumococcal 0-64 years Vaccine (3 - PPSV23 or PCV20) 07/06/2034    HIV screen  Completed    Hepatitis A vaccine  Aged Out    Hib vaccine  Aged Out    Meningococcal (ACWY) vaccine  Aged Out               (applicable per patient's age: Cancer Screenings, Depression Screening, Fall Risk Screening, Immunizations)    Hemoglobin A1C (%)   Date Value   11/09/2021 11.2 (H)   10/19/2021 11.1   07/16/2021 11.2     Microalb/Crt.  Ratio (mcg/mg creat)   Date Value   09/11/2017 7     LDL Cholesterol (mg/dL)   Date Value   11/09/2021 102     AST (U/L)   Date Value   11/09/2021 12     ALT (U/L)   Date Value   11/09/2021 18     BUN (mg/dL)   Date Value   11/09/2021 11      (goal A1C is < 7)   (goal LDL is <100) need 30-50% reduction from baseline     BP Readings from Last 3 Encounters:   07/19/22 126/80   06/30/22 128/75   06/07/22 120/78    (goal /80)      All Future Testing planned in [facility]:  Lab Frequency Next Occurrence   TSH with Reflex Once 04/24/2022   Microalbumin / Creatinine Urine Ratio Once 04/24/2022            Patient Active Problem List:     Tobacco use     Carpal tunnel syndrome on both sides     Acquired hypothyroidism     Hyperlipidemia     Iron deficiency anemia     Mild episode of recurrent major depressive disorder (HCC)     Type 2 diabetes mellitus, with long-term current use of insulin (HCC)     Class 1 obesity due to excess calories with serious comorbidity in adult     Primary osteoarthritis of both knees     Urge incontinence of urine     Mild intermittent asthma without complication     Dyslipidemia     Primary hypertension     Pelvic pain in female     Bilateral ovarian cysts     H/O Hysterectomy (2013)     Trichomonal vaginitis     Chest pain     Frontal headache     Frontal sinusitis     Class 3 severe obesity in adult [facility])     Bilateral chronic knee pain     Acute headache     Visual changes     Blindness of left eye     Diabetic polyneuropathy (HCC)     Dizziness

## 2022-07-26 RX ORDER — LISINOPRIL 5 MG/1
TABLET ORAL
Qty: 30 TABLET | Refills: 3 | Status: SHIPPED | OUTPATIENT
Start: 2022-07-26

## 2022-07-26 RX ORDER — INSULIN ASPART 100 [IU]/ML
INJECTION, SOLUTION INTRAVENOUS; SUBCUTANEOUS
Qty: 15 ML | Refills: 3 | Status: SHIPPED | OUTPATIENT
Start: 2022-07-26

## 2022-07-26 RX ORDER — PEN NEEDLE, DIABETIC 31 GX3/16"
NEEDLE, DISPOSABLE MISCELLANEOUS
Qty: 100 EACH | Refills: 3 | Status: SHIPPED | OUTPATIENT
Start: 2022-07-26

## 2022-07-26 NOTE — TELEPHONE ENCOUNTER
Request for Novolog and Pen needles. Next Visit Date:  Future Appointments   Date Time Provider Eriberto Duartei   8/19/2022  1:00 PM Michaela Montgomery MD [facility]   9/7/2022  4:00 PM Taryn Shah MD [address] [place] [address] Drive Maintenance   Topic Date Due    Hepatitis C screen  Never done    DTaP/Tdap/Td vaccine (1 - Tdap) Never done    Colorectal Cancer Screen  Never done    Diabetic microalbuminuria test  09/11/2018    Diabetic foot exam  03/06/2020    Annual Wellness Visit (AWV)  11/06/2021    Hepatitis B vaccine (3 of 3 - Risk 3-dose series) 11/16/2021    A1C test (Diabetic or Prediabetic)  02/09/2022    Diabetic retinal exam  03/10/2022    COVID-19 Vaccine (3 - Booster for Pfizer series) 03/18/2022    Shingles vaccine (1 of 2) 03/04/2023 (Originally 7/6/2019)    Flu vaccine (1) 09/01/2022    Lipids  11/09/2022    Depression Monitoring  01/21/2023    Breast cancer screen  04/04/2024    Pneumococcal 0-64 years Vaccine (3 - PPSV23 or PCV20) 07/06/2034    HIV screen  Completed    Hepatitis A vaccine  Aged Out    Hib vaccine  Aged Out    Meningococcal (ACWY) vaccine  Aged Out       Hemoglobin A1C (%)   Date Value   11/09/2021 11.2 (H)   10/19/2021 11.1   07/16/2021 11.2             ( goal A1C is < 7)   Microalb/Crt.  Ratio (mcg/mg creat)   Date Value   09/11/2017 7     LDL Cholesterol (mg/dL)   Date Value   11/09/2021 102       (goal LDL is <100)   AST (U/L)   Date Value   11/09/2021 12     ALT (U/L)   Date Value   11/09/2021 18     BUN (mg/dL)   Date Value   11/09/2021 11     BP Readings from Last 3 Encounters:   07/25/22 129/83   07/19/22 126/80   06/30/22 128/75          (goal 120/80)    All Future Testing planned in [facility]  Lab Frequency Next Occurrence   TSH with Reflex Once 04/24/2022   Microalbumin / Creatinine Urine Ratio Once 04/24/2022         Patient Active Problem List:     Tobacco use     Carpal tunnel syndrome on both sides     Acquired hypothyroidism     Hyperlipidemia     Iron deficiency anemia     Mild episode of recurrent major depressive disorder (HCC)     Type 2 diabetes mellitus, with long-term current use of insulin ([facility])     Class 1 obesity due to excess calories with serious comorbidity in adult     Primary osteoarthritis of both knees     Urge incontinence of urine     Mild intermittent asthma without complication     Dyslipidemia     Primary hypertension     Pelvic pain in female     Bilateral ovarian cysts     H/O Hysterectomy (2013)     Trichomonal vaginitis     Chest pain     Frontal headache     Frontal sinusitis     Class 3 severe obesity in adult [facility])     Bilateral chronic knee pain     Acute headache     Visual changes     Blindness of left eye     Diabetic polyneuropathy (HCC)     Dizziness

## 2022-08-19 ENCOUNTER — TELEPHONE (OUTPATIENT)
Dept: INTERNAL MEDICINE | Age: 52
End: 2022-08-19

## 2022-08-25 DIAGNOSIS — F33.0 MILD EPISODE OF RECURRENT MAJOR DEPRESSIVE DISORDER (HCC): ICD-10-CM

## 2022-08-25 DIAGNOSIS — Z79.4 TYPE 2 DIABETES MELLITUS WITH HYPERGLYCEMIA, WITH LONG-TERM CURRENT USE OF INSULIN (HCC): ICD-10-CM

## 2022-08-25 DIAGNOSIS — E11.65 TYPE 2 DIABETES MELLITUS WITH HYPERGLYCEMIA, WITH LONG-TERM CURRENT USE OF INSULIN (HCC): ICD-10-CM

## 2022-08-25 DIAGNOSIS — E11.42 TYPE 2 DIABETES MELLITUS WITH DIABETIC POLYNEUROPATHY, WITH LONG-TERM CURRENT USE OF INSULIN (HCC): ICD-10-CM

## 2022-08-25 DIAGNOSIS — E78.00 PURE HYPERCHOLESTEROLEMIA: ICD-10-CM

## 2022-08-25 DIAGNOSIS — G89.29 CHRONIC PAIN OF BOTH KNEES: ICD-10-CM

## 2022-08-25 DIAGNOSIS — M25.561 CHRONIC PAIN OF BOTH KNEES: ICD-10-CM

## 2022-08-25 DIAGNOSIS — E08.42 DIABETIC POLYNEUROPATHY ASSOCIATED WITH DIABETES MELLITUS DUE TO UNDERLYING CONDITION (HCC): ICD-10-CM

## 2022-08-25 DIAGNOSIS — Z79.4 TYPE 2 DIABETES MELLITUS WITH DIABETIC POLYNEUROPATHY, WITH LONG-TERM CURRENT USE OF INSULIN (HCC): ICD-10-CM

## 2022-08-25 DIAGNOSIS — E03.9 ACQUIRED HYPOTHYROIDISM: ICD-10-CM

## 2022-08-25 DIAGNOSIS — M25.562 CHRONIC PAIN OF BOTH KNEES: ICD-10-CM

## 2022-08-25 DIAGNOSIS — I10 ESSENTIAL HYPERTENSION: ICD-10-CM

## 2022-09-29 ENCOUNTER — HOSPITAL ENCOUNTER (EMERGENCY)
Age: 52
Discharge: HOME | End: 2022-09-29
Attending: EMERGENCY MEDICINE
Payer: COMMERCIAL

## 2022-09-29 ENCOUNTER — APPOINTMENT (OUTPATIENT)
Dept: CT IMAGING | Age: 52
End: 2022-09-29
Payer: COMMERCIAL

## 2022-09-29 ENCOUNTER — APPOINTMENT (OUTPATIENT)
Dept: GENERAL RADIOLOGY | Age: 52
End: 2022-09-29
Payer: COMMERCIAL

## 2022-09-29 VITALS
HEART RATE: 71 BPM | RESPIRATION RATE: 18 BRPM | DIASTOLIC BLOOD PRESSURE: 73 MMHG | WEIGHT: 250 LBS | SYSTOLIC BLOOD PRESSURE: 120 MMHG | OXYGEN SATURATION: 99 % | TEMPERATURE: 98.2 F | BODY MASS INDEX: 38.58 KG/M2

## 2022-09-29 DIAGNOSIS — G43.009 MIGRAINE WITHOUT AURA AND WITHOUT STATUS MIGRAINOSUS, NOT INTRACTABLE: ICD-10-CM

## 2022-09-29 DIAGNOSIS — R42 DIZZINESS: Primary | ICD-10-CM

## 2022-09-29 DIAGNOSIS — R73.9 HYPERGLYCEMIA: ICD-10-CM

## 2022-09-29 LAB
ABSOLUTE EOS #: 0.14 K/UL (ref 0–0.44)
ABSOLUTE IMMATURE GRANULOCYTE: 0.02 K/UL (ref 0–0.3)
ABSOLUTE LYMPH #: 2.9 K/UL (ref 1.1–3.7)
ABSOLUTE MONO #: 0.49 K/UL (ref 0.1–1.2)
ALBUMIN SERPL-MCNC: 4.2 G/DL (ref 3.5–5.2)
ALP BLD-CCNC: 112 U/L (ref 35–104)
ALT SERPL-CCNC: 12 U/L (ref 5–33)
ANION GAP SERPL CALCULATED.3IONS-SCNC: 10 MMOL/L (ref 9–17)
AST SERPL-CCNC: 13 U/L
BACTERIA: ABNORMAL
BASOPHILS # BLD: 0 % (ref 0–2)
BASOPHILS ABSOLUTE: <0.03 K/UL (ref 0–0.2)
BILIRUB SERPL-MCNC: 0.3 MG/DL (ref 0.3–1.2)
BILIRUBIN URINE: NEGATIVE
BUN BLDV-MCNC: 12 MG/DL (ref 6–20)
BUN/CREAT BLD: 20 (ref 9–20)
CALCIUM SERPL-MCNC: 9.6 MG/DL (ref 8.6–10.4)
CHLORIDE BLD-SCNC: 99 MMOL/L (ref 98–107)
CHP ED QC CHECK: NORMAL
CHP ED QC CHECK: YES
CO2: 25 MMOL/L (ref 20–31)
COLOR: YELLOW
CREAT SERPL-MCNC: 0.6 MG/DL (ref 0.5–0.9)
EOSINOPHILS RELATIVE PERCENT: 2 % (ref 1–4)
EPITHELIAL CELLS UA: ABNORMAL /HPF (ref 0–5)
GFR AFRICAN AMERICAN: >60 ML/MIN
GFR NON-AFRICAN AMERICAN: >60 ML/MIN
GFR SERPL CREATININE-BSD FRML MDRD: ABNORMAL ML/MIN/{1.73_M2}
GLUCOSE BLD-MCNC: 217 MG/DL
GLUCOSE BLD-MCNC: 217 MG/DL (ref 65–105)
GLUCOSE BLD-MCNC: 237 MG/DL (ref 70–99)
GLUCOSE BLD-MCNC: 244 MG/DL
GLUCOSE BLD-MCNC: 244 MG/DL (ref 65–105)
GLUCOSE URINE: ABNORMAL
HCT VFR BLD CALC: 41.9 % (ref 36.3–47.1)
HEMOGLOBIN: 14 G/DL (ref 11.9–15.1)
IMMATURE GRANULOCYTES: 0 %
KETONES, URINE: NEGATIVE
LEUKOCYTE ESTERASE, URINE: NEGATIVE
LYMPHOCYTES # BLD: 42 % (ref 24–43)
MAGNESIUM: 1.8 MG/DL (ref 1.6–2.6)
MCH RBC QN AUTO: 31.3 PG (ref 25.2–33.5)
MCHC RBC AUTO-ENTMCNC: 33.4 G/DL (ref 28.4–34.8)
MCV RBC AUTO: 93.5 FL (ref 82.6–102.9)
MONOCYTES # BLD: 7 % (ref 3–12)
MUCUS: ABNORMAL
NITRITE, URINE: NEGATIVE
NRBC AUTOMATED: 0 PER 100 WBC
PDW BLD-RTO: 12.5 % (ref 11.8–14.4)
PH UA: 5.5 (ref 5–8)
PLATELET # BLD: 286 K/UL (ref 138–453)
PMV BLD AUTO: 9.9 FL (ref 8.1–13.5)
POTASSIUM SERPL-SCNC: 4.3 MMOL/L (ref 3.7–5.3)
PROTEIN UA: NEGATIVE
RBC # BLD: 4.48 M/UL (ref 3.95–5.11)
RBC UA: ABNORMAL /HPF (ref 0–2)
SEG NEUTROPHILS: 49 % (ref 36–65)
SEGMENTED NEUTROPHILS ABSOLUTE COUNT: 3.36 K/UL (ref 1.5–8.1)
SERUM OSMOLALITY: 295 MOSM/KG (ref 275–295)
SODIUM BLD-SCNC: 134 MMOL/L (ref 135–144)
SPECIFIC GRAVITY UA: 1.06 (ref 1–1.03)
TOTAL PROTEIN: 7.3 G/DL (ref 6.4–8.3)
TROPONIN, HIGH SENSITIVITY: <6 NG/L (ref 0–14)
TURBIDITY: ABNORMAL
URINE HGB: NEGATIVE
UROBILINOGEN, URINE: NORMAL
WBC # BLD: 6.9 K/UL (ref 3.5–11.3)
WBC UA: ABNORMAL /HPF (ref 0–5)

## 2022-09-29 PROCEDURE — 93005 ELECTROCARDIOGRAM TRACING: CPT | Performed by: PHYSICIAN ASSISTANT

## 2022-09-29 PROCEDURE — 82947 ASSAY GLUCOSE BLOOD QUANT: CPT

## 2022-09-29 PROCEDURE — 2580000003 HC RX 258: Performed by: PHYSICIAN ASSISTANT

## 2022-09-29 PROCEDURE — 85025 COMPLETE CBC W/AUTO DIFF WBC: CPT

## 2022-09-29 PROCEDURE — 83930 ASSAY OF BLOOD OSMOLALITY: CPT

## 2022-09-29 PROCEDURE — 84484 ASSAY OF TROPONIN QUANT: CPT

## 2022-09-29 PROCEDURE — 81001 URINALYSIS AUTO W/SCOPE: CPT

## 2022-09-29 PROCEDURE — 70450 CT HEAD/BRAIN W/O DYE: CPT

## 2022-09-29 PROCEDURE — 71045 X-RAY EXAM CHEST 1 VIEW: CPT

## 2022-09-29 PROCEDURE — 99285 EMERGENCY DEPT VISIT HI MDM: CPT

## 2022-09-29 PROCEDURE — 83735 ASSAY OF MAGNESIUM: CPT

## 2022-09-29 PROCEDURE — 36415 COLL VENOUS BLD VENIPUNCTURE: CPT

## 2022-09-29 PROCEDURE — 6370000000 HC RX 637 (ALT 250 FOR IP): Performed by: PHYSICIAN ASSISTANT

## 2022-09-29 PROCEDURE — 80053 COMPREHEN METABOLIC PANEL: CPT

## 2022-09-29 RX ORDER — SODIUM CHLORIDE 9 MG/ML
1000 INJECTION, SOLUTION INTRAVENOUS ONCE
Status: COMPLETED | OUTPATIENT
Start: 2022-09-29 | End: 2022-09-29

## 2022-09-29 RX ORDER — LIRAGLUTIDE 6 MG/ML
1.2 INJECTION SUBCUTANEOUS DAILY
Qty: 4 ADJUSTABLE DOSE PRE-FILLED PEN SYRINGE | Refills: 0 | Status: SHIPPED | OUTPATIENT
Start: 2022-09-29

## 2022-09-29 RX ORDER — BUTALBITAL, ACETAMINOPHEN AND CAFFEINE 50; 325; 40 MG/1; MG/1; MG/1
1 TABLET ORAL ONCE
Status: COMPLETED | OUTPATIENT
Start: 2022-09-29 | End: 2022-09-29

## 2022-09-29 RX ADMIN — BUTALBITAL, ACETAMINOPHEN, AND CAFFEINE 1 TABLET: 50; 325; 40 TABLET ORAL at 18:50

## 2022-09-29 RX ADMIN — SODIUM CHLORIDE 1000 ML: 9 INJECTION, SOLUTION INTRAVENOUS at 17:19

## 2022-09-29 ASSESSMENT — ENCOUNTER SYMPTOMS
VOMITING: 0
ABDOMINAL PAIN: 0
NAUSEA: 0
WHEEZING: 0
BLOOD IN STOOL: 0
DIARRHEA: 0
CHEST TIGHTNESS: 0
COUGH: 0
BACK PAIN: 0
SHORTNESS OF BREATH: 0
CONSTIPATION: 0

## 2022-09-29 ASSESSMENT — PAIN DESCRIPTION - DESCRIPTORS: DESCRIPTORS: SHARP

## 2022-09-29 ASSESSMENT — PAIN - FUNCTIONAL ASSESSMENT: PAIN_FUNCTIONAL_ASSESSMENT: 0-10

## 2022-09-29 ASSESSMENT — PAIN SCALES - GENERAL
PAINLEVEL_OUTOF10: 6
PAINLEVEL_OUTOF10: 8

## 2022-09-29 ASSESSMENT — PAIN DESCRIPTION - LOCATION: LOCATION: CHEST;HEAD

## 2022-09-29 ASSESSMENT — PAIN DESCRIPTION - FREQUENCY: FREQUENCY: INTERMITTENT

## 2022-09-29 NOTE — ED NOTES
Patient has been out of metformin and insulin for the last 3 days and started having chest pain, dizziness and headache today and reports that blood sugar was in the 400's last night. Patient POC glucose was 244 in triage. Patient states she used to go to the [facility]. 's clinic for management of her DM, but missed a couple of appointments and they quit seeing her.       Nguyen Askew, RN  17/63/97 0657

## 2022-09-29 NOTE — ED PROVIDER NOTES
[address] [place] [address] ED  eMERGENCY dEPARTMENT eNCOUnter      Pt Name: Cosmo Denton  MRN: [id number]  [place] 1970  Date of evaluation: 9/29/2022  Provider: Aicha TROY PA-C    [address] [place] [address]       Chief Complaint   Patient presents with    Headache     Onset last Mon    Dizziness     Onset last Mon    Chest Pain     Onset last tues    Hyperglycemia     States lost insulin/metformin 5 days ago, triage          HISTORY OF PRESENT ILLNESS  (Location/Symptom, Timing/Onset, Context/Setting, Quality, Duration, Modifying Factors, Severity.)   Cosmo Denton is a 47 y.o. female who presents to the emergency department with complaints of generalized headache, dizziness, high blood sugar and mild chest discomfort for the last couple of days. Patient states that symptoms began on Monday and she ran out of all of her diabetic medications on Tuesday. She has had no heart palpitations, shortness of breath,  syncopal episodes, numbness tingling weakness in any of her extremities. She states that the headache is generalized in nature but she has some sharp stabbing pain on the top of her head intermittently. Nursing Notes were reviewed.     ALLERGIES     Januvia [sitagliptin phosphate], Naproxen sodium, and Neurontin [gabapentin]    CURRENT MEDICATIONS       Previous Medications    ALBUTEROL SULFATE HFA (PROVENTIL HFA) 108 (90 BASE) MCG/ACT INHALER    Inhale 2 puffs into the lungs every 6 hours as needed for Wheezing    ASPIRIN LOW DOSE 81 MG EC TABLET    TAKE 1 TABLET BY MOUTH ONE TIME A DAY    BLOOD GLUCOSE MONITOR STRIPS    1 strip by Other route three times daily Test___times daily    Diagnosis: 250.0   Diabetes Mellitus____Insulin Dependent____Non-Insulin Dependent    CALCIUM CITRATE-VITAMIN D (CALCITRATE/VITAMIN D PO)    Take 1 tablet by mouth daily     CLOTRIMAZOLE-BETAMETHASONE (LOTRISONE) 1-0.05 % CREAM    Apply to affected area bid externally x 4 days then daily for 3 days    CONTINUOUS BLOOD GLUC  (FREESTYLE JUAN CARLOS 14 DAY READER) MERNA    Test blood sugars 5-6/day    CONTINUOUS BLOOD GLUC SENSOR (FREESTYLE JUAN CARLOS 14 DAY SENSOR) MISC    Test blood sugars 5-6/day    DICLOFENAC (VOLTAREN) 50 MG EC TABLET    TAKE 1 TABLET BY MOUTH 3 TIMES A DAY WITH MEALS    DULOXETINE (CYMBALTA) 60 MG EXTENDED RELEASE CAPSULE    Take 1 capsule by mouth daily    FEROSUL 325 (65 FE) MG TABLET    TAKE 1 TABLET BY MOUTH 2 TIMES A DAY    HYDROXYZINE (ATARAX) 25 MG TABLET    Take 1 tablet by mouth every 8 hours as needed for Itching    INSULIN GLARGINE (BASAGLAR KWIKPEN) 100 UNIT/ML INJECTION PEN    Inject 35 Units into the skin nightly    LANCETS MISC    1 each by Does not apply route daily Use 3-4 times daily    LEVOTHYROXINE (SYNTHROID) 50 MCG TABLET    TAKE 1 TABLET BY MOUTH ONE TIME A DAY    LIDOCAINE-PRILOCAINE (EMLA) 2.5-2.5 % CREAM    Apply topically as needed. LISINOPRIL (PRINIVIL;ZESTRIL) 5 MG TABLET    TAKE 1 TABLET BY MOUTH ONE TIME DAILY    MAGNESIUM OXIDE (MAG-OX) 400 (241.3 MG) MG TABS TABLET    Take 1 tablet by mouth daily    METFORMIN (GLUCOPHAGE) 500 MG TABLET    TAKE 1 TABLET BY MOUTH 2 TIMES A DAY WITH MEALS    MISC.  DEVICES MISC    1 each by Does not apply route once as needed (left knee-medial  brace)    MYRBETRIQ 25 MG TB24    TAKE 1 TABLET BY MOUTH ONE TIME A DAY    NOVOLOG FLEXPEN 100 UNIT/ML INJECTION PEN    INJECT 30 UNITS INTO THE SKIN THREE TIMES DAILY BEFORE MEALS    NYSTATIN ([address]) 644049 UNIT/GM POWDER    APPLY TO SKIN OF LEGS & IN FOLDS OF SKIN WITH IRRITATION    ONE TOUCH ULTRASOFT LANCETS MISC    Dx: DM-2 use 2-3 times daily    SIMVASTATIN (ZOCOR) 40 MG TABLET    TAKE ONE TABLET BY MOUTH NIGHTLY    SUMATRIPTAN (IMITREX) 100 MG TABLET    Take 1 tablet by mouth 2 times daily as needed for Migraine    TRAZODONE (DESYREL) 150 MG TABLET    TAKE ONE TABLET BY MOUTH NIGHTLY    UNIFINE PENTIPS 31G X 5 MM MISC    USE DAILY WITH INSULIN AS DIRECTED THREE TIMES A DAY & NIGHTLY VICTOZA 18 MG/3ML SOPN SC INJECTION    START WITH INJECTING 1.2MG INTO THE SKIN ONCE A DAY FOR FOR 1 WEEK, THEN INCREASE AND INJECT 1.8MG INTO THE SKIN ONCE A DAY THEREAFTER. PAST MEDICAL HISTORY         Diagnosis Date    Anxiety     Arthritis of knee, degenerative 2012    Blindness of left eye     hx. of     Blurry vision     left remaines blurry to Left eye to  on 21    Carpal tunnel syndrome on both sides 1/15/2013    Chronic knee pain     on 18 pt is presently in pain mgmnt    Depressive disorder, not elsewhere classified 10/14/2014    Headache     Hx. of     Hydronephrosis, left 2016    Hyperlipidemia     Hypertension     Hypothyroidism 2013    Iron deficiency anemia 6/10/2014    Mild intermittent asthma without complication     Obesity     Osteoarthritis     KNEE    Polyneuropathy 2012    RAD (reactive airway disease) 2012    Snores     possible apnea but not tested yet    Type II or unspecified type diabetes mellitus without mention of complication, not stated as uncontrolled     Urge incontinence of urine 2017    Wears glasses     Weight loss        SURGICAL HISTORY           Procedure Laterality Date    CARPAL TUNNEL RELEASE      2008 left,  2009 right     SECTION  2007    CYSTOSCOPY  2016    cysto with left ureteral stent placement    HYSTERECTOMY, TOTAL ABDOMINAL (CERVIX REMOVED)  2013    LITHOTRIPSY Left 2016    cysto with ESWL and left ureteral stent placement.     NERVE BLOCK Left 2016    left knee kenalog 80mg    NERVE BLOCK  3/11/16    Rt Knee depo medrol 80     TUBAL LIGATION  3/2012         FAMILY HISTORY           Problem Relation Age of Onset    Stroke Father     Diabetes Mother     Hypertension Mother     Breast Cancer Neg Hx     Cancer Neg Hx     Colon Cancer Neg Hx     Eclampsia Neg Hx     Ovarian Cancer Neg Hx      Labor Neg Hx     Spont Abortions Neg Hx      Family Status   Relation Name Status Father  Alive    Mother  Alive    Neg Hx  (Not Specified)        SOCIAL HISTORY      reports that she has been smoking cigarettes. She has a 3.75 pack-year smoking history. She has never used smokeless tobacco. She reports that she does not currently use alcohol. She reports current drug use. Drug: Marijuana Gage Jonathan). REVIEW OF SYSTEMS    (2-9 systems for level 4, 10 or more for level 5)     Review of Systems   Constitutional:  Negative for appetite change, chills, diaphoresis, fatigue, fever and unexpected weight change. Respiratory:  Negative for cough, chest tightness, shortness of breath and wheezing. Cardiovascular:  Negative for chest pain, palpitations and leg swelling. Gastrointestinal:  Negative for abdominal pain, blood in stool, constipation, diarrhea, nausea and vomiting. Genitourinary:  Negative for dysuria, frequency and urgency. Musculoskeletal:  Negative for back pain and myalgias. Neurological:  Positive for dizziness, light-headedness and headaches. Negative for tremors, seizures, syncope, facial asymmetry, speech difficulty, weakness and numbness. Except as noted above the remainder of the review of systems was reviewed and negative. PHYSICAL EXAM    (up to 7 for level 4, 8 or more for level 5)     ED Triage Vitals [09/29/22 1640]   BP Temp Temp Source Heart Rate Resp SpO2 Height Weight   120/73 98.2 °F (36.8 °C) Oral 71 18 99 % -- 250 lb (113.4 kg)       Physical Exam  Vitals and nursing note reviewed. Constitutional:       General: She is not in acute distress. Appearance: Normal appearance. She is well-developed. She is not ill-appearing, toxic-appearing or diaphoretic. HENT:      Head: Normocephalic and atraumatic. Eyes:      General: No scleral icterus. Right eye: No discharge. Left eye: No discharge. Conjunctiva/sclera: Conjunctivae normal.   Neck:      Thyroid: No thyroid mass, thyromegaly or thyroid tenderness.    Cardiovascular: with no acute findings    Interpretation per the Radiologist below, if available at the time of this note:        ED BEDSIDE ULTRASOUND:   Performed by ED Physician - none    LABS:  Results for orders placed or performed during the hospital encounter of 09/29/22   CBC with Auto Differential   Result Value Ref Range    WBC 6.9 3.5 - 11.3 k/uL    RBC 4.48 3.95 - 5.11 m/uL    Hemoglobin 14.0 11.9 - 15.1 g/dL    Hematocrit 41.9 36.3 - 47.1 %    MCV 93.5 82.6 - 102.9 fL    MCH 31.3 25.2 - 33.5 pg    MCHC 33.4 28.4 - 34.8 g/dL    RDW 12.5 11.8 - 14.4 %    Platelets 548 230 - 454 k/uL    MPV 9.9 8.1 - 13.5 fL    NRBC Automated 0.0 0.0 per 100 WBC    Seg Neutrophils 49 36 - 65 %    Lymphocytes 42 24 - 43 %    Monocytes 7 3 - 12 %    Eosinophils % 2 1 - 4 %    Basophils 0 0 - 2 %    Immature Granulocytes 0 0 %    Segs Absolute 3.36 1.50 - 8.10 k/uL    Absolute Lymph # 2.90 1.10 - 3.70 k/uL    Absolute Mono # 0.49 0.10 - 1.20 k/uL    Absolute Eos # 0.14 0.00 - 0.44 k/uL    Basophils Absolute <0.03 0.00 - 0.20 k/uL    Absolute Immature Granulocyte 0.02 0.00 - 0.30 k/uL   CMP   Result Value Ref Range    Glucose 237 (H) 70 - 99 mg/dL    BUN 12 6 - 20 mg/dL    Creatinine 0.60 0.50 - 0.90 mg/dL    Bun/Cre Ratio 20 9 - 20    Calcium 9.6 8.6 - 10.4 mg/dL    Sodium 134 (L) 135 - 144 mmol/L    Potassium 4.3 3.7 - 5.3 mmol/L    Chloride 99 98 - 107 mmol/L    CO2 25 20 - 31 mmol/L    Anion Gap 10 9 - 17 mmol/L    Alkaline Phosphatase 112 (H) 35 - 104 U/L    ALT 12 5 - 33 U/L    AST 13 <32 U/L    Total Bilirubin 0.3 0.3 - 1.2 mg/dL    Total Protein 7.3 6.4 - 8.3 g/dL    Albumin 4.2 3.5 - 5.2 g/dL    GFR Non-African American >60 >60 mL/min    GFR African American >60 >60 mL/min    GFR Comment         Troponin   Result Value Ref Range    Troponin, High Sensitivity <6 0 - 14 ng/L   Urinalysis with Reflex to Culture    Specimen: Urine   Result Value Ref Range    Color, UA Yellow Yellow    Turbidity UA SLIGHTLY CLOUDY (A) Clear    Glucose, Ur 3+ (A) NEGATIVE    Bilirubin Urine NEGATIVE NEGATIVE    Ketones, Urine NEGATIVE NEGATIVE    Specific Gravity, UA 1.060 (H) 1.005 - 1.030    Urine Hgb NEGATIVE NEGATIVE    pH, UA 5.5 5.0 - 8.0    Protein, UA NEGATIVE NEGATIVE    Urobilinogen, Urine Normal Normal    Nitrite, Urine NEGATIVE NEGATIVE    Leukocyte Esterase, Urine NEGATIVE NEGATIVE   Magnesium   Result Value Ref Range    Magnesium 1.8 1.6 - 2.6 mg/dL   Microscopic Urinalysis   Result Value Ref Range    WBC, UA 0 TO 2 0 - 5 /HPF    RBC, UA None 0 - 2 /HPF    Epithelial Cells UA 20 TO 50 0 - 5 /HPF    Bacteria, UA MODERATE (A) None    Mucus, UA 1+ (A) None   POC Glucose Fingerstick   Result Value Ref Range    POC Glucose 244 (H) 65 - 105 mg/dL   POCT Glucose   Result Value Ref Range    Glucose 244 mg/dL    QC OK? y    POCT Glucose   Result Value Ref Range    Glucose 217 mg/dL    QC OK? yes    POC Glucose Fingerstick   Result Value Ref Range    POC Glucose 217 (H) 65 - 105 mg/dL       All other labs were within normal range or not returned as of this dictation. EMERGENCY DEPARTMENT COURSE and DIFFERENTIAL DIAGNOSIS/MDM:   Vitals:    Vitals:    09/29/22 1640   BP: 120/73   Pulse: 71   Resp: 18   Temp: 98.2 °F (36.8 °C)   TempSrc: Oral   SpO2: 99%   Weight: 250 lb (113.4 kg)           Medical Decision Making patient is a 50-year-old female with complaint of lightheadedness, headache and elevated blood sugar for the last few days since she ran out of her metformin and Januvia. Patient is looking for a new family practice doctor. Blood sugar initially 244 and improved with IV fluids. Headache resolved after Fioricet. Patient is discharged home in stable condition with prescriptions for metformin and Januvia.   She is to establish care with new family practice doctor and return to the emergency room if symptoms worsen patient ambulated out of the emergency room without any difficulty    CONSULTS:  None    PROCEDURES:  None    FINAL IMPRESSION      1. Dizziness    2. Migraine without aura and without status migrainosus, not intractable    3. Hyperglycemia          DISPOSITION/PLAN   DISPOSITION Decision To Discharge 09/29/2022 07:06:54 PM      PATIENT REFERRED TO:   No follow-up provider specified.     DISCHARGE MEDICATIONS:     New Prescriptions    LIRAGLUTIDE (VICTOZA) 18 MG/3ML SOPN SC INJECTION    Inject 1.2 mg into the skin daily    METFORMIN (GLUCOPHAGE) 500 MG TABLET    Take 1 tablet by mouth 2 times daily (with meals)         (Please note that portions of this note were completed with a voice recognition program.  Efforts were made to edit the dictations but occasionally words are mis-transcribed.)    Kasie TROY PA-C  Attending Emergency Physician         Tabitha Scott PA-C  09/29/22 8026

## 2022-09-29 NOTE — ED PROVIDER NOTES
eMERGENCY dEPARTMENT eNCOUnter   Independent Attestation     Pt Name: Antonietta Roche  MRN: [id number]  [place] 1970  Date of evaluation: 9/29/22     Antonietta Roche is a 47 y.o. female with CC: Headache (Onset last Mon), Dizziness (Onset last Mon), Chest Pain (Onset last tues), and Hyperglycemia (States lost insulin/metformin 5 days ago, triage )      Based on the medical record the care appears appropriate. I was personally available for consultation in the Emergency Department. The care is provided during an unprecedented national emergency due to the novel coronavirus, COVID 19.     Steven Mclain DO  Attending Emergency Physician                 Steven Mclain DO  09/29/22 1464

## 2022-09-30 ENCOUNTER — NURSE TRIAGE (OUTPATIENT)
Dept: OTHER | Facility: CLINIC | Age: 52
End: 2022-09-30

## 2022-09-30 LAB
EKG ATRIAL RATE: 73 BPM
EKG P AXIS: 54 DEGREES
EKG P-R INTERVAL: 156 MS
EKG Q-T INTERVAL: 386 MS
EKG QRS DURATION: 86 MS
EKG QTC CALCULATION (BAZETT): 425 MS
EKG R AXIS: 34 DEGREES
EKG T AXIS: 49 DEGREES
EKG VENTRICULAR RATE: 73 BPM

## 2022-09-30 PROCEDURE — 93010 ELECTROCARDIOGRAM REPORT: CPT | Performed by: INTERNAL MEDICINE

## 2022-10-04 ENCOUNTER — OFFICE VISIT (OUTPATIENT)
Dept: INTERNAL MEDICINE CLINIC | Age: 52
End: 2022-10-04
Payer: COMMERCIAL

## 2022-10-04 ENCOUNTER — HOSPITAL ENCOUNTER (OUTPATIENT)
Age: 52
Setting detail: SPECIMEN
Discharge: HOME | End: 2022-10-04

## 2022-10-04 VITALS
HEART RATE: 89 BPM | SYSTOLIC BLOOD PRESSURE: 120 MMHG | WEIGHT: 250 LBS | DIASTOLIC BLOOD PRESSURE: 68 MMHG | BODY MASS INDEX: 38.58 KG/M2 | OXYGEN SATURATION: 98 %

## 2022-10-04 DIAGNOSIS — E11.65 TYPE 2 DIABETES MELLITUS WITH HYPERGLYCEMIA, WITH LONG-TERM CURRENT USE OF INSULIN (HCC): ICD-10-CM

## 2022-10-04 DIAGNOSIS — Z79.4 TYPE 2 DIABETES MELLITUS WITH HYPERGLYCEMIA, WITH LONG-TERM CURRENT USE OF INSULIN (HCC): ICD-10-CM

## 2022-10-04 DIAGNOSIS — R51.9 NONINTRACTABLE HEADACHE, UNSPECIFIED CHRONICITY PATTERN, UNSPECIFIED HEADACHE TYPE: Primary | ICD-10-CM

## 2022-10-04 DIAGNOSIS — Z12.11 COLON CANCER SCREENING: ICD-10-CM

## 2022-10-04 LAB
CREATININE URINE: 323.4 MG/DL (ref 28–217)
MICROALBUMIN/CREAT 24H UR: 19 MG/L
MICROALBUMIN/CREAT UR-RTO: 6 MCG/MG CREAT

## 2022-10-04 PROCEDURE — G8417 CALC BMI ABV UP PARAM F/U: HCPCS | Performed by: INTERNAL MEDICINE

## 2022-10-04 PROCEDURE — 3046F HEMOGLOBIN A1C LEVEL >9.0%: CPT | Performed by: INTERNAL MEDICINE

## 2022-10-04 PROCEDURE — 4004F PT TOBACCO SCREEN RCVD TLK: CPT | Performed by: INTERNAL MEDICINE

## 2022-10-04 PROCEDURE — G8427 DOCREV CUR MEDS BY ELIG CLIN: HCPCS | Performed by: INTERNAL MEDICINE

## 2022-10-04 PROCEDURE — 2022F DILAT RTA XM EVC RTNOPTHY: CPT | Performed by: INTERNAL MEDICINE

## 2022-10-04 PROCEDURE — G8484 FLU IMMUNIZE NO ADMIN: HCPCS | Performed by: INTERNAL MEDICINE

## 2022-10-04 PROCEDURE — 3017F COLORECTAL CA SCREEN DOC REV: CPT | Performed by: INTERNAL MEDICINE

## 2022-10-04 PROCEDURE — 99203 OFFICE O/P NEW LOW 30 MIN: CPT | Performed by: INTERNAL MEDICINE

## 2022-10-04 RX ORDER — SUMATRIPTAN 25 MG/1
25 TABLET, FILM COATED ORAL
Qty: 9 TABLET | Refills: 1 | Status: SHIPPED | OUTPATIENT
Start: 2022-10-04 | End: 2022-10-04

## 2022-10-04 NOTE — PROGRESS NOTES
Visit Information    Have you changed or started any medications since your last visit including any over-the-counter medicines, vitamins, or herbal medicines? no   Are you having any side effects from any of your medications? -  no  Have you stopped taking any of your medications? Is so, why? -  no    Have you seen any other physician or provider since your last visit? Yes - Records Obtained  Have you had any other diagnostic tests since your last visit? Yes - Records Obtained  Have you been seen in the emergency room and/or had an admission to a hospital since we last saw you? Yes - Records Obtained  Have you had your routine dental cleaning in the past 6 months? no    Have you activated your [employer] account? If not, what are your barriers?  Yes     Patient Care Team:  Cinthia Stacy MD as PCP - General (Internal Medicine)  Malaika Crowe MD as Anesthesiologist (Pain Management)  Parmjit Griggs MD as Consulting Physician (Urology)  Melany Torres MD as Consulting Physician (Internal Medicine)    Medical History Review  Past Medical, Family, and Social History reviewed and does contribute to the patient presenting condition    Health Maintenance   Topic Date Due    Hepatitis C screen  Never done    DTaP/Tdap/Td vaccine (1 - Tdap) Never done    Colorectal Cancer Screen  Never done    Cervical cancer screen  05/20/2016    Diabetic microalbuminuria test  09/11/2018    Diabetic foot exam  03/06/2020    Hepatitis B vaccine (3 of 4 - 4-dose series) 07/16/2021    Annual Wellness Visit (AWV)  11/06/2021    A1C test (Diabetic or Prediabetic)  02/09/2022    Diabetic retinal exam  03/10/2022    COVID-19 Vaccine (3 - Booster for Pfizer series) 03/18/2022    Flu vaccine (1) 08/01/2022    Shingles vaccine (1 of 2) 03/04/2023 (Originally 7/6/2019)    Lipids  11/09/2022    Depression Monitoring  01/21/2023    Breast cancer screen  04/04/2024    Pneumococcal 0-64 years Vaccine (3 - PPSV23 or PCV20) 07/06/2034    HIV screen Completed    Hepatitis A vaccine  Aged Out    Hib vaccine  Aged Out    Meningococcal (ACWY) vaccine  Aged Out

## 2022-10-04 NOTE — PROGRESS NOTES
[address] [facility] [address]  [place] [address]  Dept: [phone]  Dept Fax: [phone]    Natividad Alas is a 47 y.o. female who presents today for her medicalconditions/complaints as noted below. Natividad Alas is c/o of Annual Exam (Seen in er on 9/29 for dizziness ) and Headache (Then gets dizzy)      HPI:     Diabetes  She presents for her initial diabetic visit. She has type 2 diabetes mellitus. Her disease course has been improving. Risk factors for coronary artery disease include diabetes mellitus, dyslipidemia, hypertension and tobacco exposure. Current diabetic treatment includes insulin injections and oral agent (dual therapy). She is compliant with treatment most of the time. An ACE inhibitor/angiotensin II receptor blocker is being taken.    Headache  Headache pattern:  Some headache always there, and the pain level varies (has had HA most of life but now they are associated with dizziness--lightheaded)  Duration:  Less than 2 weeks  Frequency:  Headaches came more frequently then constant pain started  Initial event:  None  Recent event:  None  Previous testing:  Blood work and CT  Time of day symptoms are worse:  No specific time of day  Days of the week symptoms are worse:  No specific day of the week  Laterality:  Both sides at the same time (bitemporal, but will have shooting pains come and go in different areas of her head)  Location:  Temples/sides  Aggravating factors:  None  Abortive medications tried:  Ibuprofen (doesn't help)    Past Medical History:   Diagnosis Date    Anxiety     Arthritis of knee, degenerative 1/31/2012    Blindness of left eye     hx. of     Blurry vision     left remaines blurry to Left eye to  on 1-14-21    Carpal tunnel syndrome on both sides 1/15/2013    Chronic knee pain     on 4/17/18 pt is presently in pain mgmnt    Depressive disorder, not elsewhere classified 10/14/2014    Headache     Hx. of     Hydronephrosis, left 9/13/2016    Hyperlipidemia     Hypertension     Hypothyroidism 8/2/2013    Iron deficiency anemia 6/10/2014    Mild intermittent asthma without complication 2/06/4176    Obesity     Osteoarthritis     KNEE    Polyneuropathy 11/14/2012    RAD (reactive airway disease) 6/26/2012    Snores     possible apnea but not tested yet    Type II or unspecified type diabetes mellitus without mention of complication, not stated as uncontrolled     Urge incontinence of urine 5/31/2017    Wears glasses     Weight loss         Current Outpatient Medications   Medication Sig Dispense Refill    SUMAtriptan (IMITREX) 25 MG tablet Take 1 tablet by mouth once as needed for Migraine 9 tablet 1    metFORMIN (GLUCOPHAGE) 500 MG tablet Take 1 tablet by mouth 2 times daily (with meals) 60 tablet 5    Liraglutide (VICTOZA) 18 MG/3ML SOPN SC injection Inject 1.2 mg into the skin daily 4 Adjustable Dose Pre-filled Pen Syringe 0    UNIFINE PENTIPS 31G X 5 MM MISC USE DAILY WITH INSULIN AS DIRECTED THREE TIMES A DAY & NIGHTLY 100 each 3    lisinopril (PRINIVIL;ZESTRIL) 5 MG tablet TAKE 1 TABLET BY MOUTH ONE TIME DAILY 30 tablet 3    NOVOLOG FLEXPEN 100 UNIT/ML injection pen INJECT 30 UNITS INTO THE SKIN THREE TIMES DAILY BEFORE MEALS 15 mL 3    clotrimazole-betamethasone (LOTRISONE) 1-0.05 % cream Apply to affected area bid externally x 4 days then daily for 3 days 45 g 1    lidocaine-prilocaine (EMLA) 2.5-2.5 % cream Apply topically as needed.  5 g 1    levothyroxine (SYNTHROID) 50 MCG tablet TAKE 1 TABLET BY MOUTH ONE TIME A DAY 30 tablet 2    FEROSUL 325 (65 Fe) MG tablet TAKE 1 TABLET BY MOUTH 2 TIMES A DAY 60 tablet 3    hydrOXYzine (ATARAX) 25 MG tablet Take 1 tablet by mouth every 8 hours as needed for Itching 12 tablet 0    blood glucose monitor strips 1 strip by Other route three times daily Test___times daily    Diagnosis: 250.0   Diabetes Mellitus____Insulin Dependent____Non-Insulin Dependent 100 strip 3    nystatin [facility]) 898092 UNIT/GM powder APPLY TO SKIN OF LEGS & IN FOLDS OF SKIN WITH IRRITATION 1 each 2    VICTOZA 18 MG/3ML SOPN SC injection START WITH INJECTING 1.2MG INTO THE SKIN ONCE A DAY FOR FOR 1 WEEK, THEN INCREASE AND INJECT 1.8MG INTO THE SKIN ONCE A DAY THEREAFTER. 6 mL 3    diclofenac (VOLTAREN) 50 MG EC tablet TAKE 1 TABLET BY MOUTH 3 TIMES A DAY WITH MEALS 60 tablet 3    metFORMIN (GLUCOPHAGE) 500 MG tablet TAKE 1 TABLET BY MOUTH 2 TIMES A DAY WITH MEALS 60 tablet 3    ASPIRIN LOW DOSE 81 MG EC tablet TAKE 1 TABLET BY MOUTH ONE TIME A DAY 30 tablet 3    MYRBETRIQ 25 MG TB24 TAKE 1 TABLET BY MOUTH ONE TIME A DAY 30 tablet 3    traZODone (DESYREL) 150 MG tablet TAKE ONE TABLET BY MOUTH NIGHTLY 30 tablet 3    simvastatin (ZOCOR) 40 MG tablet TAKE ONE TABLET BY MOUTH NIGHTLY 30 tablet 3    Continuous Blood Gluc Sensor (FREESTYLE JUAN CARLOS 14 DAY SENSOR) MISC Test blood sugars 5-6/day 2 each 5    Continuous Blood Gluc  (FREESTYLE JUAN CARLOS 14 DAY READER) MERNA Test blood sugars 5-6/day 2 each 11    SUMAtriptan (IMITREX) 100 MG tablet Take 1 tablet by mouth 2 times daily as needed for Migraine 9 tablet 3    magnesium oxide (MAG-OX) 400 (241.3 Mg) MG TABS tablet Take 1 tablet by mouth daily 30 tablet 3    insulin glargine (BASAGLAR KWIKPEN) 100 UNIT/ML injection pen Inject 35 Units into the skin nightly      ONE TOUCH ULTRASOFT LANCETS MISC Dx: DM-2 use 2-3 times daily 100 each 2    Lancets MISC 1 each by Does not apply route daily Use 3-4 times daily 100 each 11    DULoxetine (CYMBALTA) 60 MG extended release capsule Take 1 capsule by mouth daily 90 capsule 3    albuterol sulfate HFA (PROVENTIL HFA) 108 (90 Base) MCG/ACT inhaler Inhale 2 puffs into the lungs every 6 hours as needed for Wheezing 1 Inhaler 2    Calcium Citrate-Vitamin D (CALCITRATE/VITAMIN D PO) Take 1 tablet by mouth daily       Misc.  Devices MISC 1 each by Does not apply route once as needed (left knee-medial  brace) 1 each 0     No current facility-administered medications for this visit. Allergies   Allergen Reactions    Januvia [Sitagliptin Phosphate] Hives    Naproxen Sodium Hives    Neurontin [Gabapentin] Nausea Only       Health Maintenance   Topic Date Due    Hepatitis C screen  Never done    DTaP/Tdap/Td vaccine (1 - Tdap) Never done    Colorectal Cancer Screen  Never done    Cervical cancer screen  05/20/2016    Diabetic microalbuminuria test  09/11/2018    Diabetic foot exam  03/06/2020    Hepatitis B vaccine (3 of 4 - 4-dose series) 07/16/2021    Annual Wellness Visit (AWV)  11/06/2021    A1C test (Diabetic or Prediabetic)  02/09/2022    Diabetic retinal exam  03/10/2022    COVID-19 Vaccine (3 - Booster for Pfizer series) 03/18/2022    Flu vaccine (1) 08/01/2022    Shingles vaccine (1 of 2) 03/04/2023 (Originally 7/6/2019)    Lipids  11/09/2022    Depression Monitoring  01/21/2023    Breast cancer screen  04/04/2024    Pneumococcal 0-64 years Vaccine (3 - PPSV23 or PCV20) 07/06/2034    HIV screen  Completed    Hepatitis A vaccine  Aged Out    Hib vaccine  Aged Out    Meningococcal (ACWY) vaccine  Aged Out       Subjective:      Review of Systems    Objective:     Physical Exam  /68 (Site: Right Upper Arm, Position: Sitting)   Pulse 89   Wt 250 lb (113.4 kg)   LMP 07/29/2013   SpO2 98%   BMI 38.58 kg/m²       Assessment:       Diagnosis Orders   1. Nonintractable headache, unspecified chronicity pattern, unspecified headache type  SUMAtriptan (IMITREX) 25 MG tablet    [facility], [facility], [employer]      2. Type 2 diabetes mellitus with hyperglycemia, with long-term current use of insulin (HCC)  Hemoglobin A1c    Microalbumin, Ur      3. Colon cancer screening  FIT-DNA (Cologuard)          Plan:      Return in about 4 weeks (around 11/1/2022) for AWV.     Orders Placed This Encounter   Medications    SUMAtriptan (IMITREX) 25 MG tablet     Sig: Take 1 tablet by mouth once as needed for Migraine     Dispense: 9 tablet     Refill:  1    metFORMIN (GLUCOPHAGE) 500 MG tablet     Sig: Take 1 tablet by mouth 2 times daily (with meals)     Dispense:  60 tablet     Refill:  5     Orders Placed This Encounter   Procedures    FIT-DNA (Cologuard)    Hemoglobin A1c     Standing Status:   Future     Standing Expiration Date:   10/4/2023    Microalbumin, Ur     Standing Status:   Future     Standing Expiration Date:   10/4/2023    [facility], Neurology, [facility]     Referral Priority:   Routine     Referral Type:   Eval and Treat     Referral Reason:   Specialty Services Required     Requested Specialty:   Neurology     Number of Visits Requested:   1            Patient given educational materials - see patient instructions. Discussed use, benefit, and side effects of prescribed medications. All patientquestions answered. Pt voiced understanding.     Electronically signed by Renetta Newman MD on 10/4/2022at 1:39 PM

## 2022-10-06 LAB
ESTIMATED AVERAGE GLUCOSE: 272 MG/DL
HBA1C MFR BLD: 11.1 % (ref 4–6)

## 2022-10-10 RX ORDER — LEVOTHYROXINE SODIUM 0.05 MG/1
TABLET ORAL
Qty: 30 TABLET | Refills: 2 | OUTPATIENT
Start: 2022-10-10

## 2022-10-10 RX ORDER — ASPIRIN 81 MG/1
TABLET, COATED ORAL
Qty: 30 TABLET | Refills: 3 | OUTPATIENT
Start: 2022-10-10

## 2022-10-10 RX ORDER — LIRAGLUTIDE 6 MG/ML
INJECTION SUBCUTANEOUS
Qty: 6 ML | Refills: 3 | OUTPATIENT
Start: 2022-10-10

## 2022-10-10 RX ORDER — DULOXETIN HYDROCHLORIDE 60 MG/1
CAPSULE, DELAYED RELEASE ORAL
Qty: 90 CAPSULE | Refills: 3 | OUTPATIENT
Start: 2022-10-10

## 2022-10-10 RX ORDER — SIMVASTATIN 40 MG
TABLET ORAL
Qty: 30 TABLET | Refills: 3 | OUTPATIENT
Start: 2022-10-10

## 2022-10-10 RX ORDER — TRAZODONE HYDROCHLORIDE 150 MG/1
TABLET ORAL
Qty: 30 TABLET | Refills: 3 | OUTPATIENT
Start: 2022-10-10

## 2022-10-10 NOTE — TELEPHONE ENCOUNTER
Patient established care with another PCP office. Patient will be d/c from office since she has new PCP.

## 2022-11-01 ENCOUNTER — TELEPHONE (OUTPATIENT)
Dept: INTERNAL MEDICINE CLINIC | Age: 52
End: 2022-11-01

## 2022-11-09 ENCOUNTER — OFFICE VISIT (OUTPATIENT)
Dept: INTERNAL MEDICINE CLINIC | Age: 52
End: 2022-11-09
Payer: COMMERCIAL

## 2022-11-09 ENCOUNTER — TELEPHONE (OUTPATIENT)
Dept: INTERNAL MEDICINE CLINIC | Age: 52
End: 2022-11-09

## 2022-11-09 VITALS
HEIGHT: 68 IN | SYSTOLIC BLOOD PRESSURE: 122 MMHG | WEIGHT: 242 LBS | OXYGEN SATURATION: 98 % | HEART RATE: 96 BPM | DIASTOLIC BLOOD PRESSURE: 70 MMHG | BODY MASS INDEX: 36.68 KG/M2

## 2022-11-09 DIAGNOSIS — E11.65 TYPE 2 DIABETES MELLITUS WITH HYPERGLYCEMIA, WITH LONG-TERM CURRENT USE OF INSULIN (HCC): ICD-10-CM

## 2022-11-09 DIAGNOSIS — E11.42 TYPE 2 DIABETES MELLITUS WITH DIABETIC POLYNEUROPATHY, WITH LONG-TERM CURRENT USE OF INSULIN (HCC): ICD-10-CM

## 2022-11-09 DIAGNOSIS — M25.561 CHRONIC PAIN OF BOTH KNEES: ICD-10-CM

## 2022-11-09 DIAGNOSIS — Z79.4 TYPE 2 DIABETES MELLITUS WITH DIABETIC POLYNEUROPATHY, WITH LONG-TERM CURRENT USE OF INSULIN (HCC): ICD-10-CM

## 2022-11-09 DIAGNOSIS — I10 ESSENTIAL HYPERTENSION: ICD-10-CM

## 2022-11-09 DIAGNOSIS — G89.29 CHRONIC PAIN OF BOTH KNEES: ICD-10-CM

## 2022-11-09 DIAGNOSIS — Z00.00 ENCOUNTER FOR WELL ADULT EXAM WITHOUT ABNORMAL FINDINGS: Primary | ICD-10-CM

## 2022-11-09 DIAGNOSIS — E03.9 ACQUIRED HYPOTHYROIDISM: ICD-10-CM

## 2022-11-09 DIAGNOSIS — M25.562 CHRONIC PAIN OF BOTH KNEES: ICD-10-CM

## 2022-11-09 DIAGNOSIS — E11.65 UNCONTROLLED TYPE 2 DIABETES MELLITUS WITH HYPERGLYCEMIA (HCC): ICD-10-CM

## 2022-11-09 DIAGNOSIS — N39.41 URGE INCONTINENCE OF URINE: ICD-10-CM

## 2022-11-09 DIAGNOSIS — F33.0 MILD EPISODE OF RECURRENT MAJOR DEPRESSIVE DISORDER (HCC): ICD-10-CM

## 2022-11-09 DIAGNOSIS — Z23 INFLUENZA VACCINATION ADMINISTERED AT CURRENT VISIT: ICD-10-CM

## 2022-11-09 DIAGNOSIS — E78.00 PURE HYPERCHOLESTEROLEMIA: ICD-10-CM

## 2022-11-09 DIAGNOSIS — Z79.4 TYPE 2 DIABETES MELLITUS WITH HYPERGLYCEMIA, WITH LONG-TERM CURRENT USE OF INSULIN (HCC): ICD-10-CM

## 2022-11-09 DIAGNOSIS — E08.42 DIABETIC POLYNEUROPATHY ASSOCIATED WITH DIABETES MELLITUS DUE TO UNDERLYING CONDITION (HCC): ICD-10-CM

## 2022-11-09 PROCEDURE — 90674 CCIIV4 VAC NO PRSV 0.5 ML IM: CPT | Performed by: INTERNAL MEDICINE

## 2022-11-09 PROCEDURE — G0439 PPPS, SUBSEQ VISIT: HCPCS | Performed by: INTERNAL MEDICINE

## 2022-11-09 PROCEDURE — 3078F DIAST BP <80 MM HG: CPT | Performed by: INTERNAL MEDICINE

## 2022-11-09 PROCEDURE — 3074F SYST BP LT 130 MM HG: CPT | Performed by: INTERNAL MEDICINE

## 2022-11-09 PROCEDURE — G8482 FLU IMMUNIZE ORDER/ADMIN: HCPCS | Performed by: INTERNAL MEDICINE

## 2022-11-09 PROCEDURE — G0008 ADMIN INFLUENZA VIRUS VAC: HCPCS | Performed by: INTERNAL MEDICINE

## 2022-11-09 RX ORDER — FLASH GLUCOSE SENSOR
KIT MISCELLANEOUS
Qty: 2 EACH | Refills: 5 | Status: SHIPPED | OUTPATIENT
Start: 2022-11-09

## 2022-11-09 RX ORDER — TRAZODONE HYDROCHLORIDE 150 MG/1
TABLET ORAL
Qty: 90 TABLET | Refills: 3 | Status: SHIPPED | OUTPATIENT
Start: 2022-11-09

## 2022-11-09 RX ORDER — LIRAGLUTIDE 6 MG/ML
1.8 INJECTION SUBCUTANEOUS DAILY
Qty: 6 ML | Refills: 5 | Status: SHIPPED | OUTPATIENT
Start: 2022-11-09

## 2022-11-09 RX ORDER — LISINOPRIL 5 MG/1
TABLET ORAL
Qty: 90 TABLET | Refills: 3 | Status: SHIPPED | OUTPATIENT
Start: 2022-11-09

## 2022-11-09 RX ORDER — ASPIRIN 81 MG/1
TABLET ORAL
Qty: 90 TABLET | Refills: 3 | Status: SHIPPED | OUTPATIENT
Start: 2022-11-09

## 2022-11-09 RX ORDER — DULOXETIN HYDROCHLORIDE 60 MG/1
60 CAPSULE, DELAYED RELEASE ORAL DAILY
Qty: 90 CAPSULE | Refills: 3 | Status: SHIPPED | OUTPATIENT
Start: 2022-11-09

## 2022-11-09 RX ORDER — LIRAGLUTIDE 6 MG/ML
1.2 INJECTION SUBCUTANEOUS DAILY
Qty: 4 ADJUSTABLE DOSE PRE-FILLED PEN SYRINGE | Refills: 0 | Status: SHIPPED | OUTPATIENT
Start: 2022-11-09 | End: 2022-11-09

## 2022-11-09 RX ORDER — SIMVASTATIN 40 MG
TABLET ORAL
Qty: 90 TABLET | Refills: 3 | Status: SHIPPED | OUTPATIENT
Start: 2022-11-09

## 2022-11-09 RX ORDER — INSULIN ASPART 100 [IU]/ML
INJECTION, SOLUTION INTRAVENOUS; SUBCUTANEOUS
Qty: 15 ML | Refills: 3 | Status: SHIPPED | OUTPATIENT
Start: 2022-11-09

## 2022-11-09 RX ORDER — LEVOTHYROXINE SODIUM 0.05 MG/1
TABLET ORAL
Qty: 90 TABLET | Refills: 3 | Status: SHIPPED | OUTPATIENT
Start: 2022-11-09

## 2022-11-09 RX ORDER — HYDROXYZINE HYDROCHLORIDE 25 MG/1
25 TABLET, FILM COATED ORAL EVERY 8 HOURS PRN
Qty: 12 TABLET | Refills: 0 | Status: SHIPPED | OUTPATIENT
Start: 2022-11-09

## 2022-11-09 RX ORDER — PEN NEEDLE, DIABETIC 31 GX3/16"
NEEDLE, DISPOSABLE MISCELLANEOUS
Qty: 100 EACH | Refills: 3 | Status: SHIPPED | OUTPATIENT
Start: 2022-11-09

## 2022-11-09 NOTE — PROGRESS NOTES
Visit Information    Have you changed or started any medications since your last visit including any over-the-counter medicines, vitamins, or herbal medicines? no   Have you stopped taking any of your medications? Is so, why? -  no  Are you having any side effects from any of your medications? - no    Have you seen any other physician or provider since your last visit?  no   Have you had any other diagnostic tests since your last visit?  no   Have you been seen in the emergency room and/or had an admission in a hospital since we last saw you?  no   Have you had your routine dental cleaning in the past 6 months? No - appointment scheduled for November 2022      Do you have an active [facility] account? If no, what is the barrier?   Yes    Patient Care Team:  Hiram Atkinson MD as PCP - General (Internal Medicine)  Hiram Atkinson MD as PCP [facility]  Lui Craven MD as Anesthesiologist (Pain Management)  Jean Carlos Motley MD as Consulting Physician (Urology)  Rashid Belcher MD as Consulting Physician (Internal Medicine)    Medical History Review  Past Medical, Family, and Social History reviewed and does contribute to the patient presenting condition    Health Maintenance   Topic Date Due    Hepatitis C screen  Never done    DTaP/Tdap/Td vaccine (1 - Tdap) Never done    Colorectal Cancer Screen  Never done    Diabetic foot exam  03/06/2020    Annual Wellness Visit (AWV)  11/06/2021    Hepatitis B vaccine (3 of 3 - Risk 3-dose series) 11/16/2021    COVID-19 Vaccine (3 - Booster for Ortiz Peter series) 12/13/2021    Diabetic retinal exam  03/10/2022    Flu vaccine (1) 08/01/2022    Lipids  11/09/2022    Shingles vaccine (1 of 2) 03/04/2023 (Originally 7/6/2019)    A1C test (Diabetic or Prediabetic)  01/04/2023    Depression Monitoring  01/21/2023    Diabetic microalbuminuria test  10/04/2023    Breast cancer screen  04/04/2024    Pneumococcal 0-64 years Vaccine (3 - PPSV23 if available, else PCV20) 07/06/2034    HIV screen  Completed    Hepatitis A vaccine  Aged Out    Hib vaccine  Aged Out    Meningococcal (ACWY) vaccine  Aged Out

## 2022-11-09 NOTE — PROGRESS NOTES
Well Adult Note  Name: Carloz Lucas Date: 2022   MRN: [id number] Sex: Female   Age: 47 y.o. Ethnicity: Non- / Non    : 1970 Race: Hayley Thakur / Oliverio Goldstein Y Christy Horse is here for well adult exam.  History:  DM HTN smoking cessation hypothyroidism COPD        Review of Systems    Allergies   Allergen Reactions    Januvia [Sitagliptin Phosphate] Hives    Naproxen Sodium Hives    Neurontin [Gabapentin] Nausea Only         Prior to Visit Medications    Medication Sig Taking?  Authorizing Provider   Liraglutide (VICTOZA) 18 MG/3ML SOPN SC injection Inject 1.2 mg into the skin daily Yes Moris Alves MD   Insulin Pen Needle (UNIFINE PENTIPS) 31G X 5 MM MISC USE DAILY WITH INSULIN AS DIRECTED THREE TIMES A DAY & NIGHTLY Yes Moris Alves MD   lisinopril (PRINIVIL;ZESTRIL) 5 MG tablet TAKE 1 TABLET BY MOUTH ONE TIME DAILY Yes Moris Alves MD   insulin aspart (NOVOLOG FLEXPEN) 100 UNIT/ML injection pen INJECT 30 UNITS INTO THE SKIN THREE TIMES DAILY BEFORE MEALS Yes Moris Alves MD   levothyroxine (SYNTHROID) 50 MCG tablet TAKE 1 TABLET BY MOUTH ONE TIME A DAY Yes Moris Alves MD   hydrOXYzine HCl (ATARAX) 25 MG tablet Take 1 tablet by mouth every 8 hours as needed for Itching Yes Moris Alves MD   Liraglutide (VICTOZA) 18 MG/3ML SOPN SC injection Inject 1.8 mg into the skin daily Yes Moris Alves MD   diclofenac (VOLTAREN) 50 MG EC tablet TAKE 1 TABLET BY MOUTH 3 TIMES A DAY WITH MEALS Yes Moris Alves MD   aspirin (ASPIRIN LOW DOSE) 81 MG EC tablet TAKE 1 TABLET BY MOUTH ONE TIME A DAY Yes Moris Alves MD   mirabegron (MYRBETRIQ) 25 MG TB24 TAKE 1 TABLET BY MOUTH ONE TIME A DAY Yes Moris Alves MD   traZODone (DESYREL) 150 MG tablet TAKE ONE TABLET BY MOUTH NIGHTLY Yes Moris Alves MD   simvastatin (ZOCOR) 40 MG tablet TAKE ONE TABLET BY MOUTH NIGHTLY Yes Moris Alves MD   Continuous Blood Gluc Sensor (FREESTYLE JUAN CARLOS 14 DAY SENSOR) MISC Test blood sugars 5-6/day Yes Cassie Encarnacion MD   DULoxetine (CYMBALTA) 60 MG extended release capsule Take 1 capsule by mouth daily Yes Cassie Encarnacion MD   metFORMIN (GLUCOPHAGE) 500 MG tablet Take 1 tablet by mouth 2 times daily (with meals) Yes Cassie Encarnacion MD   clotrimazole-betamethasone (LOTRISONE) 1-0.05 % cream Apply to affected area bid externally x 4 days then daily for 3 days Yes Pastor Chika Thomas DO   lidocaine-prilocaine (EMLA) 2.5-2.5 % cream Apply topically as needed.  Yes Pastor Chika Thomas DO   FEROSUL 325 (65 Fe) MG tablet TAKE 1 TABLET BY MOUTH 2 TIMES A DAY Yes Daniel Cifuentes MD   blood glucose monitor strips 1 strip by Other route three times daily Test___times daily    Diagnosis: 250.0   Diabetes Mellitus____Insulin Dependent____Non-Insulin Dependent Yes Daniel Cifuentes MD   [facility]) [id number] UNIT/GM powder APPLY TO SKIN OF LEGS & IN FOLDS OF SKIN WITH IRRITATION Yes Daniel Cifuentes MD   metFORMIN (GLUCOPHAGE) 500 MG tablet TAKE 1 TABLET BY MOUTH 2 TIMES A DAY WITH MEALS Yes Daniel Cifuentes MD   Continuous Blood Gluc  (FREESTYLE JUAN CARLOS 14 DAY READER) MERNA Test blood sugars 5-6/day Yes Eriberto Andre MD   SUMAtriptan (IMITREX) 100 MG tablet Take 1 tablet by mouth 2 times daily as needed for Migraine Yes MARILU Deng NP   magnesium oxide (MAG-OX) 400 (241.3 Mg) MG TABS tablet Take 1 tablet by mouth daily Yes MARILU Deng NP   insulin glargine (BASAGLAR KWIKPEN) 100 UNIT/ML injection pen Inject 35 Units into the skin nightly Yes Historical Provider, MD   ONE TOUCH ULTRASOFT LANCETS MISC Dx: DM-2 use 2-3 times daily Yes Daniel Cifuentes MD   Lancets MISC 1 each by Does not apply route daily Use 3-4 times daily Yes Daniel Cifuentes MD   albuterol sulfate HFA (PROVENTIL HFA) 108 (90 Base) MCG/ACT inhaler Inhale 2 puffs into the lungs every 6 hours as needed for Wheezing Yes Kellen Standard, APRN - CNP   Calcium Citrate-Vitamin D (CALCITRATE/VITAMIN D PO) Take 1 tablet by mouth daily  Yes Historical Provider, MD   SUMAtriptan (IMITREX) 25 MG tablet Take 1 tablet by mouth once as needed for Migraine  Darshan Teresa MD   Misc. Devices MISC 1 each by Does not apply route once as needed (left knee-medial  brace)  Aroldo Cohen, DO         Past Medical History:   Diagnosis Date    Anxiety     Arthritis of knee, degenerative 2012    Blindness of left eye     hx. of     Blurry vision     left remaines blurry to Left eye to  on 21    Carpal tunnel syndrome on both sides 1/15/2013    Chronic knee pain     on 17 pt is presently in pain mgmnt    Depressive disorder, not elsewhere classified 10/14/2014    Headache     Hx. of     Hydronephrosis, left 2016    Hyperlipidemia     Hypertension     Hypothyroidism 2013    Iron deficiency anemia 6/10/2014    Mild intermittent asthma without complication     Obesity     Osteoarthritis     KNEE    Polyneuropathy 2012    RAD (reactive airway disease) 2012    Snores     possible apnea but not tested yet    Type II or unspecified type diabetes mellitus without mention of complication, not stated as uncontrolled     Urge incontinence of urine 2017    Wears glasses     Weight loss        Past Surgical History:   Procedure Laterality Date    CARPAL TUNNEL RELEASE      2008 left,  2009 right     SECTION  2007    CYSTOSCOPY  2016    cysto with left ureteral stent placement    HYSTERECTOMY, TOTAL ABDOMINAL (CERVIX REMOVED)  2013    LITHOTRIPSY Left 2016    cysto with ESWL and left ureteral stent placement.     NERVE BLOCK Left 2016    left knee kenalog 80mg    NERVE BLOCK  3/11/16    Rt Knee depo medrol 80     TUBAL LIGATION  3/2012         Family History   Problem Relation Age of Onset    Stroke Father     Diabetes Mother     Hypertension Mother     Breast Cancer Neg Hx     Cancer Neg Hx     Colon Cancer Neg Hx     Eclampsia Neg Hx Ovarian Cancer Neg Hx      Labor Neg Hx     Spont Abortions Neg Hx        Social History     Tobacco Use    Smoking status: Light Smoker     Packs/day: 0.25     Years: 15.00     Pack years: 3.75     Types: Cigarettes    Smokeless tobacco: Never    Tobacco comments:     4 per day   Vaping Use    Vaping Use: Never used   Substance Use Topics    Alcohol use: Not Currently     Comment: rare    Drug use: Yes     Types: Marijuana (Weed)     Comment: 1 times per week       Objective   /70   Pulse 96   Ht 5' 7.5\" (1.715 m)   Wt 242 lb (109.8 kg)   LMP 2013   SpO2 98%   BMI 37.34 kg/m²   Wt Readings from Last 3 Encounters:   22 242 lb (109.8 kg)   10/04/22 250 lb (113.4 kg)   22 250 lb (113.4 kg)     There were no vitals filed for this visit. Physical Exam      Assessment   Plan   1. Encounter for well adult exam without abnormal findings  2. Type 2 diabetes mellitus with diabetic polyneuropathy, with long-term current use of insulin ([facility])  -     Liraglutide (VICTOZA) 18 MG/3ML SOPN SC injection; Inject 1.2 mg into the skin daily, Disp-4 Adjustable Dose Pre-filled Pen Syringe, R-0Normal  -     Insulin Pen Needle (UNIFINE PENTIPS) 31G X 5 MM MISC; Disp-100 each, R-3, NormalUSE DAILY WITH INSULIN AS DIRECTED THREE TIMES A DAY & NIGHTLY  3. Type 2 diabetes mellitus with hyperglycemia, with long-term current use of insulin ([facility])  -     insulin aspart (NOVOLOG FLEXPEN) 100 UNIT/ML injection pen; INJECT 30 UNITS INTO THE SKIN THREE TIMES DAILY BEFORE MEALS, Disp-15 mL, R-3Normal  -     Liraglutide (VICTOZA) 18 MG/3ML SOPN SC injection; Inject 1.8 mg into the skin daily, Disp-6 mL, R-5Normal  4. Acquired hypothyroidism  -     levothyroxine (SYNTHROID) 50 MCG tablet; TAKE 1 TABLET BY MOUTH ONE TIME A DAY, Disp-90 tablet, R-3Normal  -     TSH with Reflex; Future  5.  Chronic pain of both knees  -     diclofenac (VOLTAREN) 50 MG EC tablet; TAKE 1 TABLET BY MOUTH 3 TIMES A DAY WITH MEALS, Disp-180 tablet, R-3Normal  6. Essential hypertension  -     lisinopril (PRINIVIL;ZESTRIL) 5 MG tablet; TAKE 1 TABLET BY MOUTH ONE TIME DAILY, Disp-90 tablet, R-3Normal  -     aspirin (ASPIRIN LOW DOSE) 81 MG EC tablet; TAKE 1 TABLET BY MOUTH ONE TIME A DAY, Disp-90 tablet, R-3Normal  7. Urge incontinence of urine  -     mirabegron (MYRBETRIQ) 25 MG TB24; TAKE 1 TABLET BY MOUTH ONE TIME A DAY, Disp-90 tablet, R-3Normal  8. Mild episode of recurrent major depressive disorder (HCC)  -     traZODone (DESYREL) 150 MG tablet; TAKE ONE TABLET BY MOUTH NIGHTLY, Disp-90 tablet, R-3Normal  9. Pure hypercholesterolemia  -     simvastatin (ZOCOR) 40 MG tablet; TAKE ONE TABLET BY MOUTH NIGHTLY, Disp-90 tablet, R-3Normal  10. Uncontrolled type 2 diabetes mellitus with hyperglycemia (HCC)  -     Continuous Blood Gluc Sensor (mInfoYLE JUAN CARLOS 14 DAY SENSOR) [facility]; Test blood sugars 5-6/day, Disp-2 each, R-5Normal  11. Diabetic polyneuropathy associated with diabetes mellitus due to underlying condition (HCC)  -     DULoxetine (CYMBALTA) 60 MG extended release capsule; Take 1 capsule by mouth daily, Disp-90 capsule, R-3Normal  12.  Influenza vaccination administered at current visit  -     Influenza, FLUCELVAX, (age 10 mo+), IM, Preservative Free, 0.5 mL         Personalized Preventive Plan   Current Health Maintenance Status  Immunization History   Administered Date(s) Administered    COVID-19, PFIZER PURPLE top, DILUTE for use, (age 15 y+), 30mcg/0.3mL 09/20/2021, 10/18/2021    Hepatitis B Adult (Engerix-B) 10/26/2020, 07/16/2021    Influenza Vaccine, unspecified formulation 10/09/2017, 10/26/2018    Influenza Virus Vaccine 12/17/2013, 10/09/2014, 12/02/2015, 01/27/2020    Influenza, AFLURIA (age 1 yrs+), FLUZONE, (age 10 mo+), MDV, 0.5mL 10/09/2017, 01/27/2020, 10/26/2020, 10/19/2021    Influenza, FLUARIX, FLULAVAL, FLUZONE (age 10 mo+) AND AFLURIA, (age 1 y+), PF, 0.5mL 10/26/2018    Influenza, High Dose (Fluzone 65 yrs and older) 09/27/2012    Influenza, Quadv, 6 mo and older, IM (Fluzone, Flulaval) 10/09/2017    Pneumococcal Conjugate 13-valent (Chavo Liberty) 09/04/2015    Pneumococcal Polysaccharide ([id number]) 05/31/2017        Health Maintenance   Topic Date Due    Hepatitis C screen  Never done    DTaP/Tdap/Td vaccine (1 - Tdap) Never done    Colorectal Cancer Screen  Never done    Diabetic foot exam  03/06/2020    Annual Wellness Visit (AWV)  11/06/2021    Hepatitis B vaccine (3 of 3 - Risk 3-dose series) 11/16/2021    COVID-19 Vaccine (3 - Booster for Pfizer series) 12/13/2021    Diabetic retinal exam  03/10/2022    Flu vaccine (1) 08/01/2022    Lipids  11/09/2022    Shingles vaccine (1 of 2) 03/04/2023 (Originally 7/6/2019)    A1C test (Diabetic or Prediabetic)  01/04/2023    Depression Monitoring  01/21/2023    Diabetic microalbuminuria test  10/04/2023    Breast cancer screen  04/04/2024    Pneumococcal 0-64 years Vaccine (3 - PPSV23 if available, else PCV20) 07/06/2034    HIV screen  Completed    Hepatitis A vaccine  Aged Out    Hib vaccine  Aged Out    Meningococcal (ACWY) vaccine  Aged Out     Recommendations for Vizimax Due: see orders and patient instructions/AVS.    No follow-ups on file.

## 2022-11-09 NOTE — PATIENT INSTRUCTIONS
Quitting Tobacco: Care Instructions  Quitting tobacco is much harder than simply changing a habit. Nicotine cravings make it hard to quit, but you can do it. Your doctor will help you set up the plan that best meets your needs. You will miss the nicotine at first. You may feel short-tempered and grumpy. You may have trouble sleeping or thinking clearly. The urge to use tobacco may continue for a time. Combining tools can raise your chances of success. You can use medicine along with counseling. And you can join a quit-tobacco program, such as the American Lung Association's Freedom from Smoking program.    Get support. Reach out to family and friends, and find a counselor to help you quit. Join a support group, such as Nicotine Anonymous. Go to www.smokefree. gov to sign up for text messaging support. Talk to your doctor or pharmacist about medicines that can help you quit. Medicines can help with cravings and withdrawal symptoms. There are several over-the-counter choices, such as nicotine patches, gum, and lozenges. After you quit, do not use tobacco again, not even once. Get rid of all tobacco products and anything that reminds you of tobacco, such as ashtrays. Avoid things that make you reach for tobacco.  Change your daily routine. Take a different route to work, or eat a meal in a different place. Try to cut down on stress. Find ways to calm yourself, such as taking a hot bath or doing deep breathing exercises. Eat a healthy diet, and get regular exercise. Having healthy habits may help you quit using tobacco.     Don't give up on quitting if you use tobacco again. Most people quit and restart a few times before they quit for good. Follow-up care is a key part of your treatment and safety. Be sure to make and go to all appointments, and call your doctor if you are having problems. It's also a good idea to know your test results and keep a list of the medicines you take.   Where can you learn more? Go to [email or web address] and sign in to your [employer] account. Enter O817 in the Propertybase box to learn more about \"Quitting Tobacco: Care Instructions. \"     If you do not have an account, please click on the \"Sign Up Now\" link. Current as of: November 8, 2021               Content Version: 13.4  © 9927-0782 Healthwise, Incorporated. Care instructions adapted under license by [facility] ([facility]). If you have questions about a medical condition or this instruction, always ask your healthcare professional. [address] any warranty or liability for your use of this information.

## 2022-11-09 NOTE — TELEPHONE ENCOUNTER
[facility] pharmacist calling to clarify two different set of doses that had been sent for the Victoza , please advise all other ROS negative except as per HPI

## 2022-11-15 NOTE — PROGRESS NOTES
glargine (LANTUS SOLOSTAR) 100 UNIT/ML injection pen; Inject 35 Units into the skin nightly  Dispense: 3 pen; Refill: 2    2. Pure hypercholesterolemia  - simvastatin (ZOCOR) 40 MG tablet; Take 1 tablet by mouth nightly  Dispense: 30 tablet; Refill: 2    3. Urge incontinence of urine  - oxybutynin (DITROPAN XL) 10 MG extended release tablet; Take 1 tablet by mouth daily  Dispense: 30 tablet; Refill: 2    4. Essential hypertension  - lisinopril (PRINIVIL;ZESTRIL) 5 MG tablet; Take 1 tablet by mouth daily  Dispense: 30 tablet; Refill: 2  - aspirin EC 81 MG EC tablet; Take 1 tablet by mouth daily  Dispense: 30 tablet; Refill: 2  - Blood Pressure KIT; Use to check blood pressure daily and as needed  Dispense: 1 kit; Refill: 0    5. Acquired hypothyroidism  - levothyroxine (SYNTHROID) 50 MCG tablet; Take 1 tablet by mouth daily  Dispense: 30 tablet; Refill: 2    6. Iron deficiency anemia secondary to inadequate dietary iron intake  - ferrous sulfate (IRON 325) 325 (65 Fe) MG tablet; Take 1 tablet by mouth 2 times daily  Dispense: 60 tablet; Refill: 2    7. Bilateral chronic knee pain  - diclofenac (VOLTAREN) 50 MG EC tablet; Take 1 tablet by mouth 2 times daily  Dispense: 60 tablet; Refill: 2    8. Rash and nonspecific skin eruption  - nystatin (MYCOSTATIN) 457691 UNIT/GM powder; Apply to skin of legs and in folds of skin with irritation  Dispense: 1 Bottle; Refill: 0    9. Asthma, intermittent, uncomplicated  - albuterol sulfate HFA (PROVENTIL HFA) 108 (90 Base) MCG/ACT inhaler; Inhale 2 puffs into the lungs every 6 hours as needed for Wheezing  Dispense: 1 Inhaler;  Refill: 2    RV 3 months    Note: not billable if this call serves to triage the patient into an appointment for the relevant concern      Jamia Lomeli Pain Refusal Text: I offered to prescribe pain medication but the patient refused to take this medication.

## 2022-11-21 ENCOUNTER — HOSPITAL ENCOUNTER (OUTPATIENT)
Age: 52
Setting detail: SPECIMEN
Discharge: HOME | End: 2022-11-21

## 2022-11-21 ENCOUNTER — OFFICE VISIT (OUTPATIENT)
Dept: OBGYN | Age: 52
End: 2022-11-21
Payer: COMMERCIAL

## 2022-11-21 VITALS
WEIGHT: 242 LBS | SYSTOLIC BLOOD PRESSURE: 128 MMHG | BODY MASS INDEX: 37.34 KG/M2 | DIASTOLIC BLOOD PRESSURE: 81 MMHG | HEART RATE: 93 BPM

## 2022-11-21 DIAGNOSIS — N89.8 VAGINAL DISCHARGE: ICD-10-CM

## 2022-11-21 DIAGNOSIS — N89.8 VAGINAL DISCHARGE: Primary | ICD-10-CM

## 2022-11-21 PROCEDURE — 3017F COLORECTAL CA SCREEN DOC REV: CPT | Performed by: STUDENT IN AN ORGANIZED HEALTH CARE EDUCATION/TRAINING PROGRAM

## 2022-11-21 PROCEDURE — 99213 OFFICE O/P EST LOW 20 MIN: CPT | Performed by: STUDENT IN AN ORGANIZED HEALTH CARE EDUCATION/TRAINING PROGRAM

## 2022-11-21 PROCEDURE — 4004F PT TOBACCO SCREEN RCVD TLK: CPT | Performed by: STUDENT IN AN ORGANIZED HEALTH CARE EDUCATION/TRAINING PROGRAM

## 2022-11-21 PROCEDURE — 3074F SYST BP LT 130 MM HG: CPT | Performed by: STUDENT IN AN ORGANIZED HEALTH CARE EDUCATION/TRAINING PROGRAM

## 2022-11-21 PROCEDURE — G8482 FLU IMMUNIZE ORDER/ADMIN: HCPCS | Performed by: STUDENT IN AN ORGANIZED HEALTH CARE EDUCATION/TRAINING PROGRAM

## 2022-11-21 PROCEDURE — G8427 DOCREV CUR MEDS BY ELIG CLIN: HCPCS | Performed by: STUDENT IN AN ORGANIZED HEALTH CARE EDUCATION/TRAINING PROGRAM

## 2022-11-21 PROCEDURE — 3078F DIAST BP <80 MM HG: CPT | Performed by: STUDENT IN AN ORGANIZED HEALTH CARE EDUCATION/TRAINING PROGRAM

## 2022-11-21 PROCEDURE — G8417 CALC BMI ABV UP PARAM F/U: HCPCS | Performed by: STUDENT IN AN ORGANIZED HEALTH CARE EDUCATION/TRAINING PROGRAM

## 2022-11-21 NOTE — PROGRESS NOTES
OB/GYN Problem Visit    Johnny Franks  11/21/2022                       Primary Care Physician: Kvng Mejia MD    CC:   Chief Complaint   Patient presents with    Follow-up     Vag brown discharge 5-6 days          HPI: Johnny Franks is a 47 y.o. female [id number]    The patient was seen and examined. She is complaining of brown vaginal discharge. Patient reports brown vaginal discharge for the past 5 days. She reports a slight odor to the discharge, denies any bright red vaginal bleeding. Denies any vaginal pain or abdominal pain. Denies any other concerns or complaints at this time.       REVIEW OF SYSTEMS:   Constitutional: negative fever, negative chills, negative weight changes   HEENT: negative visual disturbances, negative headaches, negative dizziness, negative hearing loss  Breast: Negative breast abnormalities, negative breast lumps, negative nipple discharge  Respiratory: negative dyspnea, negative cough, negative SOB  Cardiovascular: negative chest pain,  negative palpitations, negative arrhythmia, negative syncope   Gastrointestinal: negative abdominal pain, negative RUQ pain, negative N/V, negative diarrhea, negative constipation, negative bowel changes, negative heartburn   Genitourinary: negative dysuria, negative hematuria, negative urinary incontinence, positive vaginal discharge, negative vaginal bleeding or spotting  Dermatological: negative rash, negative pruritis, negative mole or other skin changes  Hematologic: negative bruising  Immunologic/Lymphatic: negative recent illness, negative recent sick contact  Musculoskeletal: negative back pain, negative myalgias, negative arthralgias  Neurological:  negative dizziness, negative migraines, negative seizures, negative weakness  Behavior/Psych: negative depression, negative anxiety, negative SI, negative HI    ________________________________________________________________________    OBSTETRICAL HISTORY:  OB History   [place] Para Term  AB Living   5 1 1 0 4 1   SAB IAB Ectopic Molar Multiple Live Births   4 0 0   0        # Outcome Date GA Lbr Eric/2nd Weight Sex Delivery Anes PTL Lv   5 Term    7 lb 6 oz (3.345 kg)  CS-Unspec      4 SAB            3 SAB            2 SAB            1 SAB                PAST MEDICAL HISTORY:      Diagnosis Date    Anxiety     Arthritis of knee, degenerative 2012    Blindness of left eye     hx. of     Blurry vision     left remaines blurry to Left eye to  on 21    Carpal tunnel syndrome on both sides 1/15/2013    Chronic knee pain     on 18 pt is presently in pain mgmnt    Depressive disorder, not elsewhere classified 10/14/2014    Headache     Hx. of     Hydronephrosis, left 2016    Hyperlipidemia     Hypertension     Hypothyroidism 2013    Iron deficiency anemia 6/10/2014    Mild intermittent asthma without complication 1394    Obesity     Osteoarthritis     KNEE    Polyneuropathy 2012    RAD (reactive airway disease) 2012    Snores     possible apnea but not tested yet    Type II or unspecified type diabetes mellitus without mention of complication, not stated as uncontrolled     Urge incontinence of urine 2017    Wears glasses     Weight loss        PAST SURGICAL HISTORY:                                                                    Procedure Laterality Date    CARPAL TUNNEL RELEASE      2008 left,  2009 right     SECTION  2007    CYSTOSCOPY  2016    cysto with left ureteral stent placement    HYSTERECTOMY, TOTAL ABDOMINAL (CERVIX REMOVED)  2013    LITHOTRIPSY Left 2016    cysto with ESWL and left ureteral stent placement.     NERVE BLOCK Left 2016    left knee kenalog 80mg    NERVE BLOCK  3/11/16    Rt Knee depo medrol 80     TUBAL LIGATION  3/2012       MEDICATIONS:  Current Outpatient Medications   Medication Sig Dispense Refill    Insulin Pen Needle (UNIFINE PENTIPS) 31G X 5 MM MISC USE DAILY WITH INSULIN AS DIRECTED THREE TIMES A DAY & NIGHTLY 100 each 3    lisinopril (PRINIVIL;ZESTRIL) 5 MG tablet TAKE 1 TABLET BY MOUTH ONE TIME DAILY 90 tablet 3    insulin aspart (NOVOLOG FLEXPEN) 100 UNIT/ML injection pen INJECT 30 UNITS INTO THE SKIN THREE TIMES DAILY BEFORE MEALS 15 mL 3    levothyroxine (SYNTHROID) 50 MCG tablet TAKE 1 TABLET BY MOUTH ONE TIME A DAY 90 tablet 3    hydrOXYzine HCl (ATARAX) 25 MG tablet Take 1 tablet by mouth every 8 hours as needed for Itching 12 tablet 0    Liraglutide (VICTOZA) 18 MG/3ML SOPN SC injection Inject 1.8 mg into the skin daily 6 mL 5    diclofenac (VOLTAREN) 50 MG EC tablet TAKE 1 TABLET BY MOUTH 3 TIMES A DAY WITH MEALS 180 tablet 3    aspirin (ASPIRIN LOW DOSE) 81 MG EC tablet TAKE 1 TABLET BY MOUTH ONE TIME A DAY 90 tablet 3    mirabegron (MYRBETRIQ) 25 MG TB24 TAKE 1 TABLET BY MOUTH ONE TIME A DAY 90 tablet 3    traZODone (DESYREL) 150 MG tablet TAKE ONE TABLET BY MOUTH NIGHTLY 90 tablet 3    simvastatin (ZOCOR) 40 MG tablet TAKE ONE TABLET BY MOUTH NIGHTLY 90 tablet 3    Continuous Blood Gluc Sensor (FREESTYLE JUAN CARLOS 14 DAY SENSOR) MISC Test blood sugars 5-6/day 2 each 5    DULoxetine (CYMBALTA) 60 MG extended release capsule Take 1 capsule by mouth daily 90 capsule 3    clotrimazole-betamethasone (LOTRISONE) 1-0.05 % cream Apply to affected area bid externally x 4 days then daily for 3 days 45 g 1    lidocaine-prilocaine (EMLA) 2.5-2.5 % cream Apply topically as needed.  5 g 1    FEROSUL 325 (65 Fe) MG tablet TAKE 1 TABLET BY MOUTH 2 TIMES A DAY 60 tablet 3    blood glucose monitor strips 1 strip by Other route three times daily Test___times daily    Diagnosis: 250.0   Diabetes Mellitus____Insulin Dependent____Non-Insulin Dependent 100 strip 3    nystatin (NYAMYC) 042869 UNIT/GM powder APPLY TO SKIN OF LEGS & IN FOLDS OF SKIN WITH IRRITATION 1 each 2    metFORMIN (GLUCOPHAGE) 500 MG tablet TAKE 1 TABLET BY MOUTH 2 TIMES A DAY WITH MEALS 60 tablet 3    Continuous Blood Gluc  (FREESTYLE JUAN CARLOS 14 DAY READER) MERNA Test blood sugars 5-6/day 2 each 11    SUMAtriptan (IMITREX) 100 MG tablet Take 1 tablet by mouth 2 times daily as needed for Migraine 9 tablet 3    magnesium oxide (MAG-OX) 400 (241.3 Mg) MG TABS tablet Take 1 tablet by mouth daily 30 tablet 3    insulin glargine (BASAGLAR KWIKPEN) 100 UNIT/ML injection pen Inject 35 Units into the skin nightly      ONE TOUCH ULTRASOFT LANCETS MISC Dx: DM-2 use 2-3 times daily 100 each 2    Lancets MISC 1 each by Does not apply route daily Use 3-4 times daily 100 each 11    albuterol sulfate HFA (PROVENTIL HFA) 108 (90 Base) MCG/ACT inhaler Inhale 2 puffs into the lungs every 6 hours as needed for Wheezing 1 Inhaler 2    Calcium Citrate-Vitamin D (CALCITRATE/VITAMIN D PO) Take 1 tablet by mouth daily       SUMAtriptan (IMITREX) 25 MG tablet Take 1 tablet by mouth once as needed for Migraine 9 tablet 1    metFORMIN (GLUCOPHAGE) 500 MG tablet Take 1 tablet by mouth 2 times daily (with meals) (Patient not taking: Reported on 11/21/2022) 60 tablet 5    Misc. Devices MISC 1 each by Does not apply route once as needed (left knee-medial  brace) 1 each 0     No current facility-administered medications for this visit. ALLERGIES:  Allergies as of 11/21/2022 - Fully Reviewed 11/21/2022   Allergen Reaction Noted    Januvia [sitagliptin phosphate] Hives 12/06/2011    Naproxen sodium Hives 12/05/2011    Neurontin [gabapentin] Nausea Only 06/03/2013                                   VITALS:  Vitals:    11/21/22 1450   BP: 128/81   Pulse: 93   Weight: 242 lb (109.8 kg)                                                                                                                                                                         PHYSICAL EXAM:   Chaperone for Intimate Exam: Chaperone was present for entire exam, Chaperone Name: Oksana Anthony MA    General Appearance: Appears healthy. Alert; in no acute distress. Pleasant.   Skin: Normal  Lymphatic: No cervical, superclavicular, axillary, or inguinal adenopathy. HEENT: normocephalic and atraumatic, Thyroid normal to inspection and palpation  Respiratory: No labored breathing or conversational dyspnea  Cardiovascular: regular rate and rhythm  Breast:  (Chest): normal appearance  Abdomen: soft, non-tender, non-distended, no right upper quadrant tenderness, and no CVA tenderness,   Pelvic Exam:   External genitalia: General appearance; normal, Hair distribution; normal, Lesions absent  Urinary system: urethral meatus normal  Vaginal: normal mucosa, no discharge; vaginal cuff intact with no redness or irritation noted  Cervix: surgically absent  Extremities: non-tender BLE and non-edematous  Musculoskeletal: no gross abnormalities  Psych: Normal.    DATA:  No results found for this visit on 11/21/22.     ASSESSMENT & PLAN:    Nathaniel Yan is a 47 y.o. female [id number]      Vaginal discharge   -Patient complaining of brown vaginal discharge for the past 5 to 6 days   -Patient denies any bright red vaginal bleeding or abdominal/vaginal pain   -SSE revealing vaginal cuff intact, no bleeding or brown discharge noted, no lesions noted   -GC/C and vaginitis collected, will follow results   -Return for annual 7/2023    Patient Active Problem List    Diagnosis Date Noted    Acquired hypothyroidism      Priority: High    Tobacco use 04/19/2011     Priority: High    Dizziness 11/08/2021    Diabetic polyneuropathy ([facility] [address] 10/19/2021    Blindness of left eye 01/08/2021    Acute headache 01/07/2021    Visual changes     Class 3 severe obesity in adult [facility]) 01/29/2020    Bilateral chronic knee pain 01/29/2020    Trichomonal vaginitis 12/04/2018     Flagyl Rx and [employer] PARTNER Rx for partner Preston Wraying 6/8/1966      Pelvic pain in female 05/10/2018    Bilateral ovarian cysts 05/10/2018     Seen on CT on 4/2018   Left ovarian cysts seen on TVUS on 05/2018   Repeat TVUS in 3 months (09/2018)       H/O Hysterectomy (2013) 05/10/2018    Dyslipidemia     Primary hypertension     Frontal headache 06/24/2017    Frontal sinusitis 06/24/2017    Chest pain 06/23/2017    Urge incontinence of urine 05/31/2017    Mild intermittent asthma without complication 54/17/9941    Primary osteoarthritis of both knees 06/10/2016    Type 2 diabetes mellitus, with long-term current use of insulin ([facility] [address]     Class 1 obesity due to excess calories with serious comorbidity in adult     Mild episode of recurrent major depressive disorder ([facility] [address] 10/14/2014    Hyperlipidemia 06/10/2014    Iron deficiency anemia 06/10/2014    Carpal tunnel syndrome on both sides 01/15/2013       Return in about 7 months (around 7/1/2023) for Annual.      Patient was seen with total face to face time of 20 minutes. More than 50% of this visit was on counseling and education regarding her diagnose(s) as listed below and options. She was also counseled on her preventative health maintenance recommendations and follow-up. Diagnosis Orders   1. Vaginal discharge  Chlamydia Trachomatis & Neisseria gonorrhoeae (GC) by amplified detection    Vaginitis DNA Probe          Vincent Gunter DO  Ob/Gyn Resident  [facility] [place]  11/21/2022, 3:21 PM         Attending Physician Statement  I have discussed the care of Bay Torres, including pertinent history and exam findings,  with the resident. I have reviewed the key elements of all parts of the encounter with the resident. I agree with the assessment, plan and orders as documented by the resident.   (GE Modifier)    Mohit Soto DO

## 2022-11-22 LAB
C TRACH DNA GENITAL QL NAA+PROBE: NEGATIVE
CANDIDA SPECIES, DNA PROBE: NEGATIVE
GARDNERELLA VAGINALIS, DNA PROBE: NEGATIVE
N. GONORRHOEAE DNA: NEGATIVE
SOURCE: NORMAL
SPECIMEN DESCRIPTION: NORMAL
TRICHOMONAS VAGINALIS DNA: NEGATIVE

## 2022-12-13 ENCOUNTER — TELEPHONE (OUTPATIENT)
Dept: INTERNAL MEDICINE CLINIC | Age: 52
End: 2022-12-13

## 2022-12-14 ENCOUNTER — TELEPHONE (OUTPATIENT)
Dept: INTERNAL MEDICINE CLINIC | Age: 52
End: 2022-12-14

## 2022-12-14 NOTE — TELEPHONE ENCOUNTER
Continuous Blood Gluc Sensor (FREESTYLE JUAN CARLOS 14 DAY SENSOR) [facility] [[id number]]  Continuous Blood Gluc Sensor (FREESTYLE JUAN CARLOS 14 DAY SENSOR) [address] [facility] [[id number]]     Order Details  Dose, Route, Frequency: As Directed   Dispense Quantity: 2 each Refills: 5          Sig: Test blood sugars 5-6/day         Start Date: 11/09/22 End Date: --   Written Date: 11/09/22 Expiration Date: 11/09/23       Diagnosis Association: Uncontrolled type 2 diabetes mellitus with hyperglycemia ([facility] [address] (E11.65)   Original Order:  Continuous Blood Gluc Sensor (FREESTYLE JUAN CARLOS [address] [facility]) [address] [facility] [[id number]]   Providers    Authorizing Provider: Brittani Teran MD NPI: [id number]   Ordering User: Brittani Teran MD          Pharmacy    [address] [place] [address]   Phone:  [phone]  Fax:  [phone]   Pharmacy states that they have called numerous times about a PA on this and has not heard anything. It has been since 11/9/2022.     Key # [id number] at [address] [facility] my meds

## 2023-03-20 DIAGNOSIS — R51.9 NONINTRACTABLE HEADACHE, UNSPECIFIED CHRONICITY PATTERN, UNSPECIFIED HEADACHE TYPE: ICD-10-CM

## 2023-03-20 RX ORDER — SUMATRIPTAN 25 MG/1
TABLET, FILM COATED ORAL
Qty: 9 TABLET | Refills: 1 | Status: SHIPPED | OUTPATIENT
Start: 2023-03-20

## 2023-05-11 ENCOUNTER — HOSPITAL ENCOUNTER (OUTPATIENT)
Age: 53
Setting detail: SPECIMEN
Discharge: HOME | End: 2023-05-11

## 2023-05-11 ENCOUNTER — OFFICE VISIT (OUTPATIENT)
Dept: OBGYN | Age: 53
End: 2023-05-11
Payer: COMMERCIAL

## 2023-05-11 VITALS
SYSTOLIC BLOOD PRESSURE: 129 MMHG | BODY MASS INDEX: 37.96 KG/M2 | DIASTOLIC BLOOD PRESSURE: 88 MMHG | WEIGHT: 246 LBS | HEART RATE: 90 BPM

## 2023-05-11 DIAGNOSIS — Z12.31 ENCOUNTER FOR SCREENING MAMMOGRAM FOR MALIGNANT NEOPLASM OF BREAST: ICD-10-CM

## 2023-05-11 DIAGNOSIS — N89.8 VAGINAL ODOR: Primary | ICD-10-CM

## 2023-05-11 DIAGNOSIS — Z00.00 HEALTH CARE MAINTENANCE: ICD-10-CM

## 2023-05-11 PROCEDURE — 99211 OFF/OP EST MAY X REQ PHY/QHP: CPT | Performed by: OBSTETRICS & GYNECOLOGY

## 2023-05-11 RX ORDER — FLUCONAZOLE 150 MG/1
150 TABLET ORAL ONCE
Qty: 2 TABLET | Refills: 2 | Status: SHIPPED | OUTPATIENT
Start: 2023-05-11 | End: 2023-05-11

## 2023-05-11 NOTE — PROGRESS NOTES
(NOVOLOG FLEXPEN) 100 UNIT/ML injection pen INJECT 30 UNITS INTO THE SKIN THREE TIMES DAILY BEFORE MEALS 15 mL 3    levothyroxine (SYNTHROID) 50 MCG tablet TAKE 1 TABLET BY MOUTH ONE TIME A DAY 90 tablet 3    Liraglutide (VICTOZA) 18 MG/3ML SOPN SC injection Inject 1.8 mg into the skin daily 6 mL 5    diclofenac (VOLTAREN) 50 MG EC tablet TAKE 1 TABLET BY MOUTH 3 TIMES A DAY WITH MEALS 180 tablet 3    aspirin (ASPIRIN LOW DOSE) 81 MG EC tablet TAKE 1 TABLET BY MOUTH ONE TIME A DAY 90 tablet 3    mirabegron (MYRBETRIQ) 25 MG TB24 TAKE 1 TABLET BY MOUTH ONE TIME A DAY 90 tablet 3    traZODone (DESYREL) 150 MG tablet TAKE ONE TABLET BY MOUTH NIGHTLY 90 tablet 3    simvastatin (ZOCOR) 40 MG tablet TAKE ONE TABLET BY MOUTH NIGHTLY 90 tablet 3    Continuous Blood Gluc Sensor (FREESTYLE JUAN CARLOS 14 DAY SENSOR) MISC Test blood sugars 5-6/day 2 each 5    DULoxetine (CYMBALTA) 60 MG extended release capsule Take 1 capsule by mouth daily 90 capsule 3    lidocaine-prilocaine (EMLA) 2.5-2.5 % cream Apply topically as needed.  5 g 1    FEROSUL 325 (65 Fe) MG tablet TAKE 1 TABLET BY MOUTH 2 TIMES A DAY 60 tablet 3    blood glucose monitor strips 1 strip by Other route three times daily Test___times daily    Diagnosis: 250.0   Diabetes Mellitus____Insulin Dependent____Non-Insulin Dependent 100 strip 3    nystatin (NYAMYC) 309833 UNIT/GM powder APPLY TO SKIN OF LEGS & IN FOLDS OF SKIN WITH IRRITATION 1 each 2    metFORMIN (GLUCOPHAGE) 500 MG tablet TAKE 1 TABLET BY MOUTH 2 TIMES A DAY WITH MEALS 60 tablet 3    Continuous Blood Gluc  (FREESTYLE JUAN CARLOS 14 DAY READER) MERNA Test blood sugars 5-6/day 2 each 11    SUMAtriptan (IMITREX) 100 MG tablet Take 1 tablet by mouth 2 times daily as needed for Migraine 9 tablet 3    magnesium oxide (MAG-OX) 400 (241.3 Mg) MG TABS tablet Take 1 tablet by mouth daily 30 tablet 3    insulin glargine (BASAGLAR KWIKPEN) 100 UNIT/ML injection pen Inject 35 Units into the skin nightly      ONE TOUCH

## 2023-05-12 ENCOUNTER — TELEPHONE (OUTPATIENT)
Dept: OBGYN | Age: 53
End: 2023-05-12

## 2023-05-12 DIAGNOSIS — A59.9 TRICHIMONIASIS: Primary | ICD-10-CM

## 2023-05-12 DIAGNOSIS — N89.8 VAGINAL ODOR: ICD-10-CM

## 2023-05-12 LAB
C TRACH DNA SPEC QL PROBE+SIG AMP: NEGATIVE
CANDIDA SPECIES, DNA PROBE: NEGATIVE
GARDNERELLA VAGINALIS, DNA PROBE: NEGATIVE
N GONORRHOEA DNA SPEC QL PROBE+SIG AMP: NEGATIVE
SOURCE: ABNORMAL
SPECIMEN DESCRIPTION: NORMAL
TRICHOMONAS VAGINALIS DNA: POSITIVE

## 2023-05-12 RX ORDER — METRONIDAZOLE 500 MG/1
500 TABLET ORAL 2 TIMES DAILY
Qty: 14 TABLET | Refills: 0 | Status: SHIPPED | OUTPATIENT
Start: 2023-05-12 | End: 2023-05-19

## 2023-05-15 NOTE — TELEPHONE ENCOUNTER
Shared test results with patient and per her request canceled Flagyl script at [facility] and called in the script to [facility].

## 2023-09-18 ENCOUNTER — OFFICE VISIT (OUTPATIENT)
Dept: INTERNAL MEDICINE CLINIC | Age: 53
End: 2023-09-18
Payer: COMMERCIAL

## 2023-09-18 VITALS
SYSTOLIC BLOOD PRESSURE: 124 MMHG | OXYGEN SATURATION: 100 % | DIASTOLIC BLOOD PRESSURE: 80 MMHG | HEART RATE: 80 BPM | WEIGHT: 238.6 LBS | BODY MASS INDEX: 36.82 KG/M2

## 2023-09-18 DIAGNOSIS — Z79.4 TYPE 2 DIABETES MELLITUS WITH HYPERGLYCEMIA, WITH LONG-TERM CURRENT USE OF INSULIN (HCC): ICD-10-CM

## 2023-09-18 DIAGNOSIS — Z79.4 TYPE 2 DIABETES MELLITUS WITH DIABETIC POLYNEUROPATHY, WITH LONG-TERM CURRENT USE OF INSULIN (HCC): Primary | ICD-10-CM

## 2023-09-18 DIAGNOSIS — I10 PRIMARY HYPERTENSION: ICD-10-CM

## 2023-09-18 DIAGNOSIS — G47.33 OSA (OBSTRUCTIVE SLEEP APNEA): ICD-10-CM

## 2023-09-18 DIAGNOSIS — E11.42 TYPE 2 DIABETES MELLITUS WITH DIABETIC POLYNEUROPATHY, WITH LONG-TERM CURRENT USE OF INSULIN (HCC): Primary | ICD-10-CM

## 2023-09-18 DIAGNOSIS — E03.9 HYPOTHYROIDISM, UNSPECIFIED TYPE: ICD-10-CM

## 2023-09-18 DIAGNOSIS — E11.65 TYPE 2 DIABETES MELLITUS WITH HYPERGLYCEMIA, WITH LONG-TERM CURRENT USE OF INSULIN (HCC): ICD-10-CM

## 2023-09-18 DIAGNOSIS — Z12.11 SCREEN FOR COLON CANCER: ICD-10-CM

## 2023-09-18 DIAGNOSIS — J45.20 MILD INTERMITTENT ASTHMA WITHOUT COMPLICATION: ICD-10-CM

## 2023-09-18 DIAGNOSIS — M17.0 PRIMARY OSTEOARTHRITIS OF BOTH KNEES: ICD-10-CM

## 2023-09-18 LAB — HBA1C MFR BLD: 6.1 %

## 2023-09-18 PROCEDURE — 4004F PT TOBACCO SCREEN RCVD TLK: CPT | Performed by: INTERNAL MEDICINE

## 2023-09-18 PROCEDURE — 2022F DILAT RTA XM EVC RTNOPTHY: CPT | Performed by: INTERNAL MEDICINE

## 2023-09-18 PROCEDURE — 83036 HEMOGLOBIN GLYCOSYLATED A1C: CPT | Performed by: INTERNAL MEDICINE

## 2023-09-18 PROCEDURE — 3079F DIAST BP 80-89 MM HG: CPT | Performed by: INTERNAL MEDICINE

## 2023-09-18 PROCEDURE — 3044F HG A1C LEVEL LT 7.0%: CPT | Performed by: INTERNAL MEDICINE

## 2023-09-18 PROCEDURE — G8427 DOCREV CUR MEDS BY ELIG CLIN: HCPCS | Performed by: INTERNAL MEDICINE

## 2023-09-18 PROCEDURE — 3074F SYST BP LT 130 MM HG: CPT | Performed by: INTERNAL MEDICINE

## 2023-09-18 PROCEDURE — 3017F COLORECTAL CA SCREEN DOC REV: CPT | Performed by: INTERNAL MEDICINE

## 2023-09-18 PROCEDURE — G8417 CALC BMI ABV UP PARAM F/U: HCPCS | Performed by: INTERNAL MEDICINE

## 2023-09-18 PROCEDURE — 99214 OFFICE O/P EST MOD 30 MIN: CPT | Performed by: INTERNAL MEDICINE

## 2023-09-18 RX ORDER — LIRAGLUTIDE 6 MG/ML
1.8 INJECTION SUBCUTANEOUS DAILY
Qty: 6 ML | Refills: 5 | Status: SHIPPED | OUTPATIENT
Start: 2023-09-18

## 2023-09-18 RX ORDER — BLOOD PRESSURE TEST KIT
KIT MISCELLANEOUS
Qty: 1 KIT | Refills: 0 | Status: SHIPPED | OUTPATIENT
Start: 2023-09-18

## 2023-09-18 RX ORDER — LIRAGLUTIDE 6 MG/ML
1.2 INJECTION SUBCUTANEOUS DAILY
Qty: 6 ML | Refills: 5 | Status: SHIPPED | OUTPATIENT
Start: 2023-09-18 | End: 2023-09-18 | Stop reason: SDUPTHER

## 2023-09-18 SDOH — ECONOMIC STABILITY: HOUSING INSECURITY
IN THE LAST 12 MONTHS, WAS THERE A TIME WHEN YOU DID NOT HAVE A STEADY PLACE TO SLEEP OR SLEPT IN A SHELTER (INCLUDING NOW)?: NO

## 2023-09-18 SDOH — ECONOMIC STABILITY: INCOME INSECURITY: HOW HARD IS IT FOR YOU TO PAY FOR THE VERY BASICS LIKE FOOD, HOUSING, MEDICAL CARE, AND HEATING?: NOT HARD AT ALL

## 2023-09-18 SDOH — ECONOMIC STABILITY: FOOD INSECURITY: WITHIN THE PAST 12 MONTHS, YOU WORRIED THAT YOUR FOOD WOULD RUN OUT BEFORE YOU GOT MONEY TO BUY MORE.: NEVER TRUE

## 2023-09-18 SDOH — ECONOMIC STABILITY: FOOD INSECURITY: WITHIN THE PAST 12 MONTHS, THE FOOD YOU BOUGHT JUST DIDN'T LAST AND YOU DIDN'T HAVE MONEY TO GET MORE.: NEVER TRUE

## 2023-09-18 ASSESSMENT — PATIENT HEALTH QUESTIONNAIRE - PHQ9
3. TROUBLE FALLING OR STAYING ASLEEP: 0
SUM OF ALL RESPONSES TO PHQ QUESTIONS 1-9: 0
4. FEELING TIRED OR HAVING LITTLE ENERGY: 0
SUM OF ALL RESPONSES TO PHQ9 QUESTIONS 1 & 2: 0
10. IF YOU CHECKED OFF ANY PROBLEMS, HOW DIFFICULT HAVE THESE PROBLEMS MADE IT FOR YOU TO DO YOUR WORK, TAKE CARE OF THINGS AT HOME, OR GET ALONG WITH OTHER PEOPLE: 0
5. POOR APPETITE OR OVEREATING: 0
6. FEELING BAD ABOUT YOURSELF - OR THAT YOU ARE A FAILURE OR HAVE LET YOURSELF OR YOUR FAMILY DOWN: 0
SUM OF ALL RESPONSES TO PHQ QUESTIONS 1-9: 0
SUM OF ALL RESPONSES TO PHQ QUESTIONS 1-9: 0
9. THOUGHTS THAT YOU WOULD BE BETTER OFF DEAD, OR OF HURTING YOURSELF: 0
1. LITTLE INTEREST OR PLEASURE IN DOING THINGS: 0
2. FEELING DOWN, DEPRESSED OR HOPELESS: 0
7. TROUBLE CONCENTRATING ON THINGS, SUCH AS READING THE NEWSPAPER OR WATCHING TELEVISION: 0
SUM OF ALL RESPONSES TO PHQ QUESTIONS 1-9: 0
8. MOVING OR SPEAKING SO SLOWLY THAT OTHER PEOPLE COULD HAVE NOTICED. OR THE OPPOSITE, BEING SO FIGETY OR RESTLESS THAT YOU HAVE BEEN MOVING AROUND A LOT MORE THAN USUAL: 0

## 2023-09-18 NOTE — PROGRESS NOTES
[address] [address]  Dept: [phone]  Dept Fax: [phone]    Office Progress/Follow Up Note  Date of patient's visit: 9/18/2023  Patient's Name:  Pritesh Huynh YOB: 1970            Patient Care Team:  Theresa Rodrigues MD as PCP - General (Internal Medicine)  Xavier Ruiz MD as PCP - Empaneled Provider  Jero Mathias MD as Anesthesiologist (Pain Management)  Yessenia Franco MD as Consulting Physician (Urology)  Emani Garcia MD as Consulting Physician (Internal Medicine)    REASON FOR VISIT: Routine outpatient follow up/Same day visit/Post hospital/ED visit    HISTORY OF PRESENT ILLNESS:      Chief Complaint   Patient presents with    Establish Care     New to provider   Patient has been having diziness and pain in her knees         History was obtained from the patient.  Pritesh Huynh is a 46 y.o. is here for a   Dizziness and lightheadedness   Has checked bd during the episodes   Blood sugars getting too low   Lowest 60s   Gets some symptoms at 100  Highest    On victoza and basaglar  Taking 12 units at night and sliding scale    Will discontinue basaglar   Pt confused about which insulin she takes when   Advised to stop   Check 4 times daily   Bring log next visit   Watches diet   Hba1c today 6.1     Not checking Bp at home   Does not have cuff     Loosing weight  gradually     Knee pian chronic   Left worse   Some swelling   OA of both knees  Used to get steroid injections with no help   To see ortho   Was alst seen in 4/2022    Does not sleep good   Snores   Insomnia   Multiple awakenings   Sleep study ordered     Patient Active Problem List   Diagnosis    Tobacco use    Carpal tunnel syndrome on both sides    Acquired hypothyroidism    Hyperlipidemia    Iron deficiency anemia    Mild episode of recurrent major depressive disorder (HCC)    Type 2 diabetes mellitus, with long-term current use of insulin
Hepatitis A vaccine  Aged Out    Hib vaccine  Aged Out    Meningococcal (ACWY) vaccine  Aged Out    Cervical cancer screen  Discontinued

## 2023-09-27 ENCOUNTER — OFFICE VISIT (OUTPATIENT)
Dept: ORTHOPEDIC SURGERY | Age: 53
End: 2023-09-27

## 2023-09-27 VITALS — WEIGHT: 238 LBS | BODY MASS INDEX: 36.07 KG/M2 | HEIGHT: 68 IN

## 2023-09-27 DIAGNOSIS — M17.0 PRIMARY OSTEOARTHRITIS OF BOTH KNEES: Primary | ICD-10-CM

## 2023-09-27 RX ORDER — BUPIVACAINE HYDROCHLORIDE 2.5 MG/ML
2 INJECTION, SOLUTION INFILTRATION; PERINEURAL ONCE
Status: COMPLETED | OUTPATIENT
Start: 2023-09-27 | End: 2023-09-27

## 2023-09-27 RX ORDER — MELOXICAM 15 MG/1
15 TABLET ORAL DAILY
Qty: 30 TABLET | Refills: 3 | Status: SHIPPED | OUTPATIENT
Start: 2023-09-27

## 2023-09-27 RX ORDER — METHYLPREDNISOLONE ACETATE 80 MG/ML
80 INJECTION, SUSPENSION INTRA-ARTICULAR; INTRALESIONAL; INTRAMUSCULAR; SOFT TISSUE ONCE
Status: COMPLETED | OUTPATIENT
Start: 2023-09-27 | End: 2023-09-27

## 2023-09-27 RX ADMIN — METHYLPREDNISOLONE ACETATE 80 MG: 80 INJECTION, SUSPENSION INTRA-ARTICULAR; INTRALESIONAL; INTRAMUSCULAR; SOFT TISSUE at 10:26

## 2023-09-27 RX ADMIN — BUPIVACAINE HYDROCHLORIDE 5 MG: 2.5 INJECTION, SOLUTION INFILTRATION; PERINEURAL at 10:24

## 2023-09-27 RX ADMIN — BUPIVACAINE HYDROCHLORIDE 5 MG: 2.5 INJECTION, SOLUTION INFILTRATION; PERINEURAL at 10:25

## 2023-09-27 NOTE — PROGRESS NOTES
I performed a history and physical examination of the patient and discussed management with the resident. I reviewed the physician assistant/resident physician note and agree with the documented findings and plan of care. Any areas of disagreement are noted on the chart. I have personally evaluated this patient and have completed at least one if not all key elements of the E/M (history, physical exam, and MDM). Additional findings are as noted. I agree with the chief complaint, past medical history, past surgical history, allergies, medications, social and family history as documented unless otherwise noted below.      Electronically signed by Corine Talamantes DO on 9/27/2023 at 11:49 AM

## 2023-10-11 ENCOUNTER — HOSPITAL ENCOUNTER (OUTPATIENT)
Dept: SLEEP CENTER | Age: 53
Discharge: HOME | End: 2023-10-13
Payer: COMMERCIAL

## 2023-10-11 VITALS
WEIGHT: 238 LBS | RESPIRATION RATE: 26 BRPM | BODY MASS INDEX: 36.07 KG/M2 | HEIGHT: 68 IN | OXYGEN SATURATION: 95 % | HEART RATE: 96 BPM

## 2023-10-11 DIAGNOSIS — G47.33 OSA (OBSTRUCTIVE SLEEP APNEA): ICD-10-CM

## 2023-10-11 PROCEDURE — 95810 POLYSOM 6/> YRS 4/> PARAM: CPT

## 2023-10-11 ASSESSMENT — SLEEP AND FATIGUE QUESTIONNAIRES
HOW LIKELY ARE YOU TO NOD OFF OR FALL ASLEEP WHILE WATCHING TV: 3
HOW LIKELY ARE YOU TO NOD OFF OR FALL ASLEEP WHILE SITTING QUIETLY AFTER LUNCH WITHOUT ALCOHOL: 0
HOW LIKELY ARE YOU TO NOD OFF OR FALL ASLEEP WHILE LYING DOWN TO REST IN THE AFTERNOON WHEN CIRCUMSTANCES PERMIT: 2
HOW LIKELY ARE YOU TO NOD OFF OR FALL ASLEEP IN A CAR, WHILE STOPPED FOR A FEW MINUTES IN TRAFFIC: 0
ESS TOTAL SCORE: 8
HOW LIKELY ARE YOU TO NOD OFF OR FALL ASLEEP WHILE SITTING AND READING: 3
HOW LIKELY ARE YOU TO NOD OFF OR FALL ASLEEP WHILE SITTING AND TALKING TO SOMEONE: 0
HOW LIKELY ARE YOU TO NOD OFF OR FALL ASLEEP WHILE SITTING INACTIVE IN A PUBLIC PLACE: 0
HOW LIKELY ARE YOU TO NOD OFF OR FALL ASLEEP WHEN YOU ARE A PASSENGER IN A CAR FOR AN HOUR WITHOUT A BREAK: 0

## 2023-10-16 ENCOUNTER — TELEPHONE (OUTPATIENT)
Dept: INTERNAL MEDICINE CLINIC | Age: 53
End: 2023-10-16

## 2023-10-20 LAB — STATUS: NORMAL

## 2023-11-29 ENCOUNTER — OFFICE VISIT (OUTPATIENT)
Dept: PRIMARY CARE CLINIC | Age: 53
End: 2023-11-29
Payer: COMMERCIAL

## 2023-11-29 VITALS
DIASTOLIC BLOOD PRESSURE: 74 MMHG | HEART RATE: 81 BPM | WEIGHT: 239 LBS | HEIGHT: 68 IN | BODY MASS INDEX: 36.22 KG/M2 | SYSTOLIC BLOOD PRESSURE: 110 MMHG | OXYGEN SATURATION: 100 %

## 2023-11-29 DIAGNOSIS — I10 ESSENTIAL HYPERTENSION: ICD-10-CM

## 2023-11-29 DIAGNOSIS — Z76.89 ENCOUNTER TO ESTABLISH CARE: Primary | ICD-10-CM

## 2023-11-29 DIAGNOSIS — N39.41 URGE INCONTINENCE OF URINE: ICD-10-CM

## 2023-11-29 DIAGNOSIS — E11.9 TYPE 2 DIABETES MELLITUS WITHOUT COMPLICATION, WITH LONG-TERM CURRENT USE OF INSULIN (HCC): ICD-10-CM

## 2023-11-29 DIAGNOSIS — E03.9 ACQUIRED HYPOTHYROIDISM: ICD-10-CM

## 2023-11-29 DIAGNOSIS — Z79.4 TYPE 2 DIABETES MELLITUS WITHOUT COMPLICATION, WITH LONG-TERM CURRENT USE OF INSULIN (HCC): ICD-10-CM

## 2023-11-29 DIAGNOSIS — M25.561 CHRONIC PAIN OF BOTH KNEES: ICD-10-CM

## 2023-11-29 DIAGNOSIS — F33.0 MILD EPISODE OF RECURRENT MAJOR DEPRESSIVE DISORDER (HCC): ICD-10-CM

## 2023-11-29 DIAGNOSIS — M25.562 CHRONIC PAIN OF BOTH KNEES: ICD-10-CM

## 2023-11-29 DIAGNOSIS — J45.20 ASTHMA, INTERMITTENT, UNCOMPLICATED: ICD-10-CM

## 2023-11-29 DIAGNOSIS — Z13.9 DUE FOR SCREENING: ICD-10-CM

## 2023-11-29 DIAGNOSIS — Z23 NEED FOR IMMUNIZATION AGAINST INFLUENZA: ICD-10-CM

## 2023-11-29 DIAGNOSIS — G89.29 CHRONIC PAIN OF BOTH KNEES: ICD-10-CM

## 2023-11-29 PROBLEM — R39.15 URGENCY OF MICTURITION: Status: ACTIVE | Noted: 2023-11-29

## 2023-11-29 PROBLEM — I65.23 CAROTID STENOSIS, BILATERAL: Status: ACTIVE | Noted: 2023-11-29

## 2023-11-29 PROCEDURE — G8482 FLU IMMUNIZE ORDER/ADMIN: HCPCS | Performed by: NURSE PRACTITIONER

## 2023-11-29 PROCEDURE — 99204 OFFICE O/P NEW MOD 45 MIN: CPT | Performed by: NURSE PRACTITIONER

## 2023-11-29 PROCEDURE — 90674 CCIIV4 VAC NO PRSV 0.5 ML IM: CPT | Performed by: NURSE PRACTITIONER

## 2023-11-29 PROCEDURE — 3078F DIAST BP <80 MM HG: CPT | Performed by: NURSE PRACTITIONER

## 2023-11-29 PROCEDURE — 4004F PT TOBACCO SCREEN RCVD TLK: CPT | Performed by: NURSE PRACTITIONER

## 2023-11-29 PROCEDURE — G0008 ADMIN INFLUENZA VIRUS VAC: HCPCS | Performed by: NURSE PRACTITIONER

## 2023-11-29 PROCEDURE — 2022F DILAT RTA XM EVC RTNOPTHY: CPT | Performed by: NURSE PRACTITIONER

## 2023-11-29 PROCEDURE — 3074F SYST BP LT 130 MM HG: CPT | Performed by: NURSE PRACTITIONER

## 2023-11-29 PROCEDURE — G8417 CALC BMI ABV UP PARAM F/U: HCPCS | Performed by: NURSE PRACTITIONER

## 2023-11-29 PROCEDURE — 3017F COLORECTAL CA SCREEN DOC REV: CPT | Performed by: NURSE PRACTITIONER

## 2023-11-29 PROCEDURE — 3044F HG A1C LEVEL LT 7.0%: CPT | Performed by: NURSE PRACTITIONER

## 2023-11-29 PROCEDURE — G8427 DOCREV CUR MEDS BY ELIG CLIN: HCPCS | Performed by: NURSE PRACTITIONER

## 2023-11-29 RX ORDER — LIRAGLUTIDE 6 MG/ML
1.8 INJECTION SUBCUTANEOUS DAILY
Qty: 6 ML | Refills: 5 | Status: SHIPPED | OUTPATIENT
Start: 2023-11-29

## 2023-11-29 RX ORDER — LEVOTHYROXINE SODIUM 0.05 MG/1
TABLET ORAL
Qty: 90 TABLET | Refills: 3 | Status: SHIPPED | OUTPATIENT
Start: 2023-11-29

## 2023-11-29 RX ORDER — DULOXETIN HYDROCHLORIDE 60 MG/1
60 CAPSULE, DELAYED RELEASE ORAL DAILY
Qty: 90 CAPSULE | Refills: 3 | Status: SHIPPED | OUTPATIENT
Start: 2023-11-29

## 2023-11-29 RX ORDER — ALBUTEROL SULFATE 90 UG/1
2 AEROSOL, METERED RESPIRATORY (INHALATION) EVERY 6 HOURS PRN
Qty: 1 EACH | Refills: 5 | Status: SHIPPED | OUTPATIENT
Start: 2023-11-29

## 2023-11-29 RX ORDER — SIMVASTATIN 40 MG
TABLET ORAL
Qty: 90 TABLET | Refills: 3 | Status: SHIPPED | OUTPATIENT
Start: 2023-11-29

## 2023-11-29 RX ORDER — ASPIRIN 81 MG/1
TABLET ORAL
Qty: 90 TABLET | Refills: 3 | Status: SHIPPED | OUTPATIENT
Start: 2023-11-29

## 2023-11-29 RX ORDER — LISINOPRIL 5 MG/1
TABLET ORAL
Qty: 90 TABLET | Refills: 3 | Status: SHIPPED | OUTPATIENT
Start: 2023-11-29

## 2023-11-29 RX ORDER — TRAZODONE HYDROCHLORIDE 150 MG/1
TABLET ORAL
Qty: 90 TABLET | Refills: 3 | Status: SHIPPED | OUTPATIENT
Start: 2023-11-29

## 2023-11-29 NOTE — PROGRESS NOTES
[address] [facility]  [id number] [facility] [address]  Dept: [phone]       Juan Shepherd is a 46 y.o. female who presents today for her  medical conditions/complaintsas noted below. Juan Shepherd is c/o of New Patient and Tinnitus (Constant humming in right ear)      HPI:     HPI    This is a 53-year-old female with multiple underlying comorbidities including chronic osteoarthritis, left eye severe vision impairment, carpal tunnel syndrome, major depression, hypertension, acquired hypothyroidism, iron deficiency anemia, mild intermittent asthma, morbid obesity, polyneuropathy and type 2 diabetes who presents today to establish care. Patient is seeking care as her previous PCP retired. No major concerns today outside of intermittent right ear \"humming\". Patient states symptoms been ongoing for 3 weeks. Waxes and wanes in intensity. Last for few minutes at a time. She is denying any pain. No acute injury or trauma. No recent infection. No abnormalities noted on clinical exam.  Medications reviewed, no obvious offending agents. No associated neurological changes or deficits reported. No concerning findings on focal neuroexam.  Denies any headache/dizziness or lightheadedness. Offered ENT referral for audiology testing. Patient is declining at this time. Medication list reviewed and reconciled accordingly. Refills provided at patient's request.  Chronic headaches are currently well-controlled with Myrbetriq. Blood pressure is within normal range today. Patient endorses compliance with antihypertensive medications. Tolerating medications well without ADRs. Endorses compliance with type 2 diabetes medications as well. Engaging in home blood glucose monitoring. Denies any significant episodes of hypo or hyperglycemia. No changes in mental health. Denies any suicidal thoughts or ideations.   Asthma is also

## 2024-02-29 ENCOUNTER — HOSPITAL ENCOUNTER (OUTPATIENT)
Age: 54
Discharge: HOME | End: 2024-02-29
Payer: COMMERCIAL

## 2024-02-29 ENCOUNTER — OFFICE VISIT (OUTPATIENT)
Dept: PRIMARY CARE CLINIC | Age: 54
End: 2024-02-29
Payer: COMMERCIAL

## 2024-02-29 VITALS
WEIGHT: 247 LBS | HEART RATE: 83 BPM | SYSTOLIC BLOOD PRESSURE: 127 MMHG | DIASTOLIC BLOOD PRESSURE: 82 MMHG | OXYGEN SATURATION: 100 % | BODY MASS INDEX: 37.44 KG/M2 | HEIGHT: 68 IN

## 2024-02-29 DIAGNOSIS — Z79.4 TYPE 2 DIABETES MELLITUS WITHOUT COMPLICATION, WITH LONG-TERM CURRENT USE OF INSULIN (HCC): ICD-10-CM

## 2024-02-29 DIAGNOSIS — I10 PRIMARY HYPERTENSION: ICD-10-CM

## 2024-02-29 DIAGNOSIS — E11.9 TYPE 2 DIABETES MELLITUS WITHOUT COMPLICATION, WITH LONG-TERM CURRENT USE OF INSULIN (HCC): ICD-10-CM

## 2024-02-29 DIAGNOSIS — D50.0 IRON DEFICIENCY ANEMIA DUE TO CHRONIC BLOOD LOSS: ICD-10-CM

## 2024-02-29 DIAGNOSIS — E03.9 ACQUIRED HYPOTHYROIDISM: ICD-10-CM

## 2024-02-29 DIAGNOSIS — R68.89 COLD INTOLERANCE: Primary | ICD-10-CM

## 2024-02-29 LAB
25(OH)D3 SERPL-MCNC: 12.4 NG/ML
ALBUMIN SERPL-MCNC: 4.4 G/DL (ref 3.5–5.2)
ALBUMIN/GLOB SERPL: 1.4 {RATIO} (ref 1–2.5)
ALP SERPL-CCNC: 91 U/L (ref 35–104)
ALT SERPL-CCNC: 12 U/L (ref 5–33)
ANION GAP SERPL CALCULATED.3IONS-SCNC: 11 MMOL/L (ref 9–17)
AST SERPL-CCNC: 14 U/L
BASOPHILS # BLD: 0.03 K/UL (ref 0–0.2)
BASOPHILS NFR BLD: 0 % (ref 0–2)
BILIRUB SERPL-MCNC: 0.4 MG/DL (ref 0.3–1.2)
BUN SERPL-MCNC: 15 MG/DL (ref 6–20)
CALCIUM SERPL-MCNC: 9.5 MG/DL (ref 8.6–10.4)
CHLORIDE SERPL-SCNC: 105 MMOL/L (ref 98–107)
CHOLEST SERPL-MCNC: 227 MG/DL
CHOLESTEROL/HDL RATIO: 2.8
CO2 SERPL-SCNC: 24 MMOL/L (ref 20–31)
CREAT SERPL-MCNC: 0.6 MG/DL (ref 0.5–0.9)
EOSINOPHIL # BLD: 0.08 K/UL (ref 0–0.44)
EOSINOPHILS RELATIVE PERCENT: 1 % (ref 1–4)
ERYTHROCYTE [DISTWIDTH] IN BLOOD BY AUTOMATED COUNT: 13.1 % (ref 11.8–14.4)
EST. AVERAGE GLUCOSE BLD GHB EST-MCNC: 128 MG/DL
FERRITIN SERPL-MCNC: 198 NG/ML (ref 13–150)
GFR SERPL CREATININE-BSD FRML MDRD: >60 ML/MIN/1.73M2
GLUCOSE SERPL-MCNC: 96 MG/DL (ref 70–99)
HBA1C MFR BLD: 6.1 % (ref 4–6)
HCT VFR BLD AUTO: 42.3 % (ref 36.3–47.1)
HDLC SERPL-MCNC: 81 MG/DL
HGB BLD-MCNC: 13.8 G/DL (ref 11.9–15.1)
IMM GRANULOCYTES # BLD AUTO: <0.03 K/UL (ref 0–0.3)
IMM GRANULOCYTES NFR BLD: 0 %
IRON SATN MFR SERPL: 27 % (ref 20–55)
IRON SERPL-MCNC: 69 UG/DL (ref 37–145)
LDLC SERPL CALC-MCNC: 128 MG/DL (ref 0–130)
LYMPHOCYTES NFR BLD: 2.34 K/UL (ref 1.1–3.7)
LYMPHOCYTES RELATIVE PERCENT: 34 % (ref 24–43)
MCH RBC QN AUTO: 32.1 PG (ref 25.2–33.5)
MCHC RBC AUTO-ENTMCNC: 32.6 G/DL (ref 28.4–34.8)
MCV RBC AUTO: 98.4 FL (ref 82.6–102.9)
MONOCYTES NFR BLD: 0.59 K/UL (ref 0.1–1.2)
MONOCYTES NFR BLD: 9 % (ref 3–12)
NEUTROPHILS NFR BLD: 56 % (ref 36–65)
NEUTS SEG NFR BLD: 3.88 K/UL (ref 1.5–8.1)
NRBC BLD-RTO: 0 PER 100 WBC
PLATELET # BLD AUTO: 299 K/UL (ref 138–453)
PMV BLD AUTO: 9.7 FL (ref 8.1–13.5)
POTASSIUM SERPL-SCNC: 4.2 MMOL/L (ref 3.7–5.3)
PROT SERPL-MCNC: 7.5 G/DL (ref 6.4–8.3)
RBC # BLD AUTO: 4.3 M/UL (ref 3.95–5.11)
SODIUM SERPL-SCNC: 140 MMOL/L (ref 135–144)
TIBC SERPL-MCNC: 259 UG/DL (ref 250–450)
TRIGL SERPL-MCNC: 91 MG/DL
TSH SERPL DL<=0.05 MIU/L-ACNC: 1.51 UIU/ML (ref 0.3–5)
UNSATURATED IRON BINDING CAPACITY: 190 UG/DL (ref 112–347)
WBC OTHER # BLD: 6.9 K/UL (ref 3.5–11.3)

## 2024-02-29 PROCEDURE — G8427 DOCREV CUR MEDS BY ELIG CLIN: HCPCS | Performed by: NURSE PRACTITIONER

## 2024-02-29 PROCEDURE — 85025 COMPLETE CBC W/AUTO DIFF WBC: CPT

## 2024-02-29 PROCEDURE — 80053 COMPREHEN METABOLIC PANEL: CPT

## 2024-02-29 PROCEDURE — 3046F HEMOGLOBIN A1C LEVEL >9.0%: CPT | Performed by: NURSE PRACTITIONER

## 2024-02-29 PROCEDURE — 99213 OFFICE O/P EST LOW 20 MIN: CPT | Performed by: NURSE PRACTITIONER

## 2024-02-29 PROCEDURE — 83540 ASSAY OF IRON: CPT

## 2024-02-29 PROCEDURE — 3079F DIAST BP 80-89 MM HG: CPT | Performed by: NURSE PRACTITIONER

## 2024-02-29 PROCEDURE — 83036 HEMOGLOBIN GLYCOSYLATED A1C: CPT

## 2024-02-29 PROCEDURE — 80061 LIPID PANEL: CPT

## 2024-02-29 PROCEDURE — 82306 VITAMIN D 25 HYDROXY: CPT

## 2024-02-29 PROCEDURE — 3074F SYST BP LT 130 MM HG: CPT | Performed by: NURSE PRACTITIONER

## 2024-02-29 PROCEDURE — 84443 ASSAY THYROID STIM HORMONE: CPT

## 2024-02-29 PROCEDURE — 83550 IRON BINDING TEST: CPT

## 2024-02-29 PROCEDURE — 3017F COLORECTAL CA SCREEN DOC REV: CPT | Performed by: NURSE PRACTITIONER

## 2024-02-29 PROCEDURE — 36415 COLL VENOUS BLD VENIPUNCTURE: CPT

## 2024-02-29 PROCEDURE — G8417 CALC BMI ABV UP PARAM F/U: HCPCS | Performed by: NURSE PRACTITIONER

## 2024-02-29 PROCEDURE — 2022F DILAT RTA XM EVC RTNOPTHY: CPT | Performed by: NURSE PRACTITIONER

## 2024-02-29 PROCEDURE — 82728 ASSAY OF FERRITIN: CPT

## 2024-02-29 PROCEDURE — 4004F PT TOBACCO SCREEN RCVD TLK: CPT | Performed by: NURSE PRACTITIONER

## 2024-02-29 PROCEDURE — G8482 FLU IMMUNIZE ORDER/ADMIN: HCPCS | Performed by: NURSE PRACTITIONER

## 2024-02-29 ASSESSMENT — ENCOUNTER SYMPTOMS
VOMITING: 0
ABDOMINAL PAIN: 0
COUGH: 0
DIARRHEA: 0
SHORTNESS OF BREATH: 0
BACK PAIN: 0
CHEST TIGHTNESS: 0
COLOR CHANGE: 0
SINUS PRESSURE: 0
NAUSEA: 0
SORE THROAT: 0
PHOTOPHOBIA: 0
SINUS PAIN: 0

## 2024-02-29 ASSESSMENT — PATIENT HEALTH QUESTIONNAIRE - PHQ9
4. FEELING TIRED OR HAVING LITTLE ENERGY: 0
10. IF YOU CHECKED OFF ANY PROBLEMS, HOW DIFFICULT HAVE THESE PROBLEMS MADE IT FOR YOU TO DO YOUR WORK, TAKE CARE OF THINGS AT HOME, OR GET ALONG WITH OTHER PEOPLE: 0
7. TROUBLE CONCENTRATING ON THINGS, SUCH AS READING THE NEWSPAPER OR WATCHING TELEVISION: 0
SUM OF ALL RESPONSES TO PHQ QUESTIONS 1-9: 0
SUM OF ALL RESPONSES TO PHQ9 QUESTIONS 1 & 2: 0
SUM OF ALL RESPONSES TO PHQ QUESTIONS 1-9: 0
5. POOR APPETITE OR OVEREATING: 0
9. THOUGHTS THAT YOU WOULD BE BETTER OFF DEAD, OR OF HURTING YOURSELF: 0
8. MOVING OR SPEAKING SO SLOWLY THAT OTHER PEOPLE COULD HAVE NOTICED. OR THE OPPOSITE, BEING SO FIGETY OR RESTLESS THAT YOU HAVE BEEN MOVING AROUND A LOT MORE THAN USUAL: 0
1. LITTLE INTEREST OR PLEASURE IN DOING THINGS: 0
2. FEELING DOWN, DEPRESSED OR HOPELESS: 0
6. FEELING BAD ABOUT YOURSELF - OR THAT YOU ARE A FAILURE OR HAVE LET YOURSELF OR YOUR FAMILY DOWN: 0
SUM OF ALL RESPONSES TO PHQ QUESTIONS 1-9: 0
3. TROUBLE FALLING OR STAYING ASLEEP: 0
SUM OF ALL RESPONSES TO PHQ QUESTIONS 1-9: 0

## 2024-02-29 NOTE — PROGRESS NOTES
normal.                  An electronic signature was used to authenticate this note.    --MARILU Garrison - CNP

## 2024-03-04 ENCOUNTER — TELEPHONE (OUTPATIENT)
Dept: PRIMARY CARE CLINIC | Age: 54
End: 2024-03-04

## 2024-03-04 DIAGNOSIS — E55.9 VITAMIN D INSUFFICIENCY: Primary | ICD-10-CM

## 2024-03-04 RX ORDER — CALCIUM CARBONATE 500(1250)
500 TABLET ORAL DAILY
Qty: 90 TABLET | Refills: 0 | Status: SHIPPED | OUTPATIENT
Start: 2024-03-04 | End: 2024-06-02

## 2024-03-04 RX ORDER — ERGOCALCIFEROL 1.25 MG/1
50000 CAPSULE ORAL WEEKLY
Qty: 12 CAPSULE | Refills: 0 | Status: SHIPPED | OUTPATIENT
Start: 2024-03-04

## 2024-03-04 NOTE — TELEPHONE ENCOUNTER
Please let patient know labs are stable outside of fairly low vitamin D level.  I have sent over weekly vitamin D supplementation to patient's pharmacy on file.  I have also sent over a daily calcium supplement which she should also be taking based on her age and postmenopausal status.  On patient's next visit, we will repeat her vitamin D levels.  My hope is vitamin D level will be improved and we then can combine the vitamin D and calcium supplement. Thyroid and iron studies are normal.  We will monitor night time symptoms while we correct the Vit D levels.

## 2024-03-24 ENCOUNTER — HOSPITAL ENCOUNTER (EMERGENCY)
Age: 54
Discharge: HOME | End: 2024-03-25
Attending: STUDENT IN AN ORGANIZED HEALTH CARE EDUCATION/TRAINING PROGRAM
Payer: COMMERCIAL

## 2024-03-24 ENCOUNTER — APPOINTMENT (OUTPATIENT)
Dept: GENERAL RADIOLOGY | Age: 54
End: 2024-03-24
Payer: COMMERCIAL

## 2024-03-24 DIAGNOSIS — R07.89 ATYPICAL CHEST PAIN: Primary | ICD-10-CM

## 2024-03-24 DIAGNOSIS — G62.9 NEUROPATHY: ICD-10-CM

## 2024-03-24 DIAGNOSIS — R09.1 PLEURISY: ICD-10-CM

## 2024-03-24 LAB
ANION GAP SERPL CALCULATED.3IONS-SCNC: 10 MMOL/L (ref 9–17)
BASOPHILS # BLD: 0.03 K/UL (ref 0–0.2)
BASOPHILS NFR BLD: 1 % (ref 0–2)
BUN SERPL-MCNC: 20 MG/DL (ref 6–20)
BUN/CREAT SERPL: 33 (ref 9–20)
CALCIUM SERPL-MCNC: 9.3 MG/DL (ref 8.6–10.4)
CHLORIDE SERPL-SCNC: 108 MMOL/L (ref 98–107)
CO2 SERPL-SCNC: 23 MMOL/L (ref 20–31)
CREAT SERPL-MCNC: 0.6 MG/DL (ref 0.5–0.9)
EOSINOPHIL # BLD: 0.14 K/UL (ref 0–0.44)
EOSINOPHILS RELATIVE PERCENT: 2 % (ref 1–4)
ERYTHROCYTE [DISTWIDTH] IN BLOOD BY AUTOMATED COUNT: 12.8 % (ref 11.8–14.4)
GFR SERPL CREATININE-BSD FRML MDRD: >60 ML/MIN/1.73M2
GLUCOSE SERPL-MCNC: 130 MG/DL (ref 70–99)
HCO3 VENOUS: 22.9 MMOL/L (ref 22–29)
HCT VFR BLD AUTO: 38 % (ref 36.3–47.1)
HGB BLD-MCNC: 12.6 G/DL (ref 11.9–15.1)
IMM GRANULOCYTES # BLD AUTO: 0.02 K/UL (ref 0–0.3)
IMM GRANULOCYTES NFR BLD: 0 %
LYMPHOCYTES NFR BLD: 2.77 K/UL (ref 1.1–3.7)
LYMPHOCYTES RELATIVE PERCENT: 44 % (ref 24–43)
MCH RBC QN AUTO: 32 PG (ref 25.2–33.5)
MCHC RBC AUTO-ENTMCNC: 33.2 G/DL (ref 28.4–34.8)
MCV RBC AUTO: 96.4 FL (ref 82.6–102.9)
MONOCYTES NFR BLD: 0.55 K/UL (ref 0.1–1.2)
MONOCYTES NFR BLD: 9 % (ref 3–12)
NEUTROPHILS NFR BLD: 44 % (ref 36–65)
NEUTS SEG NFR BLD: 2.8 K/UL (ref 1.5–8.1)
NRBC BLD-RTO: 0 PER 100 WBC
O2 SAT, VEN: 98.2 % (ref 60–85)
PCO2, VEN: 31.3 MM HG (ref 41–51)
PH VENOUS: 7.47 (ref 7.32–7.43)
PLATELET # BLD AUTO: 279 K/UL (ref 138–453)
PMV BLD AUTO: 9.6 FL (ref 8.1–13.5)
PO2, VEN: 99.5 MM HG (ref 30–50)
POSITIVE BASE EXCESS, VEN: 0.1 MMOL/L (ref 0–3)
POTASSIUM SERPL-SCNC: 4.2 MMOL/L (ref 3.7–5.3)
RBC # BLD AUTO: 3.94 M/UL (ref 3.95–5.11)
SODIUM SERPL-SCNC: 141 MMOL/L (ref 135–144)
TROPONIN I SERPL HS-MCNC: <6 NG/L (ref 0–14)
WBC OTHER # BLD: 6.3 K/UL (ref 3.5–11.3)

## 2024-03-24 PROCEDURE — 85379 FIBRIN DEGRADATION QUANT: CPT

## 2024-03-24 PROCEDURE — 71045 X-RAY EXAM CHEST 1 VIEW: CPT

## 2024-03-24 PROCEDURE — 6360000002 HC RX W HCPCS: Performed by: STUDENT IN AN ORGANIZED HEALTH CARE EDUCATION/TRAINING PROGRAM

## 2024-03-24 PROCEDURE — 82803 BLOOD GASES ANY COMBINATION: CPT

## 2024-03-24 PROCEDURE — 80048 BASIC METABOLIC PNL TOTAL CA: CPT

## 2024-03-24 PROCEDURE — 83880 ASSAY OF NATRIURETIC PEPTIDE: CPT

## 2024-03-24 PROCEDURE — 84484 ASSAY OF TROPONIN QUANT: CPT

## 2024-03-24 PROCEDURE — 85025 COMPLETE CBC W/AUTO DIFF WBC: CPT

## 2024-03-24 PROCEDURE — 93005 ELECTROCARDIOGRAM TRACING: CPT | Performed by: STUDENT IN AN ORGANIZED HEALTH CARE EDUCATION/TRAINING PROGRAM

## 2024-03-24 PROCEDURE — 99285 EMERGENCY DEPT VISIT HI MDM: CPT

## 2024-03-24 PROCEDURE — 96375 TX/PRO/DX INJ NEW DRUG ADDON: CPT

## 2024-03-24 PROCEDURE — 96374 THER/PROPH/DIAG INJ IV PUSH: CPT

## 2024-03-24 RX ORDER — CALCIUM CARBONATE 500(1250)
500 TABLET ORAL DAILY
COMMUNITY
Start: 2024-03-04

## 2024-03-24 RX ORDER — ONDANSETRON 2 MG/ML
4 INJECTION INTRAMUSCULAR; INTRAVENOUS ONCE
Status: COMPLETED | OUTPATIENT
Start: 2024-03-24 | End: 2024-03-24

## 2024-03-24 RX ORDER — KETOROLAC TROMETHAMINE 30 MG/ML
30 INJECTION, SOLUTION INTRAMUSCULAR; INTRAVENOUS ONCE
Status: COMPLETED | OUTPATIENT
Start: 2024-03-24 | End: 2024-03-24

## 2024-03-24 RX ADMIN — ONDANSETRON 4 MG: 2 INJECTION INTRAMUSCULAR; INTRAVENOUS at 23:44

## 2024-03-24 RX ADMIN — KETOROLAC TROMETHAMINE 30 MG: 30 INJECTION, SOLUTION INTRAMUSCULAR at 23:45

## 2024-03-24 ASSESSMENT — PAIN - FUNCTIONAL ASSESSMENT: PAIN_FUNCTIONAL_ASSESSMENT: 0-10

## 2024-03-24 ASSESSMENT — PAIN SCALES - GENERAL
PAINLEVEL_OUTOF10: 10
PAINLEVEL_OUTOF10: 6

## 2024-03-25 VITALS
TEMPERATURE: 98.1 F | SYSTOLIC BLOOD PRESSURE: 116 MMHG | RESPIRATION RATE: 20 BRPM | HEART RATE: 74 BPM | DIASTOLIC BLOOD PRESSURE: 80 MMHG | WEIGHT: 242 LBS | BODY MASS INDEX: 37.34 KG/M2 | OXYGEN SATURATION: 97 %

## 2024-03-25 LAB
BNP SERPL-MCNC: <36 PG/ML
D DIMER PPP FEU-MCNC: 0.36 UG/ML FEU (ref 0–0.59)

## 2024-03-25 PROCEDURE — 6370000000 HC RX 637 (ALT 250 FOR IP): Performed by: STUDENT IN AN ORGANIZED HEALTH CARE EDUCATION/TRAINING PROGRAM

## 2024-03-25 RX ORDER — CYCLOBENZAPRINE HCL 10 MG
10 TABLET ORAL 3 TIMES DAILY PRN
Qty: 30 TABLET | Refills: 0 | Status: SHIPPED | OUTPATIENT
Start: 2024-03-25 | End: 2024-04-04

## 2024-03-25 RX ORDER — KETOROLAC TROMETHAMINE 10 MG/1
10 TABLET, FILM COATED ORAL EVERY 6 HOURS PRN
Qty: 20 TABLET | Refills: 0 | Status: SHIPPED | OUTPATIENT
Start: 2024-03-25

## 2024-03-25 RX ORDER — LIDOCAINE 50 MG/G
1 PATCH TOPICAL DAILY
Qty: 30 PATCH | Refills: 0 | Status: SHIPPED | OUTPATIENT
Start: 2024-03-25

## 2024-03-25 RX ORDER — HYDROCODONE BITARTRATE AND ACETAMINOPHEN 5; 325 MG/1; MG/1
1 TABLET ORAL ONCE
Status: COMPLETED | OUTPATIENT
Start: 2024-03-25 | End: 2024-03-25

## 2024-03-25 RX ADMIN — HYDROCODONE BITARTRATE AND ACETAMINOPHEN 1 TABLET: 5; 325 TABLET ORAL at 01:05

## 2024-03-25 ASSESSMENT — PAIN SCALES - GENERAL: PAINLEVEL_OUTOF10: 5

## 2024-03-25 NOTE — ED NOTES
Pt presents to ED with c/o L sided chest pain that radiates down her L arm. Pt stated pain has been ongoing for a couple of weeks. Pain has worsened today. Pt took ibuprofen that gave her some relief for approx 1 hour. Pt stated pain is sharp, stabbing in nature. Pt also reports she has been having numness in her legs from knee down. Pt very tearful.

## 2024-03-26 LAB
EKG ATRIAL RATE: 87 BPM
EKG P AXIS: 60 DEGREES
EKG P-R INTERVAL: 150 MS
EKG Q-T INTERVAL: 364 MS
EKG QRS DURATION: 86 MS
EKG QTC CALCULATION (BAZETT): 438 MS
EKG R AXIS: 29 DEGREES
EKG T AXIS: 47 DEGREES
EKG VENTRICULAR RATE: 87 BPM

## 2024-03-26 NOTE — ED PROVIDER NOTES
every 6 hours as needed for Pain, Disp-20 tablet, R-0Normal           PHYSICIAN CONSULTS ORDERED THIS ENCOUNTER:  None    ED Course Notes From Epic Narrator:  ED Course as of 03/26/24 0711   Sun Mar 24, 2024   2346 pCO2, Jose(!): 31.3 [MS]   2346 pH, Jose(!): 7.473 [MS]      ED Course User Index  [MS] Piter Roth DO         CRITICAL CARE:   0    PROCEDURES:  none    FINAL IMPRESSION      1. Atypical chest pain    2. Pleurisy    3. Neuropathy          DISPOSITION/PLAN   DISPOSITION Decision To Discharge 03/25/2024 01:02:03 AM      OUTPATIENT FOLLOW UP THE PATIENT:  Zulma Collier, APRN - CNP  [address] [facility] [address]  [facility] [id number] [phone]    Schedule an appointment as soon as possible for a visit       Tye Hylton MD  [address], [address]  [place] [id number] [phone]    Schedule an appointment as soon as possible for a visit in 1 week        DISCHARGE MEDICATIONS:  Discharge Medication List as of 3/25/2024  1:04 AM        START taking these medications    Details   lidocaine (LIDODERM) 5 % Place 1 patch onto the skin daily 12 hours on, 12 hours off., Disp-30 patch, R-0Normal      cyclobenzaprine (FLEXERIL) 10 MG tablet Take 1 tablet by mouth 3 times daily as needed for Muscle spasms, Disp-30 tablet, R-0Normal      ketorolac (TORADOL) 10 MG tablet Take 1 tablet by mouth every 6 hours as needed for Pain, Disp-20 tablet, R-0Normal             Piter Roth DO  EmergencyMedicine Attending    (Please note that portions of this note were completed with a voice recognition program.  Efforts were made to edit the dictations but occasionally words are mis-transcribed.)     Piter Roth DO  03/26/24 0715

## 2024-05-02 ENCOUNTER — TELEPHONE (OUTPATIENT)
Dept: PRIMARY CARE CLINIC | Age: 54
End: 2024-05-02

## 2024-05-02 ENCOUNTER — OFFICE VISIT (OUTPATIENT)
Dept: PRIMARY CARE CLINIC | Age: 54
End: 2024-05-02
Payer: COMMERCIAL

## 2024-05-02 ENCOUNTER — HOSPITAL ENCOUNTER (OUTPATIENT)
Dept: GENERAL RADIOLOGY | Age: 54
Discharge: HOME | End: 2024-05-04
Payer: COMMERCIAL

## 2024-05-02 ENCOUNTER — HOSPITAL ENCOUNTER (OUTPATIENT)
Age: 54
Discharge: HOME | End: 2024-05-04
Payer: COMMERCIAL

## 2024-05-02 VITALS
SYSTOLIC BLOOD PRESSURE: 134 MMHG | OXYGEN SATURATION: 100 % | WEIGHT: 242.6 LBS | DIASTOLIC BLOOD PRESSURE: 95 MMHG | HEIGHT: 68 IN | HEART RATE: 102 BPM | BODY MASS INDEX: 36.77 KG/M2

## 2024-05-02 DIAGNOSIS — M79.671 RIGHT FOOT PAIN: Primary | ICD-10-CM

## 2024-05-02 DIAGNOSIS — R55 PRE-SYNCOPE: ICD-10-CM

## 2024-05-02 DIAGNOSIS — M79.671 RIGHT FOOT PAIN: ICD-10-CM

## 2024-05-02 PROCEDURE — 73630 X-RAY EXAM OF FOOT: CPT

## 2024-05-02 PROCEDURE — 3017F COLORECTAL CA SCREEN DOC REV: CPT | Performed by: NURSE PRACTITIONER

## 2024-05-02 PROCEDURE — 4004F PT TOBACCO SCREEN RCVD TLK: CPT | Performed by: NURSE PRACTITIONER

## 2024-05-02 PROCEDURE — G8417 CALC BMI ABV UP PARAM F/U: HCPCS | Performed by: NURSE PRACTITIONER

## 2024-05-02 PROCEDURE — 3080F DIAST BP >= 90 MM HG: CPT | Performed by: NURSE PRACTITIONER

## 2024-05-02 PROCEDURE — 99214 OFFICE O/P EST MOD 30 MIN: CPT | Performed by: NURSE PRACTITIONER

## 2024-05-02 PROCEDURE — G8427 DOCREV CUR MEDS BY ELIG CLIN: HCPCS | Performed by: NURSE PRACTITIONER

## 2024-05-02 PROCEDURE — 3075F SYST BP GE 130 - 139MM HG: CPT | Performed by: NURSE PRACTITIONER

## 2024-05-02 PROCEDURE — 73610 X-RAY EXAM OF ANKLE: CPT

## 2024-05-02 RX ORDER — HYDROCODONE BITARTRATE AND ACETAMINOPHEN 5; 325 MG/1; MG/1
1 TABLET ORAL EVERY 6 HOURS PRN
Qty: 12 TABLET | Refills: 0 | Status: SHIPPED | OUTPATIENT
Start: 2024-05-02 | End: 2024-05-05

## 2024-05-02 RX ORDER — IBUPROFEN 800 MG/1
800 TABLET ORAL 3 TIMES DAILY PRN
Qty: 180 TABLET | Refills: 1 | Status: SHIPPED | OUTPATIENT
Start: 2024-05-02

## 2024-05-02 ASSESSMENT — ENCOUNTER SYMPTOMS
SINUS PAIN: 0
SINUS PRESSURE: 0
NAUSEA: 0
SHORTNESS OF BREATH: 0
CHEST TIGHTNESS: 0
PHOTOPHOBIA: 0
ABDOMINAL PAIN: 0
DIARRHEA: 0
COLOR CHANGE: 0
VOMITING: 0
COUGH: 0
SORE THROAT: 0
BACK PAIN: 0

## 2024-05-02 NOTE — TELEPHONE ENCOUNTER
Let Althea know I have ordered a repeat carotid artery ultrasound and a CT scan of her head for further evaluation of the dizziness.  Ensure she is staying adequately hydrated and continue to monitor for any triggering factors.  I will call her with those results.
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
## 2024-05-07 ENCOUNTER — TELEPHONE (OUTPATIENT)
Dept: PRIMARY CARE CLINIC | Age: 54
End: 2024-05-07

## 2024-05-07 NOTE — TELEPHONE ENCOUNTER
Can we please call Althea.  I am working on her Social Security paperwork.  The first section is wanting the diagnosis patient has been approved for her Social Security with.  It appears this is a continuation of her disability.  Can we please clarify what the patient has received disability for in the past so I can complete accordingly?

## 2024-05-08 NOTE — TELEPHONE ENCOUNTER
Diabetes Mellitus 2  Depression  Bad Knees (she does not know the proper medical term for her condition)  Bipolar   Carpal Tunnel Bilateral

## 2024-05-13 ENCOUNTER — OFFICE VISIT (OUTPATIENT)
Dept: ORTHOPEDIC SURGERY | Age: 54
End: 2024-05-13
Payer: COMMERCIAL

## 2024-05-13 VITALS — BODY MASS INDEX: 37.89 KG/M2 | WEIGHT: 250 LBS | HEIGHT: 68 IN

## 2024-05-13 DIAGNOSIS — M17.0 PRIMARY OSTEOARTHRITIS OF BOTH KNEES: Primary | ICD-10-CM

## 2024-05-13 DIAGNOSIS — M25.562 PAIN IN BOTH KNEES, UNSPECIFIED CHRONICITY: Primary | ICD-10-CM

## 2024-05-13 DIAGNOSIS — M25.561 PAIN IN BOTH KNEES, UNSPECIFIED CHRONICITY: Primary | ICD-10-CM

## 2024-05-13 PROCEDURE — 3017F COLORECTAL CA SCREEN DOC REV: CPT | Performed by: PHYSICIAN ASSISTANT

## 2024-05-13 PROCEDURE — 4004F PT TOBACCO SCREEN RCVD TLK: CPT | Performed by: PHYSICIAN ASSISTANT

## 2024-05-13 PROCEDURE — G8427 DOCREV CUR MEDS BY ELIG CLIN: HCPCS | Performed by: PHYSICIAN ASSISTANT

## 2024-05-13 PROCEDURE — 20610 DRAIN/INJ JOINT/BURSA W/O US: CPT | Performed by: PHYSICIAN ASSISTANT

## 2024-05-13 PROCEDURE — G8417 CALC BMI ABV UP PARAM F/U: HCPCS | Performed by: PHYSICIAN ASSISTANT

## 2024-05-13 PROCEDURE — 99214 OFFICE O/P EST MOD 30 MIN: CPT | Performed by: PHYSICIAN ASSISTANT

## 2024-05-13 RX ORDER — METHYLPREDNISOLONE ACETATE 80 MG/ML
80 INJECTION, SUSPENSION INTRA-ARTICULAR; INTRALESIONAL; INTRAMUSCULAR; SOFT TISSUE ONCE
Status: COMPLETED | OUTPATIENT
Start: 2024-05-13 | End: 2024-05-13

## 2024-05-13 RX ORDER — BUPIVACAINE HYDROCHLORIDE 2.5 MG/ML
2 INJECTION, SOLUTION INFILTRATION; PERINEURAL ONCE
Status: COMPLETED | OUTPATIENT
Start: 2024-05-13 | End: 2024-05-13

## 2024-05-13 RX ADMIN — BUPIVACAINE HYDROCHLORIDE 5 MG: 2.5 INJECTION, SOLUTION INFILTRATION; PERINEURAL at 12:00

## 2024-05-13 RX ADMIN — METHYLPREDNISOLONE ACETATE 80 MG: 80 INJECTION, SUSPENSION INTRA-ARTICULAR; INTRALESIONAL; INTRAMUSCULAR; SOFT TISSUE at 12:01

## 2024-05-13 RX ADMIN — BUPIVACAINE HYDROCHLORIDE 5 MG: 2.5 INJECTION, SOLUTION INFILTRATION; PERINEURAL at 12:01

## 2024-05-13 ASSESSMENT — ENCOUNTER SYMPTOMS
VOMITING: 0
SHORTNESS OF BREATH: 0
COLOR CHANGE: 0

## 2024-05-13 NOTE — PROGRESS NOTES
of Depo-Medrol.  Patient tolerated the procedure well without post injection complications.  I instructed the patient to call our office immediately if they have any swelling or increased pain at the injection site.    The patient was identified. Verbal consent was obtained to proceed with a Left  knee injection. The Left knee was confirmed with the patient.  After a sterile prep with Betadine the knee was injected using a lateral joint line approach with a mixture of  2mL of 0.25% Marcaine and 80 mg of Depo-Medrol.  Patient tolerated the procedure well without post injection complications.  I instructed the patient to call our office immediately if they have any swelling or increased pain at the injection site.      Assessment:      1. Primary osteoarthritis of both knees       Plan:   Assessment & Plan     Althea Jamison is a 53 y.o. female here in follow for chronic bilateral knee pain due to osteoarthritis.  The patient notes that she would like to continue with conservative treatment for her knees.  She is not interested in surgical intervention due to the fact that she is very nervous/scared for surgery.  I personally interpreted and reviewed the patient's recent x-rays revealing severe degenerative changes within the bilateral.     At the patient's request she was given bilateral knee corticosteroid injections.  She tolerated procedures without complication.  We did discuss that she can have repeat injections every 4 months if needed therefore she would be eligible on or after 9/13/2024 only if needed.    Our office staff will begin the prior authorization process for bilateral knee Visco supplemental injections (Durolane or Synvisc 1 preferred).  We did discuss that some insurances deny viscosupplement injections if the patient does not complete formal physical therapy.  A PT referral was offered but the patient declined the offer.    The patient is to follow-up in 6 weeks, or sooner if bilateral knee

## 2024-05-14 ENCOUNTER — TELEPHONE (OUTPATIENT)
Dept: ORTHOPEDIC SURGERY | Age: 54
End: 2024-05-14

## 2024-05-20 ENCOUNTER — TELEPHONE (OUTPATIENT)
Dept: ORTHOPEDIC SURGERY | Age: 54
End: 2024-05-20

## 2024-05-20 NOTE — TELEPHONE ENCOUNTER
[facility] for a CB to be scheduled with Alma Petersen PA-C

## 2024-05-21 DIAGNOSIS — E55.9 VITAMIN D INSUFFICIENCY: ICD-10-CM

## 2024-05-21 RX ORDER — ERGOCALCIFEROL 1.25 MG/1
50000 CAPSULE ORAL WEEKLY
Qty: 12 CAPSULE | Refills: 0 | Status: SHIPPED | OUTPATIENT
Start: 2024-05-21

## 2024-06-03 ENCOUNTER — PROCEDURE VISIT (OUTPATIENT)
Dept: ORTHOPEDIC SURGERY | Age: 54
End: 2024-06-03
Payer: COMMERCIAL

## 2024-06-03 VITALS — BODY MASS INDEX: 37.89 KG/M2 | WEIGHT: 250 LBS | HEIGHT: 68 IN

## 2024-06-03 DIAGNOSIS — M17.0 PRIMARY OSTEOARTHRITIS OF BOTH KNEES: Primary | ICD-10-CM

## 2024-06-03 PROCEDURE — G8417 CALC BMI ABV UP PARAM F/U: HCPCS | Performed by: PHYSICIAN ASSISTANT

## 2024-06-03 PROCEDURE — G8427 DOCREV CUR MEDS BY ELIG CLIN: HCPCS | Performed by: PHYSICIAN ASSISTANT

## 2024-06-03 PROCEDURE — 99213 OFFICE O/P EST LOW 20 MIN: CPT | Performed by: PHYSICIAN ASSISTANT

## 2024-06-03 PROCEDURE — 20610 DRAIN/INJ JOINT/BURSA W/O US: CPT | Performed by: PHYSICIAN ASSISTANT

## 2024-06-03 PROCEDURE — 4004F PT TOBACCO SCREEN RCVD TLK: CPT | Performed by: PHYSICIAN ASSISTANT

## 2024-06-03 PROCEDURE — 3017F COLORECTAL CA SCREEN DOC REV: CPT | Performed by: PHYSICIAN ASSISTANT

## 2024-06-03 RX ORDER — BUPIVACAINE HYDROCHLORIDE 2.5 MG/ML
2 INJECTION, SOLUTION INFILTRATION; PERINEURAL ONCE
Status: COMPLETED | OUTPATIENT
Start: 2024-06-03 | End: 2024-06-03

## 2024-06-03 RX ADMIN — BUPIVACAINE HYDROCHLORIDE 5 MG: 2.5 INJECTION, SOLUTION INFILTRATION; PERINEURAL at 10:44

## 2024-06-03 ASSESSMENT — ENCOUNTER SYMPTOMS
SHORTNESS OF BREATH: 0
COUGH: 0
VOMITING: 0
COLOR CHANGE: 0

## 2024-06-03 NOTE — PROGRESS NOTES
[facility]  [address]  [place] [address]  Dept: [phone]  Dept Fax: [phone]        Ambulatory Follow Up      Subjective:   Althea Jamison is a 53 y.o. year old female who presents to our office today for routine followup regarding her   1. Primary osteoarthritis of both knees        Chief Complaint   Patient presents with    Follow-up     B/L synvisc one injections          HPI Althea Jamison  is a 53 y.o. female who presents today in follow for chronic bilateral knee pain due to osteoarthritis.  The patient was last seen on 5/13/2024 and underwent treatment in the form of bilateral knee corticosteroid injections and prior authorization for bilateral knee visco-supplemental injections.   The patient notes 1-2 weeks of improvement in her knee pain with the previous injections. She is here today for insurance approved bilateral knee Synvisc One injections. She notes that she continues to have dull achy pain within both knees with prolonged standing or prolonged walking.    Review of Systems   Constitutional:  Negative for activity change and fever.   HENT:  Negative for sneezing.    Respiratory:  Negative for cough and shortness of breath.    Cardiovascular:  Negative for chest pain.   Gastrointestinal:  Negative for vomiting.   Musculoskeletal:  Positive for arthralgias (bilateral knee). Negative for joint swelling and myalgias.   Skin:  Negative for color change.   Neurological:  Negative for weakness and numbness.   Psychiatric/Behavioral:  Negative for sleep disturbance.          Objective :   Ht 1.715 m (5' 7.5\")   Wt 113.4 kg (250 lb)   LMP 07/29/2013   BMI 38.58 kg/m²  Body mass index is 38.58 kg/m².  General: Althea Jamison is a 53 y.o. female who is alert and oriented and sitting comfortably in our office.  Ortho Exam    MS:   Patient ambulates independently without a limp noted to the Bilateral lower

## 2024-06-03 NOTE — PATIENT INSTRUCTIONS
INJECTION CARE    The injection site should never get red, hot, or swollen and if it does the patient will contact our office right away. With a cortisone steroid injection, the patient may experience a increase in soreness the first 24-48 hours due to a cortisone flair and can take anti-inflammatories for a short period of time to reduce that soreness. With a visco-supplemental (gel)  injection, on rare occasion the patient may develop swelling and pain if having a reaction to the medication. If this happens, try an anti-inflammatory medication and ice as needed. If pain and swelling continues, call the office for further instructions. The patient should not submerge the injection site in water for a minimum of 24 hours to avoid infection. This means no lakes, pools, ponds, or hot tubs for 24 hours. If the patient is diabetic the injection may increase their blood sugar for up to one week. The patient can do this cortisone injection once every 4 months as needed and visco-supplemental (gel) injections every 6 months.

## 2024-07-23 DIAGNOSIS — E11.9 TYPE 2 DIABETES MELLITUS WITHOUT COMPLICATION, WITH LONG-TERM CURRENT USE OF INSULIN (HCC): ICD-10-CM

## 2024-07-23 DIAGNOSIS — Z79.4 TYPE 2 DIABETES MELLITUS WITHOUT COMPLICATION, WITH LONG-TERM CURRENT USE OF INSULIN (HCC): ICD-10-CM

## 2024-10-14 ENCOUNTER — OFFICE VISIT (OUTPATIENT)
Dept: ORTHOPEDIC SURGERY | Age: 54
End: 2024-10-14
Payer: COMMERCIAL

## 2024-10-14 VITALS — HEIGHT: 68 IN | BODY MASS INDEX: 37.74 KG/M2 | WEIGHT: 249 LBS

## 2024-10-14 DIAGNOSIS — M25.562 CHRONIC PAIN OF BOTH KNEES: ICD-10-CM

## 2024-10-14 DIAGNOSIS — M25.561 CHRONIC PAIN OF BOTH KNEES: ICD-10-CM

## 2024-10-14 DIAGNOSIS — G89.29 CHRONIC PAIN OF BOTH KNEES: ICD-10-CM

## 2024-10-14 DIAGNOSIS — M17.0 PRIMARY OSTEOARTHRITIS OF BOTH KNEES: Primary | ICD-10-CM

## 2024-10-14 PROCEDURE — 20610 DRAIN/INJ JOINT/BURSA W/O US: CPT | Performed by: PHYSICIAN ASSISTANT

## 2024-10-14 PROCEDURE — G8417 CALC BMI ABV UP PARAM F/U: HCPCS | Performed by: PHYSICIAN ASSISTANT

## 2024-10-14 PROCEDURE — 3017F COLORECTAL CA SCREEN DOC REV: CPT | Performed by: PHYSICIAN ASSISTANT

## 2024-10-14 PROCEDURE — G8484 FLU IMMUNIZE NO ADMIN: HCPCS | Performed by: PHYSICIAN ASSISTANT

## 2024-10-14 PROCEDURE — 4004F PT TOBACCO SCREEN RCVD TLK: CPT | Performed by: PHYSICIAN ASSISTANT

## 2024-10-14 PROCEDURE — 99214 OFFICE O/P EST MOD 30 MIN: CPT | Performed by: PHYSICIAN ASSISTANT

## 2024-10-14 PROCEDURE — G8427 DOCREV CUR MEDS BY ELIG CLIN: HCPCS | Performed by: PHYSICIAN ASSISTANT

## 2024-10-14 RX ORDER — BUPIVACAINE HYDROCHLORIDE 2.5 MG/ML
1 INJECTION, SOLUTION INFILTRATION; PERINEURAL ONCE
Status: COMPLETED | OUTPATIENT
Start: 2024-10-14 | End: 2024-10-14

## 2024-10-14 RX ORDER — METHYLPREDNISOLONE ACETATE 80 MG/ML
80 INJECTION, SUSPENSION INTRA-ARTICULAR; INTRALESIONAL; INTRAMUSCULAR; SOFT TISSUE ONCE
Status: COMPLETED | OUTPATIENT
Start: 2024-10-14 | End: 2024-10-14

## 2024-10-14 RX ORDER — BUPIVACAINE HYDROCHLORIDE 2.5 MG/ML
2 INJECTION, SOLUTION INFILTRATION; PERINEURAL ONCE
Status: COMPLETED | OUTPATIENT
Start: 2024-10-14 | End: 2024-10-14

## 2024-10-14 RX ADMIN — METHYLPREDNISOLONE ACETATE 80 MG: 80 INJECTION, SUSPENSION INTRA-ARTICULAR; INTRALESIONAL; INTRAMUSCULAR; SOFT TISSUE at 15:44

## 2024-10-14 RX ADMIN — BUPIVACAINE HYDROCHLORIDE 2 ML: 2.5 INJECTION, SOLUTION INFILTRATION; PERINEURAL at 15:44

## 2024-10-14 RX ADMIN — BUPIVACAINE HYDROCHLORIDE 1 ML: 2.5 INJECTION, SOLUTION INFILTRATION; PERINEURAL at 15:46

## 2024-10-14 ASSESSMENT — ENCOUNTER SYMPTOMS
COUGH: 0
SHORTNESS OF BREATH: 0
VOMITING: 0
COLOR CHANGE: 0

## 2024-10-14 NOTE — PROGRESS NOTES
Negative for cough and shortness of breath.    Cardiovascular:  Negative for chest pain.   Gastrointestinal:  Negative for vomiting.   Musculoskeletal:  Positive for arthralgias (bilateral knee (L>R)) and joint swelling (left knee). Negative for myalgias.   Skin:  Negative for color change.   Neurological:  Negative for weakness and numbness.   Psychiatric/Behavioral:  Negative for sleep disturbance.          Objective :   Ht 1.715 m (5' 7.52\")   Wt 112.9 kg (249 lb)   LMP 07/29/2013   BMI 38.40 kg/m²  Body mass index is 38.4 kg/m².  General: Althea Jamison is a 54 y.o. female who is alert and oriented and sitting comfortably in our office.  Ortho Exam    MSK:   Evaluation of the Bilateral knee reveals a varus deformity of the left knee.  There is no erythema, skin warmth, skin lesions, or signs of infection appreciated to the bilateral knee.  There is tenderness over the Medial joint line with palpation, bilaterally.  There is a moderate knee effusion noted on the left, no effusion on the right.  Range of motion of the Bilateral knee is 3-105.  No instability of the knee is appreciated at 0 and 30° of flexion, bilaterally.  There is a negative anterior drawer and Lachman's test, bilaterally.  There is increased pain with medial Carmencita's testing, bilaterally.  No calf tenderness is noted, bilaterally.  There is a negative hip log roll and Stinchfield test on the Bilateral hip.  Motor, sensory, vascular examination to the Bilateral lower extremity is intact.  Patient has full range of motion of the Bilateral ankle.      Neuro: alert and oriented to person and place.   Eyes: Extra-ocular muscles intact  Mouth: Oral mucosa moist. No perioral lesions  Pulm: Respirations unlabored and regular. Symmetric chest excursion without outward deformity is noted.   Skin: warm, well perfused  Psych:   Patient has good fund of knowledge and displays understanging of exam, diagnosis, and plan.    Radiology:   No new imaging

## 2024-10-18 ENCOUNTER — TELEPHONE (OUTPATIENT)
Dept: ORTHOPEDIC SURGERY | Age: 54
End: 2024-10-18

## 2024-10-18 NOTE — TELEPHONE ENCOUNTER
Renae from [facility] called for OVN for prior auth for injection. OVN faxed

## 2024-11-05 ENCOUNTER — OFFICE VISIT (OUTPATIENT)
Dept: PRIMARY CARE CLINIC | Age: 54
End: 2024-11-05

## 2024-11-05 VITALS
DIASTOLIC BLOOD PRESSURE: 85 MMHG | HEART RATE: 84 BPM | HEIGHT: 68 IN | BODY MASS INDEX: 37.19 KG/M2 | OXYGEN SATURATION: 100 % | WEIGHT: 245.4 LBS | SYSTOLIC BLOOD PRESSURE: 121 MMHG

## 2024-11-05 DIAGNOSIS — I10 ESSENTIAL HYPERTENSION: ICD-10-CM

## 2024-11-05 DIAGNOSIS — Z12.11 SCREENING FOR MALIGNANT NEOPLASM OF COLON: ICD-10-CM

## 2024-11-05 DIAGNOSIS — Z12.31 ENCOUNTER FOR SCREENING MAMMOGRAM FOR MALIGNANT NEOPLASM OF BREAST: ICD-10-CM

## 2024-11-05 DIAGNOSIS — Z00.00 MEDICARE ANNUAL WELLNESS VISIT, SUBSEQUENT: Primary | ICD-10-CM

## 2024-11-05 DIAGNOSIS — E03.9 ACQUIRED HYPOTHYROIDISM: ICD-10-CM

## 2024-11-05 DIAGNOSIS — R09.89 OTHER SPECIFIED SYMPTOMS AND SIGNS INVOLVING THE CIRCULATORY AND RESPIRATORY SYSTEMS: ICD-10-CM

## 2024-11-05 DIAGNOSIS — Z79.4 TYPE 2 DIABETES MELLITUS WITHOUT COMPLICATION, WITH LONG-TERM CURRENT USE OF INSULIN (HCC): ICD-10-CM

## 2024-11-05 DIAGNOSIS — G43.C0 PERIODIC HEADACHE SYNDROME, NOT INTRACTABLE: ICD-10-CM

## 2024-11-05 DIAGNOSIS — R26.9 GAIT DISTURBANCE: ICD-10-CM

## 2024-11-05 DIAGNOSIS — N39.41 URGE INCONTINENCE OF URINE: ICD-10-CM

## 2024-11-05 DIAGNOSIS — E55.9 VITAMIN D INSUFFICIENCY: ICD-10-CM

## 2024-11-05 DIAGNOSIS — J45.20 ASTHMA, INTERMITTENT, UNCOMPLICATED: ICD-10-CM

## 2024-11-05 DIAGNOSIS — E11.9 TYPE 2 DIABETES MELLITUS WITHOUT COMPLICATION, WITH LONG-TERM CURRENT USE OF INSULIN (HCC): ICD-10-CM

## 2024-11-05 DIAGNOSIS — R73.03 PRE-DIABETES: ICD-10-CM

## 2024-11-05 DIAGNOSIS — F33.0 MILD EPISODE OF RECURRENT MAJOR DEPRESSIVE DISORDER (HCC): ICD-10-CM

## 2024-11-05 DIAGNOSIS — K59.00 CONSTIPATION, UNSPECIFIED CONSTIPATION TYPE: ICD-10-CM

## 2024-11-05 RX ORDER — LEVOTHYROXINE SODIUM 50 UG/1
TABLET ORAL
Qty: 30 TABLET | Refills: 0 | Status: SHIPPED | OUTPATIENT
Start: 2024-11-05

## 2024-11-05 RX ORDER — LISINOPRIL 5 MG/1
TABLET ORAL
Qty: 90 TABLET | Refills: 1 | Status: SHIPPED | OUTPATIENT
Start: 2024-11-05

## 2024-11-05 RX ORDER — DOCUSATE SODIUM 100 MG/1
100 CAPSULE, LIQUID FILLED ORAL 2 TIMES DAILY
Qty: 60 CAPSULE | Refills: 0 | Status: SHIPPED | OUTPATIENT
Start: 2024-11-05 | End: 2024-12-05

## 2024-11-05 RX ORDER — MIRABEGRON 25 MG/1
TABLET, FILM COATED, EXTENDED RELEASE ORAL
Qty: 90 TABLET | Refills: 0 | Status: SHIPPED | OUTPATIENT
Start: 2024-11-05

## 2024-11-05 RX ORDER — ALBUTEROL SULFATE 90 UG/1
2 INHALANT RESPIRATORY (INHALATION) EVERY 6 HOURS PRN
Qty: 1 EACH | Refills: 5 | Status: SHIPPED | OUTPATIENT
Start: 2024-11-05

## 2024-11-05 RX ORDER — ASPIRIN 81 MG/1
TABLET ORAL
Qty: 90 TABLET | Refills: 3 | Status: SHIPPED | OUTPATIENT
Start: 2024-11-05

## 2024-11-05 RX ORDER — CALCIUM CARBONATE 500(1250)
500 TABLET ORAL DAILY
Qty: 90 TABLET | Refills: 0 | Status: SHIPPED | OUTPATIENT
Start: 2024-11-05 | End: 2025-02-03

## 2024-11-05 RX ORDER — ERGOCALCIFEROL 1.25 MG/1
50000 CAPSULE, LIQUID FILLED ORAL WEEKLY
Qty: 12 CAPSULE | Refills: 0 | Status: SHIPPED | OUTPATIENT
Start: 2024-11-05

## 2024-11-05 RX ORDER — SUMATRIPTAN 100 MG/1
100 TABLET, FILM COATED ORAL 2 TIMES DAILY PRN
Qty: 9 TABLET | Refills: 3 | Status: SHIPPED | OUTPATIENT
Start: 2024-11-05

## 2024-11-05 RX ORDER — LIRAGLUTIDE 6 MG/ML
1.8 INJECTION SUBCUTANEOUS DAILY
Qty: 6 ML | Refills: 5 | Status: SHIPPED | OUTPATIENT
Start: 2024-11-05

## 2024-11-05 RX ORDER — DULOXETIN HYDROCHLORIDE 60 MG/1
60 CAPSULE, DELAYED RELEASE ORAL DAILY
Qty: 90 CAPSULE | Refills: 3 | Status: SHIPPED | OUTPATIENT
Start: 2024-11-05

## 2024-11-05 RX ORDER — SIMVASTATIN 40 MG
TABLET ORAL
Qty: 90 TABLET | Refills: 1 | Status: SHIPPED | OUTPATIENT
Start: 2024-11-05

## 2024-11-05 RX ORDER — TRAZODONE HYDROCHLORIDE 150 MG/1
TABLET ORAL
Qty: 90 TABLET | Refills: 3 | Status: SHIPPED | OUTPATIENT
Start: 2024-11-05

## 2024-11-05 SDOH — ECONOMIC STABILITY: FOOD INSECURITY: WITHIN THE PAST 12 MONTHS, YOU WORRIED THAT YOUR FOOD WOULD RUN OUT BEFORE YOU GOT MONEY TO BUY MORE.: NEVER TRUE

## 2024-11-05 SDOH — ECONOMIC STABILITY: FOOD INSECURITY: WITHIN THE PAST 12 MONTHS, THE FOOD YOU BOUGHT JUST DIDN'T LAST AND YOU DIDN'T HAVE MONEY TO GET MORE.: NEVER TRUE

## 2024-11-05 SDOH — ECONOMIC STABILITY: INCOME INSECURITY: HOW HARD IS IT FOR YOU TO PAY FOR THE VERY BASICS LIKE FOOD, HOUSING, MEDICAL CARE, AND HEATING?: NOT HARD AT ALL

## 2024-11-05 ASSESSMENT — PATIENT HEALTH QUESTIONNAIRE - PHQ9
8. MOVING OR SPEAKING SO SLOWLY THAT OTHER PEOPLE COULD HAVE NOTICED. OR THE OPPOSITE, BEING SO FIGETY OR RESTLESS THAT YOU HAVE BEEN MOVING AROUND A LOT MORE THAN USUAL: NOT AT ALL
2. FEELING DOWN, DEPRESSED OR HOPELESS: SEVERAL DAYS
3. TROUBLE FALLING OR STAYING ASLEEP: SEVERAL DAYS
7. TROUBLE CONCENTRATING ON THINGS, SUCH AS READING THE NEWSPAPER OR WATCHING TELEVISION: NOT AT ALL
10. IF YOU CHECKED OFF ANY PROBLEMS, HOW DIFFICULT HAVE THESE PROBLEMS MADE IT FOR YOU TO DO YOUR WORK, TAKE CARE OF THINGS AT HOME, OR GET ALONG WITH OTHER PEOPLE: SOMEWHAT DIFFICULT
SUM OF ALL RESPONSES TO PHQ QUESTIONS 1-9: 5
5. POOR APPETITE OR OVEREATING: SEVERAL DAYS
SUM OF ALL RESPONSES TO PHQ9 QUESTIONS 1 & 2: 2
4. FEELING TIRED OR HAVING LITTLE ENERGY: SEVERAL DAYS
6. FEELING BAD ABOUT YOURSELF - OR THAT YOU ARE A FAILURE OR HAVE LET YOURSELF OR YOUR FAMILY DOWN: NOT AT ALL
SUM OF ALL RESPONSES TO PHQ QUESTIONS 1-9: 5
SUM OF ALL RESPONSES TO PHQ QUESTIONS 1-9: 5
9. THOUGHTS THAT YOU WOULD BE BETTER OFF DEAD, OR OF HURTING YOURSELF: NOT AT ALL
1. LITTLE INTEREST OR PLEASURE IN DOING THINGS: SEVERAL DAYS
SUM OF ALL RESPONSES TO PHQ QUESTIONS 1-9: 5

## 2024-11-05 ASSESSMENT — LIFESTYLE VARIABLES
HOW OFTEN DO YOU HAVE A DRINK CONTAINING ALCOHOL: MONTHLY OR LESS
HOW MANY STANDARD DRINKS CONTAINING ALCOHOL DO YOU HAVE ON A TYPICAL DAY: 1 OR 2

## 2024-11-05 NOTE — PROGRESS NOTES
Medicare Annual Wellness Visit    Althea Jamison is here for Medicare AWV and balance    Assessment & Plan   Medicare annual wellness visit, subsequent  Essential hypertension  -     lisinopril (PRINIVIL;ZESTRIL) 5 MG tablet; TAKE 1 TABLET BY MOUTH ONE TIME DAILY, Disp-90 tablet, R-1Normal  -     simvastatin (ZOCOR) 40 MG tablet; TAKE ONE TABLET BY MOUTH NIGHTLY, Disp-90 tablet, R-1Normal  -     aspirin (ASPIRIN LOW DOSE) 81 MG EC tablet; TAKE 1 TABLET BY MOUTH ONE TIME A DAY, Disp-90 tablet, R-3Normal  Mild episode of recurrent major depressive disorder (HCC)  -     DULoxetine (CYMBALTA) 60 MG extended release capsule; Take 1 capsule by mouth daily, Disp-90 capsule, R-3Normal  -     traZODone (DESYREL) 150 MG tablet; TAKE ONE TABLET BY MOUTH NIGHTLY, Disp-90 tablet, R-3Normal  Vitamin D insufficiency  -     calcium carbonate (OSCAL) 500 MG TABS tablet; Take 1 tablet by mouth daily, Disp-90 tablet, R-0Normal  -     vitamin D (ERGOCALCIFEROL) 1.25 MG (92395 UT) CAPS capsule; Take 1 capsule by mouth Once a week at 5 PM, Disp-12 capsule, R-0Normal  Constipation, unspecified constipation type  -     docusate sodium (COLACE) 100 MG capsule; Take 1 capsule by mouth 2 times daily, Disp-60 capsule, R-0Normal  -     TSH With Reflex Ft4; Future  Screening for malignant neoplasm of colon  -     [facility] Screening Colonoscopy  Gait disturbance  -     Vascular duplex carotid bilateral; Future  -     MRI BRAIN W CONTRAST; Future  -     DME Order for (Specify) as OP  -     CBC with Auto Differential; Future  -     Basic Metabolic Panel; Future  Encounter for screening mammogram for malignant neoplasm of breast  -     [facility] DIGITAL SCREEN W OR WO CAD BILATERAL; Future  Pre-diabetes  -     Hemoglobin A1C; Future  Other specified symptoms and signs involving the circulatory and respiratory systems  -     Vascular duplex carotid bilateral; Future  Asthma, intermittent, uncomplicated  -     albuterol sulfate HFA (PROVENTIL HFA) 108 (90

## 2024-11-05 NOTE — PATIENT INSTRUCTIONS
helps you feel calm. You can use online videos, books, or a teacher to guide you.  Do breathing exercises. For example:  From a standing position, bend forward from the waist with your knees slightly bent. Let your arms dangle close to the floor.  Breathe in slowly and deeply as you return to a standing position. Roll up slowly and lift your head last.  Hold your breath for just a few seconds in the standing position.  Breathe out slowly and bend forward from the waist.  Let your feelings out. Talk, laugh, cry, and express anger when you need to. Talking with supportive friends or family, a counselor, or a rosalba leader about your feelings is a healthy way to relieve stress. Avoid discussing your feelings with people who make you feel worse.  Write. It may help to write about things that are bothering you. This helps you find out how much stress you feel and what is causing it. When you know this, you can find better ways to cope.  What can you do to prevent stress?  You might try some of these things to help prevent stress:  Manage your time. This helps you find time to do the things you want and need to do.  Get enough sleep. Your body recovers from the stresses of the day while you are sleeping.  Get support. Your family, friends, and community can make a difference in how you experience stress.  Limit your news feed. Avoid or limit time on social media or news that may make you feel stressed.  Do something active. Exercise or activity can help reduce stress. Walking is a great way to get started.  Where can you learn more?  Go to https://www.LendYour.net/patientEd and enter N032 to learn more about \"Learning About Stress.\"  Current as of: October 24, 2023  Content Version: 14.2  © 2024 [employer].   Care instructions adapted under license by [employer]. If you have questions about a medical condition or this instruction, always ask your healthcare professional. [employer] disclaims

## 2024-12-19 DIAGNOSIS — K59.00 CONSTIPATION, UNSPECIFIED CONSTIPATION TYPE: ICD-10-CM

## 2024-12-19 DIAGNOSIS — N39.41 URGE INCONTINENCE OF URINE: ICD-10-CM

## 2024-12-19 DIAGNOSIS — E55.9 VITAMIN D INSUFFICIENCY: ICD-10-CM

## 2024-12-19 DIAGNOSIS — E03.9 ACQUIRED HYPOTHYROIDISM: ICD-10-CM

## 2024-12-19 RX ORDER — ERGOCALCIFEROL 1.25 MG/1
CAPSULE, LIQUID FILLED ORAL
Qty: 12 CAPSULE | Refills: 0 | Status: SHIPPED | OUTPATIENT
Start: 2024-12-19

## 2024-12-19 RX ORDER — MIRABEGRON 25 MG/1
TABLET, FILM COATED, EXTENDED RELEASE ORAL
Qty: 90 TABLET | Refills: 0 | Status: SHIPPED | OUTPATIENT
Start: 2024-12-19

## 2024-12-19 RX ORDER — DOCUSATE SODIUM 100 MG/1
100 CAPSULE, LIQUID FILLED ORAL 2 TIMES DAILY
Qty: 60 CAPSULE | Refills: 0 | Status: SHIPPED | OUTPATIENT
Start: 2024-12-19

## 2024-12-19 RX ORDER — CALCIUM CARBONATE 500 MG/1
1 TABLET, CHEWABLE ORAL DAILY
Qty: 90 TABLET | Refills: 0 | Status: SHIPPED | OUTPATIENT
Start: 2024-12-19

## 2024-12-19 RX ORDER — LEVOTHYROXINE SODIUM 50 UG/1
TABLET ORAL
Qty: 30 TABLET | Refills: 0 | Status: SHIPPED | OUTPATIENT
Start: 2024-12-19

## 2025-01-15 ENCOUNTER — HOSPITAL ENCOUNTER (EMERGENCY)
Age: 55
Discharge: HOME | End: 2025-01-15
Attending: STUDENT IN AN ORGANIZED HEALTH CARE EDUCATION/TRAINING PROGRAM
Payer: MEDICAID

## 2025-01-15 VITALS
SYSTOLIC BLOOD PRESSURE: 130 MMHG | BODY MASS INDEX: 37.48 KG/M2 | DIASTOLIC BLOOD PRESSURE: 84 MMHG | WEIGHT: 243 LBS | HEART RATE: 79 BPM | RESPIRATION RATE: 18 BRPM | OXYGEN SATURATION: 99 % | TEMPERATURE: 97.9 F

## 2025-01-15 DIAGNOSIS — Z86.39 HISTORY OF HYPERGLYCEMIA: ICD-10-CM

## 2025-01-15 DIAGNOSIS — G62.9 NEUROPATHY: Primary | ICD-10-CM

## 2025-01-15 LAB
ANION GAP SERPL CALCULATED.3IONS-SCNC: 11 MMOL/L (ref 9–16)
BASOPHILS # BLD: <0.03 K/UL (ref 0–0.2)
BASOPHILS NFR BLD: 0 % (ref 0–2)
BUN SERPL-MCNC: 12 MG/DL (ref 6–20)
CALCIUM SERPL-MCNC: 9.3 MG/DL (ref 8.6–10.4)
CHLORIDE SERPL-SCNC: 103 MMOL/L (ref 98–107)
CO2 SERPL-SCNC: 23 MMOL/L (ref 20–31)
CREAT SERPL-MCNC: 0.6 MG/DL (ref 0.5–0.9)
EOSINOPHIL # BLD: 0.11 K/UL (ref 0–0.44)
EOSINOPHILS RELATIVE PERCENT: 2 % (ref 1–4)
ERYTHROCYTE [DISTWIDTH] IN BLOOD BY AUTOMATED COUNT: 12.8 % (ref 11.8–14.4)
GFR, ESTIMATED: >90 ML/MIN/1.73M2
GLUCOSE BLD-MCNC: 134 MG/DL (ref 65–105)
GLUCOSE SERPL-MCNC: 139 MG/DL (ref 74–99)
HCT VFR BLD AUTO: 40 % (ref 36.3–47.1)
HGB BLD-MCNC: 13.4 G/DL (ref 11.9–15.1)
IMM GRANULOCYTES # BLD AUTO: 0.02 K/UL (ref 0–0.3)
IMM GRANULOCYTES NFR BLD: 0 %
LYMPHOCYTES NFR BLD: 2.55 K/UL (ref 1.1–3.7)
LYMPHOCYTES RELATIVE PERCENT: 45 % (ref 24–43)
MCH RBC QN AUTO: 32.4 PG (ref 25.2–33.5)
MCHC RBC AUTO-ENTMCNC: 33.5 G/DL (ref 28.4–34.8)
MCV RBC AUTO: 96.6 FL (ref 82.6–102.9)
MONOCYTES NFR BLD: 0.57 K/UL (ref 0.1–1.2)
MONOCYTES NFR BLD: 10 % (ref 3–12)
NEUTROPHILS NFR BLD: 42 % (ref 36–65)
NEUTS SEG NFR BLD: 2.38 K/UL (ref 1.5–8.1)
NRBC BLD-RTO: 0 PER 100 WBC
PLATELET, FLUORESCENCE: ABNORMAL K/UL (ref 138–453)
POTASSIUM SERPL-SCNC: 4 MMOL/L (ref 3.7–5.3)
RBC # BLD AUTO: 4.14 M/UL (ref 3.95–5.11)
SODIUM SERPL-SCNC: 138 MMOL/L (ref 136–145)
WBC OTHER # BLD: 5.7 K/UL (ref 3.5–11.3)

## 2025-01-15 PROCEDURE — 80048 BASIC METABOLIC PNL TOTAL CA: CPT

## 2025-01-15 PROCEDURE — 82947 ASSAY GLUCOSE BLOOD QUANT: CPT

## 2025-01-15 PROCEDURE — 6360000002 HC RX W HCPCS: Performed by: STUDENT IN AN ORGANIZED HEALTH CARE EDUCATION/TRAINING PROGRAM

## 2025-01-15 PROCEDURE — 2580000003 HC RX 258: Performed by: STUDENT IN AN ORGANIZED HEALTH CARE EDUCATION/TRAINING PROGRAM

## 2025-01-15 PROCEDURE — 96374 THER/PROPH/DIAG INJ IV PUSH: CPT

## 2025-01-15 PROCEDURE — 96375 TX/PRO/DX INJ NEW DRUG ADDON: CPT

## 2025-01-15 PROCEDURE — 99284 EMERGENCY DEPT VISIT MOD MDM: CPT

## 2025-01-15 PROCEDURE — 85025 COMPLETE CBC W/AUTO DIFF WBC: CPT

## 2025-01-15 PROCEDURE — 6370000000 HC RX 637 (ALT 250 FOR IP): Performed by: STUDENT IN AN ORGANIZED HEALTH CARE EDUCATION/TRAINING PROGRAM

## 2025-01-15 PROCEDURE — 2500000003 HC RX 250 WO HCPCS: Performed by: STUDENT IN AN ORGANIZED HEALTH CARE EDUCATION/TRAINING PROGRAM

## 2025-01-15 RX ORDER — PREGABALIN 50 MG/1
50 CAPSULE ORAL 2 TIMES DAILY
Qty: 28 CAPSULE | Refills: 0 | Status: SHIPPED | OUTPATIENT
Start: 2025-01-15 | End: 2025-01-29

## 2025-01-15 RX ORDER — DIPHENHYDRAMINE HYDROCHLORIDE 50 MG/ML
25 INJECTION INTRAMUSCULAR; INTRAVENOUS ONCE
Status: COMPLETED | OUTPATIENT
Start: 2025-01-15 | End: 2025-01-15

## 2025-01-15 RX ORDER — PREGABALIN 25 MG/1
50 CAPSULE ORAL ONCE
Status: COMPLETED | OUTPATIENT
Start: 2025-01-15 | End: 2025-01-15

## 2025-01-15 RX ADMIN — FAMOTIDINE 20 MG: 10 INJECTION INTRAVENOUS at 20:08

## 2025-01-15 RX ADMIN — PREGABALIN 50 MG: 25 CAPSULE ORAL at 22:53

## 2025-01-15 RX ADMIN — DIPHENHYDRAMINE HYDROCHLORIDE 25 MG: 50 INJECTION INTRAMUSCULAR; INTRAVENOUS at 20:07

## 2025-01-15 ASSESSMENT — PAIN DESCRIPTION - LOCATION: LOCATION: HEAD

## 2025-01-15 ASSESSMENT — PAIN - FUNCTIONAL ASSESSMENT: PAIN_FUNCTIONAL_ASSESSMENT: 0-10

## 2025-01-15 ASSESSMENT — PAIN SCALES - GENERAL: PAINLEVEL_OUTOF10: 3

## 2025-01-16 NOTE — DISCHARGE INSTRUCTIONS
You are given short trial of Lyrica to see if this helps with your symptoms.  If it does talk to your family doctor about continuing.

## 2025-01-16 NOTE — ED NOTES
Presents with c/p hyperglycemia. Pt states her blood sugar at home was reading >600, upon arrival to ED blood sugar was 134. Pt states she has been experiencing increased thirst and urination along with intermittent hives.

## 2025-01-20 NOTE — ED PROVIDER NOTES
[facility] EMERGENCY DEPARTMENT ENCOUNTER      Pt Name: Althea Jamison  MRN: [id number]  Birthdate 1970  Date of evaluation: 1/20/25    CHIEF COMPLAINT       Chief Complaint   Patient presents with    Hyperglycemia     Takes insulin and metformin. Having difficulty getting BS down. Increased thirst and urination    Urticaria       HISTORY OF PRESENT ILLNESS   Althea Jamison is a 54 y.o. female who presents with concern for hyperglycemia.  States her blood sugar was reading over 600 at home.  She is also noticed some itching to her hands and her feet which typically occurs when her blood sugars elevated.    PASTMEDICAL HISTORY     Past Medical History:   Diagnosis Date    Anxiety     Arthritis of knee, degenerative 1/31/2012    Blindness of left eye     hx. of     Blurry vision     left remaines blurry to Left eye to  on 1-14-21    Carpal tunnel syndrome on both sides 1/15/2013    Chronic knee pain     on 4/17/18 pt is presently in pain mgmnt    Depressive disorder, not elsewhere classified 10/14/2014    Headache     Hx. of     Hydronephrosis, left 9/13/2016    Hyperlipidemia     Hypertension     Hypothyroidism 8/2/2013    Iron deficiency anemia 6/10/2014    Mild intermittent asthma without complication 5/31/2017    Obesity     Osteoarthritis     KNEE    Polyneuropathy 11/14/2012    RAD (reactive airway disease) 6/26/2012    Snores     possible apnea but not tested yet    Type II or unspecified type diabetes mellitus without mention of complication, not stated as uncontrolled     Urge incontinence of urine 5/31/2017    Wears glasses     Weight loss      Past Problem List  Patient Active Problem List   Diagnosis Code    Tobacco use Z72.0    Carpal tunnel syndrome on both sides G56.03    Acquired hypothyroidism E03.9    Hyperlipidemia E78.5    Iron deficiency anemia D50.9    Mild episode of recurrent major depressive disorder (HCC) F33.0    Type 2 diabetes mellitus, with long-term current use of insulin

## 2025-03-11 ENCOUNTER — TELEPHONE (OUTPATIENT)
Dept: PHARMACY | Facility: CLINIC | Age: 55
End: 2025-03-11

## 2025-03-11 NOTE — TELEPHONE ENCOUNTER
[facility]: ADHERENCE REVIEW  Identified care gap per [place]: fills at [facility] AM[facility]ULATORY CARE PHARMACY : Diabetes adherence    Patient also appears to be prescribed: ACE/ARB and Statin    ASSESSMENT  DIABETES ADHERENCE    Insurance Records claims through 3/3/25 (Prior Year PDC = not reported; YTD PDC = FIRST FILL; Potential Fail Date: 25):   Metformin 500mg tab last filled on 25 for 45 day supply. Next refill due: 25    Prescribed si tablet/capsule twice daily    Per Reconcile Dispense History: last filled on 25 for 45 day supply.     Per [facility] AMBULATORY CARE Pharmacy: not contacted    Lab Results   Component Value Date    LABA1C 6.1 (H) 2024    LABA1C 6.1 2023    LABA1C 11.1 (H) 10/04/2022     NOTE: A1c <9%    PLAN    The following are interventions that have been identified:   Patient OVERDUE refilling Metformin 500mg tab and active on home medication list.   Patient eligible for 100 day supply of Metformin 500mg tab.    Attempting to reach patient to review - unable to leave message. Letter sent to patient.      Last Visit: 24  Next Visit: none    [facility]   [facility]  [phone] Option 1     For Pharmacy Admin Tracking Only    Program: [employer]  CPA in place:  No  Gap Closed?: No   Time Spent (min): 5

## 2025-03-12 ENCOUNTER — TELEPHONE (OUTPATIENT)
Dept: ORTHOPEDIC SURGERY | Age: 55
End: 2025-03-12

## 2025-03-12 DIAGNOSIS — E11.9 TYPE 2 DIABETES MELLITUS WITHOUT COMPLICATION, WITH LONG-TERM CURRENT USE OF INSULIN (HCC): ICD-10-CM

## 2025-03-12 DIAGNOSIS — Z79.4 TYPE 2 DIABETES MELLITUS WITHOUT COMPLICATION, WITH LONG-TERM CURRENT USE OF INSULIN (HCC): ICD-10-CM

## 2025-03-12 NOTE — TELEPHONE ENCOUNTER
Patient was formerly a patient of [facility].  Since she has moved to be with Dr. Ambrosio, patient wants to stay at [facility].  She has an appointment at the clinic and would like to get a gel injection.  It was sent for approval on 10/18/24.  Please call patient to let her know if the approval went through.  Thanks.

## 2025-03-12 NOTE — TELEPHONE ENCOUNTER
Patient called back regarding adherence.     Patient stated she is still taking Metformin 500mg as prescribed, one tablet twice daily. Patient stated she takes this consistently without issue. Stated she is getting low on medication and believes provider had sent in new prescription for this recently and will contact pharmacy soon to check on this and refill. If no refill available she will contact provider. Advised patient she is eligible for 100 d/s if she is wanting that for next fill.       Tre Whittington [facility]   Phone: [phone], Option #3

## 2025-03-19 ENCOUNTER — OFFICE VISIT (OUTPATIENT)
Dept: ORTHOPEDIC SURGERY | Age: 55
End: 2025-03-19

## 2025-03-19 VITALS — BODY MASS INDEX: 36.83 KG/M2 | WEIGHT: 243 LBS | HEIGHT: 68 IN

## 2025-03-19 DIAGNOSIS — M17.0 PRIMARY OSTEOARTHRITIS OF BOTH KNEES: Primary | ICD-10-CM

## 2025-03-19 PROCEDURE — G8427 DOCREV CUR MEDS BY ELIG CLIN: HCPCS | Performed by: STUDENT IN AN ORGANIZED HEALTH CARE EDUCATION/TRAINING PROGRAM

## 2025-03-19 PROCEDURE — 3017F COLORECTAL CA SCREEN DOC REV: CPT | Performed by: STUDENT IN AN ORGANIZED HEALTH CARE EDUCATION/TRAINING PROGRAM

## 2025-03-19 PROCEDURE — G8417 CALC BMI ABV UP PARAM F/U: HCPCS | Performed by: STUDENT IN AN ORGANIZED HEALTH CARE EDUCATION/TRAINING PROGRAM

## 2025-03-19 PROCEDURE — 4004F PT TOBACCO SCREEN RCVD TLK: CPT | Performed by: STUDENT IN AN ORGANIZED HEALTH CARE EDUCATION/TRAINING PROGRAM

## 2025-03-19 RX ORDER — BUPIVACAINE HYDROCHLORIDE 2.5 MG/ML
2 INJECTION, SOLUTION INFILTRATION; PERINEURAL ONCE
Status: COMPLETED | OUTPATIENT
Start: 2025-03-19 | End: 2025-03-19

## 2025-03-19 RX ORDER — METHYLPREDNISOLONE ACETATE 80 MG/ML
80 INJECTION, SUSPENSION INTRA-ARTICULAR; INTRALESIONAL; INTRAMUSCULAR; SOFT TISSUE ONCE
Status: COMPLETED | OUTPATIENT
Start: 2025-03-19 | End: 2025-03-19

## 2025-03-19 RX ADMIN — METHYLPREDNISOLONE ACETATE 80 MG: 80 INJECTION, SUSPENSION INTRA-ARTICULAR; INTRALESIONAL; INTRAMUSCULAR; SOFT TISSUE at 10:12

## 2025-03-19 RX ADMIN — BUPIVACAINE HYDROCHLORIDE 5 MG: 2.5 INJECTION, SOLUTION INFILTRATION; PERINEURAL at 10:12

## 2025-03-19 RX ADMIN — BUPIVACAINE HYDROCHLORIDE 5 MG: 2.5 INJECTION, SOLUTION INFILTRATION; PERINEURAL at 10:13

## 2025-03-19 NOTE — PROGRESS NOTES
failed Tylenol, and NSAIDs.  Voltaren gel gives her no relief.  She has received both cortisone, and gel injections in the past.  Gel injections give her more consistent relief.    -Today the patient would like to try cortisone injections while she is getting approved for repeat gel injections.  These were provided in the office today.  Will work on approval for the gel injections.  We discussed not having the gel injections within 3 months of when she is planning on having surgery.    -She is planning on having multiple teeth extracted at the end of April and will then be able to have dental clearance.  She has stopped smoking.  Her BMI is 37.    -She will follow-up 30 to 60 days prior to when she is planning on having surgery to start preoperative clearance.  Otherwise she will follow-up for gel injections after the ears are improved 3 months prior to surgery.  All questions and concerns were answered.  Patient is comfortable with agrees with the plan.    KNEE INJECTION PROCEDURE NOTE:  The patient was identified.  The bilateral knees was confirmed with the patient.  After a sterile prep with Betadine the knee was injected using a lateral joint line approach with a mixture of 2 mL of 0.25% Marcaine and 80 mg of Depo-Medrol.  Patient tolerated the procedure well without post injection complications.  I instructed the patient to call our office immediately if they have any swelling or increased pain at the injection site.    Dr. Jones was present for the procedure      Electronically signed by Chacorta Squries DO PGY-4 on 3/19/2025 at 9:41 AM    This note is created with the assistance of a speech recognition program.  While intending to generate a document that actually reflects the content of the visit, the document can still have some errors including those of syntax and sound a like substitutions which may escape proof reading.  In such instances, actual meaning can be extrapolated by contextual diversion

## 2025-03-25 ENCOUNTER — TELEPHONE (OUTPATIENT)
Dept: ORTHOPEDIC SURGERY | Age: 55
End: 2025-03-25

## 2025-03-26 ENCOUNTER — TELEPHONE (OUTPATIENT)
Dept: GASTROENTEROLOGY | Age: 55
End: 2025-03-26

## 2025-04-08 ENCOUNTER — TELEPHONE (OUTPATIENT)
Dept: ORTHOPEDIC SURGERY | Age: 55
End: 2025-04-08

## 2025-04-08 NOTE — TELEPHONE ENCOUNTER
Patient came into the office to ask about injections she said someone called her. She needed to update her insurance. I scanned in her new cards but for some reason a notification popped up saying that her account was locked ad I am unable to make changes to her account? Please follow up with this patient regarding her injections.

## 2025-04-09 NOTE — TELEPHONE ENCOUNTER
LVM for pt to CB to be scheduled for b/l synvisc one injections. Approval in chart, item in stock at .s location. Resident clinic pt.

## 2025-04-16 ENCOUNTER — PREP FOR PROCEDURE (OUTPATIENT)
Dept: GASTROENTEROLOGY | Age: 55
End: 2025-04-16

## 2025-04-16 ENCOUNTER — OFFICE VISIT (OUTPATIENT)
Dept: ORTHOPEDIC SURGERY | Age: 55
End: 2025-04-16

## 2025-04-16 VITALS — HEIGHT: 68 IN | BODY MASS INDEX: 37.48 KG/M2

## 2025-04-16 DIAGNOSIS — M17.0 PRIMARY OSTEOARTHRITIS OF BOTH KNEES: Primary | ICD-10-CM

## 2025-04-16 DIAGNOSIS — Z12.11 COLON CANCER SCREENING: ICD-10-CM

## 2025-04-16 NOTE — TELEPHONE ENCOUNTER
Procedure scheduled/Dr Smith  Procedure:colon  Dx: screening  Date:06/18/2025  Time:800 am arrive 600 am   Hospital:[facility] phone call:tbd  Bowel Prep instructions given:miranda/dulcolax  In office/via phone: phone  Clearance needed:no  GLP - 1:

## 2025-04-16 NOTE — PROGRESS NOTES
[facility]  [address]  [place] [address]  Dept: [phone]  Dept Fax: [phone]        Ambulatory   Follow Up    Subjective:   Althea Jamison is a 54 year old female who presents to our office today for evaluation of her bilateral knee pain.  Patient was last seen in our clinic on 3/19/2025 where she received bilateral corticosteroid injections.  She states that these injections only gave her about 1 to 2 weeks of any relief.  She has had many injections in the past including both Synvisc and corticosteroid injections.  Venously.  Her last Synvisc injection was performed on 6/3/2024 and she states that this did give her much more relief than the corticosteroid injection.  She states that her pain has continued to get worse however she has not yet been able to have surgery due to having teeth issues.  She is scheduled to have several more teeth pulled this summer and is working to get this done in order to complete her preoperative clearance for surgery.      All previous conservative treatment measures have failed to provide her relief including oral anti-inflammatory medication topical anti-inflammatory gels as well as her multiple injections.  She has also completed physical therapy and home exercises with minimal improvement.  She continues to have significant discomfort with her knees which has continued to reduce her quality of life.  Patient's last HbA1c was 6.1 on 2/29/2024.  Her most recent BMI was 37.    Review of Systems   Constitutional: Negative for Fever and Chills.   HENT: Negative for Congestion.    Eyes: Negative for Blurred Vision and Double Vision.   Respiratory: Negative for Cough, Shortness of Breath and Wheezing.    Cardiovascular: Negative for Chest Pain and Palpitations.   Gastrointestinal: Negative for Nausea. Negative for Vomiting.   Musculoskeletal: Positive for Myalgias and Joint Pain (bilateral

## 2025-04-17 RX ORDER — BISACODYL 5 MG
TABLET, DELAYED RELEASE (ENTERIC COATED) ORAL
Qty: 4 TABLET | Refills: 0 | Status: SHIPPED | OUTPATIENT
Start: 2025-04-17

## 2025-04-17 RX ORDER — POLYETHYLENE GLYCOL 3350, SODIUM CHLORIDE, SODIUM BICARBONATE, POTASSIUM CHLORIDE 420; 11.2; 5.72; 1.48 G/4L; G/4L; G/4L; G/4L
POWDER, FOR SOLUTION ORAL
Qty: 4000 ML | Refills: 0 | Status: SHIPPED | OUTPATIENT
Start: 2025-04-17

## 2025-05-16 PROBLEM — Z12.11 COLON CANCER SCREENING: Status: RESOLVED | Noted: 2025-04-16 | Resolved: 2025-05-16

## 2025-05-29 ENCOUNTER — TELEPHONE (OUTPATIENT)
Dept: PHARMACY | Facility: CLINIC | Age: 55
End: 2025-05-29

## 2025-05-29 NOTE — TELEPHONE ENCOUNTER
Formerly named [facility] CLINICAL PHARMACY: ADHERENCE REVIEW  Identified care gap per [employer]: fills at [facility] : Diabetes and Statin adherence    Patient also appears to be prescribed: ACE/ARB (adherent due in July)    Failed DM for     RX request denied 3/12   (claim 1/02 x30 days (0rf at [facility]))    STATIN ADHERENCE    Insurance Records claims through 25 (Prior Year PDC = not reported; YTD PDC = FIRST FILL; Potential Fail Date: 25):   SIMVASTATIN 40 MG  last filled on 25 for 90 day supply. Next refill due: 25    Prescribed si tablet/capsule daily        Lab Results   Component Value Date    CHOL 227 (H) 2024    TRIG 91 2024    HDL 81 2024     Lab Results   Component Value Date     2024      ALT   Date Value Ref Range Status   2024 12 5 - 33 U/L Final     AST   Date Value Ref Range Status   2024 14 <32 U/L Final     The 10-year ASCVD risk score (Tin PATRICK, et al., 2019) is: 17.8%    Values used to calculate the score:      Age: 54 years      Sex: Female      Is Non- : Yes      Diabetic: Yes      Tobacco smoker: Yes      Systolic Blood Pressure: 130 mmHg      Is BP treated: Yes      HDL Cholesterol: 81 mg/dL      Total Cholesterol: 227 mg/dL     LIS    The following are interventions that have been identified:   Patient OVERDUE refilling SIMVASTATIN 40 MG and active on home medication list.     Attempting to reach patient to review.  Left message asking for return call. Letter sent to patient.      Last Visit: 24  Next Visit: none      Eve Guadarrama CPhT.   Population [facility]   [facility]  Toll free: [phone] Option 1     For Pharmacy Admin Tracking Only    Program: [employer]  CPA in place:  No  Gap Closed?: No   Time Spent (min): 15

## 2025-06-04 ENCOUNTER — HOSPITAL ENCOUNTER (OUTPATIENT)
Dept: PREADMISSION TESTING | Age: 55
Discharge: HOME | End: 2025-06-08

## 2025-06-04 VITALS — BODY MASS INDEX: 36.83 KG/M2 | WEIGHT: 243 LBS | HEIGHT: 68 IN

## 2025-06-04 NOTE — PROGRESS NOTES
please bring a copy.    You will be taken to the pre-op holding area where you will be prepared for surgery.  A physical assessment will be performed by a nurse practitioner or house officer.  Your IV will be started and you will meet your anesthesiologist.    When you go to surgery, your family will be directed to the surgical waiting room, where the doctor should speak with them after your surgery.    After surgery, you will be taken to the recovery area.  When you are alert and stable, you will receive instructions and be prepared for discharge.     INSTRUCTIONS REVIEWED WITH DANN, UNDERSTANDING VERBALIZED.  COLONOSCOPY- 06/18/2025

## 2025-07-07 DIAGNOSIS — I10 ESSENTIAL HYPERTENSION: ICD-10-CM

## 2025-07-07 RX ORDER — LISINOPRIL 5 MG/1
5 TABLET ORAL DAILY
Qty: 90 TABLET | Refills: 0 | OUTPATIENT
Start: 2025-07-07

## 2025-07-21 ENCOUNTER — OFFICE VISIT (OUTPATIENT)
Dept: ORTHOPEDIC SURGERY | Age: 55
End: 2025-07-21

## 2025-07-21 VITALS — BODY MASS INDEX: 39.87 KG/M2 | WEIGHT: 254 LBS | HEIGHT: 67 IN

## 2025-07-21 DIAGNOSIS — G89.29 CHRONIC PAIN OF RIGHT KNEE: Primary | ICD-10-CM

## 2025-07-21 DIAGNOSIS — M25.561 CHRONIC PAIN OF RIGHT KNEE: Primary | ICD-10-CM

## 2025-07-21 RX ORDER — BUPIVACAINE HYDROCHLORIDE 2.5 MG/ML
2 INJECTION, SOLUTION INFILTRATION; PERINEURAL ONCE
Status: COMPLETED | OUTPATIENT
Start: 2025-07-21 | End: 2025-07-21

## 2025-07-21 RX ORDER — METHYLPREDNISOLONE ACETATE 80 MG/ML
80 INJECTION, SUSPENSION INTRA-ARTICULAR; INTRALESIONAL; INTRAMUSCULAR; SOFT TISSUE ONCE
Status: COMPLETED | OUTPATIENT
Start: 2025-07-21 | End: 2025-07-21

## 2025-07-21 RX ADMIN — BUPIVACAINE HYDROCHLORIDE 5 MG: 2.5 INJECTION, SOLUTION INFILTRATION; PERINEURAL at 12:50

## 2025-07-21 RX ADMIN — METHYLPREDNISOLONE ACETATE 80 MG: 80 INJECTION, SUSPENSION INTRA-ARTICULAR; INTRALESIONAL; INTRAMUSCULAR; SOFT TISSUE at 12:50

## 2025-07-21 NOTE — PROGRESS NOTES
[facility]  [address]  [place] [address]  Dept Phone: [phone]  Dept Fax: [phone]      [facility] Clinic Follow Up      Subjective:     Althea Jamison is a 55 y.o. year old female who presents to the clinic today for follow up of bilateral knee pain.  Patient has bilateral knee osteoarthritis.  Over 3 months ago she, she underwent bilateral corticosteroid injections.  She states that they lasted for approximately 3 weeks, with near 100% relief.  Denies any new injuries or falls.  She would like to discuss undergoing left total knee arthroplasty.  Followed by the right knee at a later date.    Review of Systems  Gen: no fever, chills, malaise  CV: no chest pain or palpitations  Resp: no cough or shortness of breath  GI: no nausea, vomiting, diarrhea, or constipation  Neuro: no seizures, vertigo, or headache  Msk: Per HPI  10 remaining systems reviewed and negative    Objective :   There were no vitals filed for this visit.Body mass index is 39.78 kg/m².  General: No acute distress, resting comfortably in the clinic  Neuro: alert. oriented  Eyes: Extra-ocular muscles intact  Pulm: Respirations unlabored and regular.  Skin: warm, well perfused  Psych:   Patient has good fund of knowledge and displays understanding of exam, diagnosis, and plan.  Bilateral lower extremities: Skin intact.  Medial and lateral joint line tenderness palpation bilaterally.  Knees are stable to varus/valgus stress.  Patient does walk with a cane, with antalgic gait.  Patient expresses baseline sensation light touch throughout bilateral lower extremities.  Legs are warm/well-perfused.  Compartment soft/compressible.    Radiology:  Imaging studies from today were independently reviewed and read as listed below. Any relevant images obtained prior to today's visit were also independently interpreted.    Prior x-rays of knees were reviewed, yielding severe bilateral knee osteoarthritis,

## 2025-07-29 PROBLEM — M25.562 LATERAL KNEE PAIN, LEFT: Status: ACTIVE | Noted: 2025-07-29

## 2025-07-29 PROBLEM — M17.12 OSTEOARTHRITIS OF LEFT KNEE: Status: ACTIVE | Noted: 2025-07-29

## 2025-08-12 ENCOUNTER — OFFICE VISIT (OUTPATIENT)
Dept: PRIMARY CARE CLINIC | Age: 55
End: 2025-08-12
Payer: MEDICARE

## 2025-08-12 ENCOUNTER — HOSPITAL ENCOUNTER (OUTPATIENT)
Dept: GENERAL RADIOLOGY | Age: 55
Discharge: HOME | End: 2025-08-14
Payer: MEDICARE

## 2025-08-12 ENCOUNTER — HOSPITAL ENCOUNTER (OUTPATIENT)
Dept: LAB | Age: 55
Discharge: HOME | End: 2025-08-12
Payer: MEDICARE

## 2025-08-12 VITALS
WEIGHT: 252.4 LBS | DIASTOLIC BLOOD PRESSURE: 70 MMHG | HEIGHT: 67 IN | SYSTOLIC BLOOD PRESSURE: 108 MMHG | BODY MASS INDEX: 39.62 KG/M2 | OXYGEN SATURATION: 100 % | HEART RATE: 82 BPM

## 2025-08-12 DIAGNOSIS — Z79.4 TYPE 2 DIABETES MELLITUS WITH DIABETIC POLYNEUROPATHY, WITH LONG-TERM CURRENT USE OF INSULIN (HCC): ICD-10-CM

## 2025-08-12 DIAGNOSIS — Z76.0 MEDICATION REFILL: ICD-10-CM

## 2025-08-12 DIAGNOSIS — E11.42 TYPE 2 DIABETES MELLITUS WITH DIABETIC POLYNEUROPATHY, WITH LONG-TERM CURRENT USE OF INSULIN (HCC): ICD-10-CM

## 2025-08-12 DIAGNOSIS — K59.00 CONSTIPATION, UNSPECIFIED CONSTIPATION TYPE: ICD-10-CM

## 2025-08-12 DIAGNOSIS — K59.00 CONSTIPATION, UNSPECIFIED CONSTIPATION TYPE: Primary | ICD-10-CM

## 2025-08-12 DIAGNOSIS — E03.9 ACQUIRED HYPOTHYROIDISM: ICD-10-CM

## 2025-08-12 DIAGNOSIS — I10 ESSENTIAL HYPERTENSION: ICD-10-CM

## 2025-08-12 LAB
25(OH)D3 SERPL-MCNC: 13.7 NG/ML (ref 30–100)
ALBUMIN SERPL-MCNC: 4.3 G/DL (ref 3.5–5.2)
ALBUMIN/GLOB SERPL: 1.5 {RATIO} (ref 1–2.5)
ALP SERPL-CCNC: 87 U/L (ref 35–104)
ALT SERPL-CCNC: 19 U/L (ref 10–35)
ANION GAP SERPL CALCULATED.3IONS-SCNC: 9 MMOL/L (ref 9–16)
AST SERPL-CCNC: 18 U/L (ref 10–35)
BASOPHILS # BLD: <0.03 K/UL (ref 0–0.2)
BASOPHILS NFR BLD: 1 % (ref 0–2)
BILIRUB SERPL-MCNC: 0.4 MG/DL (ref 0–1.2)
BUN SERPL-MCNC: 9 MG/DL (ref 6–20)
CALCIUM SERPL-MCNC: 9.8 MG/DL (ref 8.6–10.4)
CHLORIDE SERPL-SCNC: 102 MMOL/L (ref 98–107)
CHOLEST SERPL-MCNC: 225 MG/DL (ref 0–199)
CHOLESTEROL/HDL RATIO: 2.7
CO2 SERPL-SCNC: 26 MMOL/L (ref 20–31)
CREAT SERPL-MCNC: 0.7 MG/DL (ref 0.6–0.9)
CREAT UR-MCNC: 73.4 MG/DL (ref 28–217)
EOSINOPHIL # BLD: 0.06 K/UL (ref 0–0.44)
EOSINOPHILS RELATIVE PERCENT: 1 % (ref 1–4)
ERYTHROCYTE [DISTWIDTH] IN BLOOD BY AUTOMATED COUNT: 12.3 % (ref 11.8–14.4)
EST. AVERAGE GLUCOSE BLD GHB EST-MCNC: 148 MG/DL
GFR, ESTIMATED: >90 ML/MIN/1.73M2
GLUCOSE SERPL-MCNC: 144 MG/DL (ref 74–99)
HBA1C MFR BLD: 6.8 % (ref 4–6)
HCT VFR BLD AUTO: 38.6 % (ref 36.3–47.1)
HCV AB SERPL QL IA: NONREACTIVE
HDLC SERPL-MCNC: 83 MG/DL
HGB BLD-MCNC: 12.8 G/DL (ref 11.9–15.1)
IMM GRANULOCYTES # BLD AUTO: <0.03 K/UL (ref 0–0.3)
IMM GRANULOCYTES NFR BLD: 1 %
LDLC SERPL CALC-MCNC: 124 MG/DL (ref 0–100)
LYMPHOCYTES NFR BLD: 1.62 K/UL (ref 1.1–3.7)
LYMPHOCYTES RELATIVE PERCENT: 38 % (ref 24–43)
MCH RBC QN AUTO: 31.6 PG (ref 25.2–33.5)
MCHC RBC AUTO-ENTMCNC: 33.2 G/DL (ref 28.4–34.8)
MCV RBC AUTO: 95.3 FL (ref 82.6–102.9)
MICROALBUMIN UR-MCNC: <12 MG/L (ref 0–20)
MICROALBUMIN/CREAT UR-RTO: NORMAL MCG/MG CREAT (ref 0–25)
MONOCYTES NFR BLD: 0.43 K/UL (ref 0.1–1.2)
MONOCYTES NFR BLD: 10 % (ref 3–12)
NEUTROPHILS NFR BLD: 49 % (ref 36–65)
NEUTS SEG NFR BLD: 2.16 K/UL (ref 1.5–8.1)
NRBC BLD-RTO: 0 PER 100 WBC
PLATELET # BLD AUTO: 285 K/UL (ref 138–453)
PMV BLD AUTO: 9.9 FL (ref 8.1–13.5)
POTASSIUM SERPL-SCNC: 4.3 MMOL/L (ref 3.7–5.3)
PROT SERPL-MCNC: 7.1 G/DL (ref 6.6–8.7)
RBC # BLD AUTO: 4.05 M/UL (ref 3.95–5.11)
SODIUM SERPL-SCNC: 137 MMOL/L (ref 136–145)
TRIGL SERPL-MCNC: 91 MG/DL
TSH SERPL DL<=0.05 MIU/L-ACNC: 1.66 UIU/ML (ref 0.27–4.2)
VLDLC SERPL CALC-MCNC: 18 MG/DL (ref 1–30)
WBC OTHER # BLD: 4.3 K/UL (ref 3.5–11.3)

## 2025-08-12 PROCEDURE — 36415 COLL VENOUS BLD VENIPUNCTURE: CPT

## 2025-08-12 PROCEDURE — 85025 COMPLETE CBC W/AUTO DIFF WBC: CPT

## 2025-08-12 PROCEDURE — 82306 VITAMIN D 25 HYDROXY: CPT

## 2025-08-12 PROCEDURE — 3074F SYST BP LT 130 MM HG: CPT | Performed by: NURSE PRACTITIONER

## 2025-08-12 PROCEDURE — 82043 UR ALBUMIN QUANTITATIVE: CPT

## 2025-08-12 PROCEDURE — 99214 OFFICE O/P EST MOD 30 MIN: CPT | Performed by: NURSE PRACTITIONER

## 2025-08-12 PROCEDURE — 80061 LIPID PANEL: CPT

## 2025-08-12 PROCEDURE — 74022 RADEX COMPL AQT ABD SERIES: CPT

## 2025-08-12 PROCEDURE — 3078F DIAST BP <80 MM HG: CPT | Performed by: NURSE PRACTITIONER

## 2025-08-12 PROCEDURE — 83036 HEMOGLOBIN GLYCOSYLATED A1C: CPT

## 2025-08-12 PROCEDURE — 86803 HEPATITIS C AB TEST: CPT

## 2025-08-12 PROCEDURE — 82570 ASSAY OF URINE CREATININE: CPT

## 2025-08-12 PROCEDURE — 80053 COMPREHEN METABOLIC PANEL: CPT

## 2025-08-12 PROCEDURE — 84443 ASSAY THYROID STIM HORMONE: CPT

## 2025-08-12 RX ORDER — LIRAGLUTIDE 6 MG/ML
1.8 INJECTION SUBCUTANEOUS DAILY
Qty: 6 ML | Refills: 5 | Status: SHIPPED | OUTPATIENT
Start: 2025-08-12

## 2025-08-12 RX ORDER — MIRABEGRON 25 MG/1
TABLET, FILM COATED, EXTENDED RELEASE ORAL
Qty: 90 TABLET | Refills: 0 | Status: SHIPPED | OUTPATIENT
Start: 2025-08-12

## 2025-08-12 RX ORDER — LISINOPRIL 5 MG/1
TABLET ORAL
Qty: 90 TABLET | Refills: 1 | Status: SHIPPED | OUTPATIENT
Start: 2025-08-12

## 2025-08-12 RX ORDER — SIMVASTATIN 40 MG
TABLET ORAL
Qty: 90 TABLET | Refills: 1 | Status: SHIPPED | OUTPATIENT
Start: 2025-08-12

## 2025-08-12 RX ORDER — DOCUSATE SODIUM 100 MG/1
100 CAPSULE, LIQUID FILLED ORAL 2 TIMES DAILY
Qty: 60 CAPSULE | Refills: 0 | Status: SHIPPED | OUTPATIENT
Start: 2025-08-12

## 2025-08-12 RX ORDER — ALBUTEROL SULFATE 90 UG/1
2 INHALANT RESPIRATORY (INHALATION) EVERY 6 HOURS PRN
Qty: 1 EACH | Refills: 5 | Status: SHIPPED | OUTPATIENT
Start: 2025-08-12

## 2025-08-12 RX ORDER — TRAZODONE HYDROCHLORIDE 150 MG/1
TABLET ORAL
Qty: 90 TABLET | Refills: 3 | Status: SHIPPED | OUTPATIENT
Start: 2025-08-12

## 2025-08-12 RX ORDER — SODIUM PHOSPHATE, DIBASIC AND SODIUM PHOSPHATE, MONOBASIC 7; 19 G/230ML; G/230ML
1 ENEMA RECTAL
Qty: 230 ML | COMMUNITY
Start: 2025-08-12 | End: 2025-08-15

## 2025-08-12 RX ORDER — DULOXETIN HYDROCHLORIDE 60 MG/1
60 CAPSULE, DELAYED RELEASE ORAL DAILY
Qty: 90 CAPSULE | Refills: 3 | Status: SHIPPED | OUTPATIENT
Start: 2025-08-12

## 2025-08-12 RX ORDER — SUMATRIPTAN SUCCINATE 100 MG/1
100 TABLET ORAL 2 TIMES DAILY PRN
Qty: 9 TABLET | Refills: 3 | Status: SHIPPED | OUTPATIENT
Start: 2025-08-12

## 2025-08-12 RX ORDER — MAGNESIUM CARB/ALUMINUM HYDROX 105-160MG
296 TABLET,CHEWABLE ORAL ONCE
Qty: 296 ML | Refills: 0 | Status: SHIPPED | OUTPATIENT
Start: 2025-08-12 | End: 2025-08-12

## 2025-08-12 RX ORDER — CALCIUM CARBONATE 500 MG/1
1 TABLET, CHEWABLE ORAL DAILY
Qty: 90 TABLET | Refills: 0 | Status: SHIPPED | OUTPATIENT
Start: 2025-08-12

## 2025-08-12 RX ORDER — ASPIRIN 81 MG/1
TABLET ORAL
Qty: 90 TABLET | Refills: 3 | Status: SHIPPED | OUTPATIENT
Start: 2025-08-12

## 2025-08-12 RX ORDER — BISACODYL 5 MG
TABLET, DELAYED RELEASE (ENTERIC COATED) ORAL
Qty: 4 TABLET | Refills: 0 | Status: SHIPPED | OUTPATIENT
Start: 2025-08-12

## 2025-08-12 SDOH — ECONOMIC STABILITY: FOOD INSECURITY: WITHIN THE PAST 12 MONTHS, THE FOOD YOU BOUGHT JUST DIDN'T LAST AND YOU DIDN'T HAVE MONEY TO GET MORE.: NEVER TRUE

## 2025-08-12 SDOH — ECONOMIC STABILITY: FOOD INSECURITY: WITHIN THE PAST 12 MONTHS, YOU WORRIED THAT YOUR FOOD WOULD RUN OUT BEFORE YOU GOT MONEY TO BUY MORE.: NEVER TRUE

## 2025-08-12 ASSESSMENT — PATIENT HEALTH QUESTIONNAIRE - PHQ9
SUM OF ALL RESPONSES TO PHQ QUESTIONS 1-9: 0
SUM OF ALL RESPONSES TO PHQ QUESTIONS 1-9: 0
3. TROUBLE FALLING OR STAYING ASLEEP: NOT AT ALL
4. FEELING TIRED OR HAVING LITTLE ENERGY: NOT AT ALL
2. FEELING DOWN, DEPRESSED OR HOPELESS: NOT AT ALL
8. MOVING OR SPEAKING SO SLOWLY THAT OTHER PEOPLE COULD HAVE NOTICED. OR THE OPPOSITE, BEING SO FIGETY OR RESTLESS THAT YOU HAVE BEEN MOVING AROUND A LOT MORE THAN USUAL: NOT AT ALL
SUM OF ALL RESPONSES TO PHQ QUESTIONS 1-9: 0
9. THOUGHTS THAT YOU WOULD BE BETTER OFF DEAD, OR OF HURTING YOURSELF: NOT AT ALL
6. FEELING BAD ABOUT YOURSELF - OR THAT YOU ARE A FAILURE OR HAVE LET YOURSELF OR YOUR FAMILY DOWN: NOT AT ALL
SUM OF ALL RESPONSES TO PHQ QUESTIONS 1-9: 0
7. TROUBLE CONCENTRATING ON THINGS, SUCH AS READING THE NEWSPAPER OR WATCHING TELEVISION: NOT AT ALL
1. LITTLE INTEREST OR PLEASURE IN DOING THINGS: NOT AT ALL
5. POOR APPETITE OR OVEREATING: NOT AT ALL
10. IF YOU CHECKED OFF ANY PROBLEMS, HOW DIFFICULT HAVE THESE PROBLEMS MADE IT FOR YOU TO DO YOUR WORK, TAKE CARE OF THINGS AT HOME, OR GET ALONG WITH OTHER PEOPLE: NOT DIFFICULT AT ALL

## 2025-08-13 ENCOUNTER — HOSPITAL ENCOUNTER (EMERGENCY)
Age: 55
Discharge: HOME | End: 2025-08-13

## 2025-08-13 DIAGNOSIS — E55.9 VITAMIN D INSUFFICIENCY: Primary | ICD-10-CM

## 2025-08-13 RX ORDER — ERGOCALCIFEROL 1.25 MG/1
50000 CAPSULE, LIQUID FILLED ORAL WEEKLY
Qty: 12 CAPSULE | Refills: 2 | Status: SHIPPED | OUTPATIENT
Start: 2025-08-13

## 2025-08-19 DIAGNOSIS — K59.00 CONSTIPATION, UNSPECIFIED CONSTIPATION TYPE: Primary | ICD-10-CM

## 2025-08-19 RX ORDER — SODIUM PHOSPHATE, DIBASIC AND SODIUM PHOSPHATE, MONOBASIC 7; 19 G/230ML; G/230ML
1 ENEMA RECTAL
COMMUNITY
Start: 2025-08-19

## 2025-08-27 RX ORDER — SODIUM CHLORIDE, SODIUM LACTATE, POTASSIUM CHLORIDE, CALCIUM CHLORIDE 600; 310; 30; 20 MG/100ML; MG/100ML; MG/100ML; MG/100ML
INJECTION, SOLUTION INTRAVENOUS CONTINUOUS
OUTPATIENT
Start: 2025-08-27

## 2025-08-28 DIAGNOSIS — M17.0 PRIMARY OSTEOARTHRITIS OF BOTH KNEES: ICD-10-CM

## 2025-08-28 DIAGNOSIS — Z98.890 STATUS POST SURGERY: Primary | ICD-10-CM

## 2025-09-04 ENCOUNTER — HOSPITAL ENCOUNTER (OUTPATIENT)
Dept: GENERAL RADIOLOGY | Age: 55
Discharge: HOME | End: 2025-09-06
Payer: MEDICARE

## 2025-09-04 ENCOUNTER — HOSPITAL ENCOUNTER (OUTPATIENT)
Dept: PREADMISSION TESTING | Age: 55
Discharge: HOME | End: 2025-09-04
Payer: MEDICARE

## 2025-09-04 VITALS
BODY MASS INDEX: 38.92 KG/M2 | DIASTOLIC BLOOD PRESSURE: 88 MMHG | OXYGEN SATURATION: 98 % | HEIGHT: 67 IN | WEIGHT: 248 LBS | TEMPERATURE: 97.3 F | RESPIRATION RATE: 14 BRPM | SYSTOLIC BLOOD PRESSURE: 106 MMHG | HEART RATE: 72 BPM

## 2025-09-04 DIAGNOSIS — Z01.818 PREOPERATIVE TESTING: ICD-10-CM

## 2025-09-04 LAB
ABO + RH BLD: NORMAL
ALBUMIN SERPL-MCNC: 4.5 G/DL (ref 3.5–5.2)
ALBUMIN/GLOB SERPL: 1.5 {RATIO} (ref 1–2.5)
ALP SERPL-CCNC: 84 U/L (ref 35–104)
ALT SERPL-CCNC: 11 U/L (ref 10–35)
ANION GAP SERPL CALCULATED.3IONS-SCNC: 11 MMOL/L (ref 9–16)
ANTIBODY IDENTIFICATION: NORMAL
ANTIGEN TYPE, PATIENT: NORMAL
ARM BAND NUMBER: NORMAL
AST SERPL-CCNC: 14 U/L (ref 10–35)
BILIRUB SERPL-MCNC: 0.3 MG/DL (ref 0–1.2)
BILIRUB UR QL STRIP: NEGATIVE
BLOOD BANK SAMPLE EXPIRATION: NORMAL
BLOOD GROUP ANTIBODIES SERPL: POSITIVE
BUN SERPL-MCNC: 14 MG/DL (ref 6–20)
CALCIUM SERPL-MCNC: 9.8 MG/DL (ref 8.6–10.4)
CHLORIDE SERPL-SCNC: 103 MMOL/L (ref 98–107)
CLARITY UR: CLEAR
CO2 SERPL-SCNC: 24 MMOL/L (ref 20–31)
COLOR UR: YELLOW
COMMENT: NORMAL
CREAT SERPL-MCNC: 0.7 MG/DL (ref 0.6–0.9)
ERYTHROCYTE [DISTWIDTH] IN BLOOD BY AUTOMATED COUNT: 12.3 % (ref 11.8–14.4)
EST. AVERAGE GLUCOSE BLD GHB EST-MCNC: 146 MG/DL
GFR, ESTIMATED: >90 ML/MIN/1.73M2
GLUCOSE SERPL-MCNC: 107 MG/DL (ref 74–99)
GLUCOSE UR STRIP-MCNC: NEGATIVE MG/DL
HBA1C MFR BLD: 6.7 % (ref 4–6)
HCT VFR BLD AUTO: 39.3 % (ref 36.3–47.1)
HGB BLD-MCNC: 13.1 G/DL (ref 11.9–15.1)
HGB UR QL STRIP.AUTO: NEGATIVE
KETONES UR STRIP-MCNC: NEGATIVE MG/DL
LEUKOCYTE ESTERASE UR QL STRIP: NEGATIVE
MCH RBC QN AUTO: 31.8 PG (ref 25.2–33.5)
MCHC RBC AUTO-ENTMCNC: 33.3 G/DL (ref 28.4–34.8)
MCV RBC AUTO: 95.4 FL (ref 82.6–102.9)
NITRITE UR QL STRIP: NEGATIVE
NRBC BLD-RTO: 0 PER 100 WBC
PH UR STRIP: 6 [PH] (ref 5–8)
PLATELET # BLD AUTO: 303 K/UL (ref 138–453)
PMV BLD AUTO: 9.8 FL (ref 8.1–13.5)
POTASSIUM SERPL-SCNC: 4.5 MMOL/L (ref 3.7–5.3)
PROT SERPL-MCNC: 7.6 G/DL (ref 6.6–8.7)
PROT UR STRIP-MCNC: NEGATIVE MG/DL
RBC # BLD AUTO: 4.12 M/UL (ref 3.95–5.11)
SODIUM SERPL-SCNC: 138 MMOL/L (ref 136–145)
SP GR UR STRIP: 1.02 (ref 1–1.03)
UROBILINOGEN UR STRIP-ACNC: NORMAL EU/DL (ref 0–1)
WBC OTHER # BLD: 4.9 K/UL (ref 3.5–11.3)

## 2025-09-04 PROCEDURE — 36415 COLL VENOUS BLD VENIPUNCTURE: CPT

## 2025-09-04 PROCEDURE — 71046 X-RAY EXAM CHEST 2 VIEWS: CPT

## 2025-09-04 PROCEDURE — 85027 COMPLETE CBC AUTOMATED: CPT

## 2025-09-04 PROCEDURE — 93005 ELECTROCARDIOGRAM TRACING: CPT

## 2025-09-04 PROCEDURE — 86870 RBC ANTIBODY IDENTIFICATION: CPT

## 2025-09-04 PROCEDURE — 86905 BLOOD TYPING RBC ANTIGENS: CPT

## 2025-09-04 PROCEDURE — 80053 COMPREHEN METABOLIC PANEL: CPT

## 2025-09-04 PROCEDURE — 87641 MR-STAPH DNA AMP PROBE: CPT

## 2025-09-04 PROCEDURE — 83036 HEMOGLOBIN GLYCOSYLATED A1C: CPT

## 2025-09-04 PROCEDURE — 86850 RBC ANTIBODY SCREEN: CPT

## 2025-09-04 PROCEDURE — 86900 BLOOD TYPING SEROLOGIC ABO: CPT

## 2025-09-04 PROCEDURE — 86901 BLOOD TYPING SEROLOGIC RH(D): CPT

## 2025-09-04 PROCEDURE — 81003 URINALYSIS AUTO W/O SCOPE: CPT

## 2025-09-04 ASSESSMENT — PAIN DESCRIPTION - ORIENTATION: ORIENTATION: RIGHT;LEFT

## 2025-09-04 ASSESSMENT — PAIN DESCRIPTION - FREQUENCY: FREQUENCY: CONTINUOUS

## 2025-09-04 ASSESSMENT — PAIN DESCRIPTION - DESCRIPTORS: DESCRIPTORS: ACHING;BURNING

## 2025-09-04 ASSESSMENT — PAIN DESCRIPTION - LOCATION: LOCATION: KNEE

## 2025-09-04 ASSESSMENT — PAIN DESCRIPTION - PAIN TYPE: TYPE: CHRONIC PAIN

## 2025-09-05 LAB
EKG ATRIAL RATE: 69 BPM
EKG P AXIS: 50 DEGREES
EKG P-R INTERVAL: 154 MS
EKG Q-T INTERVAL: 400 MS
EKG QRS DURATION: 90 MS
EKG QTC CALCULATION (BAZETT): 428 MS
EKG R AXIS: 23 DEGREES
EKG T AXIS: 33 DEGREES
EKG VENTRICULAR RATE: 69 BPM
MRSA, DNA, NASAL: NEGATIVE
SPECIMEN DESCRIPTION: NORMAL